# Patient Record
Sex: FEMALE | Race: WHITE | NOT HISPANIC OR LATINO | Employment: OTHER | URBAN - METROPOLITAN AREA
[De-identification: names, ages, dates, MRNs, and addresses within clinical notes are randomized per-mention and may not be internally consistent; named-entity substitution may affect disease eponyms.]

---

## 2017-03-30 ENCOUNTER — HISTORICAL (OUTPATIENT)
Dept: LAB | Facility: HOSPITAL | Age: 72
End: 2017-03-30

## 2017-04-03 ENCOUNTER — HISTORICAL (OUTPATIENT)
Dept: ADMINISTRATIVE | Facility: HOSPITAL | Age: 72
End: 2017-04-03

## 2017-04-19 ENCOUNTER — HISTORICAL (OUTPATIENT)
Dept: ANESTHESIOLOGY | Facility: HOSPITAL | Age: 72
End: 2017-04-19

## 2017-04-20 ENCOUNTER — HISTORICAL (OUTPATIENT)
Dept: LAB | Facility: HOSPITAL | Age: 72
End: 2017-04-20

## 2017-05-09 ENCOUNTER — HISTORICAL (OUTPATIENT)
Dept: RADIOLOGY | Facility: HOSPITAL | Age: 72
End: 2017-05-09

## 2017-05-09 LAB — POC CREATININE: 0.9 MG/DL (ref 0.6–1.3)

## 2017-10-10 ENCOUNTER — HISTORICAL (OUTPATIENT)
Dept: LAB | Facility: HOSPITAL | Age: 72
End: 2017-10-10

## 2017-10-16 ENCOUNTER — HISTORICAL (OUTPATIENT)
Dept: ADMINISTRATIVE | Facility: HOSPITAL | Age: 72
End: 2017-10-16

## 2017-12-20 ENCOUNTER — HISTORICAL (OUTPATIENT)
Dept: ADMINISTRATIVE | Facility: HOSPITAL | Age: 72
End: 2017-12-20

## 2018-03-01 ENCOUNTER — HISTORICAL (OUTPATIENT)
Dept: RADIOLOGY | Facility: HOSPITAL | Age: 73
End: 2018-03-01

## 2018-12-28 ENCOUNTER — HISTORICAL (OUTPATIENT)
Dept: ADMINISTRATIVE | Facility: HOSPITAL | Age: 73
End: 2018-12-28

## 2019-03-22 ENCOUNTER — HISTORICAL (OUTPATIENT)
Dept: INTENSIVE CARE | Facility: HOSPITAL | Age: 74
End: 2019-03-22

## 2019-04-16 ENCOUNTER — HISTORICAL (OUTPATIENT)
Dept: INTENSIVE CARE | Facility: HOSPITAL | Age: 74
End: 2019-04-16

## 2019-10-30 ENCOUNTER — HISTORICAL (OUTPATIENT)
Dept: CARDIOLOGY | Facility: HOSPITAL | Age: 74
End: 2019-10-30

## 2020-01-03 ENCOUNTER — HISTORICAL (OUTPATIENT)
Dept: ADMINISTRATIVE | Facility: HOSPITAL | Age: 75
End: 2020-01-03

## 2020-06-30 ENCOUNTER — TELEPHONE (OUTPATIENT)
Dept: SURGERY | Facility: CLINIC | Age: 75
End: 2020-06-30

## 2020-07-09 ENCOUNTER — OFFICE VISIT (OUTPATIENT)
Dept: SURGERY | Facility: CLINIC | Age: 75
End: 2020-07-09
Payer: MEDICARE

## 2020-07-09 VITALS
DIASTOLIC BLOOD PRESSURE: 65 MMHG | WEIGHT: 207 LBS | HEART RATE: 81 BPM | BODY MASS INDEX: 35.34 KG/M2 | HEIGHT: 64 IN | SYSTOLIC BLOOD PRESSURE: 119 MMHG

## 2020-07-09 DIAGNOSIS — K44.9 HERNIA, HIATAL: ICD-10-CM

## 2020-07-09 DIAGNOSIS — K31.84 GASTROPARESIS: ICD-10-CM

## 2020-07-09 PROCEDURE — 99999 PR PBB SHADOW E&M-EST. PATIENT-LVL III: CPT | Mod: PBBFAC,,, | Performed by: SURGERY

## 2020-07-09 PROCEDURE — 99999 PR PBB SHADOW E&M-EST. PATIENT-LVL III: ICD-10-PCS | Mod: PBBFAC,,, | Performed by: SURGERY

## 2020-07-09 PROCEDURE — 99204 OFFICE O/P NEW MOD 45 MIN: CPT | Mod: S$PBB,,, | Performed by: SURGERY

## 2020-07-09 PROCEDURE — 99213 OFFICE O/P EST LOW 20 MIN: CPT | Mod: PBBFAC | Performed by: SURGERY

## 2020-07-09 PROCEDURE — 99204 PR OFFICE/OUTPT VISIT, NEW, LEVL IV, 45-59 MIN: ICD-10-PCS | Mod: S$PBB,,, | Performed by: SURGERY

## 2020-07-09 RX ORDER — CARVEDILOL 25 MG/1
25 TABLET ORAL 2 TIMES DAILY WITH MEALS
Status: ON HOLD | COMMUNITY
End: 2023-08-29 | Stop reason: HOSPADM

## 2020-07-09 RX ORDER — OMEPRAZOLE 20 MG/1
CAPSULE, DELAYED RELEASE ORAL DAILY
Status: ON HOLD | COMMUNITY
End: 2023-08-29 | Stop reason: HOSPADM

## 2020-07-09 RX ORDER — FAMOTIDINE 20 MG/1
20 TABLET, FILM COATED ORAL NIGHTLY
Status: ON HOLD | COMMUNITY
Start: 2020-04-21 | End: 2023-08-29 | Stop reason: HOSPADM

## 2020-07-09 RX ORDER — VENLAFAXINE 75 MG/1
75 TABLET ORAL 2 TIMES DAILY
Status: ON HOLD | COMMUNITY
End: 2023-08-29 | Stop reason: HOSPADM

## 2020-07-09 RX ORDER — DICYCLOMINE HYDROCHLORIDE 20 MG/1
20 TABLET ORAL EVERY 6 HOURS
Status: ON HOLD | COMMUNITY
End: 2023-08-29 | Stop reason: HOSPADM

## 2020-07-09 RX ORDER — AMLODIPINE BESYLATE 2.5 MG/1
2.5 TABLET ORAL DAILY
Status: ON HOLD | COMMUNITY
End: 2023-08-29

## 2020-07-09 RX ORDER — METHOCARBAMOL 500 MG/1
500 TABLET, FILM COATED ORAL 4 TIMES DAILY
Status: ON HOLD | COMMUNITY
End: 2023-08-29 | Stop reason: HOSPADM

## 2020-07-09 RX ORDER — DIAZEPAM 5 MG/1
5 TABLET ORAL EVERY 6 HOURS PRN
Status: ON HOLD | COMMUNITY
End: 2023-08-29 | Stop reason: HOSPADM

## 2020-07-09 NOTE — MEDICAL/APP STUDENT
Patient ID: Katty Veronica is a 75 y.o. female.    Chief Complaint: Consult (hiatal hernia)      HPI:  Katty Veronica is a 75 y.o. female with a history of GERD, CAD, hypertension, heart disease and stroke who presented to clinic with epigastric pain and nausea for 6 months which is worse with eating. Patient denies having heartburn currently with no regurgitation. She denies vomiting though she does have dry heaves. Patient notes dysphagia to liquids and solids. Patient's last EGD noted a hiatal hernia.      Review of Systems   Constitutional: Negative for chills, fatigue and fever.   HENT: Negative.    Eyes: Negative.    Respiratory: Negative for chest tightness.    Cardiovascular: Negative.    Gastrointestinal: Positive for abdominal pain and nausea. Negative for vomiting.   Endocrine: Negative.    Genitourinary: Negative.    Musculoskeletal: Negative.    Allergic/Immunologic: Negative.    Neurological: Negative for dizziness, syncope and headaches.       Current Outpatient Medications   Medication Sig Dispense Refill    amLODIPine (NORVASC) 2.5 MG tablet Take 2.5 mg by mouth once daily.      carvediloL (COREG) 25 MG tablet Take 25 mg by mouth 2 (two) times daily with meals.      diazePAM (VALIUM) 5 MG tablet Take 5 mg by mouth every 6 (six) hours as needed for Anxiety.      dicyclomine (BENTYL) 20 mg tablet Take 20 mg by mouth every 6 (six) hours.      methocarbamoL (ROBAXIN) 500 MG Tab Take 500 mg by mouth 4 (four) times daily.      omeprazole (PRILOSEC) 20 MG capsule Take by mouth once daily.      venlafaxine (EFFEXOR) 75 MG tablet Take 75 mg by mouth 2 (two) times daily.      famotidine (PEPCID) 20 MG tablet Take 20 mg by mouth every evening.       No current facility-administered medications for this visit.        Review of patient's allergies indicates:   Allergen Reactions    Morphine     Norco [hydrocodone-acetaminophen]        Past Medical History:   Diagnosis Date    Anxiety     COPD  (chronic obstructive pulmonary disease)     Coronary artery disease     s/p 2 stents    Depression     GERD (gastroesophageal reflux disease)     Glaucoma     Hypertension     Obstructive sleep apnea     on cpap    Stroke     Urinary, incontinence, stress female        Past Surgical History:   Procedure Laterality Date    APPENDECTOMY      BACK SURGERY      CHOLECYSTECTOMY      EXPLORATION OF COMMON BILE DUCT      HERNIA REPAIR      incisional hernia times 2, central abdomen    HYSTERECTOMY      OVARIAN CYST REMOVAL      TONSILLECTOMY         Family History   Problem Relation Age of Onset    Heart disease Mother     Cancer Father        Social History     Socioeconomic History    Marital status:      Spouse name: Not on file    Number of children: Not on file    Years of education: Not on file    Highest education level: Not on file   Occupational History    Not on file   Social Needs    Financial resource strain: Not on file    Food insecurity     Worry: Not on file     Inability: Not on file    Transportation needs     Medical: Not on file     Non-medical: Not on file   Tobacco Use    Smoking status: Never Smoker   Substance and Sexual Activity    Alcohol use: Not on file    Drug use: Not on file    Sexual activity: Not on file   Lifestyle    Physical activity     Days per week: Not on file     Minutes per session: Not on file    Stress: Not on file   Relationships    Social connections     Talks on phone: Not on file     Gets together: Not on file     Attends [employer] service: Not on file     Active member of club or organization: Not on file     Attends meetings of clubs or organizations: Not on file     Relationship status: Not on file   Other Topics Concern    Not on file   Social History Narrative    Not on file       Vitals:    07/09/20 1428   BP: 119/65   Pulse: 81       Physical Exam  HENT:      Head: Atraumatic.   Cardiovascular:      Rate and Rhythm: Normal rate  and regular rhythm.   Pulmonary:      Effort: Pulmonary effort is normal.      Breath sounds: Normal breath sounds.   Abdominal:      General: Abdomen is flat.      Tenderness: There is abdominal tenderness. There is guarding.   Neurological:      General: No focal deficit present.      Mental Status: She is alert and oriented to person, place, and time.         Assessment & Plan:  Katty Veronica is a 75 y.o. female with a history of GERD, CAD, hypertension, heart disease and stroke who presented to clinic with epigastric pain and nausea for 6 months which is worse with eating. Will obtain mesenteric ultrasound, GES, UGI and EGD with dilation. Patient will need to be cleared with cardiologist prior to any procedure.     [facility] [place] - MS III

## 2020-07-09 NOTE — PROGRESS NOTES
I have seen the patient, reviewed the Resident's history and physical, assessment and plan. I have personally interviewed and examined the patient at bedside and: agree with the findings.     Moderate hiatal hernia and gastroparesis.  She denies heartburn, denies regurgitation, has dysphagia to liquids and solids (is on a soft diet) improved with dilation (last one 2012 with last endoscopy), has severe burping, has occasional nausea, denies vomiting but has dry heaves, has chronic epigastric pain worsened with eating, denies odynophagia, has coughing, denies asthma, denies hoarseness and denies sore throat.  Also she denies globus and denies water brash.  She takes pepcid daily and ppi bid.  She has no recent tests but last egd shows medium hiatal hernia and last ges was prolonged.  Will obtain mesenteric u/s, ges, ugi and egd with dilation.  She has significant sob and heart disease so will need clearance if we do any procedures.

## 2020-07-09 NOTE — LETTER
[facility] - General Surgery  [address]  [facility] [address]  Phone: [phone] July 9, 2020      Gunnar Segura MD  [address]  [address]  [facility] [address]    Patient: Katty Veronica   MR Number: [id number]   YOB: 1945   Date of Visit: 7/9/2020     Dear Dr. Segura:    Thank you for referring Katty Veronica to me for evaluation. Attached you will find relevant portions of my assessment and plan of care.    Ms. Veronica presents with moderate hiatal hernia and gastroparesis.  She denies heartburn, denies regurgitation, has dysphagia to liquids and solids (is on a soft diet) improved with dilation (last one 2012 with last endoscopy), has severe burping, has occasional nausea, denies vomiting but has dry heaves, has chronic epigastric pain worsened with eating, denies odynophagia, has coughing, denies asthma, denies hoarseness and denies sore throat.  Also, she denies globus and denies water brash.  She takes pepcid daily and PPI twice a day.  She has no recent tests but last EGD shows medium hiatal hernia and last GES was prolonged.  We will obtain mesenteric U/S, GES, UGI and EGD with dilation.  She has significant SOB and heart disease so we will need clearance if we do any procedures.    If you have questions, please do not hesitate to call me. I look forward to following Katty Veronica along with you.    Sincerely,    Jimmy Michele M.D.  Professor, [employer]  Section Head, General Surgery  [facility]    WSR/afw    CC  Mert Jara MD

## 2020-07-10 ENCOUNTER — HISTORICAL (OUTPATIENT)
Dept: ADMINISTRATIVE | Facility: HOSPITAL | Age: 75
End: 2020-07-10

## 2020-08-31 ENCOUNTER — HISTORICAL (OUTPATIENT)
Dept: ANESTHESIOLOGY | Facility: HOSPITAL | Age: 75
End: 2020-08-31

## 2020-09-04 ENCOUNTER — TELEPHONE (OUTPATIENT)
Dept: SURGERY | Facility: CLINIC | Age: 75
End: 2020-09-04

## 2020-09-04 NOTE — TELEPHONE ENCOUNTER
Pt states that most test have been set up by Dr. Segura, but she still hasn't had the EGD. Progress note sent to Dr. Segura to help assist with getting pt scheduled for an EGD w/ dilation.

## 2020-09-04 NOTE — TELEPHONE ENCOUNTER
----- Message from Abigail Casiano sent at 9/4/2020 10:32 AM CDT -----  ATTN Angela Spence       please call patient she needs to know what tests are ordered or going to be ordered and has questions regarding her surgery being planned     Please contact @# [phone]

## 2020-09-10 ENCOUNTER — HISTORICAL (OUTPATIENT)
Dept: RADIOLOGY | Facility: HOSPITAL | Age: 75
End: 2020-09-10

## 2020-09-29 ENCOUNTER — HISTORICAL (OUTPATIENT)
Dept: ENDOSCOPY | Facility: HOSPITAL | Age: 75
End: 2020-09-29

## 2020-10-16 ENCOUNTER — HISTORICAL (OUTPATIENT)
Dept: ADMINISTRATIVE | Facility: HOSPITAL | Age: 75
End: 2020-10-16

## 2020-10-18 LAB — FINAL CULTURE: NORMAL

## 2020-10-20 ENCOUNTER — TELEPHONE (OUTPATIENT)
Dept: SURGERY | Facility: CLINIC | Age: 75
End: 2020-10-20

## 2020-10-20 NOTE — TELEPHONE ENCOUNTER
----- Message from Toyin Roque sent at 10/20/2020 12:52 PM CDT -----  Pt is calling for deepthi about her surgery and would like for deepthi to give her a call back

## 2020-11-09 ENCOUNTER — DOCUMENTATION ONLY (OUTPATIENT)
Dept: SURGERY | Facility: CLINIC | Age: 75
End: 2020-11-09

## 2020-11-09 NOTE — PROGRESS NOTES
U/s 2020 fatty liver  EGD with dil 2020 normal esophagus, mild gastritis, med HH  Ugi 2020 Moderate hh and narrowing of les  Pcp has cleared    Still awaiting mesenteric u/s and ges.  May need motility study.  Also if not obtain will get pfts and stress test.

## 2020-11-11 ENCOUNTER — TELEPHONE (OUTPATIENT)
Dept: SURGERY | Facility: CLINIC | Age: 75
End: 2020-11-11

## 2020-11-12 ENCOUNTER — HISTORICAL (OUTPATIENT)
Dept: ADMINISTRATIVE | Facility: HOSPITAL | Age: 75
End: 2020-11-12

## 2021-03-01 ENCOUNTER — HISTORICAL (OUTPATIENT)
Dept: ADMINISTRATIVE | Facility: HOSPITAL | Age: 76
End: 2021-03-01

## 2021-03-15 ENCOUNTER — HISTORICAL (OUTPATIENT)
Dept: ADMINISTRATIVE | Facility: HOSPITAL | Age: 76
End: 2021-03-15

## 2021-03-30 ENCOUNTER — TELEPHONE (OUTPATIENT)
Dept: SURGERY | Facility: CLINIC | Age: 76
End: 2021-03-30

## 2021-04-06 ENCOUNTER — TELEPHONE (OUTPATIENT)
Dept: SURGERY | Facility: CLINIC | Age: 76
End: 2021-04-06

## 2021-04-14 ENCOUNTER — PATIENT MESSAGE (OUTPATIENT)
Dept: SURGERY | Facility: CLINIC | Age: 76
End: 2021-04-14

## 2021-04-15 ENCOUNTER — OFFICE VISIT (OUTPATIENT)
Dept: SURGERY | Facility: CLINIC | Age: 76
End: 2021-04-15
Payer: MEDICARE

## 2021-04-15 DIAGNOSIS — R10.9 ABDOMINAL PAIN, UNSPECIFIED ABDOMINAL LOCATION: ICD-10-CM

## 2021-04-15 DIAGNOSIS — K31.84 GASTROPARESIS: Primary | ICD-10-CM

## 2021-04-15 DIAGNOSIS — R06.02 SHORTNESS OF BREATH: ICD-10-CM

## 2021-04-15 DIAGNOSIS — K44.9 HERNIA, HIATAL: ICD-10-CM

## 2021-04-15 PROCEDURE — 99442 PR PHYSICIAN TELEPHONE EVALUATION 11-20 MIN: CPT | Mod: ,,, | Performed by: SURGERY

## 2021-04-15 PROCEDURE — 99442 PR PHYSICIAN TELEPHONE EVALUATION 11-20 MIN: ICD-10-PCS | Mod: ,,, | Performed by: SURGERY

## 2021-04-27 ENCOUNTER — HISTORICAL (OUTPATIENT)
Dept: RADIOLOGY | Facility: HOSPITAL | Age: 76
End: 2021-04-27

## 2021-05-10 ENCOUNTER — HISTORICAL (OUTPATIENT)
Dept: CARDIOLOGY | Facility: HOSPITAL | Age: 76
End: 2021-05-10

## 2021-05-12 ENCOUNTER — PATIENT MESSAGE (OUTPATIENT)
Dept: RESEARCH | Facility: HOSPITAL | Age: 76
End: 2021-05-12

## 2021-05-14 ENCOUNTER — HISTORICAL (OUTPATIENT)
Dept: ADMINISTRATIVE | Facility: HOSPITAL | Age: 76
End: 2021-05-14

## 2021-05-17 ENCOUNTER — HISTORICAL (OUTPATIENT)
Dept: ADMINISTRATIVE | Facility: HOSPITAL | Age: 76
End: 2021-05-17

## 2021-07-09 ENCOUNTER — TELEPHONE (OUTPATIENT)
Dept: SURGERY | Facility: CLINIC | Age: 76
End: 2021-07-09

## 2021-07-10 ENCOUNTER — PATIENT MESSAGE (OUTPATIENT)
Dept: SURGERY | Facility: CLINIC | Age: 76
End: 2021-07-10

## 2021-07-15 ENCOUNTER — TELEPHONE (OUTPATIENT)
Dept: SURGERY | Facility: CLINIC | Age: 76
End: 2021-07-15

## 2021-09-23 ENCOUNTER — HISTORICAL (OUTPATIENT)
Dept: ADMINISTRATIVE | Facility: HOSPITAL | Age: 76
End: 2021-09-23

## 2021-11-01 ENCOUNTER — HISTORICAL (OUTPATIENT)
Dept: ADMINISTRATIVE | Facility: HOSPITAL | Age: 76
End: 2021-11-01

## 2021-11-01 LAB
ALBUMIN SERPL-MCNC: 3.6 GM/DL (ref 3.4–4.8)
ALBUMIN/GLOB SERPL: 1.1 RATIO (ref 1.1–2)
ALP SERPL-CCNC: 76 UNIT/L (ref 40–150)
ALT SERPL-CCNC: 29 UNIT/L (ref 0–55)
AST SERPL-CCNC: 22 UNIT/L (ref 5–34)
BILIRUB SERPL-MCNC: 0.5 MG/DL
BILIRUBIN DIRECT+TOT PNL SERPL-MCNC: 0.2 MG/DL (ref 0–0.5)
BILIRUBIN DIRECT+TOT PNL SERPL-MCNC: 0.3 MG/DL (ref 0–0.8)
BUN SERPL-MCNC: 17.9 MG/DL (ref 9.8–20.1)
CALCIUM SERPL-MCNC: 9.3 MG/DL (ref 8.7–10.5)
CHLORIDE SERPL-SCNC: 103 MMOL/L (ref 98–107)
CO2 SERPL-SCNC: 25 MMOL/L (ref 23–31)
CREAT SERPL-MCNC: 0.88 MG/DL (ref 0.55–1.02)
ERYTHROCYTE [DISTWIDTH] IN BLOOD BY AUTOMATED COUNT: 13.8 % (ref 11.5–17)
GLOBULIN SER-MCNC: 3.3 GM/DL (ref 2.4–3.5)
GLUCOSE SERPL-MCNC: 103 MG/DL (ref 82–115)
HCT VFR BLD AUTO: 48.6 % (ref 37–47)
HGB BLD-MCNC: 15.6 GM/DL (ref 12–16)
MCH RBC QN AUTO: 28.8 PG (ref 27–31)
MCHC RBC AUTO-ENTMCNC: 32.1 GM/DL (ref 33–36)
MCV RBC AUTO: 89.7 FL (ref 80–94)
PLATELET # BLD AUTO: 245 X10(3)/MCL (ref 130–400)
PMV BLD AUTO: 11.8 FL (ref 9.4–12.4)
POTASSIUM SERPL-SCNC: 4.7 MMOL/L (ref 3.5–5.1)
PROT SERPL-MCNC: 6.9 GM/DL (ref 5.8–7.6)
RBC # BLD AUTO: 5.42 X10(6)/MCL (ref 4.2–5.4)
SODIUM SERPL-SCNC: 140 MMOL/L (ref 136–145)
WBC # SPEC AUTO: 8.9 X10(3)/MCL (ref 4.5–11.5)

## 2021-11-08 ENCOUNTER — HISTORICAL (OUTPATIENT)
Dept: ADMINISTRATIVE | Facility: HOSPITAL | Age: 76
End: 2021-11-08

## 2021-11-08 LAB
CHOLEST SERPL-MCNC: 194 MG/DL
CHOLEST/HDLC SERPL: 4 {RATIO} (ref 0–5)
FERRITIN SERPL-MCNC: 84 NG/ML (ref 4.63–204)
HDLC SERPL-MCNC: 53 MG/DL (ref 35–60)
IRON SATN MFR SERPL: 36 % (ref 20–50)
IRON SERPL-MCNC: 100 UG/DL (ref 50–170)
LDLC SERPL CALC-MCNC: 109 MG/DL (ref 50–140)
SARS-COV-2 AG RESP QL IA.RAPID: NEGATIVE
TIBC SERPL-MCNC: 181 UG/DL (ref 70–310)
TIBC SERPL-MCNC: 281 UG/DL (ref 250–450)
TRANSFERRIN SERPL-MCNC: 247 MG/DL (ref 173–360)
TRIGL SERPL-MCNC: 160 MG/DL (ref 37–140)
VLDLC SERPL CALC-MCNC: 32 MG/DL

## 2021-11-09 ENCOUNTER — HOSPITAL ENCOUNTER (OUTPATIENT)
Dept: MEDSURG UNIT | Facility: HOSPITAL | Age: 76
End: 2021-11-10
Attending: SURGERY | Admitting: SURGERY

## 2021-11-10 LAB
ABS NEUT (OLG): 6.89 X10(3)/MCL (ref 2.1–9.2)
ALBUMIN SERPL-MCNC: 3 GM/DL (ref 3.4–4.8)
ALBUMIN/GLOB SERPL: 0.9 RATIO (ref 1.1–2)
ALP SERPL-CCNC: 70 UNIT/L (ref 40–150)
ALT SERPL-CCNC: 26 UNIT/L (ref 0–55)
AST SERPL-CCNC: 28 UNIT/L (ref 5–34)
BASOPHILS # BLD AUTO: 0 X10(3)/MCL (ref 0–0.2)
BASOPHILS NFR BLD AUTO: 0 %
BILIRUB SERPL-MCNC: 0.5 MG/DL
BILIRUBIN DIRECT+TOT PNL SERPL-MCNC: 0.2 MG/DL (ref 0–0.5)
BILIRUBIN DIRECT+TOT PNL SERPL-MCNC: 0.3 MG/DL (ref 0–0.8)
BUN SERPL-MCNC: 10.5 MG/DL (ref 9.8–20.1)
CALCIUM SERPL-MCNC: 9 MG/DL (ref 8.7–10.5)
CHLORIDE SERPL-SCNC: 98 MMOL/L (ref 98–107)
CO2 SERPL-SCNC: 25 MMOL/L (ref 23–31)
CREAT SERPL-MCNC: 0.73 MG/DL (ref 0.55–1.02)
ERYTHROCYTE [DISTWIDTH] IN BLOOD BY AUTOMATED COUNT: 13.8 % (ref 11.5–17)
EST CREAT CLEARANCE SER (OHS): 55.21 ML/MIN
GLOBULIN SER-MCNC: 3.5 GM/DL (ref 2.4–3.5)
GLUCOSE SERPL-MCNC: 153 MG/DL (ref 82–115)
HCT VFR BLD AUTO: 43.6 % (ref 37–47)
HGB BLD-MCNC: 14.4 GM/DL (ref 12–16)
LYMPHOCYTES # BLD AUTO: 1.9 X10(3)/MCL (ref 0.6–4.6)
LYMPHOCYTES NFR BLD AUTO: 21 %
MCH RBC QN AUTO: 28.5 PG (ref 27–31)
MCHC RBC AUTO-ENTMCNC: 33 GM/DL (ref 33–36)
MCV RBC AUTO: 86.3 FL (ref 80–94)
MONOCYTES # BLD AUTO: 0.4 X10(3)/MCL (ref 0.1–1.3)
MONOCYTES NFR BLD AUTO: 5 %
NEUTROPHILS # BLD AUTO: 6.89 X10(3)/MCL (ref 2.1–9.2)
NEUTROPHILS NFR BLD AUTO: 74 %
PLATELET # BLD AUTO: 183 X10(3)/MCL (ref 130–400)
PMV BLD AUTO: 11.6 FL (ref 9.4–12.4)
POTASSIUM SERPL-SCNC: 4.3 MMOL/L (ref 3.5–5.1)
PROT SERPL-MCNC: 6.5 GM/DL (ref 5.8–7.6)
RBC # BLD AUTO: 5.05 X10(6)/MCL (ref 4.2–5.4)
SODIUM SERPL-SCNC: 133 MMOL/L (ref 136–145)
WBC # SPEC AUTO: 9.3 X10(3)/MCL (ref 4.5–11.5)

## 2022-02-21 ENCOUNTER — HISTORICAL (OUTPATIENT)
Dept: ADMINISTRATIVE | Facility: HOSPITAL | Age: 77
End: 2022-02-21

## 2022-03-30 ENCOUNTER — HISTORICAL (OUTPATIENT)
Dept: ADMINISTRATIVE | Facility: HOSPITAL | Age: 77
End: 2022-03-30

## 2022-04-09 ENCOUNTER — HISTORICAL (OUTPATIENT)
Dept: ADMINISTRATIVE | Facility: HOSPITAL | Age: 77
End: 2022-04-09

## 2022-04-25 VITALS
SYSTOLIC BLOOD PRESSURE: 124 MMHG | BODY MASS INDEX: 35.12 KG/M2 | DIASTOLIC BLOOD PRESSURE: 82 MMHG | OXYGEN SATURATION: 97 % | HEIGHT: 64 IN | WEIGHT: 205.69 LBS

## 2022-10-03 ENCOUNTER — HOSPITAL ENCOUNTER (EMERGENCY)
Facility: HOSPITAL | Age: 77
Discharge: HOME | End: 2022-10-03
Attending: EMERGENCY MEDICINE
Payer: MEDICARE

## 2022-10-03 VITALS
OXYGEN SATURATION: 98 % | RESPIRATION RATE: 17 BRPM | HEART RATE: 53 BPM | TEMPERATURE: 98 F | SYSTOLIC BLOOD PRESSURE: 141 MMHG | DIASTOLIC BLOOD PRESSURE: 70 MMHG
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
## 2022-10-03 DIAGNOSIS — S16.1XXA STRAIN OF NECK MUSCLE, INITIAL ENCOUNTER: ICD-10-CM

## 2022-10-03 PROCEDURE — 99284 EMERGENCY DEPT VISIT MOD MDM: CPT | Mod: 25

## 2022-10-03 PROCEDURE — 25000003 PHARM REV CODE 250: Performed by: NURSE PRACTITIONER

## 2022-10-03 RX ORDER — ONDANSETRON 4 MG/1
4 TABLET, ORALLY DISINTEGRATING ORAL
Status: COMPLETED | OUTPATIENT
Start: 2022-10-03 | End: 2022-10-03

## 2022-10-03 RX ORDER — ACETAMINOPHEN 500 MG
1000 TABLET ORAL
Status: COMPLETED | OUTPATIENT
Start: 2022-10-03 | End: 2022-10-03

## 2022-10-03 RX ADMIN — ONDANSETRON 4 MG: 4 TABLET, ORALLY DISINTEGRATING ORAL at 11:10

## 2022-10-03 RX ADMIN — ACETAMINOPHEN 1000 MG: 500 TABLET, FILM COATED ORAL at 11:10
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
## 2022-10-03 NOTE — DISCHARGE INSTRUCTIONS
Thanks for letting us take care of you today!  It is our goal to give you courteous care and to keep you comfortable and informed, if you have any questions before you leave I will be happy to try and answer them.    Here is some advice after your visit:      Your visit in the emergency department is NOT definitive care - please follow-up with your primary care doctor and/or specialist within 1-2 days.  Please return if you have any worsening in your condition or if you have any other concerns.    If you had radiology exams like an XRAY or CT in the emergency Department the interpreation on them may be preliminary - there may be less time sensitive findings on the reports please obtain these reports within 24 hours from the hospital or by using your out on your mobile phone to access records.  Bring these to your primary care doctor and/or specialist for further review of incidental findings.    Please review any LAB WORK from your visit today with your primary care physician.    If you were prescribed OPIATE PAIN MEDICATION - please understand of these medications can be addictive, you may fill less of the prescription was written for, you do not have to take the full prescription.  You may discard what you do not use.  Please seek help if you feel you are having problems with addiction.  Do not drive or operate heavy machinery if you are taking sedating medications.  Do not mix these medications with alcohol.
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
## 2023-01-10 ENCOUNTER — TELEPHONE (OUTPATIENT)
Dept: NEUROLOGY | Facility: CLINIC | Age: 78
End: 2023-01-10
Payer: MEDICARE

## 2023-01-10 NOTE — TELEPHONE ENCOUNTER
Patient is requesting appt with Dr. Pratt. Last OV on 08/06/2020 and was only seen for SABINA.     Patient is requesting appt to yennifer refills on Diazepam. States Dr. Moreira is unable to continue refills and this medication is needed to help her head and to keep her from falling since having balance issues after stroke.     Asking if Dr. Pratt can see her for this and take over medication or if she needs to follow up with another neurologist.     Phone: [phone]

## 2023-01-11 NOTE — TELEPHONE ENCOUNTER
As of 6/2022, patient was not utilizing pap therapy per Dr Moreira's note. Please see below message

## 2023-01-20 NOTE — TELEPHONE ENCOUNTER
Patient states she went to the hospital for a fall and they advised she follow up with neurology because they feel balance issues is due to stroke she had in the past.     Patient was seen by us for SABINA in 2020.     Will patient need to follow up with Dr. Leach? Or schedule follow up with Dr. Pratt for balance issues after stroke?     Need referral?

## 2023-03-06 ENCOUNTER — OFFICE VISIT (OUTPATIENT)
Dept: NEUROLOGY | Facility: CLINIC | Age: 78
End: 2023-03-06
Payer: MEDICARE

## 2023-03-06 VITALS
WEIGHT: 193 LBS | HEIGHT: 64 IN | DIASTOLIC BLOOD PRESSURE: 72 MMHG | BODY MASS INDEX: 32.95 KG/M2 | SYSTOLIC BLOOD PRESSURE: 104 MMHG

## 2023-03-06 DIAGNOSIS — Z78.9 INTOLERANCE OF CONTINUOUS POSITIVE AIRWAY PRESSURE (CPAP) VENTILATION: ICD-10-CM

## 2023-03-06 DIAGNOSIS — R29.6 MULTIPLE FALLS: Primary | ICD-10-CM

## 2023-03-06 DIAGNOSIS — G47.33 OBSTRUCTIVE SLEEP APNEA SYNDROME: ICD-10-CM

## 2023-03-06 PROCEDURE — 99214 OFFICE O/P EST MOD 30 MIN: CPT | Mod: S$PBB,,, | Performed by: SPECIALIST

## 2023-03-06 PROCEDURE — 99999 PR PBB SHADOW E&M-EST. PATIENT-LVL III: ICD-10-PCS | Mod: PBBFAC,,, | Performed by: SPECIALIST

## 2023-03-06 PROCEDURE — 99213 OFFICE O/P EST LOW 20 MIN: CPT | Mod: PBBFAC | Performed by: SPECIALIST

## 2023-03-06 PROCEDURE — 99999 PR PBB SHADOW E&M-EST. PATIENT-LVL III: CPT | Mod: PBBFAC,,, | Performed by: SPECIALIST

## 2023-03-06 PROCEDURE — 99214 PR OFFICE/OUTPT VISIT, EST, LEVL IV, 30-39 MIN: ICD-10-PCS | Mod: S$PBB,,, | Performed by: SPECIALIST

## 2023-03-06 NOTE — PROGRESS NOTES
"Subjective:         Patient ID: Katty Veronica is a 77 y.o. female.    Chief Complaint: Hospital F/U Falls Hx of Stroke     HPI:           (Pt states she has occs blurry vision, everything shakes in her head with left sided body weakness. States her balance is better wo falls since having her walker . Pt has not worn her CPAP for abt 2 yrs, states it was making her get infections, cough and PND. Sleeps 2-4 hrs states she hardly sleeps)      notes may also be on facesheet for HPI, ROS, and other sections     Review of Systems            Social History     Socioeconomic History    Marital status:    Tobacco Use    Smoking status: Never   Substance and Sexual Activity    Alcohol use: Not Currently   Social History Narrative    ** Merged History Encounter **              Current Outpatient Medications:     amLODIPine (NORVASC) 2.5 MG tablet, Take 2.5 mg by mouth once daily., Disp: , Rfl:     calcium-vitamin D3-magnesium 200 mg calcium- 1.25 mcg Cap, Take by mouth., Disp: , Rfl:     carvediloL (COREG) 25 MG tablet, Take 25 mg by mouth 2 (two) times daily with meals., Disp: , Rfl:     omeprazole (PRILOSEC) 20 MG capsule, Take by mouth once daily., Disp: , Rfl:     venlafaxine (EFFEXOR) 75 MG tablet, Take 75 mg by mouth 2 (two) times daily., Disp: , Rfl:     diazePAM (VALIUM) 5 MG tablet, Take 5 mg by mouth every 6 (six) hours as needed for Anxiety., Disp: , Rfl:     dicyclomine (BENTYL) 20 mg tablet, Take 20 mg by mouth every 6 (six) hours., Disp: , Rfl:     famotidine (PEPCID) 20 MG tablet, Take 20 mg by mouth every evening., Disp: , Rfl:     methocarbamoL (ROBAXIN) 500 MG Tab, Take 500 mg by mouth 4 (four) times daily., Disp: , Rfl:      Objective:      Exam  /72   Ht 5' 4" (1.626 m)   Wt 87.5 kg (193 lb)   BMI 33.13 kg/m²     General:   if accompanied, by:_ nobody   heart: RRR  pharynx:    Neurological  Speech: ok   vis fields:  EOMs:  funduscopic:  Motor:   coord:   Gait: walker "     Neuroimaging:  Images and imaging reports reviewed.  My comments:     Labs:  Reviewed     meds:      __._ multiple issues/ diagnoses or problems [if not enumerated in note then discussed in encounter but not documented]    complexity of data      _.mod   __. Imag reports reviewed:  __ hx obtained from family or accompaniment:   __other studies reviewed   __studies ordered __   __studies considered or discussed but not ordered __  __DDx discussed __    Risks     _. mod   _._ (possible or definite) neurodegenerative condition and inherent progression  __ (poss or def) autoimmune condition with possibility of flares or unexpected attack  __ (poss or def) seiz d.o. with possib of recurr seiz's   __ cerebrovasc ds with risk of recurrence of stroke  __ CNS meds (and/or) potentially high risk non CNS meds which may cause medical or behavioral side effects  _._ fall risk  _._ driving discussed   __ diagnosis unclear or DDx wide making risk uncertain to high  __other:    MDM/Medical Decision Making       _.moderate  despite no orders           Assessment/Plan:       Problem List Items Addressed This Visit          Pulmonary    Intolerance of continuous positive airway pressure (CPAP) ventilation       Other    Multiple falls - Primary    Obstructive sleep apnea syndrome       Other comments/ follow up:        Apologized I could not give her valium   Apologized I am not able to really help her     Blaze Pratt MD [facility]

## 2023-04-03 DIAGNOSIS — R10.11 ABDOMINAL PAIN, RIGHT UPPER QUADRANT: ICD-10-CM

## 2023-04-03 DIAGNOSIS — R10.13 ABDOMINAL PAIN, EPIGASTRIC: Primary | ICD-10-CM

## 2023-04-03 DIAGNOSIS — K21.9 ESOPHAGEAL REFLUX: ICD-10-CM

## 2023-06-13 ENCOUNTER — HOSPITAL ENCOUNTER (OUTPATIENT)
Dept: RADIOLOGY | Facility: HOSPITAL | Age: 78
Discharge: HOME | End: 2023-06-13
Attending: INTERNAL MEDICINE
Payer: MEDICARE

## 2023-06-13 DIAGNOSIS — R10.11 ABDOMINAL PAIN, RIGHT UPPER QUADRANT: ICD-10-CM

## 2023-06-13 DIAGNOSIS — K21.9 ESOPHAGEAL REFLUX: ICD-10-CM

## 2023-06-13 DIAGNOSIS — R10.13 ABDOMINAL PAIN, EPIGASTRIC: ICD-10-CM

## 2023-06-13 PROCEDURE — 78264 GASTRIC EMPTYING IMG STUDY: CPT | Mod: TC

## 2023-06-21 ENCOUNTER — HOSPITAL ENCOUNTER (OUTPATIENT)
Dept: RADIOLOGY | Facility: HOSPITAL | Age: 78
Discharge: HOME | End: 2023-06-21
Attending: INTERNAL MEDICINE
Payer: MEDICARE

## 2023-06-21 DIAGNOSIS — R10.11 ABDOMINAL PAIN, RIGHT UPPER QUADRANT: ICD-10-CM

## 2023-06-21 DIAGNOSIS — K21.9 ESOPHAGEAL REFLUX: ICD-10-CM

## 2023-06-21 DIAGNOSIS — R10.13 ABDOMINAL PAIN, EPIGASTRIC: ICD-10-CM

## 2023-06-21 PROCEDURE — 74240 X-RAY XM UPR GI TRC 1CNTRST: CPT | Mod: TC

## 2023-06-21 PROCEDURE — A9698 NON-RAD CONTRAST MATERIALNOC: HCPCS | Performed by: INTERNAL MEDICINE

## 2023-06-21 PROCEDURE — 74248 X-RAY SM INT F-THRU STD: CPT | Mod: TC

## 2023-06-21 PROCEDURE — 25500020 PHARM REV CODE 255: Performed by: INTERNAL MEDICINE

## 2023-06-21 RX ADMIN — BARIUM SULFATE 100 ML: 0.6 SUSPENSION ORAL at 12:06

## 2023-06-21 RX ADMIN — BARIUM SULFATE 150 ML: 980 POWDER, FOR SUSPENSION ORAL at 12:06

## 2023-07-08 ENCOUNTER — HOSPITAL ENCOUNTER (EMERGENCY)
Facility: HOSPITAL | Age: 78
Discharge: HOME | End: 2023-07-08
Attending: EMERGENCY MEDICINE
Payer: MEDICARE

## 2023-07-08 VITALS
DIASTOLIC BLOOD PRESSURE: 76 MMHG | TEMPERATURE: 99 F | OXYGEN SATURATION: 96 % | HEART RATE: 79 BPM | BODY MASS INDEX: 34.33 KG/M2 | WEIGHT: 200 LBS | RESPIRATION RATE: 20 BRPM | SYSTOLIC BLOOD PRESSURE: 115 MMHG

## 2023-07-08 DIAGNOSIS — M79.89 LEG SWELLING: ICD-10-CM

## 2023-07-08 LAB
ALBUMIN SERPL-MCNC: 3.4 G/DL (ref 3.4–4.8)
ALBUMIN/GLOB SERPL: 1 RATIO (ref 1.1–2)
ALP SERPL-CCNC: 103 UNIT/L (ref 40–150)
ALT SERPL-CCNC: 9 UNIT/L (ref 0–55)
AST SERPL-CCNC: 13 UNIT/L (ref 5–34)
BASOPHILS # BLD AUTO: 0.04 X10(3)/MCL
BASOPHILS NFR BLD AUTO: 0.5 %
BILIRUBIN DIRECT+TOT PNL SERPL-MCNC: 0.3 MG/DL
BNP BLD-MCNC: 171.2 PG/ML
BUN SERPL-MCNC: 12.4 MG/DL (ref 9.8–20.1)
CALCIUM SERPL-MCNC: 9.2 MG/DL (ref 8.4–10.2)
CHLORIDE SERPL-SCNC: 107 MMOL/L (ref 98–107)
CO2 SERPL-SCNC: 24 MMOL/L (ref 23–31)
CREAT SERPL-MCNC: 0.87 MG/DL (ref 0.55–1.02)
EOSINOPHIL # BLD AUTO: 0.15 X10(3)/MCL (ref 0–0.9)
EOSINOPHIL NFR BLD AUTO: 1.8 %
ERYTHROCYTE [DISTWIDTH] IN BLOOD BY AUTOMATED COUNT: 13.8 % (ref 11.5–17)
GFR SERPLBLD CREATININE-BSD FMLA CKD-EPI: >60 MLS/MIN/1.73/M2
GLOBULIN SER-MCNC: 3.4 GM/DL (ref 2.4–3.5)
GLUCOSE SERPL-MCNC: 118 MG/DL (ref 82–115)
HCT VFR BLD AUTO: 40.1 % (ref 37–47)
HGB BLD-MCNC: 13 G/DL (ref 12–16)
IMM GRANULOCYTES # BLD AUTO: 0.02 X10(3)/MCL (ref 0–0.04)
IMM GRANULOCYTES NFR BLD AUTO: 0.2 %
LYMPHOCYTES # BLD AUTO: 2.77 X10(3)/MCL (ref 0.6–4.6)
LYMPHOCYTES NFR BLD AUTO: 33.7 %
MCH RBC QN AUTO: 27.8 PG (ref 27–31)
MCHC RBC AUTO-ENTMCNC: 32.4 G/DL (ref 33–36)
MCV RBC AUTO: 85.7 FL (ref 80–94)
MONOCYTES # BLD AUTO: 0.65 X10(3)/MCL (ref 0.1–1.3)
MONOCYTES NFR BLD AUTO: 7.9 %
NEUTROPHILS # BLD AUTO: 4.6 X10(3)/MCL (ref 2.1–9.2)
NEUTROPHILS NFR BLD AUTO: 55.9 %
NRBC BLD AUTO-RTO: 0 %
PLATELET # BLD AUTO: 259 X10(3)/MCL (ref 130–400)
PMV BLD AUTO: 10.7 FL (ref 7.4–10.4)
POTASSIUM SERPL-SCNC: 3.7 MMOL/L (ref 3.5–5.1)
PROT SERPL-MCNC: 6.8 GM/DL (ref 5.8–7.6)
RBC # BLD AUTO: 4.68 X10(6)/MCL (ref 4.2–5.4)
SODIUM SERPL-SCNC: 139 MMOL/L (ref 136–145)
TROPONIN I SERPL-MCNC: <0.01 NG/ML (ref 0–0.04)
WBC # SPEC AUTO: 8.23 X10(3)/MCL (ref 4.5–11.5)

## 2023-07-08 PROCEDURE — 99285 EMERGENCY DEPT VISIT HI MDM: CPT | Mod: 25

## 2023-07-08 PROCEDURE — 93010 ELECTROCARDIOGRAM REPORT: CPT | Mod: ,,, | Performed by: INTERNAL MEDICINE

## 2023-07-08 PROCEDURE — 85025 COMPLETE CBC W/AUTO DIFF WBC: CPT | Performed by: PHYSICIAN ASSISTANT

## 2023-07-08 PROCEDURE — 93010 EKG 12-LEAD: ICD-10-PCS | Mod: ,,, | Performed by: INTERNAL MEDICINE

## 2023-07-08 PROCEDURE — 93005 ELECTROCARDIOGRAM TRACING: CPT

## 2023-07-08 PROCEDURE — 80053 COMPREHEN METABOLIC PANEL: CPT | Performed by: PHYSICIAN ASSISTANT

## 2023-07-08 PROCEDURE — 84484 ASSAY OF TROPONIN QUANT: CPT | Performed by: NURSE PRACTITIONER

## 2023-07-08 PROCEDURE — 83880 ASSAY OF NATRIURETIC PEPTIDE: CPT | Performed by: PHYSICIAN ASSISTANT

## 2023-07-08 NOTE — FIRST PROVIDER EVALUATION
Medical screening examination initiated.  I have conducted a focused provider triage encounter, findings are as follows:    Chief Complaint   Patient presents with    Leg Swelling     Pt presents c/o bilateral lower extremity swelling.  Onset x 2-4 days.       Brief history of present illness: 78 y.o. female presents to the ED with worsening BLE swelling to feet along with back pain s/t fall a few weeks ago. Saw ortho and is waiting for MRI of the back. Denies saddle paresthesia,bowel incontinence. Does have urinary incontinence which is chronic, sees urology     Vitals:    07/08/23 1341   BP: (!) 111/48   BP Location: Left arm   Patient Position: Sitting   Pulse: 80   Resp: 18   Temp: 99.4 °F (37.4 °C)   TempSrc: Oral   SpO2: 95%   Weight: 90.7 kg (200 lb)       Pertinent physical exam:  Awake, alert, non-labored respirations    Brief workup plan:  labs     Preliminary workup initiated; this workup will be continued and followed by the physician or advanced practice provider that is assigned to the patient when roomed.

## 2023-07-08 NOTE — ED PROVIDER NOTES
Encounter Date: 7/8/2023       History     Chief Complaint   Patient presents with    Leg Swelling     Pt presents c/o bilateral lower extremity swelling.  Onset x 2-4 days.       See MDM    The history is provided by the patient. No  was used.   Review of patient's allergies indicates:   Allergen Reactions    Morphine     Norco [hydrocodone-acetaminophen]      Past Medical History:   Diagnosis Date    Anxiety     COPD (chronic obstructive pulmonary disease)     Coronary artery disease     s/p 2 stents    Depression     GERD (gastroesophageal reflux disease)     Glaucoma     Hypertension     Obstructive sleep apnea     on cpap    Stroke     Urinary, incontinence, stress female      Past Surgical History:   Procedure Laterality Date    APPENDECTOMY      BACK SURGERY      CHOLECYSTECTOMY      EXPLORATION OF COMMON BILE DUCT      HERNIA REPAIR      incisional hernia times 2, central abdomen    HYSTERECTOMY      OVARIAN CYST REMOVAL      TONSILLECTOMY       Family History   Problem Relation Age of Onset    Heart disease Mother     Cancer Father      Social History     Tobacco Use    Smoking status: Never   Substance Use Topics    Alcohol use: Not Currently    Drug use: Not Currently     Review of Systems   Constitutional:  Negative for fever.   Respiratory:  Negative for cough and shortness of breath.    Cardiovascular:  Positive for leg swelling. Negative for chest pain.   Gastrointestinal:  Negative for abdominal pain.   Genitourinary:  Negative for difficulty urinating and dysuria.   Musculoskeletal:  Negative for gait problem.   Skin:  Negative for color change.   Neurological:  Negative for dizziness, speech difficulty and headaches.   Psychiatric/Behavioral:  Negative for hallucinations and suicidal ideas.    All other systems reviewed and are negative.    Physical Exam     Initial Vitals [07/08/23 1341]   BP Pulse Resp Temp SpO2   (!) 111/48 80 18 99.4 °F (37.4 °C) 95 %      MAP       --          Physical Exam    Nursing note and vitals reviewed.  Constitutional: She appears well-developed and well-nourished.   HENT:   Head: Normocephalic.   Eyes: EOM are normal.   Neck:   Normal range of motion.  Cardiovascular:  Normal rate, regular rhythm, normal heart sounds and intact distal pulses.           Pulmonary/Chest: Breath sounds normal. No respiratory distress.   Nonpitting edema legs bilateral   Abdominal: Abdomen is soft. Bowel sounds are normal. There is no abdominal tenderness.   Musculoskeletal:         General: Normal range of motion.      Cervical back: Normal range of motion.     Neurological: She is alert and oriented to person, place, and time. She has normal strength.   Skin: Skin is warm and dry.   Psychiatric: She has a normal mood and affect. Her behavior is normal. Judgment and thought content normal.       ED Course   Procedures  Labs Reviewed   COMPREHENSIVE METABOLIC PANEL - Abnormal; Notable for the following components:       Result Value    Glucose Level 118 (*)     Albumin/Globulin Ratio 1.0 (*)     All other components within normal limits   B-TYPE NATRIURETIC PEPTIDE - Abnormal; Notable for the following components:    Natriuretic Peptide 171.2 (*)     All other components within normal limits   CBC WITH DIFFERENTIAL - Abnormal; Notable for the following components:    MCHC 32.4 (*)     MPV 10.7 (*)     All other components within normal limits   TROPONIN I - Normal   CBC W/ AUTO DIFFERENTIAL    Narrative:     The following orders were created for panel order CBC auto differential.  Procedure                               Abnormality         Status                     ---------                               -----------         ------                     CBC with Differential[[id number]]        Abnormal            Final result                 Please view results for these tests on the individual orders.     EKG Readings: (Independently Interpreted)   Rhythm: Junctional Tachycardia.  Heart Rate: 76. Ectopy: No Ectopy. Conduction: Normal. ST Segments: Normal ST Segments. T Waves: Normal. Axis: Normal. Clinical Impression: Normal Sinus Rhythm   ECG Results              EKG 12-lead (Final result)  Result time 07/08/23 15:48:03      Final result by Interface, Lab In [facility] (07/08/23 15:48:03)                   Narrative:    Test Reason : M79.89,    Vent. Rate : 076 BPM     Atrial Rate : 076 BPM     P-R Int : 134 ms          QRS Dur : 076 ms      QT Int : 358 ms       P-R-T Axes : 260 -18 071 degrees     QTc Int : 402 ms    Undetermined rhythm. P wave is not well seen in this EKG  Probably AFIB   Baseline/motion artifact  Non-specific T wave abnormalities lateral lead AVL   Abnormal ECG  No previous ECGs available  Confirmed by Duane Emanuel MD ([id number]) on 7/8/2023 3:47:49 PM    Referred By: [email or web address]   SELF           Confirmed By:Duane Emanuel MD                                  Imaging Results              X-Ray Chest 1 View (Final result)  Result time 07/08/23 14:59:14      Final result by Ketan Rand MD (07/08/23 14:59:14)                   Impression:      No acute abnormality.      Electronically signed by: Ketan Rand MD  Date:    07/08/2023  Time:    14:59               Narrative:    EXAMINATION:  XR CHEST 1 VIEW    CLINICAL HISTORY:  Other specified soft tissue disorders    TECHNIQUE:  Single frontal view of the chest was performed.    COMPARISON:  03/30/2022    FINDINGS:  The lungs are clear, with normal appearance of pulmonary vasculature and no pleural effusion or pneumothorax.    The cardiac silhouette is normal in size. The hilar and mediastinal contours are unremarkable.    Bones are intact.                                       Medications - No data to display  Medical Decision Making:   Initial Assessment:   Historian:  Patient.  Patient is a 78-year-old female  that presents with bilateral leg swelling that has been present a few days. Associated symptoms nothing. Surrounding  information is patient did fall a few weeks ago injuring her back. Exacerbated by nothing. Relieved by nothing. Patient treatment prior to arrival none. Risk factors include none. Other history pertaining to this complaint nothing.   Assessment:  See physical exam.    Differential Diagnosis:   Peripheral edema, venous stasis, DVT, CHF  ED Management:  History was obtained.  Physical was performed.  EKG did show a junctional rhythm.  Chest x-ray was with no acute findings.  Lab studies are unremarkable elevation of BNP.  Patient is not short of breath.  Patient denies chest pain.  She states the edema is worse by the end of the day and better in the morning.  This appears to be dependent edema.  We will have her follow up with her cardiologist.  No medical or surgical consult for indicated in ER.  No social determinants that affect healthcare were noted.                        Clinical Impression:   Final diagnoses:  [M79.89] Leg swelling        ED Disposition Condition    Discharge Stable          ED Prescriptions    None       Follow-up Information       Follow up With Specialties Details Why Contact Info    Your Primary Care Provider  Call in 3 days ed follow up              BRENTON Gonzales  07/08/23 9019

## 2023-07-08 NOTE — ED NOTES
Pt. C/o bilat lower leg swelling.. reports hx of leg swelling in the past but reports more in the past few days.. reports recent left knee replacement.. noted swelling to bilat lower extremities.. gown provided to pt. At this time. Will continue to monitor..

## 2023-08-25 ENCOUNTER — HOSPITAL ENCOUNTER (INPATIENT)
Facility: HOSPITAL | Age: 78
LOS: 1 days | Discharge: HOME HEALTH | DRG: 069 | End: 2023-08-29
Attending: EMERGENCY MEDICINE | Admitting: INTERNAL MEDICINE
Payer: MEDICARE

## 2023-08-25 DIAGNOSIS — M79.89 LEG SWELLING: ICD-10-CM

## 2023-08-25 DIAGNOSIS — I10 BENIGN ESSENTIAL HTN: ICD-10-CM

## 2023-08-25 DIAGNOSIS — R60.0 LOWER EXTREMITY EDEMA: ICD-10-CM

## 2023-08-25 DIAGNOSIS — G45.9 TIA (TRANSIENT ISCHEMIC ATTACK): Primary | ICD-10-CM

## 2023-08-25 LAB
BASOPHILS # BLD AUTO: 0.03 X10(3)/MCL
BASOPHILS NFR BLD AUTO: 0.5 %
EOSINOPHIL # BLD AUTO: 0.13 X10(3)/MCL (ref 0–0.9)
EOSINOPHIL NFR BLD AUTO: 2 %
ERYTHROCYTE [DISTWIDTH] IN BLOOD BY AUTOMATED COUNT: 14.4 % (ref 11.5–17)
HCT VFR BLD AUTO: 36.8 % (ref 37–47)
HGB BLD-MCNC: 12.7 G/DL (ref 12–16)
IMM GRANULOCYTES # BLD AUTO: 0.01 X10(3)/MCL (ref 0–0.04)
IMM GRANULOCYTES NFR BLD AUTO: 0.2 %
LYMPHOCYTES # BLD AUTO: 3.21 X10(3)/MCL (ref 0.6–4.6)
LYMPHOCYTES NFR BLD AUTO: 49.2 %
MCH RBC QN AUTO: 28.4 PG (ref 27–31)
MCHC RBC AUTO-ENTMCNC: 34.5 G/DL (ref 33–36)
MCV RBC AUTO: 82.3 FL (ref 80–94)
MONOCYTES # BLD AUTO: 0.53 X10(3)/MCL (ref 0.1–1.3)
MONOCYTES NFR BLD AUTO: 8.1 %
NEUTROPHILS # BLD AUTO: 2.62 X10(3)/MCL (ref 2.1–9.2)
NEUTROPHILS NFR BLD AUTO: 40 %
NRBC BLD AUTO-RTO: 0 %
PLATELET # BLD AUTO: 238 X10(3)/MCL (ref 130–400)
PMV BLD AUTO: 11.2 FL (ref 7.4–10.4)
RBC # BLD AUTO: 4.47 X10(6)/MCL (ref 4.2–5.4)
WBC # SPEC AUTO: 6.53 X10(3)/MCL (ref 4.5–11.5)

## 2023-08-25 PROCEDURE — 84443 ASSAY THYROID STIM HORMONE: CPT | Performed by: INTERNAL MEDICINE

## 2023-08-25 PROCEDURE — 80061 LIPID PANEL: CPT | Performed by: INTERNAL MEDICINE

## 2023-08-25 PROCEDURE — 99285 EMERGENCY DEPT VISIT HI MDM: CPT | Mod: 25

## 2023-08-25 PROCEDURE — 84484 ASSAY OF TROPONIN QUANT: CPT | Performed by: EMERGENCY MEDICINE

## 2023-08-25 PROCEDURE — 83880 ASSAY OF NATRIURETIC PEPTIDE: CPT | Performed by: EMERGENCY MEDICINE

## 2023-08-25 PROCEDURE — 85730 THROMBOPLASTIN TIME PARTIAL: CPT | Performed by: EMERGENCY MEDICINE

## 2023-08-25 PROCEDURE — 83735 ASSAY OF MAGNESIUM: CPT | Performed by: EMERGENCY MEDICINE

## 2023-08-25 PROCEDURE — 85025 COMPLETE CBC W/AUTO DIFF WBC: CPT | Performed by: EMERGENCY MEDICINE

## 2023-08-25 PROCEDURE — 84439 ASSAY OF FREE THYROXINE: CPT | Performed by: INTERNAL MEDICINE

## 2023-08-25 PROCEDURE — 93005 ELECTROCARDIOGRAM TRACING: CPT

## 2023-08-25 PROCEDURE — 85610 PROTHROMBIN TIME: CPT | Performed by: EMERGENCY MEDICINE

## 2023-08-25 PROCEDURE — 86140 C-REACTIVE PROTEIN: CPT | Performed by: INTERNAL MEDICINE

## 2023-08-25 PROCEDURE — 80053 COMPREHEN METABOLIC PANEL: CPT | Performed by: EMERGENCY MEDICINE

## 2023-08-26 PROBLEM — G45.9 TIA (TRANSIENT ISCHEMIC ATTACK): Status: ACTIVE | Noted: 2023-08-26

## 2023-08-26 LAB
ALBUMIN SERPL-MCNC: 3.6 G/DL (ref 3.4–4.8)
ALBUMIN/GLOB SERPL: 1.2 RATIO (ref 1.1–2)
ALP SERPL-CCNC: 119 UNIT/L (ref 40–150)
ALT SERPL-CCNC: 11 UNIT/L (ref 0–55)
APPEARANCE UR: CLEAR
APTT PPP: 24.8 SECONDS (ref 23.2–33.7)
AST SERPL-CCNC: 27 UNIT/L (ref 5–34)
BACTERIA #/AREA URNS AUTO: NORMAL /HPF
BILIRUB SERPL-MCNC: 0.7 MG/DL
BILIRUB UR QL STRIP.AUTO: NEGATIVE
BNP BLD-MCNC: 253.9 PG/ML
BUN SERPL-MCNC: 12.6 MG/DL (ref 9.8–20.1)
CALCIUM SERPL-MCNC: 9.2 MG/DL (ref 8.4–10.2)
CHLORIDE SERPL-SCNC: 105 MMOL/L (ref 98–107)
CHOLEST SERPL-MCNC: 141 MG/DL
CHOLEST/HDLC SERPL: 3 {RATIO} (ref 0–5)
CO2 SERPL-SCNC: 25 MMOL/L (ref 23–31)
COLOR UR: YELLOW
CREAT SERPL-MCNC: 0.74 MG/DL (ref 0.55–1.02)
CRP SERPL-MCNC: 3.6 MG/L
D DIMER PPP IA.FEU-MCNC: 0.81 UG/ML FEU (ref 0–0.5)
ERYTHROCYTE [SEDIMENTATION RATE] IN BLOOD: 21 MM/HR (ref 0–20)
EST. AVERAGE GLUCOSE BLD GHB EST-MCNC: 114 MG/DL
GFR SERPLBLD CREATININE-BSD FMLA CKD-EPI: >60 MLS/MIN/1.73/M2
GLOBULIN SER-MCNC: 3.1 GM/DL (ref 2.4–3.5)
GLUCOSE SERPL-MCNC: 100 MG/DL (ref 82–115)
GLUCOSE UR QL STRIP.AUTO: NEGATIVE
HBA1C MFR BLD: 5.6 %
HDLC SERPL-MCNC: 41 MG/DL (ref 35–60)
INR PPP: 1.1
KETONES UR QL STRIP.AUTO: NEGATIVE
LDLC SERPL CALC-MCNC: 78 MG/DL (ref 50–140)
LEUKOCYTE ESTERASE UR QL STRIP.AUTO: NEGATIVE
MAGNESIUM SERPL-MCNC: 1.9 MG/DL (ref 1.6–2.6)
NITRITE UR QL STRIP.AUTO: NEGATIVE
PH UR STRIP.AUTO: 8 [PH]
POCT GLUCOSE: 114 MG/DL (ref 70–110)
POTASSIUM SERPL-SCNC: 4.5 MMOL/L (ref 3.5–5.1)
PROT SERPL-MCNC: 6.7 GM/DL (ref 5.8–7.6)
PROT UR QL STRIP.AUTO: NEGATIVE
PROTHROMBIN TIME: 13.7 SECONDS (ref 12.5–14.5)
RBC #/AREA URNS AUTO: <5 /HPF
RBC UR QL AUTO: NEGATIVE
SODIUM SERPL-SCNC: 139 MMOL/L (ref 136–145)
SP GR UR STRIP.AUTO: 1.01 (ref 1–1.03)
SQUAMOUS #/AREA URNS AUTO: <5 /HPF
T4 FREE SERPL-MCNC: 0.94 NG/DL (ref 0.7–1.48)
TRIGL SERPL-MCNC: 109 MG/DL (ref 37–140)
TROPONIN I SERPL-MCNC: <0.01 NG/ML (ref 0–0.04)
TSH SERPL-ACNC: 2.17 UIU/ML (ref 0.35–4.94)
UROBILINOGEN UR STRIP-ACNC: 1
VLDLC SERPL CALC-MCNC: 22 MG/DL
WBC #/AREA URNS AUTO: <5 /HPF

## 2023-08-26 PROCEDURE — 25000003 PHARM REV CODE 250: Performed by: INTERNAL MEDICINE

## 2023-08-26 PROCEDURE — 99223 PR INITIAL HOSPITAL CARE,LEVL III: ICD-10-PCS | Mod: ,,, | Performed by: PSYCHIATRY & NEUROLOGY

## 2023-08-26 PROCEDURE — 85652 RBC SED RATE AUTOMATED: CPT | Performed by: INTERNAL MEDICINE

## 2023-08-26 PROCEDURE — 63600175 PHARM REV CODE 636 W HCPCS: Performed by: EMERGENCY MEDICINE

## 2023-08-26 PROCEDURE — 96374 THER/PROPH/DIAG INJ IV PUSH: CPT

## 2023-08-26 PROCEDURE — A4216 STERILE WATER/SALINE, 10 ML: HCPCS | Performed by: INTERNAL MEDICINE

## 2023-08-26 PROCEDURE — 96372 THER/PROPH/DIAG INJ SC/IM: CPT | Performed by: INTERNAL MEDICINE

## 2023-08-26 PROCEDURE — C1751 CATH, INF, PER/CENT/MIDLINE: HCPCS

## 2023-08-26 PROCEDURE — 85379 FIBRIN DEGRADATION QUANT: CPT | Performed by: PHYSICIAN ASSISTANT

## 2023-08-26 PROCEDURE — 82962 GLUCOSE BLOOD TEST: CPT

## 2023-08-26 PROCEDURE — 96376 TX/PRO/DX INJ SAME DRUG ADON: CPT

## 2023-08-26 PROCEDURE — G0378 HOSPITAL OBSERVATION PER HR: HCPCS

## 2023-08-26 PROCEDURE — 99223 1ST HOSP IP/OBS HIGH 75: CPT | Mod: ,,, | Performed by: PSYCHIATRY & NEUROLOGY

## 2023-08-26 PROCEDURE — 63600175 PHARM REV CODE 636 W HCPCS: Performed by: INTERNAL MEDICINE

## 2023-08-26 PROCEDURE — 92523 SPEECH SOUND LANG COMPREHEN: CPT

## 2023-08-26 PROCEDURE — 81001 URINALYSIS AUTO W/SCOPE: CPT | Performed by: EMERGENCY MEDICINE

## 2023-08-26 PROCEDURE — 83036 HEMOGLOBIN GLYCOSYLATED A1C: CPT | Performed by: INTERNAL MEDICINE

## 2023-08-26 PROCEDURE — 25500020 PHARM REV CODE 255: Performed by: INTERNAL MEDICINE

## 2023-08-26 PROCEDURE — 25000003 PHARM REV CODE 250: Performed by: NURSE PRACTITIONER

## 2023-08-26 PROCEDURE — 25000003 PHARM REV CODE 250: Performed by: EMERGENCY MEDICINE

## 2023-08-26 PROCEDURE — 36410 VNPNXR 3YR/> PHY/QHP DX/THER: CPT

## 2023-08-26 PROCEDURE — 96375 TX/PRO/DX INJ NEW DRUG ADDON: CPT

## 2023-08-26 RX ORDER — LOSARTAN POTASSIUM 25 MG/1
25 TABLET ORAL 2 TIMES DAILY
Status: ON HOLD | COMMUNITY
Start: 2023-08-24 | End: 2023-11-19 | Stop reason: ALTCHOICE

## 2023-08-26 RX ORDER — CARVEDILOL 25 MG/1
25 TABLET ORAL
Status: ON HOLD | COMMUNITY
End: 2023-08-29 | Stop reason: HOSPADM

## 2023-08-26 RX ORDER — ENOXAPARIN SODIUM 100 MG/ML
40 INJECTION SUBCUTANEOUS EVERY 24 HOURS
Status: DISCONTINUED | OUTPATIENT
Start: 2023-08-26 | End: 2023-08-29 | Stop reason: HOSPADM

## 2023-08-26 RX ORDER — CETIRIZINE HYDROCHLORIDE 10 MG/1
TABLET ORAL
Status: ON HOLD | COMMUNITY
Start: 2023-07-25 | End: 2023-08-29

## 2023-08-26 RX ORDER — NAPROXEN SODIUM 220 MG/1
81 TABLET, FILM COATED ORAL ONCE
Status: COMPLETED | OUTPATIENT
Start: 2023-08-26 | End: 2023-08-26

## 2023-08-26 RX ORDER — FAMOTIDINE 20 MG/1
1 TABLET, FILM COATED ORAL NIGHTLY
Status: ON HOLD | COMMUNITY
Start: 2023-07-25 | End: 2023-08-29 | Stop reason: HOSPADM

## 2023-08-26 RX ORDER — ONDANSETRON 2 MG/ML
4 INJECTION INTRAMUSCULAR; INTRAVENOUS
Status: COMPLETED | OUTPATIENT
Start: 2023-08-26 | End: 2023-08-26

## 2023-08-26 RX ORDER — PROCHLORPERAZINE EDISYLATE 5 MG/ML
5 INJECTION INTRAMUSCULAR; INTRAVENOUS EVERY 6 HOURS PRN
Status: DISCONTINUED | OUTPATIENT
Start: 2023-08-26 | End: 2023-08-29 | Stop reason: HOSPADM

## 2023-08-26 RX ORDER — SODIUM CHLORIDE 0.9 % (FLUSH) 0.9 %
10 SYRINGE (ML) INJECTION
Status: DISCONTINUED | OUTPATIENT
Start: 2023-08-26 | End: 2023-08-29 | Stop reason: HOSPADM

## 2023-08-26 RX ORDER — FUROSEMIDE 10 MG/ML
40 INJECTION INTRAMUSCULAR; INTRAVENOUS
Status: DISCONTINUED | OUTPATIENT
Start: 2023-08-26 | End: 2023-08-26

## 2023-08-26 RX ORDER — OXYCODONE AND ACETAMINOPHEN 10; 325 MG/1; MG/1
TABLET ORAL
Status: ON HOLD | COMMUNITY
Start: 2023-08-17 | End: 2023-08-29 | Stop reason: HOSPADM

## 2023-08-26 RX ORDER — FENTANYL CITRATE 50 UG/ML
50 INJECTION, SOLUTION INTRAMUSCULAR; INTRAVENOUS
Status: COMPLETED | OUTPATIENT
Start: 2023-08-26 | End: 2023-08-26

## 2023-08-26 RX ORDER — ONDANSETRON 2 MG/ML
4 INJECTION INTRAMUSCULAR; INTRAVENOUS EVERY 4 HOURS PRN
Status: DISCONTINUED | OUTPATIENT
Start: 2023-08-26 | End: 2023-08-29 | Stop reason: HOSPADM

## 2023-08-26 RX ORDER — ATORVASTATIN CALCIUM 40 MG/1
40 TABLET, FILM COATED ORAL DAILY
Status: DISCONTINUED | OUTPATIENT
Start: 2023-08-26 | End: 2023-08-29 | Stop reason: HOSPADM

## 2023-08-26 RX ORDER — SODIUM CHLORIDE 0.9 % (FLUSH) 0.9 %
10 SYRINGE (ML) INJECTION EVERY 6 HOURS
Status: DISCONTINUED | OUTPATIENT
Start: 2023-08-26 | End: 2023-08-29 | Stop reason: HOSPADM

## 2023-08-26 RX ORDER — ACETAMINOPHEN 325 MG/1
650 TABLET ORAL EVERY 6 HOURS PRN
Status: DISCONTINUED | OUTPATIENT
Start: 2023-08-26 | End: 2023-08-29 | Stop reason: HOSPADM

## 2023-08-26 RX ORDER — NAPROXEN SODIUM 220 MG/1
81 TABLET, FILM COATED ORAL DAILY
Status: DISCONTINUED | OUTPATIENT
Start: 2023-08-26 | End: 2023-08-26

## 2023-08-26 RX ORDER — PANTOPRAZOLE SODIUM 40 MG/1
40 TABLET, DELAYED RELEASE ORAL
COMMUNITY
Start: 2023-08-24

## 2023-08-26 RX ORDER — SODIUM CHLORIDE 9 MG/ML
INJECTION, SOLUTION INTRAVENOUS CONTINUOUS
Status: DISCONTINUED | OUTPATIENT
Start: 2023-08-26 | End: 2023-08-27

## 2023-08-26 RX ORDER — BISACODYL 10 MG
10 SUPPOSITORY, RECTAL RECTAL DAILY PRN
Status: DISCONTINUED | OUTPATIENT
Start: 2023-08-26 | End: 2023-08-29 | Stop reason: HOSPADM

## 2023-08-26 RX ORDER — POLYETHYLENE GLYCOL 3350 17 G/17G
17 POWDER, FOR SOLUTION ORAL 2 TIMES DAILY PRN
Status: DISCONTINUED | OUTPATIENT
Start: 2023-08-27 | End: 2023-08-29 | Stop reason: HOSPADM

## 2023-08-26 RX ORDER — ONDANSETRON 4 MG/1
4 TABLET, ORALLY DISINTEGRATING ORAL
Status: COMPLETED | OUTPATIENT
Start: 2023-08-26 | End: 2023-08-26

## 2023-08-26 RX ORDER — SERTRALINE HYDROCHLORIDE 50 MG/1
50 TABLET, FILM COATED ORAL DAILY
COMMUNITY
Start: 2023-08-24

## 2023-08-26 RX ORDER — GABAPENTIN 300 MG/1
300 CAPSULE ORAL 3 TIMES DAILY
COMMUNITY
Start: 2023-08-25

## 2023-08-26 RX ORDER — METOCLOPRAMIDE 5 MG/1
TABLET ORAL
Status: ON HOLD | COMMUNITY
Start: 2023-08-02 | End: 2023-08-29 | Stop reason: HOSPADM

## 2023-08-26 RX ORDER — AMOXICILLIN 250 MG
1 CAPSULE ORAL 2 TIMES DAILY PRN
Status: DISCONTINUED | OUTPATIENT
Start: 2023-08-27 | End: 2023-08-29 | Stop reason: HOSPADM

## 2023-08-26 RX ORDER — AMLODIPINE BESYLATE 5 MG/1
5 TABLET ORAL 2 TIMES DAILY
Status: ON HOLD | COMMUNITY
Start: 2023-08-24 | End: 2023-08-29

## 2023-08-26 RX ORDER — LABETALOL HYDROCHLORIDE 5 MG/ML
10 INJECTION, SOLUTION INTRAVENOUS EVERY 30 MIN PRN
Status: DISCONTINUED | OUTPATIENT
Start: 2023-08-26 | End: 2023-08-29 | Stop reason: HOSPADM

## 2023-08-26 RX ORDER — ASPIRIN 81 MG/1
81 TABLET ORAL DAILY
Status: DISCONTINUED | OUTPATIENT
Start: 2023-08-26 | End: 2023-08-29 | Stop reason: HOSPADM

## 2023-08-26 RX ORDER — METHOCARBAMOL 500 MG/1
500 TABLET, FILM COATED ORAL
Status: ON HOLD | COMMUNITY
End: 2023-08-29 | Stop reason: HOSPADM

## 2023-08-26 RX ADMIN — IOPAMIDOL 70 ML: 755 INJECTION, SOLUTION INTRAVENOUS at 04:08

## 2023-08-26 RX ADMIN — ONDANSETRON 4 MG: 2 INJECTION INTRAMUSCULAR; INTRAVENOUS at 04:08

## 2023-08-26 RX ADMIN — ONDANSETRON 4 MG: 4 TABLET, ORALLY DISINTEGRATING ORAL at 08:08

## 2023-08-26 RX ADMIN — ACETAMINOPHEN 650 MG: 325 TABLET, FILM COATED ORAL at 09:08

## 2023-08-26 RX ADMIN — SODIUM CHLORIDE, PRESERVATIVE FREE 10 ML: 5 INJECTION INTRAVENOUS at 11:08

## 2023-08-26 RX ADMIN — ONDANSETRON 4 MG: 2 INJECTION INTRAMUSCULAR; INTRAVENOUS at 09:08

## 2023-08-26 RX ADMIN — ENOXAPARIN SODIUM 40 MG: 40 INJECTION SUBCUTANEOUS at 04:08

## 2023-08-26 RX ADMIN — PROCHLORPERAZINE EDISYLATE 5 MG: 5 INJECTION INTRAMUSCULAR; INTRAVENOUS at 09:08

## 2023-08-26 RX ADMIN — SODIUM CHLORIDE: 9 INJECTION, SOLUTION INTRAVENOUS at 09:08

## 2023-08-26 RX ADMIN — PROCHLORPERAZINE EDISYLATE 5 MG: 5 INJECTION INTRAMUSCULAR; INTRAVENOUS at 11:08

## 2023-08-26 RX ADMIN — ASPIRIN 81 MG: 81 TABLET, COATED ORAL at 08:08

## 2023-08-26 RX ADMIN — FENTANYL CITRATE 50 MCG: 50 INJECTION, SOLUTION INTRAMUSCULAR; INTRAVENOUS at 04:08

## 2023-08-26 RX ADMIN — ASPIRIN 81 MG CHEWABLE TABLET 81 MG: 81 TABLET CHEWABLE at 01:08

## 2023-08-26 RX ADMIN — ATORVASTATIN CALCIUM 40 MG: 40 TABLET, FILM COATED ORAL at 08:08

## 2023-08-26 NOTE — PT/OT/SLP EVAL
[facility]  Speech Language Pathology Department  Cognitive-Communication Evaluation    Patient Name:  Katty Veronica   MRN:  [id number]    Recommendations:     General recommendations:  SLP intervention not indicated  Communication strategies:  none  Barriers to safe discharge: none    History:       Past Medical History:   Diagnosis Date    Anxiety     COPD (chronic obstructive pulmonary disease)     Coronary artery disease     s/p 2 stents    Depression     GERD (gastroesophageal reflux disease)     Glaucoma     Hypertension     Obstructive sleep apnea     on cpap    Stroke     Urinary, incontinence, stress female      Past Surgical History:   Procedure Laterality Date    APPENDECTOMY      BACK SURGERY      CHOLECYSTECTOMY      EXPLORATION OF COMMON BILE DUCT      HERNIA REPAIR      incisional hernia times 2, central abdomen    HYSTERECTOMY      OVARIAN CYST REMOVAL      TONSILLECTOMY         Imaging   No results found for this or any previous visit.    Subjective     Patient awake, alert, and cooperative.    Pain/Comfort: Pain Rating 1: 0/10  Respiratory Status: Room air    Objective:     ORAL MUSCULATURE  Facial Movement: reduced left  Buccal Strength & Mobility: WFL  Mandibular Strength & Mobility: WFL  Oral Labial Strength & Mobility: WFL  Lingual Strength & Mobility: WFL      SPEECH PRODUCTION  Phoneme Production: adequate  Voice Quality: adequate  Voice Production: adequate  Speech Rate: appropriate  Loudness: acceptable  Respiration: WFL for speech  Resonance: adequate  Prosody: adequate  Speech Intelligibility  Known Context: Greater that 90%  Unknown Context: Greater that 90%    AUDITORY COMPREHENSION  Identification:  Body parts: WFL  Following Directions:  1-Step: WFL  2-Step: WFL  Yes/No Questions:  Simple: WFL  Complex: WFL    VERBAL EXPRESSION  Automatic Speech:  Counting: WFL  Confrontation Naming  Objects: WFL  Wh- Questions:  Object name: WFL  Object function:  WFL    COGNITION  Orientation:  Person: yes  Place: yes  Time: yes  Situation: yes   Attention:  Sustained: WFL  Memory:  Short Term: WFL  Long Term: WFL  Problem Solving  Functional simple: WFL    Assessment:     Patient and family report cognitive-linguistic skills to be at baseline. ST to sign off.    Goals:     Multidisciplinary Problems       SLP Goals       Not on file                  Patient Education:     Patient and family provided with verbal education regarding ST POC.  Understanding was verbalized.    Plan:     SLP Follow-Up:  No   Plan of Care reviewed with:  patient, family      Time Tracking:     SLP Treatment Date:   08/26/23  Speech Start Time:  0900  Speech Stop Time:  0920     Speech Total Time (min):  20 min    Billable minutes:  Evaluation of Speech Sound Production with Comprehension and Expression, 20 minutes     08/26/2023

## 2023-08-26 NOTE — ED PROVIDER NOTES
"Encounter Date: 8/25/2023    SCRIBE #1 NOTE: I, Greer Vi, am scribing for, and in the presence of,  Karen Hernandez MD. I have scribed the following portions of the note - Other sections scribed: HPI, ROS, PE.       History     Chief Complaint   Patient presents with    Facial Droop     Pt reports approx 1 hour ago had multiple episodes of l sided facial droop and slurred speech, no symptoms currently     78 year old female with a history of COPD, CAD s/p 2 stents, GERD, hypertension, and stroke with left sided weakness presents to ED via EMS for left sided facial "twisting" and slurred speech PTA. States her face started feeling like a "twisting" sensation with swelling and when she called her daughter, she had slurred speech. Pt also reports bilateral leg swelling and decreased sensation to her right 3rd toe since this afternoon. States she was seen here a few weeks ago for feet swelling, but did not go home with any fluid pills. Denies blood thinners. Reports shortness of breath from back pain for a fall on 6/21 with 3 fractures. She uses a walker for daily living. EMS denies any facial droop or speech difficulty en route.    The history is provided by the patient and the EMS personnel. No  was used.     Review of patient's allergies indicates:   Allergen Reactions    Morphine     Norco [hydrocodone-acetaminophen]      Past Medical History:   Diagnosis Date    Anxiety     COPD (chronic obstructive pulmonary disease)     Coronary artery disease     s/p 2 stents    Depression     GERD (gastroesophageal reflux disease)     Glaucoma     Hypertension     Obstructive sleep apnea     on cpap    Stroke     Urinary, incontinence, stress female      Past Surgical History:   Procedure Laterality Date    APPENDECTOMY      BACK SURGERY      CHOLECYSTECTOMY      EXPLORATION OF COMMON BILE DUCT      HERNIA REPAIR      incisional hernia times 2, central abdomen    HYSTERECTOMY      OVARIAN CYST REMOVAL   " "   TONSILLECTOMY       Family History   Problem Relation Age of Onset    Heart disease Mother     Cancer Father      Social History     Tobacco Use    Smoking status: Never   Substance Use Topics    Alcohol use: Not Currently    Drug use: Not Currently     Review of Systems   Constitutional:  Negative for chills, diaphoresis and fever.   HENT:  Negative for congestion, ear pain, sinus pain and sore throat.    Eyes:  Negative for pain, discharge and visual disturbance.   Respiratory:  Positive for shortness of breath. Negative for cough, wheezing and stridor.    Cardiovascular:  Positive for leg swelling. Negative for chest pain and palpitations.   Gastrointestinal:  Negative for abdominal pain, constipation, diarrhea, nausea, rectal pain and vomiting.   Genitourinary:  Negative for dysuria and hematuria.   Musculoskeletal:  Positive for back pain.   Skin:  Negative for rash.   Neurological:  Positive for speech difficulty (slurred), weakness (chornic, left side) and numbness. Negative for dizziness, syncope, facial asymmetry and headaches.        Facial "twisting" and swelling   Hematological: Negative.    Psychiatric/Behavioral: Negative.     All other systems reviewed and are negative.      Physical Exam     Initial Vitals [08/25/23 2047]   BP Pulse Resp Temp SpO2   (!) 140/78 78 16 98 °F (36.7 °C) 97 %      MAP       --         Physical Exam    Nursing note and vitals reviewed.  Constitutional: She appears well-developed and well-nourished. She is not diaphoretic. No distress.   HENT:   Head: Normocephalic and atraumatic.   Nose: Nose normal.   Mouth/Throat: Oropharynx is clear and moist.   Eyes: Conjunctivae and EOM are normal. Pupils are equal, round, and reactive to light.   Neck: Trachea normal. Neck supple.   Normal range of motion.  Cardiovascular:  Normal rate, regular rhythm, normal heart sounds and intact distal pulses.           No murmur heard.  Pulmonary/Chest: Breath sounds normal. No respiratory " distress. She has no wheezes. She has no rhonchi. She has no rales. She exhibits no tenderness.   Abdominal: Abdomen is soft. Bowel sounds are normal. She exhibits no distension and no mass. There is no abdominal tenderness. There is no rebound and no guarding.   Musculoskeletal:         General: No tenderness or edema. Normal range of motion.      Cervical back: Normal range of motion and neck supple.      Lumbar back: Normal. Normal range of motion.     Neurological: She is alert and oriented to person, place, and time. A sensory deficit is present. No cranial nerve deficit.   No facial weakness  Decreased sensation to left lower face  Tongue deviation to right side  Slightly left upper and left lower extremities weakness that is chronic  Decreased sensation to left lower extremity  Decreased sensation to right 3rd toe   Skin: Skin is warm and dry. Capillary refill takes less than 2 seconds. No abscess noted. No erythema. No pallor.   Psychiatric: She has a normal mood and affect. Her behavior is normal. Judgment and thought content normal.         ED Course   Procedures  Labs Reviewed   B-TYPE NATRIURETIC PEPTIDE - Abnormal; Notable for the following components:       Result Value    Natriuretic Peptide 253.9 (*)     All other components within normal limits   CBC WITH DIFFERENTIAL - Abnormal; Notable for the following components:    Hct 36.8 (*)     MPV 11.2 (*)     All other components within normal limits   MAGNESIUM - Normal   TROPONIN I - Normal   URINALYSIS, REFLEX TO URINE CULTURE - Normal   URINALYSIS, MICROSCOPIC - Normal   CBC W/ AUTO DIFFERENTIAL    Narrative:     The following orders were created for panel order CBC auto differential.  Procedure                               Abnormality         Status                     ---------                               -----------         ------                     CBC with Differential[[id number]]        Abnormal            Final result                 Please  view results for these tests on the individual orders.   COMPREHENSIVE METABOLIC PANEL   APTT   PROTIME-INR          Imaging Results              CT Head Without Contrast (Final result)  Result time 08/25/23 22:04:25      Final result by Ketan Morris MD (08/25/23 22:04:25)                   Impression:      No acute intracranial abnormality.      Electronically signed by: Ketan Morris MD  Date:    08/25/2023  Time:    22:04               Narrative:    EXAMINATION:  CT HEAD WITHOUT CONTRAST    CLINICAL HISTORY:  Transient ischemic attack (TIA);    TECHNIQUE:  Axial images of the head were obtained without IV contrast administration.  Coronal and sagittal reconstructions were provided.  Three dimensional and MIP images were obtained and evaluated.  Total DLP was 910 mGy-cm. Dose lowering technique and automated exposure control were utilized for this exam.    COMPARISON:  CT of the head 10/03/2022.    FINDINGS:  There is normal brain formation.  There is normal gray-white matter differentiation.  There is no hemorrhage, hydrocephalus, or midline shift.  There is no cytotoxic or vasogenic edema.  There is no intra or extra-axial fluid collection.  There is no herniation.    The calvarium is intact.  There is no fracture.  The bilateral orbits are normal.  The paranasal sinuses and mastoid air cells are normally developed and free of disease.                                       X-Ray Chest AP Portable (Final result)  Result time 08/25/23 21:24:39      Final result by Ketan Morris MD (08/25/23 21:24:39)                   Impression:      No acute cardiopulmonary abnormality.      Electronically signed by: Ketan Morris MD  Date:    08/25/2023  Time:    21:24               Narrative:    EXAMINATION:  Single view chest radiograph.    CLINICAL HISTORY:  shortnes of breath;    TECHNIQUE:  Single view of the chest.    COMPARISON:  Chest radiograph 07/08/2023.    FINDINGS:  The lungs are clear without consolidation or effusion.   There is no pneumothorax.  The cardiac silhouette is normal in size.  There is no acute osseous abnormality.                                    X-Rays:   Independently Interpreted Readings:   Chest X-Ray: Normal heart size.  No infiltrates.  No acute abnormalities.     Medications   furosemide injection 40 mg (has no administration in time range)   aspirin chewable tablet 81 mg (81 mg Oral Given 8/26/23 0121)     Medical Decision Making  The differential diagnosis includes, but is not limited to: TIA, CVA, hypoglycemia, and electrolyte derangement.   Cbc, cmp, coags, trop, bnp, ekg, ct head ordered and reviewed without signficiant abnoramlity  Bnp elevated, ?chf as cause of lower extremity edema, no pulmonary edema on cxr  Given asa and admitted to hospitalist    Problems Addressed:  Benign essential HTN: chronic illness or injury  Lower extremity edema: acute illness or injury that poses a threat to life or bodily functions  TIA (transient ischemic attack): acute illness or injury that poses a threat to life or bodily functions    Amount and/or Complexity of Data Reviewed  Independent Historian: EMS     Details: EMS denies any facial droop or speech difficulty en route.  Labs: ordered. Decision-making details documented in ED Course.  Radiology: ordered and independent interpretation performed. Decision-making details documented in ED Course.  ECG/medicine tests: ordered and independent interpretation performed.    Risk  OTC drugs.  Prescription drug management.  Decision regarding hospitalization.      Additional MDM:     NIH Stroke Scale:   Interval = initial 15 minute assessment from arrival  Level of consciousness = 0 - alert  LOC questions = 0 - answers both correctly  LOC commands = 0 - performs both correctly  Best gaze = 0 - normal  Visual = 0 - no visual loss  Facial palsy = 0 - normal  Motor left arm =  1 - drift  Motor right arm =  0 - no drift  Motor left leg = 1 - drift  Motor right leg =  0 - no  drift  Limb ataxia = 0 - absent  Sensory = 0 - normal  Best language = 0 - no aphasia  Dysarthria = 0 - normal articulation  Extinction and inattention = 0 - no neglect  NIH Stroke Scale Total = 2           Scribe Attestation:   Scribe #1: I performed the above scribed service and the documentation accurately describes the services I performed. I attest to the accuracy of the note.  Comments: Attending:   Physician Attestation Statement for Scribe #1: Karen MADISON MD, personally performed the services described in this documentation. All medical record entries made by the scribe were at my direction and in my presence.  I have reviewed the chart and agree that the record reflects my personal performance and is accurate and complete.        Attending Attestation:           Physician Attestation for Scribe:  Physician Attestation Statement for Scribe #1: David MADISON Brooke R, MD, reviewed documentation, as scribed by Greer Terry in my presence, and it is both accurate and complete.             ED Course as of 08/26/23 0242   Fri Aug 25, 2023   2100 First onset of symptoms with foot numbness over 4.5 hours ago, not candidate for thrombolytics and does not meet criteria for lvo [BS]   2237 Ekg performed at 2131 rate 72 regular rate, abnormal p axis, possible ectopic atrial rhythm [BS]   2237 Asking to send urine to check for uti [BS]   Sat Aug 26, 2023   0128 Hospitalist consulted [BS]      ED Course User Index  [BS] Karen Hernandez MD               Medical Decision Making:   History:   Old Medical Records: I decided to obtain old medical records.  Old Records Summarized: records from previous admission(s).       <> Summary of Records: Visit for edema  Initial Assessment:   See hpi  Independently Interpreted Test(s):   I have ordered and independently interpreted X-rays - see prior notes.  I have ordered and independently interpreted EKG Reading(s) - see prior notes  Clinical Tests:   Lab Tests: Ordered and  Reviewed  Radiological Study: Ordered and Reviewed  Medical Tests: Ordered and Reviewed  Other:   I have discussed this case with another health care provider.      Clinical Impression:   Final diagnoses:  [G45.9] TIA (transient ischemic attack) (Primary)  [I10] Benign essential HTN  [R60.0] Lower extremity edema        ED Disposition Condition    Observation Stable                Karen Hernandez MD  08/26/23 6211

## 2023-08-26 NOTE — SUBJECTIVE & OBJECTIVE
Past Medical History:   Diagnosis Date    Anxiety     COPD (chronic obstructive pulmonary disease)     Coronary artery disease     s/p 2 stents    Depression     GERD (gastroesophageal reflux disease)     Glaucoma     Hypertension     Obstructive sleep apnea     on cpap    Stroke     Urinary, incontinence, stress female        Past Surgical History:   Procedure Laterality Date    APPENDECTOMY      BACK SURGERY      CHOLECYSTECTOMY      EXPLORATION OF COMMON BILE DUCT      HERNIA REPAIR      incisional hernia times 2, central abdomen    HYSTERECTOMY      OVARIAN CYST REMOVAL      TONSILLECTOMY         Review of patient's allergies indicates:   Allergen Reactions    Morphine     Norco [hydrocodone-acetaminophen]          No current facility-administered medications on file prior to encounter.     Current Outpatient Medications on File Prior to Encounter   Medication Sig    amLODIPine (NORVASC) 5 MG tablet Take 5 mg by mouth 2 (two) times daily.    carvediloL (COREG) 25 MG tablet Take 25 mg by mouth 2 (two) times daily with meals.    cetirizine (ZYRTEC) 10 MG tablet TAKE ONE TABLET BY MOUTH EVERY DAY AT 9AM    famotidine (PEPCID) 20 MG tablet Take 1 tablet by mouth every evening.    gabapentin (NEURONTIN) 300 MG capsule Take 300 mg by mouth 3 (three) times daily.    losartan (COZAAR) 25 MG tablet Take 25 mg by mouth 2 (two) times daily.    methocarbamoL (ROBAXIN) 500 MG Tab Take 500 mg by mouth.    metoclopramide HCl (REGLAN) 5 MG tablet TAKE ONE TABLET BY MOUTH 15 MINUTES BEFORE MEALS AND AT BEDTIME STARTING MONDAY, JUNE 19TH, WHEN YOU HAVE FINISHED YOUR VENLAXAFINE    omeprazole (PRILOSEC) 20 MG capsule Take by mouth once daily.    oxyCODONE-acetaminophen (PERCOCET)  mg per tablet TAKE ONE TABLET BY MOUTH THREE TIMES DAILY AS NEEDED FOR PAIN. MAY CAUSE DROWSINESS    pantoprazole (PROTONIX) 40 MG tablet Take 40 mg by mouth.    sertraline (ZOLOFT) 50 MG tablet Take 50 mg by mouth.    amLODIPine (NORVASC) 2.5 MG  tablet Take 2.5 mg by mouth once daily.    calcium-vitamin D3-magnesium 200 mg calcium- 1.25 mcg Cap Take by mouth.    carvediloL (COREG) 25 MG tablet Take 25 mg by mouth.    diazePAM (VALIUM) 5 MG tablet Take 5 mg by mouth every 6 (six) hours as needed for Anxiety.    dicyclomine (BENTYL) 20 mg tablet Take 20 mg by mouth every 6 (six) hours.    famotidine (PEPCID) 20 MG tablet Take 20 mg by mouth every evening.    methocarbamoL (ROBAXIN) 500 MG Tab Take 500 mg by mouth 4 (four) times daily.    venlafaxine (EFFEXOR) 75 MG tablet Take 75 mg by mouth 2 (two) times daily.     Family History       Problem Relation (Age of Onset)    Cancer Father    Heart disease Mother          Tobacco Use    Smoking status: Never    Smokeless tobacco: Not on file   Substance and Sexual Activity    Alcohol use: Not Currently    Drug use: Not Currently    Sexual activity: Not on file     Review of Systems   Neurological:  Positive for facial asymmetry, speech difficulty and weakness. Negative for dizziness, tremors, seizures, syncope, light-headedness, numbness and headaches.   All other systems reviewed and are negative.      Objective:     Vital Signs (Most Recent):  Temp: 98 °F (36.7 °C) (08/25/23 2047)  Pulse: 77 (08/26/23 1114)  Resp: (!) 23 (08/26/23 1114)  BP: (!) 163/72 (08/26/23 1114)  SpO2: 98 % (08/26/23 1114) Vital Signs (24h Range):  Temp:  [98 °F (36.7 °C)] 98 °F (36.7 °C)  Pulse:  [68-78] 77  Resp:  [12-23] 23  SpO2:  [93 %-99 %] 98 %  BP: (130-169)/(72-96) 163/72        There is no height or weight on file to calculate BMI.     Physical Exam  Vitals reviewed.   Constitutional:       General: She is awake. She is not in acute distress.     Appearance: She is not ill-appearing or toxic-appearing.   Eyes:      General: Vision grossly intact. No visual field deficit.     Extraocular Movements: Extraocular movements intact.      Pupils: Pupils are equal, round, and reactive to light.   Cardiovascular:      Rate and Rhythm:  Normal rate.   Pulmonary:      Effort: Pulmonary effort is normal.   Skin:     General: Skin is warm and dry.      Capillary Refill: Capillary refill takes less than 2 seconds.   Neurological:      Mental Status: She is alert and oriented to person, place, and time. Mental status is at baseline.      Cranial Nerves: No dysarthria or facial asymmetry.      Sensory: Sensation is intact.      Motor: No weakness, tremor, atrophy, seizure activity or pronator drift.      Coordination: Coordination is intact.   Psychiatric:         Attention and Perception: Attention normal.         Mood and Affect: Mood and affect normal.         Speech: Speech normal.         Behavior: Behavior normal. Behavior is cooperative.      Significant Labs: Hemoglobin A1c:   Recent Labs   Lab 08/26/23  0651   HGBA1C 5.6     BMP:   Recent Labs   Lab 08/25/23  2253      K 4.5   CO2 25   BUN 12.6   CREATININE 0.74   CALCIUM 9.2   MG 1.90     CBC:   Recent Labs   Lab 08/25/23  2255   WBC 6.53   HGB 12.7   HCT 36.8*        Inflammatory Markers:   Recent Labs   Lab 08/25/23  2253 08/26/23  0651   SEDRATE  --  21*   CRP 3.60  --        Significant Imaging: I have reviewed all pertinent imaging results/findings within the past 24 hours.

## 2023-08-26 NOTE — HPI
78-year-old female with past medical history of COPD, CAD s/p stent, GERD, HTN, stroke (with left-sided weakness), presented to ED on 08/25 with report of left-sided facial twisting and slurred speech.  Symptom onset was 8/25 at approximately 9:30 p.m. symptoms had resolved prior to arrival to ED.  CTh showed no acute intracranial abnormality.  She was admitted to the hospitalist service and Neurology was consulted for stroke workup.    On 08/26, stroke alert was activated at 9:43 a.m. due to recurring slurred speech and left facial droop.  Symptoms resolved quickly; she was back to baseline prior to going for stat CTh.  Repeat CTh showed no acute intracranial abnormalities.  Discussed with Dr. Leach ...  No recommendation for TNK due to symptom resolution.

## 2023-08-26 NOTE — H&P
[facility] Hospital Medicine History & Physical Examination       Patient Name: Katty Veronica  MRN: [id number]  Patient Class: OP- Observation   Admission Date: 8/25/2023   Admitting Physician: Chandler Urrutia MD  Length of Stay: 0  Attending Physician: Eileen Castillo MD   Primary Care Provider: Sheila Moreira DO  Face-to-Face encounter date: 08/26/2023  Code Status:Full code   Chief Complaint: Facial Droop (Pt reports approx 1 hour ago had multiple episodes of l sided facial droop and slurred speech, no symptoms currently)        Patient information was obtained from patient, patient's family, past medical records and ER records.     HISTORY OF PRESENT ILLNESS:   Katty Veronica is a 78 y.o. female with a past medical history of essential hypertension, CAD, COPD, CVA, GERD, SABINA on nightly 2 L oxygen, depression, and anxiety presented to [facility] on 8/25/2023 for stroke-like symptoms.  Patient reported left-sided facial swelling, left facial droop, tongue deviation to the right, and slurred speech that began at paroxysmally 9:30 p.m. yesterday 08/25/2023.  Patient reported symptoms resolved prior to arrival to ED. previous CVA without deficits.  Patient stated she recently had lumbar surgery on 08/07/2023 at [facility] from fall in June.  Patient reported increased lower extremity swelling and shortness of breath secondary to pain.  Initial vital signs in ED were /78, pulse 78, respirations 16, temperature 36.7° C, and SpO2 97% on room air.  Labs revealed WBC 6.53, .9, and undetectable troponin.  EKG revealed unusual P axis, possible ectopic atrial rhythm with premature supraventricular complexes, and heart rate of 72 beats per minute.  Chest x-ray revealed no acute cardiopulmonary abnormality.  CT head revealed no acute intracranial abnormality.  Patient was given 81 mg aspirin in ED.  Patient was admitted to hospital medicine service for further medical management.   Neurology was consulted.    PAST MEDICAL HISTORY:   Essential hypertension  CAD  COPD  CVA  GERD  SABINA   Depression   Anxiety    PAST SURGICAL HISTORY:     Past Surgical History:   Procedure Laterality Date    APPENDECTOMY      BACK SURGERY      CHOLECYSTECTOMY      EXPLORATION OF COMMON BILE DUCT      HERNIA REPAIR      incisional hernia times 2, central abdomen    HYSTERECTOMY      OVARIAN CYST REMOVAL      TONSILLECTOMY       ALLERGIES:   Morphine and Norco [hydrocodone-acetaminophen]    FAMILY HISTORY:   Father: Kidney cancer, bladder cancer   Mother:  MI   Son: Heart transplant, CMO    SOCIAL HISTORY:   Denies tobacco, drug, and alcohol use    HOME MEDICATIONS:     Prior to Admission medications    Medication Sig Start Date End Date Taking? Authorizing Provider   amLODIPine (NORVASC) 5 MG tablet Take 5 mg by mouth 2 (two) times daily. 8/24/23  Yes Provider, Historical   carvediloL (COREG) 25 MG tablet Take 25 mg by mouth 2 (two) times daily with meals.   Yes Provider, Historical   cetirizine (ZYRTEC) 10 MG tablet TAKE ONE TABLET BY MOUTH EVERY DAY AT 9AM 7/25/23  Yes Provider, Historical   famotidine (PEPCID) 20 MG tablet Take 1 tablet by mouth every evening. 7/25/23  Yes Provider, Historical   gabapentin (NEURONTIN) 300 MG capsule Take 300 mg by mouth 3 (three) times daily. 8/25/23  Yes Provider, Historical   losartan (COZAAR) 25 MG tablet Take 25 mg by mouth 2 (two) times daily. 8/24/23  Yes Provider, Historical   methocarbamoL (ROBAXIN) 500 MG Tab Take 500 mg by mouth.   Yes Provider, Historical   metoclopramide HCl (REGLAN) 5 MG tablet TAKE ONE TABLET BY MOUTH 15 MINUTES BEFORE MEALS AND AT BEDTIME STARTING MONDAY, JUNE 19TH, WHEN YOU HAVE FINISHED YOUR VENLAXAFINE 8/2/23  Yes Provider, Historical   omeprazole (PRILOSEC) 20 MG capsule Take by mouth once daily.   Yes Provider, Historical   oxyCODONE-acetaminophen (PERCOCET)  mg per tablet TAKE ONE TABLET BY MOUTH THREE TIMES DAILY AS NEEDED FOR PAIN.  MAY CAUSE DROWSINESS 8/17/23  Yes Provider, Historical   pantoprazole (PROTONIX) 40 MG tablet Take 40 mg by mouth. 8/24/23  Yes Provider, Historical   sertraline (ZOLOFT) 50 MG tablet Take 50 mg by mouth. 8/24/23  Yes Provider, Historical   amLODIPine (NORVASC) 2.5 MG tablet Take 2.5 mg by mouth once daily.    Provider, Historical   calcium-vitamin D3-magnesium 200 mg calcium- 1.25 mcg Cap Take by mouth.    Provider, Historical   carvediloL (COREG) 25 MG tablet Take 25 mg by mouth.    Provider, Historical   diazePAM (VALIUM) 5 MG tablet Take 5 mg by mouth every 6 (six) hours as needed for Anxiety.    Provider, Historical   dicyclomine (BENTYL) 20 mg tablet Take 20 mg by mouth every 6 (six) hours.    Provider, Historical   famotidine (PEPCID) 20 MG tablet Take 20 mg by mouth every evening. 4/21/20   Provider, Historical   methocarbamoL (ROBAXIN) 500 MG Tab Take 500 mg by mouth 4 (four) times daily.    Provider, Historical   venlafaxine (EFFEXOR) 75 MG tablet Take 75 mg by mouth 2 (two) times daily.    Provider, Historical       REVIEW OF SYSTEMS:   Except as documented, all other systems reviewed and negative     PHYSICAL EXAM:     VITAL SIGNS: 24 HRS MIN & MAX LAST   Temp  Min: 98 °F (36.7 °C)  Max: 98 °F (36.7 °C) 98 °F (36.7 °C)   BP  Min: 130/75  Max: 162/86 (!) 156/81   Pulse  Min: 68  Max: 78  71   Resp  Min: 12  Max: 20 17   SpO2  Min: 93 %  Max: 99 % 99 %       General appearance: Elderly female in no apparent distress.  HEENNT: Atraumatic head.   Lungs: Clear to auscultation bilaterally.   Heart: Regular rate and rhythm.    Abdomen: Soft, non-distended, non-tender. Bowel sounds are normal.   Extremities: Bilateral lower extremity edema   Skin: No Rash. Warm and dry.   Neuro: Awake, alert, and oriented. Motor and sensory exams grossly intact.  No slurred speech.  Strength equal in bilateral upper and lower extremities 4/5 muscle strength  Psych/mental status: Appropriate mood and affect. Responds  appropriately to questions.     LABS AND IMAGING:     Recent Labs   Lab 08/25/23  2255   WBC 6.53   RBC 4.47   HGB 12.7   HCT 36.8*   MCV 82.3   MCH 28.4   MCHC 34.5   RDW 14.4      MPV 11.2*       Recent Labs   Lab 08/25/23  2253      K 4.5   CO2 25   BUN 12.6   CREATININE 0.74   CALCIUM 9.2   MG 1.90   ALBUMIN 3.6   ALKPHOS 119   ALT 11   AST 27   BILITOT 0.7       Microbiology Results (last 7 days)       ** No results found for the last 168 hours. **             CTA Head and Neck (xpd)  START OF REPORT:  Technique: CT angiogram of the intracranial vessels was performed without and with intravenous contrast with direct axial as well as sagittal and coronal reformations. CT angiogram of the neck vessels was performed without and with intravenous contrast with direct axial as well as sagittal and coronal reformations.    Comparison: Comparison is with study dated2023-08-25 22:00:20.    Clinical history: Slurred speech, facial droop.    Findings:  Intracranial Vascular structures:  Internal carotid arteries: Mild atheromatous calcification of the cavernous clinoid and supraclinoid segments of the bilateral internal carotid arteries is seen with only minimal associated narrowing of the associated segments.  Middle cerebral arteries: Unremarkable.  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 The right anterior cerebral artery is unremarkable.  Vertebral arteries: The left vertebral artery terminates into posterior inferior cerebellar artery. The right vertebral artery is patent. Minimal atheromatous calcification is seen in the right distal vertebral artery.  Basilar artery: Unremarkable.  Posterior cerebral arteries: There is fetal origin of the right posterior cerebral artery with a hypoplastic P1 segment. There is fetal origin of the left posterior cerebral artery with an aplastic P1 segment.  Carotids:  Common carotid arteries: The  right and the left common carotid arteries appear unremarkable.  Internal carotid artery: The right and the left internal carotid arteries appear unremarkable.  Vertebral arteries: Right vertebral artery is dominant. Both vertebral arteries are patent and normal in caliber. The origins of both vertebral arteries are unremarkable.  Brain parenchyma: No abnormal leptomeningeal or parenchymal enhancement is seen.    Impression:  1. 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  2. There is fetal origin of the right posterior cerebral artery with a hypoplastic P1 segment. There is fetal origin of the left posterior cerebral artery with an aplastic P1 segment.  3. The left vertebral artery terminates into posterior inferior cerebellar artery.  4. Unremarkable CT angiogram of the head and neck. Details and findings as noted above.        ASSESSMENT & PLAN:   Assessment:  TIA  Lower extremity swelling  Recent lumbar surgery 08/07/2023  History of essential hypertension, CAD, COPD, CVA, GERD, SABINA on nightly 2 L oxygen, depression, and anxiety    Plan:  Neurology consulted, appreciate recommendations   MRI brain   CTA head and neck - moderate hypoplasia of the A1 segment of the left anterior cerebral artery, fetal origin of the right posterior cerebral artery with the hypoplastic P1 segment, fetal origin of the left posterior cerebral artery with an aplastic P1 segment, left vertebral artery terminates into posterior inferior cerebellar artery  Echo   Bilateral carotid artery ultrasound   Lipid panel - 141, HDL 41, LDL 78, triglycerides 109, VLDL 22  Hemoglobin A1c - 5.6  ESR   CRP - 3.60  Urine drug screen   Physical, occupational, and speech therapy consulted   BP parameters per neurology  Fall precautions   Venous bilateral lower extremity ultrasound pending, follow-up results   D-dimer pending, follow-up results  Continue appropriate home medications once med rec updated   Labs in  a.m.    VTE Prophylaxis:  Lovenox    __________________________________________________________________________  INPATIENT LIST OF MEDICATIONS     Scheduled Meds:   aspirin  81 mg Oral Daily    atorvastatin  40 mg Per NG tube Daily    enoxparin  40 mg Subcutaneous Daily    furosemide (LASIX) injection  40 mg Intravenous ED 1 Time     Continuous Infusions:  PRN Meds:.acetaminophen, bisacodyL, labetalol, ondansetron, [START ON 8/27/2023] polyethylene glycol, prochlorperazine, [START ON 8/27/2023] senna-docusate 8.6-50 mg, sodium chloride 0.9%      Gus MADISON PA-C, have reviewed and discussed the case with Dr. Zoë Castillo MD   Please see the following addendum for further assessment and plan from there attending MD.    08/26/2023    ________________________________________________________________________________    MD Addendum:  zoë MADISON assumed care of this patient today  For the patient encounter, I performed the substantive portion of the visit, I reviewed the PA documentation, treatment plan, and medical decision making.  I had face to face time with this patient     78-year-old female with past medical history of COPD, coronary artery disease, GERD, hypertension, stroke presented to ED with complaints of left-sided facial twitching and slurred speech that happened on August 25th at around 9:30 p.m. symptoms resolved she was brought into the ED stat CT of the head was ordered that was negative she was admitted to hospitalist service earlier this morning code fast was called because patient started having left-sided facial droop and slurred speech immediately went and examined the patient neuro NP was present bedside patient's speech was much better symptoms had resolved.  Case was discussed with interventional Neurology as per them no recommendations for T and K due to symptom resolution repeat CT of the head was done that was negative  General awake alert oriented  Chest clear to  auscultate  Abdomen soft nontender    For now will continue with stroke protocol  MRI of the brain pending  Carotid 2D echo ordered results are pending   Repeat CT of the head no acute process  Neurology consulted and following  Started on aspirin and statin   Patient has not taking aspirin the past 1 year  PT OT ST consulted     Prophylaxis: Lovenox  Discharge Planning and Disposition: No mobility needs. Ambulating well. Good social support system.   Anticipated discharge    All diagnosis and differential diagnosis have been reviewed; assessment and plan has been documented; I have personally reviewed the labs and test results that are presently available; I have reviewed the patients medication list; I have reviewed the consulting providers response and recommendations. I have reviewed or attempted to review medical records based upon their availability.    All of the patient and family questions have been addressed and answered. Patient's is agreeable to the above stated plan. I will continue to monitor closely and make adjustments to medical management as needed.      08/26/2023

## 2023-08-26 NOTE — CONSULTS
[facility] - Emergency Dept  Neurology  Consult Note    Patient Name: Katty Veronica  MRN: [id number]  Admission Date: 8/25/2023  Hospital Length of Stay: 0 days  Code Status: Full Code   Attending Provider: Eileen Castillo MD   Consulting Provider: BRENTON Ashley  Primary Care Physician: Sheila Moreira DO  Principal Problem:<principal problem not specified>    Inpatient consult to Neurology  Consult performed by: Keyana Moreno FNP  Consult ordered by: Keyana Moreno FNP         Subjective:     Chief Complaint:    Chief Complaint   Patient presents with    Facial Droop     Pt reports approx 1 hour ago had multiple episodes of l sided facial droop and slurred speech, no symptoms currently          HPI:   78-year-old female with past medical history of COPD, CAD s/p stent, GERD, HTN, stroke (with left-sided weakness), presented to ED on 08/25 with report of left-sided facial twisting and slurred speech.  Symptom onset was 8/25 at approximately 9:30 p.m. symptoms had resolved prior to arrival to ED.  CTh showed no acute intracranial abnormality.  She was admitted to the hospitalist service and Neurology was consulted for stroke workup.    On 08/26, stroke alert was activated at 9:43 a.m. due to recurring slurred speech and left facial droop.  Symptoms resolved quickly; she was back to baseline prior to going for stat CTh.  Repeat CTh showed no acute intracranial abnormalities.  Discussed with Dr. Leach ...  No recommendation for TNK due to symptom resolution.    Stroke alert activated at 9:43 a.m.  Stroke NP responded 9:46 a.m.  Patient was back to baseline prior to NP arrival     Past Medical History:   Diagnosis Date    Anxiety     COPD (chronic obstructive pulmonary disease)     Coronary artery disease     s/p 2 stents    Depression     GERD (gastroesophageal reflux disease)     Glaucoma     Hypertension     Obstructive sleep apnea     on cpap    Stroke      Urinary, incontinence, stress female        Past Surgical History:   Procedure Laterality Date    APPENDECTOMY      BACK SURGERY      CHOLECYSTECTOMY      EXPLORATION OF COMMON BILE DUCT      HERNIA REPAIR      incisional hernia times 2, central abdomen    HYSTERECTOMY      OVARIAN CYST REMOVAL      TONSILLECTOMY         Review of patient's allergies indicates:   Allergen Reactions    Morphine     Norco [hydrocodone-acetaminophen]          No current facility-administered medications on file prior to encounter.     Current Outpatient Medications on File Prior to Encounter   Medication Sig    amLODIPine (NORVASC) 5 MG tablet Take 5 mg by mouth 2 (two) times daily.    carvediloL (COREG) 25 MG tablet Take 25 mg by mouth 2 (two) times daily with meals.    cetirizine (ZYRTEC) 10 MG tablet TAKE ONE TABLET BY MOUTH EVERY DAY AT 9AM    famotidine (PEPCID) 20 MG tablet Take 1 tablet by mouth every evening.    gabapentin (NEURONTIN) 300 MG capsule Take 300 mg by mouth 3 (three) times daily.    losartan (COZAAR) 25 MG tablet Take 25 mg by mouth 2 (two) times daily.    methocarbamoL (ROBAXIN) 500 MG Tab Take 500 mg by mouth.    metoclopramide HCl (REGLAN) 5 MG tablet TAKE ONE TABLET BY MOUTH 15 MINUTES BEFORE MEALS AND AT BEDTIME STARTING MONDAY, JUNE 19TH, WHEN YOU HAVE FINISHED YOUR VENLAXAFINE    omeprazole (PRILOSEC) 20 MG capsule Take by mouth once daily.    oxyCODONE-acetaminophen (PERCOCET)  mg per tablet TAKE ONE TABLET BY MOUTH THREE TIMES DAILY AS NEEDED FOR PAIN. MAY CAUSE DROWSINESS    pantoprazole (PROTONIX) 40 MG tablet Take 40 mg by mouth.    sertraline (ZOLOFT) 50 MG tablet Take 50 mg by mouth.    amLODIPine (NORVASC) 2.5 MG tablet Take 2.5 mg by mouth once daily.    calcium-vitamin D3-magnesium 200 mg calcium- 1.25 mcg Cap Take by mouth.    carvediloL (COREG) 25 MG tablet Take 25 mg by mouth.    diazePAM (VALIUM) 5 MG tablet Take 5 mg by mouth every 6 (six) hours as needed for  Anxiety.    dicyclomine (BENTYL) 20 mg tablet Take 20 mg by mouth every 6 (six) hours.    famotidine (PEPCID) 20 MG tablet Take 20 mg by mouth every evening.    methocarbamoL (ROBAXIN) 500 MG Tab Take 500 mg by mouth 4 (four) times daily.    venlafaxine (EFFEXOR) 75 MG tablet Take 75 mg by mouth 2 (two) times daily.     Family History       Problem Relation (Age of Onset)    Cancer Father    Heart disease Mother          Tobacco Use    Smoking status: Never    Smokeless tobacco: Not on file   Substance and Sexual Activity    Alcohol use: Not Currently    Drug use: Not Currently    Sexual activity: Not on file     Review of Systems   Neurological:  Positive for facial asymmetry, speech difficulty and weakness. Negative for dizziness, tremors, seizures, syncope, light-headedness, numbness and headaches.   All other systems reviewed and are negative.      Objective:     Vital Signs (Most Recent):  Temp: 98 °F (36.7 °C) (08/25/23 2047)  Pulse: 77 (08/26/23 1114)  Resp: (!) 23 (08/26/23 1114)  BP: (!) 163/72 (08/26/23 1114)  SpO2: 98 % (08/26/23 1114) Vital Signs (24h Range):  Temp:  [98 °F (36.7 °C)] 98 °F (36.7 °C)  Pulse:  [68-78] 77  Resp:  [12-23] 23  SpO2:  [93 %-99 %] 98 %  BP: (130-169)/(72-96) 163/72        There is no height or weight on file to calculate BMI.     Physical Exam  Vitals reviewed.   Constitutional:       General: She is awake. She is not in acute distress.     Appearance: She is not ill-appearing or toxic-appearing.   Eyes:      General: Vision grossly intact. No visual field deficit.     Extraocular Movements: Extraocular movements intact.      Pupils: Pupils are equal, round, and reactive to light.   Cardiovascular:      Rate and Rhythm: Normal rate.   Pulmonary:      Effort: Pulmonary effort is normal.   Skin:     General: Skin is warm and dry.      Capillary Refill: Capillary refill takes less than 2 seconds.   Neurological:      Mental Status: She is alert and oriented to person,  place, and time. Mental status is at baseline.      Cranial Nerves: No dysarthria or facial asymmetry.      Sensory: Sensation is intact.      Motor: No weakness, tremor, atrophy, seizure activity or pronator drift.      Coordination: Coordination is intact.   Psychiatric:         Attention and Perception: Attention normal.         Mood and Affect: Mood and affect normal.         Speech: Speech normal.         Behavior: Behavior normal. Behavior is cooperative.      Significant Labs: Hemoglobin A1c:   Recent Labs   Lab 08/26/23  0651   HGBA1C 5.6     BMP:   Recent Labs   Lab 08/25/23  2253      K 4.5   CO2 25   BUN 12.6   CREATININE 0.74   CALCIUM 9.2   MG 1.90     CBC:   Recent Labs   Lab 08/25/23 2255   WBC 6.53   HGB 12.7   HCT 36.8*        Inflammatory Markers:   Recent Labs   Lab 08/25/23 2253 08/26/23 0651   SEDRATE  --  21*   CRP 3.60  --        Significant Imaging: I have reviewed all pertinent imaging results/findings within the past 24 hours.      Assessment and Plan:     TIA (transient ischemic attack)  TIA - recurrent left facial droop and slurred speech    Continue stroke workup ...  Unsure if patient will be able to have MRI due to spinal stimulator.  Continue aspirin 81 mg daily  Continue atorvastatin 40 mg daily  Plan for diagnostic cerebral angiogram on Monday (8/28)  NPO after midnight on Monday  PT/OT/ST to evaluate        Antithrombotics for secondary stroke prevention: Antiplatelets: Aspirin: 81 mg daily    Statins for secondary stroke prevention and hyperlipidemia, if present: Statins: Atorvastatin- 40 mg daily    Aggressive risk factor modification: HTN, HLD, Obesity, CAD     Rehab efforts: The patient has been evaluated by a stroke team provider and the therapy needs have been fully considered based off the presenting complaints and exam findings. The following therapy evaluations are needed: PT evaluate and treat, OT evaluate and treat, SLP evaluate and treat    Diagnostics  ordered/pending: Carotid ultrasound to assess vasculature, MRI head without contrast to assess brain parenchyma, TTE to assess cardiac function/status   Stroke workup:  -CTH:  No acute intracranial abnormality   -CTA h/n:   1. 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  2. There is fetal origin of the right posterior cerebral artery with a hypoplastic P1 segment. There is fetal origin of the left posterior cerebral artery with an aplastic P1 segment.  3. The left vertebral artery terminates into posterior inferior cerebellar artery.  4.  No hemodynamically significant flow-limiting stenosis identified.  -repeat CTh: Chronic age-related changes.  No acute process.  -ECHO:  Completed, interpretation pending  -CUS:  Completed, interpretation pending  -LDL: 78  -A1c:  5.6  -TSH:  2.167  -not on antiplatelet, anticoagulation, or statin therapy prior to hospital admission    VTE prophylaxis: Enoxaparin 40 mg SQ every 24 hours  Mechanical prophylaxis: Place SCDs    BP parameters: TIA: SBP <220 until imaging confirmation of no infarct             VTE Risk Mitigation (From admission, onward)         Ordered     enoxaparin injection 40 mg  Daily         08/26/23 0636     Place sequential compression device  Until discontinued         08/26/23 0636                Thank you for your consult. Further recommendations may follow by MD. Keyana Moreno, BRENTON  Neurology  [facility] - Emergency Dept

## 2023-08-26 NOTE — PROCEDURES
Katty Veronica is a 78 y.o. female patient.    Temp: 98 °F (36.7 °C) (08/25/23 2047)  Pulse: 71 (08/26/23 0800)  Resp: 18 (08/26/23 0800)  BP: (!) 169/84 (08/26/23 0800)  SpO2: 98 % (08/26/23 0800)    PICC  Date/Time: 8/26/2023 8:39 AM  Performed by: Mariia Milian RN  Consent Done: Yes  Time out: Immediately prior to procedure a time out was called to verify the correct patient, procedure, equipment, support staff and site/side marked as required  Indications: vascular access  Anesthesia: local infiltration  Local anesthetic: lidocaine 1% without epinephrine  Anesthetic Total (mL): 5  Preparation: skin prepped with ChloraPrep  Skin prep agent dried: skin prep agent completely dried prior to procedure  Sterile barriers: all five maximum sterile barriers used - cap, mask, sterile gown, sterile gloves, and large sterile sheet  Hand hygiene: hand hygiene performed prior to central venous catheter insertion  Location details: left brachial  Catheter type: single lumen  Catheter size: 4 Fr  Catheter Length: 16cm    Ultrasound guidance: yes  Vessel Caliber: medium and patent, compressibility normal  Needle advanced into vessel with real time Ultrasound guidance.  Guidewire confirmed in vessel.  Sterile sheath used.  Number of attempts: 1  Post-procedure: blood return through all ports, sterile dressing applied and chlorhexidine patch    Complications: none  Comments: Arm circumference 34 cm          Name Mariia Milian RN  8/26/2023

## 2023-08-26 NOTE — ASSESSMENT & PLAN NOTE
TIA - recurrent left facial droop and slurred speech    Continue stroke workup ...  Unsure if patient will be able to have MRI due to spinal stimulator.  Continue aspirin 81 mg daily  Continue atorvastatin 40 mg daily  Plan for diagnostic cerebral angiogram on Monday (8/28)  NPO after midnight on Monday  PT/OT/ST to evaluate        Antithrombotics for secondary stroke prevention: Antiplatelets: Aspirin: 81 mg daily    Statins for secondary stroke prevention and hyperlipidemia, if present: Statins: Atorvastatin- 40 mg daily    Aggressive risk factor modification: HTN, HLD, Obesity, CAD     Rehab efforts: The patient has been evaluated by a stroke team provider and the therapy needs have been fully considered based off the presenting complaints and exam findings. The following therapy evaluations are needed: PT evaluate and treat, OT evaluate and treat, SLP evaluate and treat    Diagnostics ordered/pending: Carotid ultrasound to assess vasculature, MRI head without contrast to assess brain parenchyma, TTE to assess cardiac function/status   Stroke workup:  -CTH:  No acute intracranial abnormality   -CTA h/n:   1. 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  2. There is fetal origin of the right posterior cerebral artery with a hypoplastic P1 segment. There is fetal origin of the left posterior cerebral artery with an aplastic P1 segment.  3. The left vertebral artery terminates into posterior inferior cerebellar artery.  4.  No hemodynamically significant flow-limiting stenosis identified.  -repeat CTh: Chronic age-related changes.  No acute process.  -ECHO:  Completed, interpretation pending  -CUS:  Completed, interpretation pending  -LDL: 78  -A1c:  5.6  -TSH:  2.167  -not on antiplatelet, anticoagulation, or statin therapy prior to hospital admission    VTE prophylaxis: Enoxaparin 40 mg SQ every 24 hours  Mechanical prophylaxis: Place SCDs    BP parameters: TIA: SBP <220  until imaging confirmation of no infarct

## 2023-08-27 LAB — POCT GLUCOSE: 86 MG/DL (ref 70–110)

## 2023-08-27 PROCEDURE — 99233 SBSQ HOSP IP/OBS HIGH 50: CPT | Mod: ,,, | Performed by: PSYCHIATRY & NEUROLOGY

## 2023-08-27 PROCEDURE — 63600175 PHARM REV CODE 636 W HCPCS: Performed by: INTERNAL MEDICINE

## 2023-08-27 PROCEDURE — G0378 HOSPITAL OBSERVATION PER HR: HCPCS

## 2023-08-27 PROCEDURE — 97162 PT EVAL MOD COMPLEX 30 MIN: CPT

## 2023-08-27 PROCEDURE — 25000003 PHARM REV CODE 250: Performed by: INTERNAL MEDICINE

## 2023-08-27 PROCEDURE — 96372 THER/PROPH/DIAG INJ SC/IM: CPT | Performed by: INTERNAL MEDICINE

## 2023-08-27 PROCEDURE — 92610 EVALUATE SWALLOWING FUNCTION: CPT

## 2023-08-27 PROCEDURE — A4216 STERILE WATER/SALINE, 10 ML: HCPCS | Performed by: INTERNAL MEDICINE

## 2023-08-27 PROCEDURE — 99233 PR SUBSEQUENT HOSPITAL CARE,LEVL III: ICD-10-PCS | Mod: ,,, | Performed by: PSYCHIATRY & NEUROLOGY

## 2023-08-27 PROCEDURE — 25000003 PHARM REV CODE 250: Performed by: NURSE PRACTITIONER

## 2023-08-27 RX ORDER — VENLAFAXINE 37.5 MG/1
75 TABLET ORAL 2 TIMES DAILY
Status: DISCONTINUED | OUTPATIENT
Start: 2023-08-27 | End: 2023-08-29 | Stop reason: HOSPADM

## 2023-08-27 RX ORDER — METHOCARBAMOL 500 MG/1
500 TABLET, FILM COATED ORAL 4 TIMES DAILY PRN
Status: DISCONTINUED | OUTPATIENT
Start: 2023-08-27 | End: 2023-08-29 | Stop reason: HOSPADM

## 2023-08-27 RX ORDER — SERTRALINE HYDROCHLORIDE 50 MG/1
50 TABLET, FILM COATED ORAL DAILY
Status: DISCONTINUED | OUTPATIENT
Start: 2023-08-27 | End: 2023-08-29 | Stop reason: HOSPADM

## 2023-08-27 RX ORDER — CARVEDILOL 3.12 MG/1
6.25 TABLET ORAL 2 TIMES DAILY
Status: DISCONTINUED | OUTPATIENT
Start: 2023-08-27 | End: 2023-08-29

## 2023-08-27 RX ORDER — GABAPENTIN 300 MG/1
300 CAPSULE ORAL 3 TIMES DAILY
Status: DISCONTINUED | OUTPATIENT
Start: 2023-08-27 | End: 2023-08-29 | Stop reason: HOSPADM

## 2023-08-27 RX ORDER — PANTOPRAZOLE SODIUM 40 MG/1
40 TABLET, DELAYED RELEASE ORAL DAILY
Status: DISCONTINUED | OUTPATIENT
Start: 2023-08-27 | End: 2023-08-29 | Stop reason: HOSPADM

## 2023-08-27 RX ADMIN — SODIUM CHLORIDE, PRESERVATIVE FREE 10 ML: 5 INJECTION INTRAVENOUS at 09:08

## 2023-08-27 RX ADMIN — GABAPENTIN 300 MG: 300 CAPSULE ORAL at 03:08

## 2023-08-27 RX ADMIN — SODIUM CHLORIDE, PRESERVATIVE FREE 10 ML: 5 INJECTION INTRAVENOUS at 12:08

## 2023-08-27 RX ADMIN — ATORVASTATIN CALCIUM 40 MG: 40 TABLET, FILM COATED ORAL at 08:08

## 2023-08-27 RX ADMIN — ACETAMINOPHEN 650 MG: 325 TABLET, FILM COATED ORAL at 09:08

## 2023-08-27 RX ADMIN — ONDANSETRON 4 MG: 2 INJECTION INTRAMUSCULAR; INTRAVENOUS at 09:08

## 2023-08-27 RX ADMIN — ACETAMINOPHEN 650 MG: 325 TABLET, FILM COATED ORAL at 06:08

## 2023-08-27 RX ADMIN — CARVEDILOL 6.25 MG: 3.12 TABLET, FILM COATED ORAL at 09:08

## 2023-08-27 RX ADMIN — VENLAFAXINE 75 MG: 37.5 TABLET ORAL at 09:08

## 2023-08-27 RX ADMIN — SODIUM CHLORIDE: 9 INJECTION, SOLUTION INTRAVENOUS at 12:08

## 2023-08-27 RX ADMIN — METHOCARBAMOL 500 MG: 500 TABLET ORAL at 09:08

## 2023-08-27 RX ADMIN — ASPIRIN 81 MG: 81 TABLET, COATED ORAL at 08:08

## 2023-08-27 RX ADMIN — PROCHLORPERAZINE EDISYLATE 5 MG: 5 INJECTION INTRAMUSCULAR; INTRAVENOUS at 06:08

## 2023-08-27 RX ADMIN — PROCHLORPERAZINE EDISYLATE 5 MG: 5 INJECTION INTRAMUSCULAR; INTRAVENOUS at 09:08

## 2023-08-27 RX ADMIN — SODIUM CHLORIDE, PRESERVATIVE FREE 10 ML: 5 INJECTION INTRAVENOUS at 11:08

## 2023-08-27 RX ADMIN — ONDANSETRON 4 MG: 2 INJECTION INTRAMUSCULAR; INTRAVENOUS at 05:08

## 2023-08-27 RX ADMIN — GABAPENTIN 300 MG: 300 CAPSULE ORAL at 09:08

## 2023-08-27 RX ADMIN — SERTRALINE HYDROCHLORIDE 50 MG: 50 TABLET ORAL at 03:08

## 2023-08-27 RX ADMIN — ENOXAPARIN SODIUM 40 MG: 40 INJECTION SUBCUTANEOUS at 07:08

## 2023-08-27 RX ADMIN — PANTOPRAZOLE SODIUM 40 MG: 40 TABLET, DELAYED RELEASE ORAL at 03:08

## 2023-08-27 NOTE — PROGRESS NOTES
[facility] - Emergency Dept  Neurology  Progress Note    Patient Name: Katty Veronica  MRN: [id number]  Admission Date: 8/25/2023  Hospital Length of Stay: 0 days  Code Status: Full Code   Attending Provider: Eileen Castillo MD  Primary Care Physician: Sheila Moreira DO   Principal Problem:TIA (transient ischemic attack)    HPI:   78-year-old female with past medical history of COPD, CAD s/p stent, GERD, HTN, stroke (with left-sided weakness), presented to ED on 08/25 with report of left-sided facial twisting and slurred speech.  Symptom onset was 8/25 at approximately 9:30 p.m. symptoms had resolved prior to arrival to ED.  CTh showed no acute intracranial abnormality.  She was admitted to the hospitalist service and Neurology was consulted for stroke workup.    On 08/26, stroke alert was activated at 9:43 a.m. due to recurring slurred speech and left facial droop.  Symptoms resolved quickly; she was back to baseline prior to going for stat CTh.  Repeat CTh showed no acute intracranial abnormalities.  Discussed with Dr. Leach ...  No recommendation for TNK due to symptom resolution.      Overview/Hospital Course:  No notes on file        Subjective:     Interval History:  Sitting up in bed.  Reports continued back pain and spasms in the right leg.  Unclear if spinal stimulator is MRI compatible.  Repeat CTh this a.m. showed no acute intracranial abnormalities.      Current Facility-Administered Medications   Medication Dose Route Frequency Provider Last Rate Last Admin    0.9%  NaCl infusion   Intravenous Continuous Anabelle Moyer, FNP 75 mL/hr at 08/26/23 2109 New Bag at 08/26/23 2109    acetaminophen tablet 650 mg  650 mg Oral Q6H PRN Chandler Urrutia MD   650 mg at 08/27/23 0624    aspirin EC tablet 81 mg  81 mg Oral Daily Chandler Urrutia MD   81 mg at 08/27/23 0838    atorvastatin tablet 40 mg  40 mg Per NG tube Daily Chandler Urrutia MD   40 mg at 08/27/23 0838    bisacodyL  suppository 10 mg  10 mg Rectal Daily PRN Chandler Urrutia MD        enoxaparin injection 40 mg  40 mg Subcutaneous Daily Chandler Urrutia MD   40 mg at 08/26/23 1616    labetaloL injection 10 mg  10 mg Intravenous Q30 Min PRN Chandler Urrutia MD        methocarbamoL tablet 500 mg  500 mg Oral QID PRN Eileen Castillo MD        ondansetron injection 4 mg  4 mg Intravenous Q4H PRN Chandler Urrutia MD   4 mg at 08/27/23 0554    polyethylene glycol packet 17 g  17 g Oral BID PRN Chandler Urrutia MD        prochlorperazine injection Soln 5 mg  5 mg Intravenous Q6H PRN Chandler Urrutia MD   5 mg at 08/27/23 0926    senna-docusate 8.6-50 mg per tablet 1 tablet  1 tablet Oral BID PRN Chandler Urrutia MD        sodium chloride 0.9% flush 10 mL  10 mL Intravenous PRN Chandler Urrutia MD        sodium chloride 0.9% flush 10 mL  10 mL Intravenous Q6H Chandler Urrutia MD   10 mL at 08/26/23 1141    And    sodium chloride 0.9% flush 10 mL  10 mL Intravenous PRN Chandler Urrutia MD         Current Outpatient Medications   Medication Sig Dispense Refill    amLODIPine (NORVASC) 5 MG tablet Take 5 mg by mouth 2 (two) times daily.      carvediloL (COREG) 25 MG tablet Take 25 mg by mouth 2 (two) times daily with meals.      cetirizine (ZYRTEC) 10 MG tablet TAKE ONE TABLET BY MOUTH EVERY DAY AT 9AM      famotidine (PEPCID) 20 MG tablet Take 1 tablet by mouth every evening.      gabapentin (NEURONTIN) 300 MG capsule Take 300 mg by mouth 3 (three) times daily.      losartan (COZAAR) 25 MG tablet Take 25 mg by mouth 2 (two) times daily.      methocarbamoL (ROBAXIN) 500 MG Tab Take 500 mg by mouth.      metoclopramide HCl (REGLAN) 5 MG tablet TAKE ONE TABLET BY MOUTH 15 MINUTES BEFORE MEALS AND AT BEDTIME STARTING MONDAY, JUNE 19TH, WHEN YOU HAVE FINISHED YOUR VENLAXAFINE      omeprazole (PRILOSEC) 20 MG capsule Take by mouth once daily.      oxyCODONE-acetaminophen (PERCOCET)  mg per tablet TAKE ONE TABLET BY MOUTH THREE TIMES DAILY AS  NEEDED FOR PAIN. MAY CAUSE DROWSINESS      pantoprazole (PROTONIX) 40 MG tablet Take 40 mg by mouth.      sertraline (ZOLOFT) 50 MG tablet Take 50 mg by mouth.      amLODIPine (NORVASC) 2.5 MG tablet Take 2.5 mg by mouth once daily.      calcium-vitamin D3-magnesium 200 mg calcium- 1.25 mcg Cap Take by mouth.      carvediloL (COREG) 25 MG tablet Take 25 mg by mouth.      diazePAM (VALIUM) 5 MG tablet Take 5 mg by mouth every 6 (six) hours as needed for Anxiety.      dicyclomine (BENTYL) 20 mg tablet Take 20 mg by mouth every 6 (six) hours.      famotidine (PEPCID) 20 MG tablet Take 20 mg by mouth every evening.      methocarbamoL (ROBAXIN) 500 MG Tab Take 500 mg by mouth 4 (four) times daily.      venlafaxine (EFFEXOR) 75 MG tablet Take 75 mg by mouth 2 (two) times daily.         Review of Systems   Neurological:  Positive for weakness (left side (chronic)). Negative for dizziness, tremors, seizures, syncope, facial asymmetry, speech difficulty, light-headedness, numbness and headaches.   All other systems reviewed and are negative.    Objective:     Vital Signs (Most Recent):  Temp: 98 °F (36.7 °C) (08/27/23 0752)  Pulse: 79 (08/27/23 1004)  Resp: (!) 25 (08/27/23 1004)  BP: (!) 144/91 (08/27/23 1004)  SpO2: 100 % (08/27/23 1004) Vital Signs (24h Range):  Temp:  [98 °F (36.7 °C)] 98 °F (36.7 °C)  Pulse:  [69-83] 79  Resp:  [16-25] 25  SpO2:  [96 %-100 %] 100 %  BP: (129-163)/() 144/91        There is no height or weight on file to calculate BMI.     Physical Exam  Vitals reviewed.   Constitutional:       General: She is awake. She is not in acute distress.     Appearance: She is obese. She is not ill-appearing or toxic-appearing.   HENT:      Mouth/Throat:      Mouth: Mucous membranes are dry.   Eyes:      General: Vision grossly intact. No visual field deficit.     Extraocular Movements: Extraocular movements intact.      Pupils: Pupils are equal, round, and reactive to light.   Cardiovascular:       Rate and Rhythm: Normal rate.   Pulmonary:      Effort: Pulmonary effort is normal.   Skin:     General: Skin is warm and dry.      Capillary Refill: Capillary refill takes less than 2 seconds.   Neurological:      Mental Status: She is alert and oriented to person, place, and time. Mental status is at baseline.      Cranial Nerves: No dysarthria or facial asymmetry.      Sensory: Sensation is intact.      Motor: Weakness (Mild Left sided weakness (chronic), left hand grasp slightly weaker) present. No tremor, atrophy or seizure activity.      Coordination: Coordination is intact.   Psychiatric:         Attention and Perception: Attention normal.         Mood and Affect: Mood and affect normal.         Speech: Speech normal.         Behavior: Behavior normal. Behavior is cooperative.          Significant Labs: Hemoglobin A1c:   Recent Labs   Lab 08/26/23  0651   HGBA1C 5.6     BMP:   Recent Labs   Lab 08/25/23 2253      K 4.5   CO2 25   BUN 12.6   CREATININE 0.74   CALCIUM 9.2   MG 1.90     CBC:   Recent Labs   Lab 08/25/23 2255   WBC 6.53   HGB 12.7   HCT 36.8*        Inflammatory Markers:   Recent Labs   Lab 08/25/23 2253 08/26/23  0651   SEDRATE  --  21*   CRP 3.60  --        Significant Imaging: I have reviewed all pertinent imaging results/findings within the past 24 hours.      Assessment and Plan:     * TIA (transient ischemic attack)  TIA - recurrent left facial droop and slurred speech    Repeat CTh showed no acute abnormalities.  Continue aspirin 81 mg daily  Continue atorvastatin 40 mg daily  Plan for diagnostic cerebral angiogram on Monday (8/28)  NPO after midnight on Monday  PT/OT to evaluate    Further recommendations may follow by MD.      Antithrombotics for secondary stroke prevention: Antiplatelets: Aspirin: 81 mg daily    Statins for secondary stroke prevention and hyperlipidemia, if present: Statins: Atorvastatin- 40 mg daily    Aggressive risk factor modification: HTN, HLD,  Obesity, CAD     Rehab efforts: The patient has been evaluated by a stroke team provider and the therapy needs have been fully considered based off the presenting complaints and exam findings. The following therapy evaluations are needed: PT evaluate and treat, OT evaluate and treat, SLP evaluate and treat    Diagnostics ordered/pending: TTE to assess cardiac function/status   Stroke workup limited, unclear if spinal stimulator is MRI compatible.  -CTH:  No acute intracranial abnormality   -CTA h/n:   1. 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  2. There is fetal origin of the right posterior cerebral artery with a hypoplastic P1 segment. There is fetal origin of the left posterior cerebral artery with an aplastic P1 segment.  3. The left vertebral artery terminates into posterior inferior cerebellar artery.  4.  No hemodynamically significant flow-limiting stenosis identified.  -repeat CTh: Chronic age-related changes.  No acute process.  -ECHO:  Completed, interpretation pending  -CUS:  Negative  -LDL: 78  -A1c:  5.6  -TSH:  2.167  -not on antiplatelet, anticoagulation, or statin therapy prior to hospital admission  -repeat CTh (8/27): No acute intracranial process identified.     VTE prophylaxis: Enoxaparin 40 mg SQ every 24 hours  Mechanical prophylaxis: Place SCDs    BP parameters: TIA: Normalize blood pressure, onset greater than 24 hours            VTE Risk Mitigation (From admission, onward)         Ordered     enoxaparin injection 40 mg  Daily         08/26/23 0636     Place sequential compression device  Until discontinued         08/26/23 0636                BRENTON Ashley  Neurology  [facility] - Emergency Dept

## 2023-08-27 NOTE — ASSESSMENT & PLAN NOTE
TIA - recurrent left facial droop and slurred speech    Repeat CTh showed no acute abnormalities.  Continue aspirin 81 mg daily  Continue atorvastatin 40 mg daily  Plan for diagnostic cerebral angiogram on Monday (8/28)  NPO after midnight on Monday  PT/OT to evaluate    Further recommendations may follow by MD.      Antithrombotics for secondary stroke prevention: Antiplatelets: Aspirin: 81 mg daily    Statins for secondary stroke prevention and hyperlipidemia, if present: Statins: Atorvastatin- 40 mg daily    Aggressive risk factor modification: HTN, HLD, Obesity, CAD     Rehab efforts: The patient has been evaluated by a stroke team provider and the therapy needs have been fully considered based off the presenting complaints and exam findings. The following therapy evaluations are needed: PT evaluate and treat, OT evaluate and treat, SLP evaluate and treat    Diagnostics ordered/pending: TTE to assess cardiac function/status   Stroke workup limited, unclear if spinal stimulator is MRI compatible.  -CTH:  No acute intracranial abnormality   -CTA h/n:   1. 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  2. There is fetal origin of the right posterior cerebral artery with a hypoplastic P1 segment. There is fetal origin of the left posterior cerebral artery with an aplastic P1 segment.  3. The left vertebral artery terminates into posterior inferior cerebellar artery.  4.  No hemodynamically significant flow-limiting stenosis identified.  -repeat CTh: Chronic age-related changes.  No acute process.  -ECHO:  Completed, interpretation pending  -CUS:  Negative  -LDL: 78  -A1c:  5.6  -TSH:  2.167  -not on antiplatelet, anticoagulation, or statin therapy prior to hospital admission  -repeat CTh (8/27): No acute intracranial process identified.     VTE prophylaxis: Enoxaparin 40 mg SQ every 24 hours  Mechanical prophylaxis: Place SCDs    BP parameters: TIA: Normalize blood pressure,  onset greater than 24 hours

## 2023-08-27 NOTE — PT/OT/SLP EVAL
Physical Therapy Evaluation    Patient Name:  Katty Veronica   MRN:  [id number]    Recommendations:     Discharge Recommendations: nursing facility, skilled   Discharge Equipment Recommendations: none   Barriers to discharge: Impaired mobility and Ongoing medical needs    Assessment:     Katty Veronica is a 78 y.o. female admitted with a medical diagnosis of TIA (transient ischemic attack). Pt experiencing intermittent L facial droop and numbness beginning Friday evening. Pt pending cerebral angiogram Monday 8/28/23. Per daughter's report in room pt also with increased frequency of falls recently. She has chronic back pain with stimulator in place as well as L TKA approximately 1 yr ago which she states she has residual numbness in this extremity. Earlier this month pt underwent kyphoplasty at [facility] for compression fractures in lumbar spine. She presents with the following impairments/functional limitations: weakness, impaired endurance, impaired functional mobility, gait instability, impaired balance, pain. Overall pt mobility limited by pain and generalized stiffness. She required min A for transfers and ambulation with RW. Due to ongoing decline in mobility, high risk of falls and re-admission, this PT recommends SNF placement for ongoing skilled interventions to improve independence prior to return home.    Rehab Prognosis: Good; patient would benefit from acute skilled PT services to address these deficits and reach maximum level of function.    Recent Surgery: * No surgery found *      Plan:     During this hospitalization, patient to be seen 5 x/week to address the identified rehab impairments via gait training, therapeutic activities, therapeutic exercises and progress toward the following goals:    Plan of Care Expires:  09/27/23    Subjective     Chief Complaint: back pain  Patient/Family Comments/goals: return home  Pain/Comfort:  Pain Rating 1: 9/10  Location 1: back  Pain Addressed 1:  Reposition, Distraction  Pain Rating Post-Intervention 1: 6/10    Patients cultural, spiritual, Pentecostalism conflicts given the current situation: no    Living Environment:  Pt lives alone in handicap accessible apartment.  Prior to admission, patients level of function was Modified Independent.  Equipment used at home: walker, rolling.  DME owned (not currently used): none.  Upon discharge, patient will have assistance from TBD.    Objective:     Communicated with nurse prior to session.  Patient found HOB elevated with blood pressure cuff, PureWick, peripheral IV, pulse ox (continuous), telemetry  upon PT entry to room.    General Precautions: Standard, fall  Orthopedic Precautions:N/A   Braces: N/A  Respiratory Status: Nasal cannula, flow 2 L/min      Exams:  Cognitive Exam:  Patient is oriented to Person, Place, Time, and Situation  RLE ROM: WFL  RLE Strength: WFL  LLE ROM: WFL  LLE Strength: WFL  Skin integrity: Visible skin intact      Functional Mobility:  Bed Mobility:     Rolling Right: minimum assistance  Supine to Sit: minimum assistance  Sit to Supine: minimum assistance  Transfers:     Sit to Stand:  minimum assistance with rolling walker  Gait: 30ft with RW, min A. Verbal cues to correct posture. Lateral steps to HOB with HHA due to limited space, min A with increase trunk sway  Balance: fair      AM-PAC 6 CLICK MOBILITY  Total Score:18     Patient provided with verbal education regarding PT POC.  Understanding was verbalized, however additional teaching warranted.     Patient left HOB elevated with all lines intact, call button in reach, and nurse notified.    GOALS:   Multidisciplinary Problems       Physical Therapy Goals          Problem: Physical Therapy    Goal Priority Disciplines Outcome Goal Variances Interventions   Physical Therapy Goal     PT, PT/OT Ongoing, Progressing     Description: Goals to be met by: 09/27/2023     Patient will increase functional independence with mobility by  performin. Supine to sit with [place]  2. Sit to stand transfer with [place]  3. Bed to chair transfer with Modified [place] using Rolling Walker  4. Gait  x 300 feet with Modified [place] using Rolling Walker.                          History:     Past Medical History:   Diagnosis Date    Anxiety     COPD (chronic obstructive pulmonary disease)     Coronary artery disease     s/p 2 stents    Depression     GERD (gastroesophageal reflux disease)     Glaucoma     Hypertension     Obstructive sleep apnea     on cpap    Stroke     Urinary, incontinence, stress female        Past Surgical History:   Procedure Laterality Date    APPENDECTOMY      BACK SURGERY      CHOLECYSTECTOMY      EXPLORATION OF COMMON BILE DUCT      HERNIA REPAIR      incisional hernia times 2, central abdomen    HYSTERECTOMY      OVARIAN CYST REMOVAL      TONSILLECTOMY         Time Tracking:     PT Received On: 23  PT Start Time: 1123     PT Stop Time: 1138  PT Total Time (min): 15 min     Billable Minutes: Evaluation moderate      2023

## 2023-08-27 NOTE — PT/OT/SLP EVAL
"[facility]  Speech Language Pathology Department  Clinical Swallow Evaluation    Patient Name:  Katty Veronica   MRN:  [id number]    Recommendations:     General recommendations:  SLP intervention not indicated  Diet recommendations:  Easy to Chew solids (IDDSI 7) and thin liquids (IDDSI 0)  Medications: per patient preference  Swallow strategies/precautions: slow rate  Precautions: Standard, fall    History:     Katty Veronica is a/n 78 y.o. female admitted with symptoms of recurring TIAs, presenting with slurred speech.  Now resolved.  Daughter at bedside, reported pt has hx of esophageal "issues."  SLP reconsulted due to new episode of slurred speech.    Past Medical History:   Diagnosis Date    Anxiety     COPD (chronic obstructive pulmonary disease)     Coronary artery disease     s/p 2 stents    Depression     GERD (gastroesophageal reflux disease)     Glaucoma     Hypertension     Obstructive sleep apnea     on cpap    Stroke     Urinary, incontinence, stress female      Past Surgical History:   Procedure Laterality Date    APPENDECTOMY      BACK SURGERY      CHOLECYSTECTOMY      EXPLORATION OF COMMON BILE DUCT      HERNIA REPAIR      incisional hernia times 2, central abdomen    HYSTERECTOMY      OVARIAN CYST REMOVAL      TONSILLECTOMY           Home Diet: Easy to Chew and thin liquids  Current Method of Nutrition: NPO    Patient complaint: Nauseated    Imaging   Results for orders placed during the hospital encounter of 07/08/23    X-Ray Chest 1 View    Narrative  EXAMINATION:  XR CHEST 1 VIEW    CLINICAL HISTORY:  Other specified soft tissue disorders    TECHNIQUE:  Single frontal view of the chest was performed.    COMPARISON:  03/30/2022    FINDINGS:  The lungs are clear, with normal appearance of pulmonary vasculature and no pleural effusion or pneumothorax.    The cardiac silhouette is normal in size. The hilar and mediastinal contours are unremarkable.    Bones " are intact.    Impression  No acute abnormality.      Electronically signed by: Ketan aRnd MD  Date:    07/08/2023  Time:    14:59    Results for orders placed in visit on 04/08/14    CT Chest With Contrast    Narrative  CT thorax with contrast    INDICATION: Pulmonary mass.    Mediastinal windows demonstrate right superhilar node complex. Small  precarinal nodes present. Minor coronary vascular calcification  present.    No significant left hilar hernia present.    Lung windows unremarkable. There is some benign there is a posterior  pleural wall thickening.    Lung fields and mediastinum looks comparable to prior study of August 20, 2012.    Adrenals normal. Patient status post cholecystectomy.    IMPRESSION: Significant hiatal hernia with slight twisting of the  stomach above the hemidiaphragm. This hernia has increased in size  slightly compared to prior study of August 20, 2012    Lung fields and mediastinum otherwise stable.    Electronically Signed By: Marty Hernandez MD  Date/Time Signed: 04/08/2014 11:18    No results found for this or any previous visit.    Subjective     Patient awake, alert, and cooperative.    Patient goals: Eat/drink.     Spiritual/Cultural/Amish Beliefs/Practices that affect care: no  Pain/Comfort: Pain Rating 1: 0/10      Objective:     ORAL MUSCULATURE  Dentition: edentulous  Secretion Management: adequate  Mucosal Quality: good  Facial Movement: WFL  Buccal Strength & Mobility: WFL  Mandibular Strength & Mobility: WFL  Oral Labial Strength & Mobility: WFL  Lingual Strength & Mobility: WFL  Velar Elevation: WFL  Vocal Quality: adequate      Consistency Fed By Oral Symptoms Pharyngeal Symptoms   Thin liquid by straw Self None None   Puree Self None None     Assessment:     Swallow function appeared WNL with no overt s/sx of aspiration.  Pt refused solid trial due to nauseau; however with absence of weakness it appears safe for pt to resume normal diet.  Recommend Easy to  Chew diet, thin liquids, meds per pt preference.    Goals:     Multidisciplinary Problems       SLP Goals       Not on file                  Patient Education:     Patient and daughter/s provided with verbal education regarding swallow function.  Understanding was verbalized.    Plan:     Easy to Chew diet  Thin liquids  Meds per pt preference       Time Tracking:     SLP Treatment Date:   08/27/23  Speech Start Time:  0950  Speech Stop Time:  1000     Speech Total Time (min):  10 min    Billable minutes:  Swallow and Oral Function Evaluation, 10 minutes     08/27/2023

## 2023-08-27 NOTE — PLAN OF CARE
Problem: Physical Therapy  Goal: Physical Therapy Goal  Description: Goals to be met by: 2023     Patient will increase functional independence with mobility by performin. Supine to sit with [place]  2. Sit to stand transfer with [place]  3. Bed to chair transfer with Modified [place] using Rolling Walker  4. Gait  x 300 feet with Modified [place] using Rolling Walker.     Outcome: Ongoing, Progressing

## 2023-08-27 NOTE — SUBJECTIVE & OBJECTIVE
Subjective:     Interval History:  Sitting up in bed.  Reports continued back pain and spasms in the right leg.  Unclear if spinal stimulator is MRI compatible.  Repeat CTh this a.m. showed no acute intracranial abnormalities.      Current Facility-Administered Medications   Medication Dose Route Frequency Provider Last Rate Last Admin    0.9%  NaCl infusion   Intravenous Continuous Anabelle Moyer, FNP 75 mL/hr at 08/26/23 2109 New Bag at 08/26/23 2109    acetaminophen tablet 650 mg  650 mg Oral Q6H PRN Chandler Urrutia MD   650 mg at 08/27/23 0624    aspirin EC tablet 81 mg  81 mg Oral Daily Chandler Urrutia MD   81 mg at 08/27/23 0838    atorvastatin tablet 40 mg  40 mg Per NG tube Daily Chandler Urrutia MD   40 mg at 08/27/23 0838    bisacodyL suppository 10 mg  10 mg Rectal Daily PRN Chandler Urrutia MD        enoxaparin injection 40 mg  40 mg Subcutaneous Daily Chandler Urrutia MD   40 mg at 08/26/23 1616    labetaloL injection 10 mg  10 mg Intravenous Q30 Min PRN Chandler Urrutia MD        methocarbamoL tablet 500 mg  500 mg Oral QID PRN iEleen Castillo MD        ondansetron injection 4 mg  4 mg Intravenous Q4H PRN Chandler Urrutia MD   4 mg at 08/27/23 0554    polyethylene glycol packet 17 g  17 g Oral BID PRN Chandler Urrutia MD        prochlorperazine injection Soln 5 mg  5 mg Intravenous Q6H PRN Chandler Urrutia MD   5 mg at 08/27/23 0926    senna-docusate 8.6-50 mg per tablet 1 tablet  1 tablet Oral BID PRN Chandler Urrutia MD        sodium chloride 0.9% flush 10 mL  10 mL Intravenous PRN Chandler Urrutia MD        sodium chloride 0.9% flush 10 mL  10 mL Intravenous Q6H Chandler Urrutia MD   10 mL at 08/26/23 1141    And    sodium chloride 0.9% flush 10 mL  10 mL Intravenous PRN Chandler Urrutia MD         Current Outpatient Medications   Medication Sig Dispense Refill    amLODIPine (NORVASC) 5 MG tablet Take 5 mg by mouth 2 (two) times daily.      carvediloL (COREG) 25 MG tablet Take 25 mg by mouth 2 (two) times daily with meals.       cetirizine (ZYRTEC) 10 MG tablet TAKE ONE TABLET BY MOUTH EVERY DAY AT 9AM      famotidine (PEPCID) 20 MG tablet Take 1 tablet by mouth every evening.      gabapentin (NEURONTIN) 300 MG capsule Take 300 mg by mouth 3 (three) times daily.      losartan (COZAAR) 25 MG tablet Take 25 mg by mouth 2 (two) times daily.      methocarbamoL (ROBAXIN) 500 MG Tab Take 500 mg by mouth.      metoclopramide HCl (REGLAN) 5 MG tablet TAKE ONE TABLET BY MOUTH 15 MINUTES BEFORE MEALS AND AT BEDTIME STARTING MONDAY, JUNE 19TH, WHEN YOU HAVE FINISHED YOUR VENLAXAFINE      omeprazole (PRILOSEC) 20 MG capsule Take by mouth once daily.      oxyCODONE-acetaminophen (PERCOCET)  mg per tablet TAKE ONE TABLET BY MOUTH THREE TIMES DAILY AS NEEDED FOR PAIN. MAY CAUSE DROWSINESS      pantoprazole (PROTONIX) 40 MG tablet Take 40 mg by mouth.      sertraline (ZOLOFT) 50 MG tablet Take 50 mg by mouth.      amLODIPine (NORVASC) 2.5 MG tablet Take 2.5 mg by mouth once daily.      calcium-vitamin D3-magnesium 200 mg calcium- 1.25 mcg Cap Take by mouth.      carvediloL (COREG) 25 MG tablet Take 25 mg by mouth.      diazePAM (VALIUM) 5 MG tablet Take 5 mg by mouth every 6 (six) hours as needed for Anxiety.      dicyclomine (BENTYL) 20 mg tablet Take 20 mg by mouth every 6 (six) hours.      famotidine (PEPCID) 20 MG tablet Take 20 mg by mouth every evening.      methocarbamoL (ROBAXIN) 500 MG Tab Take 500 mg by mouth 4 (four) times daily.      venlafaxine (EFFEXOR) 75 MG tablet Take 75 mg by mouth 2 (two) times daily.         Review of Systems   Neurological:  Positive for weakness (left side (chronic)). Negative for dizziness, tremors, seizures, syncope, facial asymmetry, speech difficulty, light-headedness, numbness and headaches.   All other systems reviewed and are negative.    Objective:     Vital Signs (Most Recent):  Temp: 98 °F (36.7 °C) (08/27/23 0752)  Pulse: 79 (08/27/23 1004)  Resp: (!) 25 (08/27/23 1004)  BP: (!) 144/91 (08/27/23  1004)  SpO2: 100 % (08/27/23 1004) Vital Signs (24h Range):  Temp:  [98 °F (36.7 °C)] 98 °F (36.7 °C)  Pulse:  [69-83] 79  Resp:  [16-25] 25  SpO2:  [96 %-100 %] 100 %  BP: (129-163)/() 144/91        There is no height or weight on file to calculate BMI.     Physical Exam  Vitals reviewed.   Constitutional:       General: She is awake. She is not in acute distress.     Appearance: She is obese. She is not ill-appearing or toxic-appearing.   HENT:      Mouth/Throat:      Mouth: Mucous membranes are dry.   Eyes:      General: Vision grossly intact. No visual field deficit.     Extraocular Movements: Extraocular movements intact.      Pupils: Pupils are equal, round, and reactive to light.   Cardiovascular:      Rate and Rhythm: Normal rate.   Pulmonary:      Effort: Pulmonary effort is normal.   Skin:     General: Skin is warm and dry.      Capillary Refill: Capillary refill takes less than 2 seconds.   Neurological:      Mental Status: She is alert and oriented to person, place, and time. Mental status is at baseline.      Cranial Nerves: No dysarthria or facial asymmetry.      Sensory: Sensation is intact.      Motor: Weakness (Mild Left sided weakness (chronic), left hand grasp slightly weaker) present. No tremor, atrophy or seizure activity.      Coordination: Coordination is intact.   Psychiatric:         Attention and Perception: Attention normal.         Mood and Affect: Mood and affect normal.         Speech: Speech normal.         Behavior: Behavior normal. Behavior is cooperative.          Significant Labs: Hemoglobin A1c:   Recent Labs   Lab 08/26/23  0651   HGBA1C 5.6     BMP:   Recent Labs   Lab 08/25/23 2253      K 4.5   CO2 25   BUN 12.6   CREATININE 0.74   CALCIUM 9.2   MG 1.90     CBC:   Recent Labs   Lab 08/25/23 2255   WBC 6.53   HGB 12.7   HCT 36.8*        Inflammatory Markers:   Recent Labs   Lab 08/25/23 2253 08/26/23  0651   SEDRATE  --  21*   CRP 3.60  --         Significant Imaging: I have reviewed all pertinent imaging results/findings within the past 24 hours.

## 2023-08-27 NOTE — PROGRESS NOTES
[facility] Hospital Medicine Progress Note        Chief Complaint: Inpatient Follow-up for     HPI: 78 y.o. female with a past medical history of essential hypertension, CAD, COPD, CVA, GERD, SABINA on nightly 2 L oxygen, depression, and anxiety presented to [facility] on 8/25/2023 for stroke-like symptoms.  Patient reported left-sided facial swelling, left facial droop, tongue deviation to the right, and slurred speech that began at paroxysmally 9:30 p.m. yesterday 08/25/2023.  Patient reported symptoms resolved prior to arrival to ED. previous CVA without deficits.  Patient stated she recently had lumbar surgery on 08/07/2023 at [facility] from fall in June.  Patient reported increased lower extremity swelling and shortness of breath secondary to pain.  Initial vital signs in ED were /78, pulse 78, respirations 16, temperature 36.7° C, and SpO2 97% on room air.  Labs revealed WBC 6.53, .9, and undetectable troponin.  EKG revealed unusual P axis, possible ectopic atrial rhythm with premature supraventricular complexes, and heart rate of 72 beats per minute.  Chest x-ray revealed no acute cardiopulmonary abnormality.  CT head revealed no acute intracranial abnormality.  Patient was given 81 mg aspirin in ED.  Patient was admitted to hospital medicine service for further medical management.  Neurology was consulted.    Interval Hx:   Patient seen and examined this morning was working with speech therapy passed the swallowing no other issues reported overnight    Objective/physical exam:  General: In no acute distress, afebrile  Chest: Clear to auscultation bilaterally  Heart: RRR, +S1, S2, no appreciable murmur  Abdomen: Soft, nontender, BS +  MSK: Warm, no lower extremity edema, no clubbing or cyanosis  Neurologic: Alert and oriented x4,     VITAL SIGNS: 24 HRS MIN & MAX LAST   Temp  Min: 98 °F (36.7 °C)  Max: 98 °F (36.7 °C) 98 °F (36.7 °C)   BP  Min: 129/53  Max: 149/73 (!) 149/73    Pulse  Min: 69  Max: 83  78   Resp  Min: 16  Max: 23 (!) 22   SpO2  Min: 96 %  Max: 100 % 99 %     I have reviewed the following labs:  Recent Labs   Lab 08/25/23  2255   WBC 6.53   RBC 4.47   HGB 12.7   HCT 36.8*   MCV 82.3   MCH 28.4   MCHC 34.5   RDW 14.4      MPV 11.2*     Recent Labs   Lab 08/25/23  2253      K 4.5   CO2 25   BUN 12.6   CREATININE 0.74   CALCIUM 9.2   MG 1.90   ALBUMIN 3.6   ALKPHOS 119   ALT 11   AST 27   BILITOT 0.7     Microbiology Results (last 7 days)       ** No results found for the last 168 hours. **             See below for Radiology    Scheduled Med:   aspirin  81 mg Oral Daily    atorvastatin  40 mg Per NG tube Daily    enoxparin  40 mg Subcutaneous Daily    sodium chloride 0.9%  10 mL Intravenous Q6H      Continuous Infusions:   sodium chloride 0.9% 75 mL/hr at 08/27/23 1230      PRN Meds:  acetaminophen, bisacodyL, labetalol, methocarbamoL, ondansetron, polyethylene glycol, prochlorperazine, senna-docusate 8.6-50 mg, sodium chloride 0.9%, Flushing PICC Protocol **AND** sodium chloride 0.9% **AND** sodium chloride 0.9%     Assessment/Plan:  CVA versus TIA   Lower extremity swelling   Recent kyphoplasty   History of essential hypertension  History of coronary artery disease   COPD not in exacerbation  History of GERD   SABINA on nightly 2 L of oxygen   Depression  Anxiety    CT of the head without contrast was negative apparently patient can not get MRI because of the device incompatibility with the MRI machine   Patient had a repeat CT head done that was negative yesterday since there was symptom resolution so there was no recommendation for Tnk  Continue with aspirin and statin  Neurology is planning on doing diagnostic cerebral angiogram on Monday  PT OT speech consulted speech cleared the patient will start on easy to chew diet cardiac discussed this with the nursing staff   CTA h/n:   1. 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  2. There is fetal origin of the right posterior cerebral artery with a hypoplastic P1 segment. There is fetal origin of the left posterior cerebral artery with an aplastic P1 segment  Carotid ultrasound no large vessel occlusion, 2D echo shows EF of 55-60%   Venous ultrasound done yesterday was negative for DVT D-dimer was less than 1    VTE prophylaxis:  Lovenox    Patient condition:  Stable/Fair/Guarded/ Serious/ Critical    Anticipated discharge and Disposition:         All diagnosis and differential diagnosis have been reviewed; assessment and plan has been documented; I have personally reviewed the labs and test results that are presently available; I have reviewed the patients medication list; I have reviewed the consulting providers response and recommendations. I have reviewed or attempted to review medical records based upon their availability    All of the patient's questions have been  addressed and answered. Patient's is agreeable to the above stated plan. I will continue to monitor closely and make adjustments to medical management as needed.  _____________________________________________________________________    Nutrition Status:    Radiology:  I have personally reviewed the following imaging and agree with the radiologist.     CT Head Without Contrast  Narrative: Technique:CT of the head was performed without intravenous contrast with axial as well as coronal and sagittal images.    Comparison:Comparison is with study dated 2023-08-26 09:59:33.    Dosage Information:Automated exposure control was utilized.    Clinical history:Stroke follow up.    Findings:    Hemorrhage:No acute intracranial hemorrhage is seen.    CSF spaces:The ventricles, sulci and basal cisterns all appear mildly prominent suggesting an element of global cerebral atrophy.    Brain parenchyma:Unremarkable with preservation of the grey white junction throughout.  There is no acute large vessel territory  infarct    Cerebellum:Unremarkable.    Vascular:Note the right jugular foramen is prominent.    Sella and skull base:The sella appears to be within normal limits for age.    Calvarium:No acute linear or depressed skull fracture is seen.    Maxillofacial Structures:    Paranasal sinuses:The visualized paranasal sinuses appear clear with no mucoperiosteal thickening or air fluid levels identified.    Visualized upper cervical spine:The visualized cervical spine appears unremarkable.  Impression: Impression:    No acute intracranial process identified. Details and other findings as noted above.    No significant discrepancy with overnight report.    Electronically signed by: Anastacio Preston  Date:    08/27/2023  Time:    07:22      Eileen Castillo MD   08/27/2023

## 2023-08-28 PROCEDURE — 97116 GAIT TRAINING THERAPY: CPT

## 2023-08-28 PROCEDURE — 36223 PLACE CATH CAROTID/INOM ART: CPT | Mod: 59,LT,, | Performed by: PSYCHIATRY & NEUROLOGY

## 2023-08-28 PROCEDURE — 36226 PR ANGIO VERTEBRAL ARTERY +/- CERVIOCEREBRAL ARCH, VERTEBRAL ART, SELECTV CATH,S&I: ICD-10-PCS | Mod: 51,LT,, | Performed by: PSYCHIATRY & NEUROLOGY

## 2023-08-28 PROCEDURE — 27000221 HC OXYGEN, UP TO 24 HOURS

## 2023-08-28 PROCEDURE — 25000003 PHARM REV CODE 250: Performed by: INTERNAL MEDICINE

## 2023-08-28 PROCEDURE — 36225 PLACE CATH SUBCLAVIAN ART: CPT | Mod: 59,RT,, | Performed by: PSYCHIATRY & NEUROLOGY

## 2023-08-28 PROCEDURE — A4216 STERILE WATER/SALINE, 10 ML: HCPCS | Performed by: INTERNAL MEDICINE

## 2023-08-28 PROCEDURE — 25000003 PHARM REV CODE 250: Performed by: PSYCHIATRY & NEUROLOGY

## 2023-08-28 PROCEDURE — 63600175 PHARM REV CODE 636 W HCPCS: Performed by: INTERNAL MEDICINE

## 2023-08-28 PROCEDURE — 36227 PLACE CATH XTRNL CAROTID: CPT | Mod: RT,,, | Performed by: PSYCHIATRY & NEUROLOGY

## 2023-08-28 PROCEDURE — 99152 MOD SED SAME PHYS/QHP 5/>YRS: CPT | Mod: ,,, | Performed by: PSYCHIATRY & NEUROLOGY

## 2023-08-28 PROCEDURE — 97166 OT EVAL MOD COMPLEX 45 MIN: CPT

## 2023-08-28 PROCEDURE — 36223 PR ANGIO INTRCRANL ART +/- CERVIOCEREBRAL ARCH, CAROTID/INNOM ART,  SELCETV CATH, S&I: ICD-10-PCS | Mod: 59,LT,, | Performed by: PSYCHIATRY & NEUROLOGY

## 2023-08-28 PROCEDURE — 99152 PR MOD CONSCIOUS SEDATION, SAME PHYS, 5+ YRS, FIRST 15 MIN: ICD-10-PCS | Mod: ,,, | Performed by: PSYCHIATRY & NEUROLOGY

## 2023-08-28 PROCEDURE — 94761 N-INVAS EAR/PLS OXIMETRY MLT: CPT

## 2023-08-28 PROCEDURE — 36224 PR ANGIO INTRCRNL ART +/- CERVIOCEREBRAL ARCH, INTRNL CAROTID ART, SELECTV CATH ,S&I: ICD-10-PCS | Mod: RT,,, | Performed by: PSYCHIATRY & NEUROLOGY

## 2023-08-28 PROCEDURE — 36226 PLACE CATH VERTEBRAL ART: CPT | Mod: 51,LT,, | Performed by: PSYCHIATRY & NEUROLOGY

## 2023-08-28 PROCEDURE — 36225 PR ANGIO VERTEBRAL ARTERY +/- CERVIOCEREBRAL ARCH, SUBCLAVIAN ART, SELECTV CATH,S&I: ICD-10-PCS | Mod: 59,RT,, | Performed by: PSYCHIATRY & NEUROLOGY

## 2023-08-28 PROCEDURE — 21400001 HC TELEMETRY ROOM

## 2023-08-28 PROCEDURE — 63600175 PHARM REV CODE 636 W HCPCS: Performed by: PSYCHIATRY & NEUROLOGY

## 2023-08-28 PROCEDURE — 36227 PR ANGIO XTRNL CAROTD CIRC, XTRNL CAROTID, SELECTV CATH S&I: ICD-10-PCS | Mod: RT,,, | Performed by: PSYCHIATRY & NEUROLOGY

## 2023-08-28 PROCEDURE — 36224 PLACE CATH CAROTD ART: CPT | Mod: RT,,, | Performed by: PSYCHIATRY & NEUROLOGY

## 2023-08-28 RX ORDER — ONDANSETRON 2 MG/ML
INJECTION INTRAMUSCULAR; INTRAVENOUS
Status: COMPLETED | OUTPATIENT
Start: 2023-08-28 | End: 2023-08-28

## 2023-08-28 RX ORDER — FENTANYL CITRATE 50 UG/ML
INJECTION, SOLUTION INTRAMUSCULAR; INTRAVENOUS
Status: COMPLETED | OUTPATIENT
Start: 2023-08-28 | End: 2023-08-28

## 2023-08-28 RX ORDER — SODIUM CHLORIDE, SODIUM LACTATE, POTASSIUM CHLORIDE, CALCIUM CHLORIDE 600; 310; 30; 20 MG/100ML; MG/100ML; MG/100ML; MG/100ML
INJECTION, SOLUTION INTRAVENOUS CONTINUOUS
Status: ACTIVE | OUTPATIENT
Start: 2023-08-28 | End: 2023-08-29

## 2023-08-28 RX ORDER — MIDAZOLAM HYDROCHLORIDE 1 MG/ML
INJECTION INTRAMUSCULAR; INTRAVENOUS
Status: COMPLETED | OUTPATIENT
Start: 2023-08-28 | End: 2023-08-28

## 2023-08-28 RX ORDER — LIDOCAINE HYDROCHLORIDE 20 MG/ML
INJECTION, SOLUTION INFILTRATION; PERINEURAL
Status: COMPLETED | OUTPATIENT
Start: 2023-08-28 | End: 2023-08-28

## 2023-08-28 RX ORDER — KETOROLAC TROMETHAMINE 30 MG/ML
15 INJECTION, SOLUTION INTRAMUSCULAR; INTRAVENOUS EVERY 6 HOURS PRN
Status: DISCONTINUED | OUTPATIENT
Start: 2023-08-28 | End: 2023-08-29 | Stop reason: HOSPADM

## 2023-08-28 RX ORDER — HEPARIN SOD,PORCINE/0.9 % NACL 1000/500ML
INTRAVENOUS SOLUTION INTRAVENOUS
Status: COMPLETED | OUTPATIENT
Start: 2023-08-28 | End: 2023-08-28

## 2023-08-28 RX ADMIN — CARVEDILOL 6.25 MG: 3.12 TABLET, FILM COATED ORAL at 08:08

## 2023-08-28 RX ADMIN — ONDANSETRON 4 MG: 2 INJECTION INTRAMUSCULAR; INTRAVENOUS at 12:08

## 2023-08-28 RX ADMIN — GABAPENTIN 300 MG: 300 CAPSULE ORAL at 11:08

## 2023-08-28 RX ADMIN — VENLAFAXINE 75 MG: 37.5 TABLET ORAL at 11:08

## 2023-08-28 RX ADMIN — CARVEDILOL 6.25 MG: 3.12 TABLET, FILM COATED ORAL at 11:08

## 2023-08-28 RX ADMIN — PROCHLORPERAZINE EDISYLATE 5 MG: 5 INJECTION INTRAMUSCULAR; INTRAVENOUS at 02:08

## 2023-08-28 RX ADMIN — SODIUM CHLORIDE, POTASSIUM CHLORIDE, SODIUM LACTATE AND CALCIUM CHLORIDE: 600; 310; 30; 20 INJECTION, SOLUTION INTRAVENOUS at 03:08

## 2023-08-28 RX ADMIN — ENOXAPARIN SODIUM 40 MG: 40 INJECTION SUBCUTANEOUS at 11:08

## 2023-08-28 RX ADMIN — Medication 2000 ML: at 12:08

## 2023-08-28 RX ADMIN — ASPIRIN 81 MG: 81 TABLET, COATED ORAL at 08:08

## 2023-08-28 RX ADMIN — SODIUM CHLORIDE, PRESERVATIVE FREE 10 ML: 5 INJECTION INTRAVENOUS at 05:08

## 2023-08-28 RX ADMIN — PROCHLORPERAZINE EDISYLATE 5 MG: 5 INJECTION INTRAMUSCULAR; INTRAVENOUS at 09:08

## 2023-08-28 RX ADMIN — KETOROLAC TROMETHAMINE 15 MG: 30 INJECTION, SOLUTION INTRAMUSCULAR; INTRAVENOUS at 09:08

## 2023-08-28 RX ADMIN — LIDOCAINE HYDROCHLORIDE 5 ML: 20 INJECTION, SOLUTION INFILTRATION; PERINEURAL at 12:08

## 2023-08-28 RX ADMIN — FENTANYL CITRATE 25 MCG: 50 INJECTION, SOLUTION INTRAMUSCULAR; INTRAVENOUS at 12:08

## 2023-08-28 RX ADMIN — KETOROLAC TROMETHAMINE 15 MG: 30 INJECTION, SOLUTION INTRAMUSCULAR; INTRAVENOUS at 03:08

## 2023-08-28 RX ADMIN — MIDAZOLAM HYDROCHLORIDE 1 MG: 1 INJECTION, SOLUTION INTRAMUSCULAR; INTRAVENOUS at 12:08

## 2023-08-28 NOTE — BRIEF OP NOTE
Cerebral angiography demonstrated no significant stenoses.  The patient tolerated the procedure well, without apparent complication.  Full, dictated report to follow.

## 2023-08-28 NOTE — NURSING
Nurses Note -- 4 Eyes      8/28/2023   1:34 AM      Skin assessed during: Admit      [] No Altered Skin Integrity Present    []Prevention Measures Documented      [x] Yes- Altered Skin Integrity Present or Discovered   [] LDA Added if Not in Epic (Describe Wound)   [x] New Altered Skin Integrity was Present on Admit and Documented in LDA   [] Wound Image Taken    Wound Care Consulted? No    Attending Nurse:  Lauren Mendez RN/Staff Member:     Trinh GARCÍA

## 2023-08-28 NOTE — PROGRESS NOTES
[facility]  Hospital Medicine Progress Note        Chief Complaint: Inpatient Follow-up for right sided weakness     HPI:   78 y.o. female with a past medical history of essential hypertension, CAD, COPD, CVA, GERD, SABINA on nightly 2 L oxygen, depression, and anxiety presented to [facility] on 8/25/2023 for stroke-like symptoms.  Patient reported left-sided facial swelling, left facial droop, tongue deviation to the right, and slurred speech that began at paroxysmally 9:30 p.m. yesterday 08/25/2023.  Patient reported symptoms resolved prior to arrival to ED. previous CVA without deficits.  Patient stated she recently had lumbar surgery on 08/07/2023 at [facility] from fall in June.  Patient reported increased lower extremity swelling and shortness of breath secondary to pain.  Initial vital signs in ED were /78, pulse 78, respirations 16, temperature 36.7° C, and SpO2 97% on room air.  Labs revealed WBC 6.53, .9, and undetectable troponin.  EKG revealed unusual P axis, possible ectopic atrial rhythm with premature supraventricular complexes, and heart rate of 72 beats per minute.  Chest x-ray revealed no acute cardiopulmonary abnormality.  CT head revealed no acute intracranial abnormality.  Patient was given 81 mg aspirin in ED.  Patient was admitted to hospital medicine service for further medical management.  Neurology was consulted.     Patient was started on ASA and statin tx. Seen by neuro and cerebral angio done.     Interval Hx:   Patient awake and in mild distress. Had cerebral angio done and has bad nausea. No chest pain, headache, fever or chills or weakness of extremities.     Family at bedside, explained in detail about the patients condition, diagnosis, vitals, labs and treatment plan. They understand and agree with the plan. All their questions were answered.      Case was discussed with patient's nurse and  on the floor.    Objective/physical exam:  General:  In mild distress,  Chest: Clear to auscultation bilaterally  Heart: RRR, +S1, S2, no appreciable murmur  Abdomen: Soft, nontender, BS +  MSK: Warm, no lower extremity edema, no clubbing or cyanosis  Neurologic: Cranial nerve II-XII intact, Strength 5/5 in all 4 extremities    VITAL SIGNS: 24 HRS MIN & MAX LAST   Temp  Min: 98 °F (36.7 °C)  Max: 98.7 °F (37.1 °C) 98.7 °F (37.1 °C)   BP  Min: 134/79  Max: 177/79 (!) 177/79   Pulse  Min: 65  Max: 83  68   Resp  Min: 16  Max: 20 16   SpO2  Min: 95 %  Max: 100 % 99 %     I have reviewed the following labs:  Recent Labs   Lab 08/25/23  2255   WBC 6.53   RBC 4.47   HGB 12.7   HCT 36.8*   MCV 82.3   MCH 28.4   MCHC 34.5   RDW 14.4      MPV 11.2*     Recent Labs   Lab 08/25/23  2253      K 4.5   CO2 25   BUN 12.6   CREATININE 0.74   CALCIUM 9.2   MG 1.90   ALBUMIN 3.6   ALKPHOS 119   ALT 11   AST 27   BILITOT 0.7     Microbiology Results (last 7 days)       ** No results found for the last 168 hours. **             See below for Radiology    Scheduled Med:   aspirin  81 mg Oral Daily    atorvastatin  40 mg Per NG tube Daily    carvediloL  6.25 mg Oral BID    enoxparin  40 mg Subcutaneous Daily    gabapentin  300 mg Oral TID    pantoprazole  40 mg Oral Daily    sertraline  50 mg Oral Daily    sodium chloride 0.9%  10 mL Intravenous Q6H    venlafaxine  75 mg Oral BID      Continuous Infusions:   lactated ringers        PRN Meds:  acetaminophen, bisacodyL, ketorolac, labetalol, methocarbamoL, ondansetron, polyethylene glycol, prochlorperazine, senna-docusate 8.6-50 mg, sodium chloride 0.9%, Flushing PICC Protocol **AND** sodium chloride 0.9% **AND** sodium chloride 0.9%     Assessment/Plan:  TIA   Lower extremity swelling, non specific   Recent kyphoplasty   History of essential hypertension  History of coronary artery disease   COPD not in exacerbation  History of GERD   SABINA on nightly 2 L of oxygen   Depression  Anxiety    Plan:  Patient having nausea after her  cerebral angiogram. Will cont iv prn antiemetics  She also is c/o right hip pain, this is chronic   Added prn toradol  Will cont ASA and statin   Added iv fluids for overnight    Cont supportive care     Dc to home in am if cleared by neuro team     VTE prophylaxis: Lovenox     Patient condition:  Fair    Anticipated discharge and Disposition:   Home       All diagnosis and differential diagnosis have been reviewed; assessment and plan has been documented; I have personally reviewed the labs and test results that are presently available; I have reviewed the patients medication list; I have reviewed the consulting providers response and recommendations. I have reviewed or attempted to review medical records based upon their availability    All of the patient's questions have been  addressed and answered. Patient's is agreeable to the above stated plan. I will continue to monitor closely and make adjustments to medical management as needed.  _____________________________________________________________________    Nutrition Status:    Radiology:  I have personally reviewed the following imaging and agree with the radiologist.     IR Angiogram Cerebral Intracranial ea Ad  EXAMINATION  IR ANGIOGRAM CEREBRAL INTRACRANIAL EA ADD VESSEL    CLINICAL HISTORY  Procedural guidance - please schedule diagnostic cerebral angiogram on Monday (8/28) per Dr. Leach;    TECHNIQUE/FINDINGS  Intraoperative fluoroscopy used for procedural guidance. Independent interpretation performed by the attending surgeon in the operating room - please see the corresponding operative report for additional details.    RADIATION DOSE  *Fluoro time: 11.4 minutes  *DAP: 71 Gy x cm^2  *Ka,r: 422 mGy  *Number of images: 18 acquisition series with total of 720 images    Electronically signed by: Wilbert Moore  Date:    08/28/2023  Time:    13:18      Cristopher Davis MD   08/28/2023

## 2023-08-28 NOTE — PLAN OF CARE
Problem: Occupational Therapy  Goal: Occupational Therapy Goal  Description: Goals to be met by: in 2 weeks      Patient will increase functional independence with ADLs by performing:    UE Dressing with Modified [place].  LE Dressing with Modified [place].  Grooming while standing with Modified [place].  Toileting from toilet with Modified [place] for hygiene and clothing management.   Toilet transfer to toilet with Modified [place].    Outcome: Ongoing, Progressing

## 2023-08-28 NOTE — PT/OT/SLP EVAL
Occupational Therapy  Evaluation    Name: Katty Veronica  MRN: [id number]  Admitting Diagnosis: TIA (transient ischemic attack)  Recent Surgery: * No surgery found *      Recommendations:     Discharge Recommendations: nursing facility, skilled  Discharge Equipment Recommendations:     Barriers to discharge:       Assessment:     Katty Veronica is a 78 y.o. female with a medical diagnosis of TIA (transient ischemic attack).  She presents with the following performance deficits affecting function: weakness, impaired endurance, impaired self care skills, impaired functional mobility, gait instability, impaired sensation.   Pt lives alone, uses RW at home. Lumbar surgery 8/7. Today, pt ambulated to toilet with RW no LOB. Presents with LUE weakness from stroke in 2011. Increased time for LE dressing (socks) due to sensation deficits in LLE from hx of knee replacement. Recommend SNF at this time.      Rehab Prognosis: Good; patient would benefit from acute skilled OT services to address these deficits and reach maximum level of function.       Plan:     Patient to be seen 5 x/week to address the above listed problems via self-care/home management, therapeutic activities, therapeutic exercises  Plan of Care Expires:    Plan of Care Reviewed with: patient, son    Subjective         Occupational Profile:  Living Environment: alone, handicap accessible apt, tub with bench   Previous level of function: mod I with RW   Roles and Routines: no longer drives/works   Equipment Used at Home: bath bench, walker, rolling  Assistance upon Discharge: none; daughter provides meals     Pain/Comfort:  Pain Rating 1: 8/10  Pain Addressed 1: Reposition    Patients cultural, spiritual, Nondenominational conflicts given the current situation:      Objective:     Communicated with: nrsg prior to session.  Patient found HOB elevated with   upon OT entry to room.    General Precautions: Standard,    Orthopedic Precautions:    Braces:     Respiratory Status: Nasal cannula, flow 2 L/min- removed to mobilize - pt only uses 02 for sleep   Vital Signs: Blood Pressure: 158/75  HR: 69  Sp02: 99    Occupational Performance:    Bed Mobility:    Patient completed Supine to Sit with stand by assistance  Patient completed Sit to Supine with stand by assistance    Functional Mobility/Transfers:  Patient completed Toilet Transfer Step Transfer technique with contact guard assistance with  rolling walker  Functional Mobility: no LOB     Activities of Daily Living:  Lower Body Dressing: contact guard assistance for don/doff L sock   Toileting: contact guard assistance to bathroom with RW     Cognitive/Visual Perceptual:  Cognitive/Psychosocial Skills:     -       Oriented to: Person, Place, and Time   Visual/Perceptual:      -Intact  tracking and acuity      Physical Exam:  Sensation:    -       Intact  Upper Extremity Strength:    -       Right Upper Extremity: WNL  -       Left Upper Extremity: +3/5   Fine Motor Coordination:    -       Intact  Left hand, finger to nose, Right hand, finger to nose, Left hand thumb/finger opposition skills, and Right hand thumb/finger opposition skills    Therapeutic Positioning  Risk for acquired pressure injuries is decreased due to ability to get to BSC/toilet with assist.    OT interventions performed during the course of today's session in an effort to prevent and/or reduce acquired pressure injuries:   Therapeutic positioning completed     Skin assessment:  -   Findings: known area of altered skin integrity at L groin/hip     OT recommendations for therapeutic positioning throughout hospitalization:   Follow [facility] Pressure Injury Prevention Protocol      Patient Education:  Patient and son/s provided with verbal education regarding OT role/goals/POC, fall prevention, safety awareness, and Discharge/DME recommendations.  Understanding was verbalized.     Patient left HOB elevated with all lines intact, call button in reach,  and son present    GOALS:   Multidisciplinary Problems       Occupational Therapy Goals          Problem: Occupational Therapy    Goal Priority Disciplines Outcome Interventions   Occupational Therapy Goal     OT, PT/OT Ongoing, Progressing    Description: Goals to be met by: in 2 weeks      Patient will increase functional independence with ADLs by performing:    UE Dressing with Modified [place].  LE Dressing with Modified [place].  Grooming while standing with Modified [place].  Toileting from toilet with Modified [place] for hygiene and clothing management.   Toilet transfer to toilet with Modified [place].                         History:     Past Medical History:   Diagnosis Date    Anxiety     COPD (chronic obstructive pulmonary disease)     Coronary artery disease     s/p 2 stents    Depression     GERD (gastroesophageal reflux disease)     Glaucoma     Hypertension     Obstructive sleep apnea     on cpap    Stroke     Urinary, incontinence, stress female          Past Surgical History:   Procedure Laterality Date    APPENDECTOMY      BACK SURGERY      CHOLECYSTECTOMY      EXPLORATION OF COMMON BILE DUCT      HERNIA REPAIR      incisional hernia times 2, central abdomen    HYSTERECTOMY      OVARIAN CYST REMOVAL      TONSILLECTOMY         Time Tracking:     OT Date of Treatment:    OT Start Time: 1023  OT Stop Time: 1038  OT Total Time (min): 15 min    Billable Minutes:Evaluation Moderate Complexity     8/28/2023

## 2023-08-28 NOTE — OP NOTE
[facility]                       [address] [place] [address]    PATIENT NAME:      YVES CURRAN  YOB: 1945  CSN:               [id number]  MRN:               [id number]  ADMIT DATE:        08/25/2023 20:53:00  PHYSICIAN:         Kane Leach MD                          OPERATIVE REPORT      DATE OF SURGERY:    08/28/2023 00:00:00    SURGEON:  Kane Leach MD    PREOPERATIVE DIAGNOSIS:  TIA.    POSTOPERATIVE DIAGNOSIS:  Normal angiogram.    PROCEDURE PERFORMED:  Cerebral angiogram with catheter insertion in the   following arteries:  Right common carotid, right internal carotid, right   external carotid, right subclavian, left subclavian, left vertebral, left common   carotid.    LEVEL OF SEDATION:  Conscious sedation.  Sedation administered by independent   trained observer under attending supervision with continuous monitoring of the   patient's level of consciousness and physiologic status.  Total intraservice   sedation time 30 minutes.    COMPLICATIONS:  None.    DETAILED DESCRIPTION:  Following informed consent, the patient was prepped and   draped in the usual sterile fashion.  I infiltrated the right groin with local   anesthetic and punctured the right femoral artery, placing a 5-Japanese sheath   without incident.  I introduced my catheter and wire and advanced them to the   aortic arch.  I selected the right common carotid artery.  Angiographic images   demonstrated a normal bifurcation.  I selected the right internal carotid   artery.  Cerebral AP and lateral views demonstrated no evidence of significant   stenosis, mass effect, vascular malformation, or early venous drainage.  I then   selected the right external carotid artery.  Angiographic images demonstrated   normal external carotid circulation.  I then selected the right subclavian   artery.  Angiographic images demonstrated  no stenosis at the origin of the right   vertebral artery.  Cerebral AP and lateral views via right subclavian artery   injection demonstrated no evidence of significant stenosis, mass effect,   vascular malformation, or early venous drainage.  I then selected the left   subclavian artery.  Angiographic images demonstrated no stenosis at the origin   of left vertebral artery.  I selected the left vertebral artery.  Cerebral AP   and lateral views demonstrated that the left vertebral artery ends in the left   posterior inferior cerebellar artery.  There was no evidence of significant   stenosis, mass effect, vascular malformation, or early venous drainage.  I then   selected the left common carotid artery.  Angiographic images demonstrated a   normal bifurcation.  Cerebral AP and lateral views via left common carotid   artery injection demonstrated no evidence of significant stenosis, mass effect,   vascular malformation, or early venous drainage.  I removed the catheter.    Hemostasis was achieved using a Mynx closure device.  The patient tolerated   procedure well without apparent complication.        ______________________________  Kane Leach MD    DEP/AQS  DD:  08/28/2023  Time:  01:17PM  DT:  08/28/2023  Time:  02:14PM  Job #:  [id number]      OPERATIVE REPORT

## 2023-08-28 NOTE — PROGRESS NOTES
Inpatient Nutrition Evaluation    Admit Date: 8/25/2023   Total duration of encounter: 3 days    Nutrition Recommendation/Prescription     Continue oral diet as tolerated.  Add strawberry Boost Very High Calorie (provides 530 kcal, 22 g protein per serving) BID.  Encouraged intake.    Nutrition Assessment     Chart Review    Reason Seen: malnutrition screening tool (MST)    Malnutrition Screening Tool Results   Have you recently lost weight without trying?: Yes: 2-13 lbs  Have you been eating poorly because of a decreased appetite?: Yes   MST Score: 2   Diagnosis:   CVA versus TIA   Lower extremity swelling   Recent kyphoplasty      Past Medical History:   Diagnosis Date    Anxiety     COPD (chronic obstructive pulmonary disease)     Coronary artery disease     s/p 2 stents    Depression     GERD (gastroesophageal reflux disease)     Glaucoma     Hypertension     Obstructive sleep apnea     on cpap    Stroke     Urinary, incontinence, stress female      Scheduled Medications:   aspirin  81 mg Oral Daily    atorvastatin  40 mg Per NG tube Daily    carvediloL  6.25 mg Oral BID    enoxparin  40 mg Subcutaneous Daily    gabapentin  300 mg Oral TID    pantoprazole  40 mg Oral Daily    sertraline  50 mg Oral Daily    sodium chloride 0.9%  10 mL Intravenous Q6H    venlafaxine  75 mg Oral BID     Recent Labs     08/25/23  2253      K 4.5   CHLORIDE 105   CO2 25   MG 1.90   CALCIUM 9.2   GLUCOSE 100   ALKPHOS 119   AST 27   ALT 11   ALBUMIN 3.6   CRP 3.60     Diet Order: Diet heart healthy  Oral Supplement Order: none  Appetite/Oral Intake: fair/50-75% of meals  Factors Affecting Nutritional Intake: decreased appetite  Food/Jewish/Cultural Preferences: none reported  Food Allergies: none reported    Wound(s):  no pressure injuries documented at this time  Last Bowel Movement: 08/25/23    Comments    8/28/23 Patient reports slightly decreased intake and slow/steady weight loss over the past few years, recently lost  "4% in 1 month. She supplements with Ensure at home sometimes, likes strawberry flavor, agreeable to Boost.    Anthropometrics    Height: 5' 3" (160 cm) Height Method: Stated  Last Weight: 87.1 kg (192 lb) (23) Weight Method: Bed Scale  BMI (Calculated): 34  BMI Classification: obese grade I (BMI 30-34.9)     Ideal Body Weight (IBW), Female: 115 lb     % Ideal Body Weight, Female (lb): 166.96 %                    Usual Body Weight (UBW), k.72 kg (23)  % Usual Body Weight: 96.2     Usual Weight Provided By: patient and EMR weight history    Wt Readings from Last 5 Encounters:   23 87.1 kg (192 lb)   23 90.7 kg (200 lb)   23 87.5 kg (193 lb)   21 93.3 kg (205 lb 11 oz)   20 93.9 kg (207 lb)     Weight Change(s) Since Admission:   23 admission weight 23, no new weights    Wt Readings from Last 1 Encounters:   23 87.1 kg (192 lb)    Admit Weight: 87.1 kg (192 lb) (23)    Patient Education Not applicable.    Monitoring & Evaluation     Dietitian will monitor food and beverage intake.  Nutrition Risk/Follow-Up: low (follow-up in 5-7 days)  Patients assigned 'low nutrition risk' status do not qualify for a full nutritional assessment but will be monitored and re-evaluated in a 5-7 day time period. Please consult if re-evaluation needed sooner.  "

## 2023-08-28 NOTE — PT/OT/SLP PROGRESS
Physical Therapy Treatment    Patient Name:  Katty Veronica   MRN:  [id number]    Recommendations:     Discharge Recommendations: nursing facility, skilled  Discharge Equipment Recommendations: none  Barriers to discharge: None    Assessment:     Katty Veronica is a 78 y.o. female admitted with a medical diagnosis of TIA (transient ischemic attack).  She presents with the following impairments/functional limitations: weakness, impaired endurance, impaired self care skills, impaired functional mobility, gait instability . Pt walked a greater distance and required less assist with transfers    Rehab Prognosis: Good; patient would benefit from acute skilled PT services to address these deficits and reach maximum level of function.    Recent Surgery: * No surgery found *      Plan:     During this hospitalization, patient to be seen 5 x/week to address the identified rehab impairments via gait training, therapeutic activities, therapeutic exercises and progress toward the following goals:    Plan of Care Expires:  09/27/23    Subjective     Chief Complaint: back pain  Patient/Family Comments/goals:   Pain/Comfort:  Pain Rating 1: 6/10  Location 1: back      Objective:     Communicated with nurse prior to session.  Patient found supine with PureWick, oxygen, pulse ox (continuous), telemetry upon PT entry to room.     General Precautions: Standard, fall  Orthopedic Precautions: N/A  Braces: N/A  Respiratory Status: Nasal cannula, flow 2 L/min  Blood Pressure:   Skin Integrity: Visible skin intact      Functional Mobility:  Bed Mobility:     Supine to Sit: stand by assistance  Sit to Supine: minimum assistance  Transfers:     Sit to Stand:  contact guard assistance with rolling walker  Bed to Chair: contact guard assistance with  rolling walker  using  Step Transfer  Gait: Pt ambulated with 2 l o2 and rw for 2 attempts of 100feet and a long rest break in between    Therapeutic  Activities/Exercises:      Education:  Patient provided with verbal education regarding posture with gait.  Understanding was verbalized, however additional teaching warranted.     Patient left supine with all lines intact and call button in reach..    GOALS:   Multidisciplinary Problems       Physical Therapy Goals          Problem: Physical Therapy    Goal Priority Disciplines Outcome Goal Variances Interventions   Physical Therapy Goal     PT, PT/OT Ongoing, Progressing     Description: Goals to be met by: 2023     Patient will increase functional independence with mobility by performin. Supine to sit with [place]  2. Sit to stand transfer with [place]  3. Bed to chair transfer with Modified [place] using Rolling Walker  4. Gait  x 300 feet with Modified [place] using Rolling Walker.                          Time Tracking:     PT Received On:    PT Start Time: 0950     PT Stop Time: 1015  PT Total Time (min): 25 min     Billable Minutes: Gait Training 25    Treatment Type: Treatment  PT/PTA: PT     Number of PTA visits since last PT visit: 2023

## 2023-08-29 VITALS
HEIGHT: 63 IN | DIASTOLIC BLOOD PRESSURE: 67 MMHG | RESPIRATION RATE: 18 BRPM | WEIGHT: 192 LBS | BODY MASS INDEX: 34.02 KG/M2 | SYSTOLIC BLOOD PRESSURE: 143 MMHG | HEART RATE: 64 BPM | OXYGEN SATURATION: 97 % | TEMPERATURE: 98 F

## 2023-08-29 LAB
AV INDEX (PROSTH): 0.68
AV MEAN GRADIENT: 5 MMHG
AV PEAK GRADIENT: 9 MMHG
AV VALVE AREA BY VELOCITY RATIO: 2.18 CM²
AV VALVE AREA: 2.14 CM²
AV VELOCITY RATIO: 0.69
CV ECHO LV RWT: 0.59 CM
DOP CALC AO PEAK VEL: 1.5 M/S
DOP CALC AO VTI: 28.9 CM
DOP CALC LVOT AREA: 3.1 CM2
DOP CALC LVOT DIAMETER: 2 CM
DOP CALC LVOT PEAK VEL: 1.04 M/S
DOP CALC LVOT STROKE VOLUME: 61.86 CM3
DOP CALC MV VTI: 44 CM
DOP CALCLVOT PEAK VEL VTI: 19.7 CM
E WAVE DECELERATION TIME: 224 MSEC
E/A RATIO: 1.09
E/E' RATIO: 14 M/S
ECHO LV POSTERIOR WALL: 1.15 CM (ref 0.6–1.1)
FRACTIONAL SHORTENING: 34 % (ref 28–44)
INTERVENTRICULAR SEPTUM: 1.09 CM (ref 0.6–1.1)
LEFT ATRIUM SIZE: 4.4 CM
LEFT ATRIUM VOLUME MOD: 41.5 CM3
LEFT INTERNAL DIMENSION IN SYSTOLE: 2.6 CM (ref 2.1–4)
LEFT VENTRICLE DIASTOLIC VOLUME: 66.3 ML
LEFT VENTRICLE SYSTOLIC VOLUME: 24.6 ML
LEFT VENTRICULAR INTERNAL DIMENSION IN DIASTOLE: 3.91 CM (ref 3.5–6)
LEFT VENTRICULAR MASS: 144.4 G
LV LATERAL E/E' RATIO: 12.6 M/S
LV SEPTAL E/E' RATIO: 15.75 M/S
LVOT MG: 3 MMHG
LVOT MV: 0.74 CM/S
MV MEAN GRADIENT: 5 MMHG
MV PEAK A VEL: 1.16 M/S
MV PEAK E VEL: 1.26 M/S
MV PEAK GRADIENT: 9 MMHG
MV STENOSIS PRESSURE HALF TIME: 79 MS
MV VALVE AREA BY CONTINUITY EQUATION: 1.41 CM2
MV VALVE AREA P 1/2 METHOD: 2.78 CM2
PISA TR MAX VEL: 2.3 M/S
PV PEAK GRADIENT: 4 MMHG
PV PEAK VELOCITY: 1.03 M/S
RA PRESSURE ESTIMATED: 3 MMHG
RV TB RVSP: 5 MMHG
TDI LATERAL: 0.1 M/S
TDI SEPTAL: 0.08 M/S
TDI: 0.09 M/S
TR MAX PG: 21 MMHG
TRICUSPID ANNULAR PLANE SYSTOLIC EXCURSION: 1.88 CM
TV REST PULMONARY ARTERY PRESSURE: 24 MMHG

## 2023-08-29 PROCEDURE — 25000003 PHARM REV CODE 250: Performed by: INTERNAL MEDICINE

## 2023-08-29 PROCEDURE — 99232 PR SUBSEQUENT HOSPITAL CARE,LEVL II: ICD-10-PCS | Mod: ,,, | Performed by: PSYCHIATRY & NEUROLOGY

## 2023-08-29 PROCEDURE — 63600175 PHARM REV CODE 636 W HCPCS: Performed by: INTERNAL MEDICINE

## 2023-08-29 PROCEDURE — 99232 SBSQ HOSP IP/OBS MODERATE 35: CPT | Mod: ,,, | Performed by: PSYCHIATRY & NEUROLOGY

## 2023-08-29 PROCEDURE — 94761 N-INVAS EAR/PLS OXIMETRY MLT: CPT

## 2023-08-29 RX ORDER — CARVEDILOL 12.5 MG/1
12.5 TABLET ORAL 2 TIMES DAILY
Status: DISCONTINUED | OUTPATIENT
Start: 2023-08-29 | End: 2023-08-29 | Stop reason: HOSPADM

## 2023-08-29 RX ORDER — ASPIRIN 81 MG/1
81 TABLET ORAL DAILY
Refills: 0
Start: 2023-08-30 | End: 2024-08-29

## 2023-08-29 RX ORDER — ATORVASTATIN CALCIUM 40 MG/1
40 TABLET, FILM COATED ORAL DAILY
Qty: 90 TABLET | Refills: 3
Start: 2023-08-30 | End: 2024-02-09 | Stop reason: CLARIF

## 2023-08-29 RX ORDER — CARVEDILOL 12.5 MG/1
12.5 TABLET ORAL 2 TIMES DAILY
Qty: 60 TABLET | Refills: 11 | Status: ON HOLD | OUTPATIENT
Start: 2023-08-29 | End: 2023-11-20 | Stop reason: HOSPADM

## 2023-08-29 RX ADMIN — PROCHLORPERAZINE EDISYLATE 5 MG: 5 INJECTION INTRAMUSCULAR; INTRAVENOUS at 09:08

## 2023-08-29 RX ADMIN — KETOROLAC TROMETHAMINE 15 MG: 30 INJECTION, SOLUTION INTRAMUSCULAR; INTRAVENOUS at 03:08

## 2023-08-29 RX ADMIN — PANTOPRAZOLE SODIUM 40 MG: 40 TABLET, DELAYED RELEASE ORAL at 09:08

## 2023-08-29 RX ADMIN — ASPIRIN 81 MG: 81 TABLET, COATED ORAL at 09:08

## 2023-08-29 RX ADMIN — GABAPENTIN 300 MG: 300 CAPSULE ORAL at 09:08

## 2023-08-29 RX ADMIN — SERTRALINE HYDROCHLORIDE 50 MG: 50 TABLET ORAL at 09:08

## 2023-08-29 RX ADMIN — CARVEDILOL 6.25 MG: 3.12 TABLET, FILM COATED ORAL at 09:08

## 2023-08-29 RX ADMIN — ATORVASTATIN CALCIUM 40 MG: 40 TABLET, FILM COATED ORAL at 09:08

## 2023-08-29 RX ADMIN — KETOROLAC TROMETHAMINE 15 MG: 30 INJECTION, SOLUTION INTRAMUSCULAR; INTRAVENOUS at 09:08

## 2023-08-29 RX ADMIN — VENLAFAXINE 75 MG: 37.5 TABLET ORAL at 09:08

## 2023-08-29 NOTE — DISCHARGE SUMMARY
[facility] Hospital Medicine Discharge Summary    Admit Date: 8/25/2023  Discharge Date and Time: 8/29/202310:28 AM  Admitting Physician: JOHN Team  Discharging Physician: Cristopher Davis MD.  Primary Care Physician: Sheila Moreira DO  Consults: Neurology    Discharge Diagnoses:  TIA   Lower extremity swelling, non specific   Recent kyphoplasty   History of essential hypertension  History of coronary artery disease   COPD not in exacerbation  History of GERD   SABINA on nightly 2 L of oxygen   Depression  Anxiety    Hospital Course:   78 y.o. female with a past medical history of essential hypertension, CAD, COPD, CVA, GERD, SABINA on nightly 2 L oxygen, depression, and anxiety presented to [facility] on 8/25/2023 for stroke-like symptoms.  Patient reported left-sided facial swelling, left facial droop, tongue deviation to the right, and slurred speech that began at paroxysmally 9:30 p.m. yesterday 08/25/2023.  Patient reported symptoms resolved prior to arrival to ED. previous CVA without deficits.  Patient stated she recently had lumbar surgery on 08/07/2023 at [facility] from fall in June.  Patient reported increased lower extremity swelling and shortness of breath secondary to pain.  Initial vital signs in ED were /78, pulse 78, respirations 16, temperature 36.7° C, and SpO2 97% on room air.  Labs revealed WBC 6.53, .9, and undetectable troponin.  EKG revealed unusual P axis, possible ectopic atrial rhythm with premature supraventricular complexes, and heart rate of 72 beats per minute.  Chest x-ray revealed no acute cardiopulmonary abnormality.  CT head revealed no acute intracranial abnormality.  Patient was given 81 mg aspirin in ED.  Patient was admitted to hospital medicine service for further medical management.  Neurology was consulted.     Patient was started on ASA and statin tx. Seen by neuro and cerebral angio done. Cerebral angiography demonstrated no significant  stenoses. She was stable and asymptomatic. Had no neurological deficits. She lives in an assisted living and wants to go back there. She is ok with PT. Does not want rehab.     She was stable and asymptomatic. She was discharged home with HH and PT.      Pt was seen and examined on the day of discharge  Vitals:  VITAL SIGNS: 24 HRS MIN & MAX LAST   Temp  Min: 98.1 °F (36.7 °C)  Max: 98.7 °F (37.1 °C) 98.1 °F (36.7 °C)   BP  Min: 131/74  Max: 177/79 (!) 148/70   Pulse  Min: 65  Max: 78  68   Resp  Min: 18  Max: 20 18   SpO2  Min: 84 %  Max: 100 % 99 %       Physical Exam:  Heart RRR  Lungs clear   Abdomen soft and non tender   Neuro: No FND      Procedures Performed: No admission procedures for hospital encounter.     Significant Diagnostic Studies: See Full reports for all details    Recent Labs   Lab 08/25/23  2255   WBC 6.53   RBC 4.47   HGB 12.7   HCT 36.8*   MCV 82.3   MCH 28.4   MCHC 34.5   RDW 14.4      MPV 11.2*       Recent Labs   Lab 08/25/23  2253      K 4.5   CO2 25   BUN 12.6   CREATININE 0.74   CALCIUM 9.2   MG 1.90   ALBUMIN 3.6   ALKPHOS 119   ALT 11   AST 27   BILITOT 0.7        Microbiology Results (last 7 days)       ** No results found for the last 168 hours. **             Echo Saline Bubble? Yes    Left Ventricle: The left ventricle is normal in size. Normal wall   thickness. Normal wall motion. There is normal systolic function with a   visually estimated ejection fraction of 55 - 60%.    Left Atrium: Left atrium is mildly dilated.    Right Ventricle: Normal right ventricular cavity size. Wall thickness   is normal. Right ventricle wall motion  is normal. Systolic function is   normal.    Mitral Valve: There is no stenosis. The mean pressure gradient across   the mitral valve is 5 mmHg. There is mild regurgitation.    Tricuspid Valve: There is mild regurgitation.         Medication List        START taking these medications      aspirin 81 MG EC tablet  Commonly known as:  ECOTRIN  Take 1 tablet (81 mg total) by mouth once daily.  Start taking on: August 30, 2023     atorvastatin 40 MG tablet  Commonly known as: LIPITOR  Take 1 tablet (40 mg total) by mouth once daily.  Start taking on: August 30, 2023            CHANGE how you take these medications      carvediloL 12.5 MG tablet  Commonly known as: COREG  Take 1 tablet (12.5 mg total) by mouth 2 (two) times daily.  What changed:   medication strength  how much to take  when to take this  Another medication with the same name was removed. Continue taking this medication, and follow the directions you see here.            CONTINUE taking these medications      gabapentin 300 MG capsule  Commonly known as: NEURONTIN     losartan 25 MG tablet  Commonly known as: COZAAR     pantoprazole 40 MG tablet  Commonly known as: PROTONIX     sertraline 50 MG tablet  Commonly known as: ZOLOFT            STOP taking these medications      amLODIPine 2.5 MG tablet  Commonly known as: NORVASC     amLODIPine 5 MG tablet  Commonly known as: NORVASC     calcium-vitamin D3-magnesium 200 mg calcium- 1.25 mcg Cap     cetirizine 10 MG tablet  Commonly known as: ZYRTEC     diazePAM 5 MG tablet  Commonly known as: VALIUM     dicyclomine 20 mg tablet  Commonly known as: BENTYL     famotidine 20 MG tablet  Commonly known as: PEPCID     methocarbamoL 500 MG Tab  Commonly known as: ROBAXIN     metoclopramide HCl 5 MG tablet  Commonly known as: REGLAN     omeprazole 20 MG capsule  Commonly known as: PRILOSEC     oxyCODONE-acetaminophen  mg per tablet  Commonly known as: PERCOCET     venlafaxine 75 MG tablet  Commonly known as: EFFEXOR               Where to Get Your Medications        These medications were sent to [facility] ([place]) - [facility] [place] - [address] [place] [address]  [address] [place] [address] [place] [address]      Phone: [phone]   carvediloL 12.5 MG tablet       Information about where to get these medications is not yet available    Ask your nurse  or doctor about these medications  aspirin 81 MG EC tablet  atorvastatin 40 MG tablet          Explained in detail to the patient about the discharge plan, medications, and follow-up visits. Pt understands and agrees with the treatment plan  Discharge Disposition: Home with HH   Discharged Condition: stable  Diet-   Dietary Orders (From admission, onward)       Start     Ordered    08/28/23 1422  Dietary nutrition supplements Boost Very High Calorie Nutritional Drink - Strawberry; BID  Continuous        Question Answer Comment   Select PO Supplement: Boost Very High Calorie Nutritional Drink - Strawberry    Frequency: BID        08/28/23 1421    08/28/23 1318  Diet heart healthy  Diet effective now         08/28/23 1317                   Medications Per DC med rec  Activities as tolerated   Follow-up Information       Kane Leach MD. Go on 9/13/2023.    Specialties: Neurology, Interventional Neurology  Why: Follow up in clinic in 2-4 weeks after discharge.  Follow up appointment on 9-13@8:30 am  Contact information:  [address] [place] [address] Drive  [address]  [place] [address]  [phone]               Sheila Moreira, DO Follow up in 2 week(s).    Specialty: Internal Medicine  Why: Call Dr Moreira's office and make a follow up appointment [phone]  Contact information:  [address] [place] [address] [phone]  [place] [address]  [phone]               [facility] Follow up.    Specialty: Home Health Services  Why: This is your home health provider. You may contact the office for any questions or concerns regarding your home health services.  Contact information:  [address] [place], [place] [id number]  [phone]                           For further questions contact hospitalist office    Discharge time 33 minutes    For worsening symptoms, chest pain, shortness of breath, increased abdominal pain, high grade fever, stroke or stroke like symptoms, immediately go to the nearest Emergency Room or call  911 as soon as possible.      Cristopher Pierce M.D, on 8/29/2023. at 10:28 AM.

## 2023-08-29 NOTE — ASSESSMENT & PLAN NOTE
TIA - recurrent left facial droop and slurred speech    Continue aspirin 81 mg daily  Continue atorvastatin 40 mg daily  PT/OT recommending SNF  Will need to follow up with Dr Leach 2-4 weeks after hospital discharge (or 6 weeks if she goes to inpatient rehab).    No further stroke specific recommendations at this time.  Signing off for now ... Please call if there are any further questions or concerns.         Antithrombotics for secondary stroke prevention: Antiplatelets: Aspirin: 81 mg daily    Statins for secondary stroke prevention and hyperlipidemia, if present: Statins: Atorvastatin- 40 mg daily    Aggressive risk factor modification: HTN, HLD, Obesity, CAD     Rehab efforts: The patient has been evaluated by a stroke team provider and the therapy needs have been fully considered based off the presenting complaints and exam findings. The following therapy evaluations are needed: PT evaluate and treat, OT evaluate and treat, SLP evaluate and treat    Diagnostics ordered/pending: None   Stroke workup limited, unclear if spinal stimulator is MRI compatible.  -CTH:  No acute intracranial abnormality   -CTA h/n:   1. 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  2. There is fetal origin of the right posterior cerebral artery with a hypoplastic P1 segment. There is fetal origin of the left posterior cerebral artery with an aplastic P1 segment.  3. The left vertebral artery terminates into posterior inferior cerebellar artery.  4.  No hemodynamically significant flow-limiting stenosis identified.  -repeat CTh: Chronic age-related changes.  No acute process.  -ECHO:  EF 55-60%, bubble study negative, mildly dilated LA  -CUS:  Negative  -LDL: 78  -A1c:  5.6  -TSH:  2.167  -not on antiplatelet, anticoagulation, or statin therapy prior to hospital admission  -repeat CTh (8/27): No acute intracranial process identified.   -Cerebral angiogram (8/28):  demonstrated no significant  stenoses    VTE prophylaxis: Enoxaparin 40 mg SQ every 24 hours  Mechanical prophylaxis: Place SCDs    BP parameters: TIA: Normalize blood pressure, onset greater than 24 hours

## 2023-08-29 NOTE — PLAN OF CARE
08/29/23 1011   Discharge Assessment   Assessment Type Discharge Planning Assessment   Confirmed/corrected address, phone number and insurance Yes   Confirmed Demographics Correct on Facesheet   Source of Information patient   Communicated AMBROSIO with patient/caregiver Yes   Reason For Admission Facial droop   People in Home alone   Do you expect to return to your current living situation? Yes   Current cognitive status: Alert/Oriented   Walking or Climbing Stairs ambulation difficulty, requires equipment   Mobility Management Walker   Dressing/Bathing bathing difficulty, requires equipment   Dressing/Bathing Management Shower chair   Home Accessibility stairs to enter home;stairs within home   Number of Stairs, Within Home, Primary none   Number of Stairs, Main Entrance none   Equipment Currently Used at Home cane, straight;shower chair;rollator;walker, rolling   Do you currently have service(s) that help you manage your care at home? Yes   Name and Contact number of agency [facility] [phone]   Is the pt/caregiver preference to resume services with current agency Yes   Do you take prescription medications? Yes   Do you have any problems affording any of your prescribed medications? No   Is the patient taking medications as prescribed? yes   Who is going to help you get home at discharge? Son Jake   How do you get to doctors appointments? family or friend will provide;agency   Are you on dialysis? No   Do you take coumadin? No   DME Needed Upon Discharge  none   Discharge Plan discussed with: Patient   Transition of Care Barriers None   Discharge Plan A Home Health   Discharge Plan B [facility]     Notified [facility]  of discharge today. Discharge information faxed [phone].

## 2023-08-29 NOTE — PLAN OF CARE
Problem: Adult Inpatient Plan of Care  Goal: Plan of Care Review  Outcome: Ongoing, Progressing  Flowsheets (Taken 8/29/2023 0250)  Plan of Care Reviewed With:   patient   family  Goal: Patient-Specific Goal (Individualized)  Outcome: Ongoing, Progressing  Flowsheets (Taken 8/29/2023 0250)  Anxieties, Fears or Concerns: pain control, nausea  Individualized Care Needs: symptom management, angio site, neuro checks  Goal: Absence of Hospital-Acquired Illness or Injury  Outcome: Ongoing, Progressing  Goal: Optimal Comfort and Wellbeing  Outcome: Ongoing, Progressing  Goal: Readiness for Transition of Care  Outcome: Ongoing, Progressing     Problem: Infection  Goal: Absence of Infection Signs and Symptoms  Outcome: Ongoing, Progressing     Problem: Adjustment to Illness (Stroke, Ischemic/Transient Ischemic Attack)  Goal: Optimal Coping  Outcome: Ongoing, Progressing     Problem: Bowel Elimination Impaired (Stroke, Ischemic/Transient Ischemic Attack)  Goal: Effective Bowel Elimination  Outcome: Ongoing, Progressing     Problem: Cerebral Tissue Perfusion (Stroke, Ischemic/Transient Ischemic Attack)  Goal: Optimal Cerebral Tissue Perfusion  Outcome: Ongoing, Progressing     Problem: Cognitive Impairment (Stroke, Ischemic/Transient Ischemic Attack)  Goal: Optimal Cognitive Function  Outcome: Ongoing, Progressing     Problem: Communication Impairment (Stroke, Ischemic/Transient Ischemic Attack)  Goal: Improved Communication Skills  Outcome: Ongoing, Progressing     Problem: Functional Ability Impaired (Stroke, Ischemic/Transient Ischemic Attack)  Goal: Optimal Functional Ability  Outcome: Ongoing, Progressing     Problem: Respiratory Compromise (Stroke, Ischemic/Transient Ischemic Attack)  Goal: Effective Oxygenation and Ventilation  Outcome: Ongoing, Progressing     Problem: Sensorimotor Impairment (Stroke, Ischemic/Transient Ischemic Attack)  Goal: Improved Sensorimotor Function  Outcome: Ongoing, Progressing     Problem:  Swallowing Impairment (Stroke, Ischemic/Transient Ischemic Attack)  Goal: Optimal Eating and Swallowing without Aspiration  Outcome: Ongoing, Progressing     Problem: Urinary Elimination Impaired (Stroke, Ischemic/Transient Ischemic Attack)  Goal: Effective Urinary Elimination  Outcome: Ongoing, Progressing     Problem: Impaired Wound Healing  Goal: Optimal Wound Healing  Outcome: Ongoing, Progressing

## 2023-08-29 NOTE — PLAN OF CARE
08/29/23 1023   Final Note   Assessment Type Final Discharge Note   Anticipated Discharge Disposition Home-Health  ([facility] )   Post-Acute Status   Post-Acute Authorization Home Health   Home Health Status Set-up Complete/Auth obtained   Discharge Delays None known at this time

## 2023-08-29 NOTE — PROGRESS NOTES
[facility] - 4th Floor Medical Telemetry  Neurology  Progress Note    Patient Name: Katty Veronica  MRN: [id number]  Admission Date: 8/25/2023  Hospital Length of Stay: 1 days  Code Status: Full Code   Attending Provider: Cristopher Davis MD  Primary Care Physician: Sheila Moreira DO   Principal Problem:TIA (transient ischemic attack)    HPI:   78-year-old female with past medical history of COPD, CAD s/p stent, GERD, HTN, stroke (with left-sided weakness), presented to ED on 08/25 with report of left-sided facial twisting and slurred speech.  Symptom onset was 8/25 at approximately 9:30 p.m. symptoms had resolved prior to arrival to ED.  CTh showed no acute intracranial abnormality.  She was admitted to the hospitalist service and Neurology was consulted for stroke workup.    On 08/26, stroke alert was activated at 9:43 a.m. due to recurring slurred speech and left facial droop.  Symptoms resolved quickly; she was back to baseline prior to going for stat CTh.  Repeat CTh showed no acute intracranial abnormalities.  Discussed with Dr. Leach ...  No recommendation for TNK due to symptom resolution.      Overview/Hospital Course:  No notes on file        Subjective:     Interval History: Sleeping, easily awakened.  No new neurological issues overnight.  Updated son that cerebral angiogram was normal.      Current Facility-Administered Medications   Medication Dose Route Frequency Provider Last Rate Last Admin    acetaminophen tablet 650 mg  650 mg Oral Q6H PRN Chandler Urrutia MD   650 mg at 08/27/23 2141    aspirin EC tablet 81 mg  81 mg Oral Daily Chandler Urrutia MD   81 mg at 08/28/23 0849    atorvastatin tablet 40 mg  40 mg Per NG tube Daily Chandler Urrutia MD   40 mg at 08/27/23 0838    bisacodyL suppository 10 mg  10 mg Rectal Daily PRN Chandler Urrutia MD        carvediloL tablet 6.25 mg  6.25 mg Oral BID Eileen Castillo MD   6.25 mg at 08/28/23 2305    enoxaparin injection 40 mg  40  mg Subcutaneous Daily Chandler Urrutia MD   40 mg at 08/28/23 2305    gabapentin capsule 300 mg  300 mg Oral TID Eileen Castillo MD   300 mg at 08/28/23 2305    ketorolac injection 15 mg  15 mg Intravenous Q6H PRN Cristopher Davis MD   15 mg at 08/29/23 0344    labetaloL injection 10 mg  10 mg Intravenous Q30 Min PRN Chandler Urrutia MD        methocarbamoL tablet 500 mg  500 mg Oral QID PRN Eileen Castillo MD   500 mg at 08/27/23 2141    ondansetron injection 4 mg  4 mg Intravenous Q4H PRN Chandler Urrutia MD   4 mg at 08/27/23 2141    pantoprazole EC tablet 40 mg  40 mg Oral Daily Eileen Castillo MD   40 mg at 08/27/23 1505    polyethylene glycol packet 17 g  17 g Oral BID PRN Chandler Urrutia MD        prochlorperazine injection Soln 5 mg  5 mg Intravenous Q6H PRN Chandler Urrutia MD   5 mg at 08/28/23 2114    senna-docusate 8.6-50 mg per tablet 1 tablet  1 tablet Oral BID PRN Chandler Urrutia MD        sertraline tablet 50 mg  50 mg Oral Daily Eileen Castillo MD   50 mg at 08/27/23 1505    sodium chloride 0.9% flush 10 mL  10 mL Intravenous PRN Chandler Urrutia MD        sodium chloride 0.9% flush 10 mL  10 mL Intravenous Q6H Chandler Urrutia MD   10 mL at 08/28/23 0553    And    sodium chloride 0.9% flush 10 mL  10 mL Intravenous PRN Chandler Urrutia MD        venlafaxine tablet 75 mg  75 mg Oral BID Eileen Castillo MD   75 mg at 08/28/23 2305       Review of Systems   Neurological:  Positive for weakness (left side (chronic)). Negative for dizziness, tremors, seizures, syncope, facial asymmetry, speech difficulty, light-headedness, numbness and headaches.   All other systems reviewed and are negative.    Objective:     Vital Signs (Most Recent):  Temp: 98.3 °F (36.8 °C) (08/29/23 0449)  Pulse: 65 (08/29/23 0449)  Resp: 18 (08/29/23 0449)  BP: (!) 149/76 (08/29/23 0449)  SpO2: 99 % (08/29/23 0449) Vital Signs (24h Range):  Temp:  [98 °F (36.7 °C)-98.7 °F (37.1 °C)] 98.3 °F (36.8 °C)  Pulse:  [65-78] 65  Resp:  [16-20]  "18  SpO2:  [84 %-100 %] 99 %  BP: (131-177)/(71-87) 149/76     Weight: 87.1 kg (192 lb)  Body mass index is 34.01 kg/m².     Physical Exam  Vitals reviewed.   Constitutional:       General: She is awake. She is not in acute distress.     Appearance: She is obese. She is not ill-appearing or toxic-appearing.   HENT:      Mouth/Throat:      Mouth: Mucous membranes are dry.   Eyes:      General: Vision grossly intact. No visual field deficit.     Extraocular Movements: Extraocular movements intact.      Pupils: Pupils are equal, round, and reactive to light.   Cardiovascular:      Rate and Rhythm: Normal rate.      Pulses:           Dorsalis pedis pulses are 2+ on the right side and 2+ on the left side.   Pulmonary:      Effort: Pulmonary effort is normal.   Skin:     General: Skin is warm and dry.      Capillary Refill: Capillary refill takes less than 2 seconds.      Comments: Right groin puncture site with gauze/transparent dressing intact ... Soft, flat, no evidence of hematoma.   Neurological:      Mental Status: She is alert and oriented to person, place, and time. Mental status is at baseline.      Cranial Nerves: No dysarthria or facial asymmetry.      Sensory: Sensation is intact.      Motor: Weakness (Mild Left sided weakness (chronic), left hand grasp slightly weaker) present. No tremor, atrophy or seizure activity.      Coordination: Coordination is intact.   Psychiatric:         Attention and Perception: Attention normal.         Mood and Affect: Mood and affect normal.         Speech: Speech normal.         Behavior: Behavior normal. Behavior is cooperative.          Significant Labs: Hemoglobin A1c:   Recent Labs   Lab 08/26/23  0651   HGBA1C 5.6     BMP: No results for input(s): "GLU", "NA", "K", "CL", "CO2", "BUN", "CREATININE", "CALCIUM", "MG" in the last 48 hours.  CBC: No results for input(s): "WBC", "HGB", "HCT", "PLT" in the last 48 hours.  Inflammatory Markers: No results for input(s): "SEDRATE", " ""CRP", "PROCAL" in the last 48 hours.    Significant Imaging: I have reviewed all pertinent imaging results/findings within the past 24 hours.      Assessment and Plan:     * TIA (transient ischemic attack)  TIA - recurrent left facial droop and slurred speech    Continue aspirin 81 mg daily  Continue atorvastatin 40 mg daily  PT/OT recommending SNF  Will need to follow up with Dr Leach in 1-2 weeks after hospital discharge to discuss back pain and possible kyphoplasty.    No further stroke specific recommendations at this time.  Signing off for now ... Please call if there are any further questions or concerns.         Antithrombotics for secondary stroke prevention: Antiplatelets: Aspirin: 81 mg daily    Statins for secondary stroke prevention and hyperlipidemia, if present: Statins: Atorvastatin- 40 mg daily    Aggressive risk factor modification: HTN, HLD, Obesity, CAD     Rehab efforts: The patient has been evaluated by a stroke team provider and the therapy needs have been fully considered based off the presenting complaints and exam findings. The following therapy evaluations are needed: PT evaluate and treat, OT evaluate and treat, SLP evaluate and treat    Diagnostics ordered/pending: None   Stroke workup limited, unclear if spinal stimulator is MRI compatible.  -CTH:  No acute intracranial abnormality   -CTA h/n:   1. 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  2. There is fetal origin of the right posterior cerebral artery with a hypoplastic P1 segment. There is fetal origin of the left posterior cerebral artery with an aplastic P1 segment.  3. The left vertebral artery terminates into posterior inferior cerebellar artery.  4.  No hemodynamically significant flow-limiting stenosis identified.  -repeat CTh: Chronic age-related changes.  No acute process.  -ECHO:  EF 55-60%, bubble study negative, mildly dilated LA  -CUS:  Negative  -LDL: 78  -A1c:  5.6  -TSH:  " 2.167  -not on antiplatelet, anticoagulation, or statin therapy prior to hospital admission  -repeat CTh (8/27): No acute intracranial process identified.   -Cerebral angiogram (8/28):  demonstrated no significant stenoses    VTE prophylaxis: Enoxaparin 40 mg SQ every 24 hours  Mechanical prophylaxis: Place SCDs    BP parameters: TIA: Normalize blood pressure, onset greater than 24 hours            VTE Risk Mitigation (From admission, onward)           Ordered     enoxaparin injection 40 mg  Daily         08/26/23 0636     Place sequential compression device  Until discontinued         08/26/23 0636                    BRENTON Ashley  Neurology  [facility] - 4th Floor Medical Telemetry

## 2023-08-29 NOTE — SUBJECTIVE & OBJECTIVE
Subjective:     Interval History: Sleeping, easily awakened.  No new neurological issues overnight.  Updated son that cerebral angiogram was normal.      Current Facility-Administered Medications   Medication Dose Route Frequency Provider Last Rate Last Admin    acetaminophen tablet 650 mg  650 mg Oral Q6H PRN Chandler Urrutia MD   650 mg at 08/27/23 2141    aspirin EC tablet 81 mg  81 mg Oral Daily Chandler Urrutia MD   81 mg at 08/28/23 0849    atorvastatin tablet 40 mg  40 mg Per NG tube Daily Chandler Urrutia MD   40 mg at 08/27/23 0838    bisacodyL suppository 10 mg  10 mg Rectal Daily PRN Chandler Urrutia MD        carvediloL tablet 6.25 mg  6.25 mg Oral BID Eileen Castillo MD   6.25 mg at 08/28/23 2305    enoxaparin injection 40 mg  40 mg Subcutaneous Daily Chandler Urrutia MD   40 mg at 08/28/23 2305    gabapentin capsule 300 mg  300 mg Oral TID Eileen Castillo MD   300 mg at 08/28/23 2305    ketorolac injection 15 mg  15 mg Intravenous Q6H PRN Cristopher Davis MD   15 mg at 08/29/23 0344    labetaloL injection 10 mg  10 mg Intravenous Q30 Min PRN Chandler Urrutia MD        methocarbamoL tablet 500 mg  500 mg Oral QID PRN Eileen Castillo MD   500 mg at 08/27/23 2141    ondansetron injection 4 mg  4 mg Intravenous Q4H PRN Chandler Urrutia MD   4 mg at 08/27/23 2141    pantoprazole EC tablet 40 mg  40 mg Oral Daily Eileen Castillo MD   40 mg at 08/27/23 1505    polyethylene glycol packet 17 g  17 g Oral BID PRN Chandler Urrutia MD        prochlorperazine injection Soln 5 mg  5 mg Intravenous Q6H PRN Chandler Urrutia MD   5 mg at 08/28/23 2114    senna-docusate 8.6-50 mg per tablet 1 tablet  1 tablet Oral BID PRN Chandler Urrutia MD        sertraline tablet 50 mg  50 mg Oral Daily Eileen Castillo MD   50 mg at 08/27/23 1505    sodium chloride 0.9% flush 10 mL  10 mL Intravenous PRN Chandler Urrutia MD        sodium chloride 0.9% flush 10 mL  10 mL Intravenous Q6H Chandler Urrutia MD   10 mL at 08/28/23 0553    And    sodium  chloride 0.9% flush 10 mL  10 mL Intravenous PRN Chandler Urrutia MD        venlafaxine tablet 75 mg  75 mg Oral BID Eileen Castillo MD   75 mg at 08/28/23 2305       Review of Systems   Neurological:  Positive for weakness (left side (chronic)). Negative for dizziness, tremors, seizures, syncope, facial asymmetry, speech difficulty, light-headedness, numbness and headaches.   All other systems reviewed and are negative.    Objective:     Vital Signs (Most Recent):  Temp: 98.3 °F (36.8 °C) (08/29/23 0449)  Pulse: 65 (08/29/23 0449)  Resp: 18 (08/29/23 0449)  BP: (!) 149/76 (08/29/23 0449)  SpO2: 99 % (08/29/23 0449) Vital Signs (24h Range):  Temp:  [98 °F (36.7 °C)-98.7 °F (37.1 °C)] 98.3 °F (36.8 °C)  Pulse:  [65-78] 65  Resp:  [16-20] 18  SpO2:  [84 %-100 %] 99 %  BP: (131-177)/(71-87) 149/76     Weight: 87.1 kg (192 lb)  Body mass index is 34.01 kg/m².     Physical Exam  Vitals reviewed.   Constitutional:       General: She is awake. She is not in acute distress.     Appearance: She is obese. She is not ill-appearing or toxic-appearing.   HENT:      Mouth/Throat:      Mouth: Mucous membranes are dry.   Eyes:      General: Vision grossly intact. No visual field deficit.     Extraocular Movements: Extraocular movements intact.      Pupils: Pupils are equal, round, and reactive to light.   Cardiovascular:      Rate and Rhythm: Normal rate.      Pulses:           Dorsalis pedis pulses are 2+ on the right side and 2+ on the left side.   Pulmonary:      Effort: Pulmonary effort is normal.   Skin:     General: Skin is warm and dry.      Capillary Refill: Capillary refill takes less than 2 seconds.      Comments: Right groin puncture site with gauze/transparent dressing intact ... Soft, flat, no evidence of hematoma.   Neurological:      Mental Status: She is alert and oriented to person, place, and time. Mental status is at baseline.      Cranial Nerves: No dysarthria or facial asymmetry.      Sensory: Sensation is  "intact.      Motor: Weakness (Mild Left sided weakness (chronic), left hand grasp slightly weaker) present. No tremor, atrophy or seizure activity.      Coordination: Coordination is intact.   Psychiatric:         Attention and Perception: Attention normal.         Mood and Affect: Mood and affect normal.         Speech: Speech normal.         Behavior: Behavior normal. Behavior is cooperative.          Significant Labs: Hemoglobin A1c:   Recent Labs   Lab 08/26/23  0651   HGBA1C 5.6     BMP: No results for input(s): "GLU", "NA", "K", "CL", "CO2", "BUN", "CREATININE", "CALCIUM", "MG" in the last 48 hours.  CBC: No results for input(s): "WBC", "HGB", "HCT", "PLT" in the last 48 hours.  Inflammatory Markers: No results for input(s): "SEDRATE", "CRP", "PROCAL" in the last 48 hours.    Significant Imaging: I have reviewed all pertinent imaging results/findings within the past 24 hours.    "

## 2023-08-30 ENCOUNTER — PATIENT OUTREACH (OUTPATIENT)
Dept: ADMINISTRATIVE | Facility: CLINIC | Age: 78
End: 2023-08-30
Payer: MEDICARE

## 2023-08-30 LAB
LEFT CCA DIST DIAS: 11 CM/S
LEFT CCA DIST SYS: 69 CM/S
LEFT CCA PROX DIAS: 0 CM/S
LEFT CCA PROX SYS: 82 CM/S
LEFT ECA DIAS: 4 CM/S
LEFT ECA SYS: 107 CM/S
LEFT ICA DIST DIAS: 18 CM/S
LEFT ICA DIST SYS: 84 CM/S
LEFT ICA MID DIAS: 10 CM/S
LEFT ICA MID SYS: 64 CM/S
LEFT ICA PROX DIAS: 13 CM/S
LEFT ICA PROX SYS: 58 CM/S
LEFT VERTEBRAL SYS: 39 CM/S
OHS CV CAROTID RIGHT ICA EDV HIGHEST: 16
OHS CV CAROTID ULTRASOUND LEFT ICA/CCA RATIO: 1.22
OHS CV CAROTID ULTRASOUND RIGHT ICA/CCA RATIO: 1.36
OHS CV PV CAROTID LEFT HIGHEST CCA: 82
OHS CV PV CAROTID LEFT HIGHEST ICA: 84
OHS CV PV CAROTID RIGHT HIGHEST CCA: 91
OHS CV PV CAROTID RIGHT HIGHEST ICA: 90
OHS CV US CAROTID LEFT HIGHEST EDV: 18
RIGHT CCA DIST DIAS: 12 CM/S
RIGHT CCA DIST SYS: 66 CM/S
RIGHT CCA PROX DIAS: 15 CM/S
RIGHT CCA PROX SYS: 91 CM/S
RIGHT ECA DIAS: 0 CM/S
RIGHT ECA SYS: 83 CM/S
RIGHT ICA DIST DIAS: 14 CM/S
RIGHT ICA DIST SYS: 85 CM/S
RIGHT ICA MID DIAS: 16 CM/S
RIGHT ICA MID SYS: 90 CM/S
RIGHT ICA PROX DIAS: 13 CM/S
RIGHT ICA PROX SYS: 84 CM/S
RIGHT VERTEBRAL DIAS: 6 CM/S
RIGHT VERTEBRAL SYS: 34 CM/S

## 2023-08-30 NOTE — PROGRESS NOTES
C3 nurse spoke with Katty Veronica  for a TCC post hospital discharge follow up call. The patient has a scheduled HOSFU appointment with Dr. Leach on 09/13/2023 @ 830 am.  The patient does not have a scheduled HOSFU appointment with Sheila Moreira DO  within 5-7 days post hospital discharge date 08/29/2023.    Message sent to PCP staff requesting they contact patient and schedule follow up appointment.

## 2023-09-13 ENCOUNTER — OFFICE VISIT (OUTPATIENT)
Dept: NEUROLOGY | Facility: CLINIC | Age: 78
End: 2023-09-13
Payer: MEDICARE

## 2023-09-13 VITALS
DIASTOLIC BLOOD PRESSURE: 92 MMHG | HEIGHT: 63 IN | BODY MASS INDEX: 34.02 KG/M2 | WEIGHT: 192 LBS | SYSTOLIC BLOOD PRESSURE: 148 MMHG | HEART RATE: 67 BPM

## 2023-09-13 DIAGNOSIS — G45.9 TIA (TRANSIENT ISCHEMIC ATTACK): Primary | ICD-10-CM

## 2023-09-13 DIAGNOSIS — I69.354 HEMIPLEGIA AND HEMIPARESIS FOLLOWING CEREBRAL INFARCTION AFFECTING LEFT NON-DOMINANT SIDE: ICD-10-CM

## 2023-09-13 PROCEDURE — 99999 PR PBB SHADOW E&M-EST. PATIENT-LVL IV: CPT | Mod: PBBFAC,,, | Performed by: NURSE PRACTITIONER

## 2023-09-13 PROCEDURE — 99214 OFFICE O/P EST MOD 30 MIN: CPT | Mod: S$PBB,,, | Performed by: NURSE PRACTITIONER

## 2023-09-13 PROCEDURE — 99999 PR PBB SHADOW E&M-EST. PATIENT-LVL IV: ICD-10-PCS | Mod: PBBFAC,,, | Performed by: NURSE PRACTITIONER

## 2023-09-13 PROCEDURE — 99214 OFFICE O/P EST MOD 30 MIN: CPT | Mod: PBBFAC | Performed by: NURSE PRACTITIONER

## 2023-09-13 PROCEDURE — 99214 PR OFFICE/OUTPT VISIT, EST, LEVL IV, 30-39 MIN: ICD-10-PCS | Mod: S$PBB,,, | Performed by: NURSE PRACTITIONER

## 2023-09-13 RX ORDER — TIMOLOL MALEATE 5 MG/ML
1 SOLUTION/ DROPS OPHTHALMIC 2 TIMES DAILY
COMMUNITY
Start: 2023-09-08

## 2023-09-13 RX ORDER — FAMOTIDINE 20 MG/1
1 TABLET, FILM COATED ORAL NIGHTLY
COMMUNITY

## 2023-09-13 RX ORDER — OXYCODONE AND ACETAMINOPHEN 7.5; 325 MG/1; MG/1
TABLET ORAL
Status: ON HOLD | COMMUNITY
Start: 2023-07-29 | End: 2023-11-19 | Stop reason: CLARIF

## 2023-09-13 RX ORDER — AMLODIPINE BESYLATE 5 MG/1
TABLET ORAL
COMMUNITY

## 2023-09-13 NOTE — PROGRESS NOTES
Subjective:      Patient ID: Katty Veronica is a 78 y.o. female.    Chief Complaint:  Hospital Follow Up (Patient denies HA or dizziness. Patient states blurred vision in left eye due to floater. Also states weakness on left and right side. )      History of Present Illness  Patient past medical history of COPD, CAD s/p stent, GERD, HTN, stroke (with left-sided weakness) presents for hospital follow up of TIA. Presented to [facility] with reports of left facial twisting and slurred speech. Symptoms resolved prior to ED arrival. CT head on admit normal. Patient had recurring slurred speech and left facial droop on 8/26; repeat CT head again normal and symptoms resolved quickly. Cerebral angiogram performed by Dr. Leach and it was normal. CUS with no hemodynamically significant stenosis. Patient was reportedly not on antiplatelet, anticoagulation, or statin therapy prior to hospital admission. Today, patient denies HA or dizziness. Patient states blurred vision in left eye due to floater. Also states weakness on left and right side which is chronic in nature. Sees Dr. Moran, cardiology, for 2 cardiac stents and LAD. Recent left TKA last year with residual left leg numbness. Sees Dr. Barton for pain management, has a nevro spinal cord stimulator.  Reports she is seeing Dr. Arpit Bush for compression fractures of her spine. States that she had recent repeat MRI as well as follow up with him in 2 weeks. Currently on Percocet for pain. Was seen by Dr. Sheila Moreira as her PCP but patient states she will need a new PCP due to Dr. Moreira closing her practice.       ECHO  Left Ventricle: The left ventricle is normal in size. Normal wall thickness. Normal wall motion. There is normal systolic function with a visually estimated ejection fraction of 55 - 60%.    Left Atrium: Left atrium is mildly dilated.    Right Ventricle: Normal right ventricular cavity size. Wall thickness is normal. Right ventricle wall motion  is  normal. Systolic function is normal.    Mitral Valve: There is no stenosis. The mean pressure gradient across the mitral valve is 5 mmHg. There is mild regurgitation.    Tricuspid Valve: There is mild regurgitation.    CEREBRAL ANGIOGRAM  DATE OF SURGERY:    08/28/2023 00:00:00     SURGEON:  Kane Leach MD     PREOPERATIVE DIAGNOSIS:  TIA.     POSTOPERATIVE DIAGNOSIS:  Normal angiogram.     PROCEDURE PERFORMED:  Cerebral angiogram with catheter insertion in the   following arteries:  Right common carotid, right internal carotid, right   external carotid, right subclavian, left subclavian, left vertebral, left common   carotid.     LEVEL OF SEDATION:  Conscious sedation.  Sedation administered by independent   trained observer under attending supervision with continuous monitoring of the   patient's level of consciousness and physiologic status.  Total intraservice   sedation time 30 minutes.     COMPLICATIONS:  None.     DETAILED DESCRIPTION:  Following informed consent, the patient was prepped and   draped in the usual sterile fashion.  I infiltrated the right groin with local   anesthetic and punctured the right femoral artery, placing a 5-Kyrgyz sheath   without incident.  I introduced my catheter and wire and advanced them to the   aortic arch.  I selected the right common carotid artery.  Angiographic images   demonstrated a normal bifurcation.  I selected the right internal carotid   artery.  Cerebral AP and lateral views demonstrated no evidence of significant   stenosis, mass effect, vascular malformation, or early venous drainage.  I then   selected the right external carotid artery.  Angiographic images demonstrated   normal external carotid circulation.  I then selected the right subclavian   artery.  Angiographic images demonstrated no stenosis at the origin of the right   vertebral artery.  Cerebral AP and lateral views via right subclavian artery   injection demonstrated no evidence of  significant stenosis, mass effect,   vascular malformation, or early venous drainage.  I then selected the left   subclavian artery.  Angiographic images demonstrated no stenosis at the origin   of left vertebral artery.  I selected the left vertebral artery.  Cerebral AP   and lateral views demonstrated that the left vertebral artery ends in the left   posterior inferior cerebellar artery.  There was no evidence of significant   stenosis, mass effect, vascular malformation, or early venous drainage.  I then   selected the left common carotid artery.  Angiographic images demonstrated a   normal bifurcation.  Cerebral AP and lateral views via left common carotid   artery injection demonstrated no evidence of significant stenosis, mass effect,   vascular malformation, or early venous drainage.  I removed the catheter.    Hemostasis was achieved using a Mynx closure device.  The patient tolerated   procedure well without apparent complication.           ______________________________  Kane Leach MD     DEP/AQS  DD:  08/28/2023  Time:  01:17PM      CT Head Without Contrast  Narrative & Impression     Technique:CT of the head was performed without intravenous contrast with axial as well as coronal and sagittal images.     Comparison:Comparison is with study dated 2023-08-26 09:59:33.     Dosage Information:Automated exposure control was utilized.     Clinical history:Stroke follow up.     Findings:     Hemorrhage:No acute intracranial hemorrhage is seen.     CSF spaces:The ventricles, sulci and basal cisterns all appear mildly prominent suggesting an element of global cerebral atrophy.     Brain parenchyma:Unremarkable with preservation of the grey white junction throughout.  There is no acute large vessel territory infarct     Cerebellum:Unremarkable.     Vascular:Note the right jugular foramen is prominent.     Sella and skull base:The sella appears to be within normal limits for age.     Calvarium:No acute  linear or depressed skull fracture is seen.     Maxillofacial Structures:     Paranasal sinuses:The visualized paranasal sinuses appear clear with no mucoperiosteal thickening or air fluid levels identified.     Visualized upper cervical spine:The visualized cervical spine appears unremarkable.     Impression:  Impression:     No acute intracranial process identified. Details and other findings as noted above.     No significant discrepancy with overnight report.        Electronically signed by: Anastacio Preston  Date:                                            08/27/2023  Time:                                           07:22        CT HEAD WITHOUT CONTRAST     CLINICAL HISTORY:  Transient ischemic attack (TIA);     TECHNIQUE:  Axial images of the head were obtained without IV contrast administration.  Coronal and sagittal reconstructions were provided.  Three dimensional and MIP images were obtained and evaluated.  Total DLP was 910 mGy-cm. Dose lowering technique and automated exposure control were utilized for this exam.     COMPARISON:  CT of the head 10/03/2022.     FINDINGS:  There is normal brain formation.  There is normal gray-white matter differentiation.  There is no hemorrhage, hydrocephalus, or midline shift.  There is no cytotoxic or vasogenic edema.  There is no intra or extra-axial fluid collection.  There is no herniation.     The calvarium is intact.  There is no fracture.  The bilateral orbits are normal.  The paranasal sinuses and mastoid air cells are normally developed and free of disease.     Impression:     No acute intracranial abnormality.        Electronically signed by: Ketan Morris MD  Date:                                            08/25/2023  Time:                                           22:04  CTA HEAD/NECK  Technique:CT angiogram of the intracranial vessels was performed without and with intravenous contrast with direct axial as well as sagittal and coronal reformations. CT angiogram of  the neck vessels was performed without and with intravenous contrast with direct axial as well as sagittal and coronal reformations.     Automated exposure control was utilized to minimize radiation dose.  DLP 2388     Comparison:Comparison is with study dated 2023-08-25 22:00:20.     Clinical history:Slurred speech, facial droop.     FINDINGS:  Carotid arteries are assessed in accordance with the NASCET criteria.     Intracranial Vascular structures:     Internal carotid arteries:Mild atheromatous calcification of the cavernous clinoid and supraclinoid segments of the bilateral internal carotid arteries is seen with only minimal associated narrowing of the associated segments.     Middle cerebral arteries:Unremarkable.     Anterior cerebral arteries:Moderate hypoplasia of the A1 segment of the left anterior cerebral artery is seen. The right anterior cerebral artery is unremarkable.     Vertebral arteries:The left vertebral artery terminates into posterior inferior cerebellar artery. The right vertebral artery is patent. Minimal atheromatous calcification is seen in the right distal vertebral artery.     Basilar artery:Unremarkable.     Posterior cerebral arteries:There is fetal origin of the right posterior cerebral artery with a hypoplastic P1 segment. There is fetal origin of the left posterior cerebral artery with an aplastic P1 segment.     Carotids:     Common carotid arteries:The right and the left common carotid arteries appear unremarkable.     Internal carotid artery:The right and the left internal carotid arteries appear unremarkable.     Vertebral arteries:Right vertebral artery is dominant. Both vertebral arteries are patent and normal in caliber. The origins of both vertebral arteries are unremarkable.     Brain parenchyma:No abnormal leptomeningeal or parenchymal enhancement is seen.     Visualized portion lungs are remarkable congestive changes.     Impression:  Impression:     1. 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     2. There is fetal origin of the right posterior cerebral artery with a hypoplastic P1 segment. There is fetal origin of the left posterior cerebral artery with an aplastic P1 segment.     3. The left vertebral artery terminates into posterior inferior cerebellar artery.     4.  No hemodynamically significant flow-limiting stenosis identified.     No significant discrepancy with overnight report.        Electronically signed by: Anastacio Preston  Date:                                            08/26/2023  Time:                                           08:31      Past Medical History:   Diagnosis Date    Anxiety     COPD (chronic obstructive pulmonary disease)     Coronary artery disease     s/p 2 stents    Depression     GERD (gastroesophageal reflux disease)     Glaucoma     Hypertension     Obstructive sleep apnea     on cpap    Stroke     Urinary, incontinence, stress female        Past Surgical History:   Procedure Laterality Date    APPENDECTOMY      BACK SURGERY      CHOLECYSTECTOMY      EXPLORATION OF COMMON BILE DUCT      HERNIA REPAIR      incisional hernia times 2, central abdomen    HYSTERECTOMY      OVARIAN CYST REMOVAL      TONSILLECTOMY         Family History   Problem Relation Age of Onset    Heart disease Mother     Cancer Father        Social History     Socioeconomic History    Marital status:    Tobacco Use    Smoking status: Never   Substance and Sexual Activity    Alcohol use: Not Currently    Drug use: Not Currently   Social History Narrative    ** Merged History Encounter **            Current Outpatient Medications   Medication Sig Dispense Refill    amLODIPine (NORVASC) 5 MG tablet 1 tablet Orally Once a day      aspirin (ECOTRIN) 81 MG EC tablet Take 1 tablet (81 mg total) by mouth once daily.  0    atorvastatin (LIPITOR) 40 MG tablet Take 1 tablet (40 mg total) by mouth once daily. 90 tablet 3    carvediloL  (COREG) 12.5 MG tablet Take 1 tablet (12.5 mg total) by mouth 2 (two) times daily. 60 tablet 11    famotidine (PEPCID) 20 MG tablet Take 1 tablet by mouth every evening.      gabapentin (NEURONTIN) 300 MG capsule Take 300 mg by mouth 3 (three) times daily.      losartan (COZAAR) 25 MG tablet Take 25 mg by mouth 2 (two) times daily.      oxyCODONE-acetaminophen (PERCOCET) 7.5-325 mg per tablet TAKE ONE TABLET BY MOUTH THREE TIMES DAILY FOR PAIN. MAY CAUSE DROWSINESS      pantoprazole (PROTONIX) 40 MG tablet Take 40 mg by mouth.      sertraline (ZOLOFT) 50 MG tablet Take 50 mg by mouth.      timolol maleate 0.5% (TIMOPTIC) 0.5 % Drop Place 1 drop into both eyes 2 (two) times daily.       No current facility-administered medications for this visit.       Review of patient's allergies indicates:   Allergen Reactions    Morphine     Norco [hydrocodone-acetaminophen]         Vitals:    09/13/23 0826   BP: (!) 148/92   Pulse: 67       Review of Systems  Review of Systems   Neurological:  Positive for weakness.   All other systems reviewed and are negative.    Objective:     Neurologic Exam     Mental Status   Oriented to person, place, and time.   Speech: speech is normal   Level of consciousness: alert    Cranial Nerves   Cranial nerves II through XII intact.     Motor Exam   Muscle bulk: normalMinimal left sided weakness noted     Sensory Exam   Light touch normal.   Decreased sensation from knee down to her toes on left lower extremity     Gait, Coordination, and Reflexes Ambulates with rolling walker; antalgic       Physical Exam  Vitals reviewed.   Cardiovascular:      Rate and Rhythm: Normal rate and regular rhythm.   Pulmonary:      Effort: Pulmonary effort is normal.      Breath sounds: Normal breath sounds.   Neurological:      Mental Status: She is oriented to person, place, and time.      Cranial Nerves: Cranial nerves 2-12 are intact.   Psychiatric:         Speech: Speech normal.        Assessment:     1. TIA  (transient ischemic attack)    2. Hemiplegia and hemiparesis following cerebral infarction affecting left non-dominant side        Plan:   Recommend RF modification compliance  Continue ASA 81 mg daily  Continue Lipitor 40 mg daily  Referral to internal medicine, Dr. Verma  Secondary stroke prevention measures discussed. Education provided on signs and symptoms of stroke; advised to call 9-1-1 with any new onset of numbness, tingling or weakness, difficulty with speech or facial droop.

## 2023-10-11 ENCOUNTER — TELEPHONE (OUTPATIENT)
Dept: INTERNAL MEDICINE | Facility: CLINIC | Age: 78
End: 2023-10-11
Payer: MEDICARE

## 2023-10-11 NOTE — TELEPHONE ENCOUNTER
----- Message from Maranda Diana LPN sent at 10/11/2023  2:48 PM CDT -----  Please advise   ----- Message -----  From: Connie Valle  Sent: 10/11/2023   2:20 PM CDT  To: Luci BARRAZA Staff    .Type:  Needs Medical Advice    Who Called: pt  Symptoms (please be specific):    How long has patient had these symptoms:    Pharmacy name and phone #:    Would the patient rather a call back or a response via [facility]?   Best Call Back Number: [phone]  Additional Information: pt returning call to schedule new pt appt

## 2023-11-18 ENCOUNTER — HOSPITAL ENCOUNTER (OUTPATIENT)
Facility: HOSPITAL | Age: 78
Discharge: HOME | End: 2023-11-20
Attending: EMERGENCY MEDICINE | Admitting: INTERNAL MEDICINE
Payer: MEDICARE

## 2023-11-18 DIAGNOSIS — R07.9 CHEST PAIN: Primary | ICD-10-CM

## 2023-11-18 LAB
ALBUMIN SERPL-MCNC: 3.4 G/DL (ref 3.4–4.8)
ALBUMIN/GLOB SERPL: 1.2 RATIO (ref 1.1–2)
ALP SERPL-CCNC: 95 UNIT/L (ref 40–150)
ALT SERPL-CCNC: 12 UNIT/L (ref 0–55)
AST SERPL-CCNC: 17 UNIT/L (ref 5–34)
BASOPHILS # BLD AUTO: 0.04 X10(3)/MCL
BASOPHILS NFR BLD AUTO: 0.5 %
BILIRUB SERPL-MCNC: 0.6 MG/DL
BNP BLD-MCNC: 151.7 PG/ML
BUN SERPL-MCNC: 19.5 MG/DL (ref 9.8–20.1)
CALCIUM SERPL-MCNC: 8.6 MG/DL (ref 8.4–10.2)
CHLORIDE SERPL-SCNC: 109 MMOL/L (ref 98–107)
CO2 SERPL-SCNC: 24 MMOL/L (ref 23–31)
CREAT SERPL-MCNC: 0.77 MG/DL (ref 0.55–1.02)
EOSINOPHIL # BLD AUTO: 0.13 X10(3)/MCL (ref 0–0.9)
EOSINOPHIL NFR BLD AUTO: 1.5 %
ERYTHROCYTE [DISTWIDTH] IN BLOOD BY AUTOMATED COUNT: 14.3 % (ref 11.5–17)
GFR SERPLBLD CREATININE-BSD FMLA CKD-EPI: >60 MLS/MIN/1.73/M2
GLOBULIN SER-MCNC: 2.9 GM/DL (ref 2.4–3.5)
GLUCOSE SERPL-MCNC: 92 MG/DL (ref 82–115)
HCT VFR BLD AUTO: 33.4 % (ref 37–47)
HGB BLD-MCNC: 10.8 G/DL (ref 12–16)
IMM GRANULOCYTES # BLD AUTO: 0.03 X10(3)/MCL (ref 0–0.04)
IMM GRANULOCYTES NFR BLD AUTO: 0.3 %
INR PPP: 2.5
LYMPHOCYTES # BLD AUTO: 3.55 X10(3)/MCL (ref 0.6–4.6)
LYMPHOCYTES NFR BLD AUTO: 40.1 %
MCH RBC QN AUTO: 28.4 PG (ref 27–31)
MCHC RBC AUTO-ENTMCNC: 32.3 G/DL (ref 33–36)
MCV RBC AUTO: 87.9 FL (ref 80–94)
MONOCYTES # BLD AUTO: 0.72 X10(3)/MCL (ref 0.1–1.3)
MONOCYTES NFR BLD AUTO: 8.1 %
NEUTROPHILS # BLD AUTO: 4.38 X10(3)/MCL (ref 2.1–9.2)
NEUTROPHILS NFR BLD AUTO: 49.5 %
NRBC BLD AUTO-RTO: 0 %
PLATELET # BLD AUTO: 227 X10(3)/MCL (ref 130–400)
PMV BLD AUTO: 10.4 FL (ref 7.4–10.4)
POTASSIUM SERPL-SCNC: 3.8 MMOL/L (ref 3.5–5.1)
PROT SERPL-MCNC: 6.3 GM/DL (ref 5.8–7.6)
PROTHROMBIN TIME: 26.7 SECONDS (ref 12.5–14.5)
RBC # BLD AUTO: 3.8 X10(6)/MCL (ref 4.2–5.4)
SODIUM SERPL-SCNC: 141 MMOL/L (ref 136–145)
TROPONIN I SERPL-MCNC: <0.01 NG/ML (ref 0–0.04)
TROPONIN I SERPL-MCNC: <0.01 NG/ML (ref 0–0.04)
WBC # SPEC AUTO: 8.85 X10(3)/MCL (ref 4.5–11.5)

## 2023-11-18 PROCEDURE — G0378 HOSPITAL OBSERVATION PER HR: HCPCS

## 2023-11-18 PROCEDURE — 80053 COMPREHEN METABOLIC PANEL: CPT | Performed by: EMERGENCY MEDICINE

## 2023-11-18 PROCEDURE — 93005 ELECTROCARDIOGRAM TRACING: CPT

## 2023-11-18 PROCEDURE — 99285 EMERGENCY DEPT VISIT HI MDM: CPT | Mod: 25

## 2023-11-18 PROCEDURE — 85610 PROTHROMBIN TIME: CPT | Performed by: EMERGENCY MEDICINE

## 2023-11-18 PROCEDURE — 83880 ASSAY OF NATRIURETIC PEPTIDE: CPT | Performed by: EMERGENCY MEDICINE

## 2023-11-18 PROCEDURE — 84484 ASSAY OF TROPONIN QUANT: CPT | Mod: 91 | Performed by: EMERGENCY MEDICINE

## 2023-11-18 PROCEDURE — 85025 COMPLETE CBC W/AUTO DIFF WBC: CPT | Performed by: EMERGENCY MEDICINE

## 2023-11-18 RX ORDER — ASPIRIN 325 MG
325 TABLET, DELAYED RELEASE (ENTERIC COATED) ORAL ONCE
Status: DISCONTINUED | OUTPATIENT
Start: 2023-11-19 | End: 2023-11-19

## 2023-11-18 RX ORDER — ASPIRIN 325 MG
325 TABLET, DELAYED RELEASE (ENTERIC COATED) ORAL
Status: DISCONTINUED | OUTPATIENT
Start: 2023-11-18 | End: 2023-11-18

## 2023-11-18 RX ORDER — SODIUM CHLORIDE 0.9 % (FLUSH) 0.9 %
10 SYRINGE (ML) INJECTION
Status: DISCONTINUED | OUTPATIENT
Start: 2023-11-19 | End: 2023-11-20 | Stop reason: HOSPADM

## 2023-11-18 RX ORDER — TALC
6 POWDER (GRAM) TOPICAL NIGHTLY PRN
Status: DISCONTINUED | OUTPATIENT
Start: 2023-11-19 | End: 2023-11-20 | Stop reason: HOSPADM

## 2023-11-19 LAB — TROPONIN I SERPL-MCNC: <0.01 NG/ML (ref 0–0.04)

## 2023-11-19 PROCEDURE — 96372 THER/PROPH/DIAG INJ SC/IM: CPT | Performed by: NURSE PRACTITIONER

## 2023-11-19 PROCEDURE — G0378 HOSPITAL OBSERVATION PER HR: HCPCS

## 2023-11-19 PROCEDURE — 63600175 PHARM REV CODE 636 W HCPCS: Performed by: NURSE PRACTITIONER

## 2023-11-19 PROCEDURE — 84484 ASSAY OF TROPONIN QUANT: CPT | Performed by: EMERGENCY MEDICINE

## 2023-11-19 PROCEDURE — 25000003 PHARM REV CODE 250: Performed by: NURSE PRACTITIONER

## 2023-11-19 PROCEDURE — 25000003 PHARM REV CODE 250: Performed by: EMERGENCY MEDICINE

## 2023-11-19 RX ORDER — ONDANSETRON 2 MG/ML
4 INJECTION INTRAMUSCULAR; INTRAVENOUS EVERY 6 HOURS PRN
Status: DISCONTINUED | OUTPATIENT
Start: 2023-11-19 | End: 2023-11-20 | Stop reason: HOSPADM

## 2023-11-19 RX ORDER — OXYCODONE AND ACETAMINOPHEN 10; 325 MG/1; MG/1
1 TABLET ORAL EVERY 8 HOURS PRN
COMMUNITY
End: 2024-02-09 | Stop reason: CLARIF

## 2023-11-19 RX ORDER — CETIRIZINE HYDROCHLORIDE 10 MG/1
10 TABLET ORAL DAILY
COMMUNITY

## 2023-11-19 RX ORDER — CARVEDILOL 12.5 MG/1
25 TABLET ORAL 2 TIMES DAILY
Status: DISCONTINUED | OUTPATIENT
Start: 2023-11-19 | End: 2023-11-20 | Stop reason: HOSPADM

## 2023-11-19 RX ORDER — PANTOPRAZOLE SODIUM 40 MG/1
40 TABLET, DELAYED RELEASE ORAL DAILY
Status: DISCONTINUED | OUTPATIENT
Start: 2023-11-19 | End: 2023-11-20 | Stop reason: HOSPADM

## 2023-11-19 RX ORDER — CETIRIZINE HYDROCHLORIDE 10 MG/1
10 TABLET ORAL DAILY
Status: DISCONTINUED | OUTPATIENT
Start: 2023-11-19 | End: 2023-11-20 | Stop reason: HOSPADM

## 2023-11-19 RX ORDER — METHOCARBAMOL 500 MG/1
500 TABLET, FILM COATED ORAL 2 TIMES DAILY PRN
Status: DISCONTINUED | OUTPATIENT
Start: 2023-11-19 | End: 2023-11-20 | Stop reason: HOSPADM

## 2023-11-19 RX ORDER — METHOCARBAMOL 500 MG/1
500 TABLET, FILM COATED ORAL 2 TIMES DAILY
COMMUNITY

## 2023-11-19 RX ORDER — OXYCODONE AND ACETAMINOPHEN 10; 325 MG/1; MG/1
1 TABLET ORAL EVERY 8 HOURS PRN
Status: DISCONTINUED | OUTPATIENT
Start: 2023-11-19 | End: 2023-11-20 | Stop reason: HOSPADM

## 2023-11-19 RX ORDER — ONDANSETRON 2 MG/ML
INJECTION INTRAMUSCULAR; INTRAVENOUS
Status: COMPLETED
Start: 2023-11-19 | End: 2023-11-19

## 2023-11-19 RX ORDER — FAMOTIDINE 20 MG/1
20 TABLET, FILM COATED ORAL NIGHTLY
Status: DISCONTINUED | OUTPATIENT
Start: 2023-11-19 | End: 2023-11-20 | Stop reason: HOSPADM

## 2023-11-19 RX ORDER — ASPIRIN 81 MG/1
81 TABLET ORAL DAILY
Status: DISCONTINUED | OUTPATIENT
Start: 2023-11-19 | End: 2023-11-20 | Stop reason: HOSPADM

## 2023-11-19 RX ORDER — SERTRALINE HYDROCHLORIDE 50 MG/1
50 TABLET, FILM COATED ORAL DAILY
Status: DISCONTINUED | OUTPATIENT
Start: 2023-11-19 | End: 2023-11-20 | Stop reason: HOSPADM

## 2023-11-19 RX ORDER — GABAPENTIN 300 MG/1
300 CAPSULE ORAL 3 TIMES DAILY
Status: DISCONTINUED | OUTPATIENT
Start: 2023-11-19 | End: 2023-11-20 | Stop reason: HOSPADM

## 2023-11-19 RX ORDER — METOCLOPRAMIDE 5 MG/1
5 TABLET ORAL
COMMUNITY

## 2023-11-19 RX ORDER — ERGOCALCIFEROL 1.25 MG/1
50000 CAPSULE ORAL
COMMUNITY

## 2023-11-19 RX ORDER — ENOXAPARIN SODIUM 100 MG/ML
1 INJECTION SUBCUTANEOUS EVERY 12 HOURS
Status: DISCONTINUED | OUTPATIENT
Start: 2023-11-19 | End: 2023-11-20

## 2023-11-19 RX ORDER — AMLODIPINE BESYLATE 5 MG/1
5 TABLET ORAL DAILY
Status: DISCONTINUED | OUTPATIENT
Start: 2023-11-19 | End: 2023-11-20 | Stop reason: HOSPADM

## 2023-11-19 RX ORDER — METOCLOPRAMIDE HYDROCHLORIDE 5 MG/5ML
5 SOLUTION ORAL
Status: DISCONTINUED | OUTPATIENT
Start: 2023-11-19 | End: 2023-11-20 | Stop reason: HOSPADM

## 2023-11-19 RX ORDER — ATORVASTATIN CALCIUM 40 MG/1
40 TABLET, FILM COATED ORAL DAILY
Status: DISCONTINUED | OUTPATIENT
Start: 2023-11-19 | End: 2023-11-20 | Stop reason: HOSPADM

## 2023-11-19 RX ADMIN — CETIRIZINE HYDROCHLORIDE 10 MG: 10 TABLET, FILM COATED ORAL at 10:11

## 2023-11-19 RX ADMIN — PANTOPRAZOLE SODIUM 40 MG: 40 TABLET, DELAYED RELEASE ORAL at 10:11

## 2023-11-19 RX ADMIN — METOCLOPRAMIDE HYDROCHLORIDE 5 MG: 5 SOLUTION ORAL at 04:11

## 2023-11-19 RX ADMIN — OXYCODONE AND ACETAMINOPHEN 1 TABLET: 10; 325 TABLET ORAL at 02:11

## 2023-11-19 RX ADMIN — NITROGLYCERIN 0.5 INCH: 20 OINTMENT TOPICAL at 06:11

## 2023-11-19 RX ADMIN — GABAPENTIN 300 MG: 300 CAPSULE ORAL at 08:11

## 2023-11-19 RX ADMIN — ENOXAPARIN SODIUM 100 MG: 100 INJECTION SUBCUTANEOUS at 06:11

## 2023-11-19 RX ADMIN — CARVEDILOL 25 MG: 12.5 TABLET, FILM COATED ORAL at 08:11

## 2023-11-19 RX ADMIN — ATORVASTATIN CALCIUM 40 MG: 40 TABLET, FILM COATED ORAL at 10:11

## 2023-11-19 RX ADMIN — GABAPENTIN 300 MG: 300 CAPSULE ORAL at 04:11

## 2023-11-19 RX ADMIN — AMLODIPINE BESYLATE 5 MG: 5 TABLET ORAL at 10:11

## 2023-11-19 RX ADMIN — SERTRALINE HYDROCHLORIDE 50 MG: 50 TABLET ORAL at 10:11

## 2023-11-19 RX ADMIN — METOCLOPRAMIDE HYDROCHLORIDE 5 MG: 5 SOLUTION ORAL at 10:11

## 2023-11-19 RX ADMIN — METOCLOPRAMIDE HYDROCHLORIDE 5 MG: 5 SOLUTION ORAL at 08:11

## 2023-11-19 RX ADMIN — ENOXAPARIN SODIUM 100 MG: 100 INJECTION SUBCUTANEOUS at 04:11

## 2023-11-19 RX ADMIN — GABAPENTIN 300 MG: 300 CAPSULE ORAL at 10:11

## 2023-11-19 RX ADMIN — FAMOTIDINE 20 MG: 20 TABLET, FILM COATED ORAL at 08:11

## 2023-11-19 RX ADMIN — NITROGLYCERIN 0.5 INCH: 20 OINTMENT TOPICAL at 11:11

## 2023-11-19 RX ADMIN — ASPIRIN 81 MG: 81 TABLET, COATED ORAL at 10:11

## 2023-11-19 RX ADMIN — CARVEDILOL 25 MG: 12.5 TABLET, FILM COATED ORAL at 10:11

## 2023-11-19 NOTE — NURSING
Nurses Note -- 4 Eyes      11/19/2023   2:42 AM      Skin assessed during: Admit      [] No Altered Skin Integrity Present    []Prevention Measures Documented      [x] Yes- Altered Skin Integrity Present or Discovered   [x] LDA Added if Not in Epic (Describe Wound)   [x] New Altered Skin Integrity was Present on Admit and Documented in LDA   [] Wound Image Taken    Wound Care Consulted? Yes    Attending Nurse:  ALFRED Samaniego    Second RN/Staff Member:   ALFRED Boland

## 2023-11-19 NOTE — H&P
Cardiovascular Admission H&P    Patient Name: Katty Veronica  Age: 78 y.o.  : 1945  MRN: [id number]  Admission Date: 2023  Primary Cardiologist: elly  ?  Chief Complaint:   Chief Complaint   Patient presents with    Chest Pain     Pt arrives via [facility], EMS  / Pt reports chest tightness, palpatations, and SOB starting this evening. Pt reports extensive cardiac hx. Pt is AAOx 4, no apparent distress.        History of Present Illness:  Katty Veronica is a 78 y.o. female with past medical history including CAD and prior PCI x2 to the LAD, COPD, hypertension, prior CVA, and paroxysmal atrial fib patient on Xarelto therapy.      Patient was seen by Dr. Moran in the outpatient clinic on 11/10/2023 with complaints of chest discomfort.  PET stress test was ordered for 2023.    Patient presented to the [facility] ER on 2023 with complaints of chest tightness, shortness of breath, and palpitations.  She admits that symptoms are different than prior angina.  Troponin x2 have both been negative.  Blood pressure stable.  EKG normal sinus rhythm.  Patient did take Xarelto yesterday morning.      Patient is seen this morning.  No current chest discomfort or shortness of breath.      Review of Systems:  Review of Systems - 12 point review of systems was performed and reviewed with the patient and was negative except as indicated in the History of Present Illness.    Health Status  Review of patient's allergies indicates:   Allergen Reactions    Morphine     Norco [hydrocodone-acetaminophen]        Past Medical History:   Diagnosis Date    Anxiety     COPD (chronic obstructive pulmonary disease)     Coronary artery disease     s/p 2 stents    Depression     GERD (gastroesophageal reflux disease)     Glaucoma     Hypertension     Obstructive sleep apnea     on cpap    Stroke     Urinary, incontinence, stress female        Current Facility-Administered Medications   Medication Dose Route  "Frequency Provider Last Rate Last Admin    aspirin EC tablet 325 mg  325 mg Oral Once Renard Barlow MD        melatonin tablet 6 mg  6 mg Oral Nightly PRN Renard Barlow MD        nitroGLYCERIN 2% TD oint ointment 0.5 inch  0.5 inch Topical (Top) Q6H Renard Barlow MD        sodium chloride 0.9% flush 10 mL  10 mL Intravenous PRN Renard Barlow MD           Family History   Problem Relation Age of Onset    Heart disease Mother     Cancer Father        Past Surgical History:   Procedure Laterality Date    APPENDECTOMY      BACK SURGERY      CHOLECYSTECTOMY      EXPLORATION OF COMMON BILE DUCT      HERNIA REPAIR      incisional hernia times 2, central abdomen    HYSTERECTOMY      OVARIAN CYST REMOVAL      TONSILLECTOMY         Social History     Socioeconomic History    Marital status:    Tobacco Use    Smoking status: Never   Substance and Sexual Activity    Alcohol use: Not Currently    Drug use: Not Currently   Social History Narrative    ** Merged History Encounter **            Physical Examination:  Vital signs:  Temp:  [97.2 °F (36.2 °C)-98 °F (36.7 °C)] 97.5 °F (36.4 °C)  Pulse:  [64-76] 66  Resp:  [17-20] 20  SpO2:  [93 %-100 %] 94 %  BP: (131-147)/(62-79) 131/62  Patient Vitals for the past 8 hrs:   BP Temp Temp src Pulse Resp SpO2 Height Weight   11/19/23 0400 -- -- -- 66 -- (!) 94 % -- --   11/19/23 0338 131/62 97.5 °F (36.4 °C) Oral 68 20 95 % -- --   11/19/23 0210 134/75 97.9 °F (36.6 °C) Oral 68 18 95 % -- --   11/19/23 0131 (!) 147/79 -- -- 65 -- -- -- --   11/19/23 0128 139/63 98 °F (36.7 °C) -- 76 18 96 % 5' 2" (1.575 m) 95 kg (209 lb 7 oz)   11/19/23 0107 -- -- -- 67 -- (!) 93 % -- --   11/19/23 0103 -- 98 °F (36.7 °C) Oral 76 18 96 % -- --        Recent Results (from the past 24 hour(s))   Comprehensive metabolic panel    Collection Time: 11/18/23  8:15 PM   Result Value Ref Range    Sodium Level 141 136 - 145 mmol/L    Potassium Level 3.8 3.5 - 5.1 mmol/L    " Chloride 109 (H) 98 - 107 mmol/L    Carbon Dioxide 24 23 - 31 mmol/L    Glucose Level 92 82 - 115 mg/dL    Blood Urea Nitrogen 19.5 9.8 - 20.1 mg/dL    Creatinine 0.77 0.55 - 1.02 mg/dL    Calcium Level Total 8.6 8.4 - 10.2 mg/dL    Protein Total 6.3 5.8 - 7.6 gm/dL    Albumin Level 3.4 3.4 - 4.8 g/dL    Globulin 2.9 2.4 - 3.5 gm/dL    Albumin/Globulin Ratio 1.2 1.1 - 2.0 ratio    Bilirubin Total 0.6 <=1.5 mg/dL    Alkaline Phosphatase 95 40 - 150 unit/L    Alanine Aminotransferase 12 0 - 55 unit/L    Aspartate Aminotransferase 17 5 - 34 unit/L    eGFR >60 mls/min/1.73/m2   Brain natriuretic peptide    Collection Time: 11/18/23  8:15 PM   Result Value Ref Range    Natriuretic Peptide 151.7 (H) <=100.0 pg/mL   Troponin I    Collection Time: 11/18/23  8:15 PM   Result Value Ref Range    Troponin-I <0.010 0.000 - 0.045 ng/mL   Protime-INR    Collection Time: 11/18/23  8:15 PM   Result Value Ref Range    PT 26.7 (H) 12.5 - 14.5 seconds    INR 2.5 (H) <=1.3   CBC with Differential    Collection Time: 11/18/23  8:15 PM   Result Value Ref Range    WBC 8.85 4.50 - 11.50 x10(3)/mcL    RBC 3.80 (L) 4.20 - 5.40 x10(6)/mcL    Hgb 10.8 (L) 12.0 - 16.0 g/dL    Hct 33.4 (L) 37.0 - 47.0 %    MCV 87.9 80.0 - 94.0 fL    MCH 28.4 27.0 - 31.0 pg    MCHC 32.3 (L) 33.0 - 36.0 g/dL    RDW 14.3 11.5 - 17.0 %    Platelet 227 130 - 400 x10(3)/mcL    MPV 10.4 7.4 - 10.4 fL    Neut % 49.5 %    Lymph % 40.1 %    Mono % 8.1 %    Eos % 1.5 %    Basophil % 0.5 %    Lymph # 3.55 0.6 - 4.6 x10(3)/mcL    Neut # 4.38 2.1 - 9.2 x10(3)/mcL    Mono # 0.72 0.1 - 1.3 x10(3)/mcL    Eos # 0.13 0 - 0.9 x10(3)/mcL    Baso # 0.04 <=0.2 x10(3)/mcL    IG# 0.03 0 - 0.04 x10(3)/mcL    IG% 0.3 %    NRBC% 0.0 %   Troponin I    Collection Time: 11/18/23 11:19 PM   Result Value Ref Range    Troponin-I <0.010 0.000 - 0.045 ng/mL     [unfilled]  Wt Readings from Last 3 Encounters:   11/19/23 95 kg (209 lb 7 oz)   09/13/23 87.1 kg (192 lb)   08/27/23 87.1 kg (192 lb)          Physical Exam   Constitutional: Oriented to person, place, and time and well-developed, well-nourished, and in no distress.   Eyes: Conjunctivae and EOM are normal. Pupils are equal, round, and reactive to light.   Neck: Normal range of motion. Neck supple.   Cardiovascular: Normal rate, regular rhythm and normal heart sounds.   Pulmonary/Chest: Effort normal and breath sounds normal.   Abdominal: Soft. Bowel sounds are normal. There is no tenderness.   Musculoskeletal: Normal range of motion.   Neurological: Alert and oriented to person, place, and time. Gait normal.   Skin: Skin is warm and dry.   Psychiatric: Affect normal.       Assessment/Plan:    Chest discomfort, known CAD with prior PCI   -troponin x2 negative   -patient with similar complaints in the outpatient setting with plans for outpatient PET stress test   -okay for patient to eat today   -patient NPO after midnight for possible left heart catheterization in the morning    PAF   -last dose Xarelto 11/18/2023   -start Lovenox 1 milligram/kilogram b.i.d.  -monitor closely on telemetry    Hypertension   -medical therapy     CVA   -monitor        *Patient of Dr. Moran.         BRENTON Jack-C  [facility]     Dr. Wilkerson

## 2023-11-19 NOTE — ED PROVIDER NOTES
Encounter Date: 11/18/2023       History     Chief Complaint   Patient presents with    Chest Pain     Pt arrives via [facility], EMS  / Pt reports chest tightness, palpatations, and SOB starting this evening. Pt reports extensive cardiac hx. Pt is AAOx 4, no apparent distress.      This is a 78-year-old female with an extensive cardiac history she is got 2 stents in her LAD in the past and sees Dr. Montalvo with Cardiology.  Patient says that she is been having a sensation of chest pain shortness of breath and palpitations since mid afternoon today.  She is had these symptoms before.  She does have a history of atrial fibrillation.    Physical exam is unremarkable EKG shows sinus rhythm with occasional PVCs no STEMI no ectopy otherwise normal intervals.      Review of patient's allergies indicates:   Allergen Reactions    Morphine     Norco [hydrocodone-acetaminophen]      Past Medical History:   Diagnosis Date    Anxiety     COPD (chronic obstructive pulmonary disease)     Coronary artery disease     s/p 2 stents    Depression     GERD (gastroesophageal reflux disease)     Glaucoma     Hypertension     Obstructive sleep apnea     on cpap    Stroke     Urinary, incontinence, stress female      Past Surgical History:   Procedure Laterality Date    APPENDECTOMY      BACK SURGERY      CHOLECYSTECTOMY      EXPLORATION OF COMMON BILE DUCT      HERNIA REPAIR      incisional hernia times 2, central abdomen    HYSTERECTOMY      OVARIAN CYST REMOVAL      TONSILLECTOMY       Family History   Problem Relation Age of Onset    Heart disease Mother     Cancer Father      Social History     Tobacco Use    Smoking status: Never   Substance Use Topics    Alcohol use: Not Currently    Drug use: Not Currently     Review of Systems   Respiratory:  Positive for shortness of breath.    Cardiovascular:  Positive for chest pain.       Physical Exam     Initial Vitals [11/18/23 2001]   BP Pulse Resp Temp SpO2   139/63 64 17 97.2 °F (36.2 °C) 100 %       MAP       --         Physical Exam    HENT:   Head: Normocephalic.   Eyes: EOM are normal. Left eye exhibits no discharge. No scleral icterus.   Cardiovascular:  Regular rhythm.           Pulmonary/Chest: No stridor. No respiratory distress.   Abdominal: She exhibits no distension.   Musculoskeletal:         General: Normal range of motion.     Neurological: She is alert and oriented to person, place, and time. She has normal strength.   Skin: Skin is dry. No rash noted. No erythema. No pallor.   Psychiatric: She has a normal mood and affect. Her behavior is normal. Judgment and thought content normal.         ED Course   Procedures  Labs Reviewed   COMPREHENSIVE METABOLIC PANEL - Abnormal; Notable for the following components:       Result Value    Chloride 109 (*)     All other components within normal limits   B-TYPE NATRIURETIC PEPTIDE - Abnormal; Notable for the following components:    Natriuretic Peptide 151.7 (*)     All other components within normal limits   PROTIME-INR - Abnormal; Notable for the following components:    PT 26.7 (*)     INR 2.5 (*)     All other components within normal limits   CBC WITH DIFFERENTIAL - Abnormal; Notable for the following components:    RBC 3.80 (*)     Hgb 10.8 (*)     Hct 33.4 (*)     MCHC 32.3 (*)     All other components within normal limits   TROPONIN I - Normal   TROPONIN I - Normal   CBC W/ AUTO DIFFERENTIAL    Narrative:     The following orders were created for panel order CBC auto differential.  Procedure                               Abnormality         Status                     ---------                               -----------         ------                     CBC with Differential[[id number]]       Abnormal            Final result                 Please view results for these tests on the individual orders.     EKG Readings: (Independently Interpreted)   EKG per my interpretation rate of 70 normal sinus rhythm occasional PVCs no STEMI.  Time was 8:09  p.m.     ECG Results              EKG 12-lead (Final result)  Result time 11/19/23 07:36:49      Final result by Interface, Lab In [facility] (11/19/23 07:36:49)                   Narrative:    Test Reason : R07.9,    Vent. Rate : 070 BPM     Atrial Rate : 070 BPM     P-R Int : 140 ms          QRS Dur : 080 ms      QT Int : 424 ms       P-R-T Axes : 000 -02 039 degrees     QTc Int : 457 ms    Sinus rhythm with Premature supraventricular complexes  Otherwise normal ECG  When compared with ECG of 25-AUG-2023 21:16,  Sinus rhythm has replaced Ectopic atrial rhythm  Confirmed by Jimmy Menchaca MD ([id number]) on 11/19/2023 7:36:39 AM    Referred By:             Confirmed By:Jimmy Menchaca MD                                  Imaging Results              X-Ray Chest 1 View (Final result)  Result time 11/18/23 21:14:50      Final result by Anastacio Preston MD (11/18/23 21:14:50)                   Impression:      NO ACUTE CARDIOPULMONARY PROCESS IDENTIFIED.      Electronically signed by: Anastacio Preston  Date:    11/18/2023  Time:    21:14               Narrative:    EXAMINATION:  XR CHEST 1 VIEW    CLINICAL HISTORY:  chest pain;    TECHNIQUE:  One view    COMPARISON:  August 25, 2023.    FINDINGS:  Cardiopericardial silhouette is within normal limits.  No acute dense focal or segmental consolidation, congestive process, pleural effusions or pneumothorax.  Thoracic spinal neurostimulator electrode                                       Medications   ondansetron 4 mg/2 mL injection (  Return to Cabinet 11/19/23 1630)   influenza 65up-adj (QUADRIVALENT ADJUVANTED PF) vaccine 0.5 mL (0.5 mLs Intramuscular Given 11/20/23 1234)     Medical Decision Making  Amount and/or Complexity of Data Reviewed  Labs: ordered. Decision-making details documented in ED Course.  Radiology: ordered and independent interpretation performed. Decision-making details documented in ED Course.  ECG/medicine tests: ordered and independent interpretation performed.  Decision-making details documented in ED Course.    Risk  OTC drugs.               ED Course as of 11/21/23 0454   Sat Nov 18, 2023 2125 Dr mejía paged [NL]      ED Course User Index  [NL] Renard Barlow MD                        Clinical Impression:  Final diagnoses:  [R07.9] Chest pain (Primary)          ED Disposition Condition    Observation                 Renard Barlow MD  11/21/23 0454

## 2023-11-20 VITALS
BODY MASS INDEX: 38.54 KG/M2 | DIASTOLIC BLOOD PRESSURE: 74 MMHG | WEIGHT: 209.44 LBS | TEMPERATURE: 98 F | HEIGHT: 62 IN | SYSTOLIC BLOOD PRESSURE: 146 MMHG | RESPIRATION RATE: 20 BRPM | OXYGEN SATURATION: 96 % | HEART RATE: 71 BPM

## 2023-11-20 PROBLEM — R07.9 CHEST PAIN: Status: ACTIVE | Noted: 2023-11-20

## 2023-11-20 PROBLEM — R07.9 CHEST PAIN: Status: RESOLVED | Noted: 2023-11-20 | Resolved: 2023-11-20

## 2023-11-20 PROCEDURE — 76937 US GUIDE VASCULAR ACCESS: CPT | Mod: 59

## 2023-11-20 PROCEDURE — 25000003 PHARM REV CODE 250: Performed by: NURSE PRACTITIONER

## 2023-11-20 PROCEDURE — C1751 CATH, INF, PER/CENT/MIDLINE: HCPCS

## 2023-11-20 PROCEDURE — 25000003 PHARM REV CODE 250: Performed by: INTERNAL MEDICINE

## 2023-11-20 PROCEDURE — G0008 ADMIN INFLUENZA VIRUS VAC: HCPCS | Performed by: NURSE PRACTITIONER

## 2023-11-20 PROCEDURE — 36573 INSJ PICC RS&I 5 YR+: CPT

## 2023-11-20 PROCEDURE — A4216 STERILE WATER/SALINE, 10 ML: HCPCS | Performed by: NURSE PRACTITIONER

## 2023-11-20 PROCEDURE — 90471 IMMUNIZATION ADMIN: CPT | Performed by: NURSE PRACTITIONER

## 2023-11-20 PROCEDURE — G0378 HOSPITAL OBSERVATION PER HR: HCPCS

## 2023-11-20 PROCEDURE — 63600175 PHARM REV CODE 636 W HCPCS: Performed by: NURSE PRACTITIONER

## 2023-11-20 PROCEDURE — 25000003 PHARM REV CODE 250: Performed by: EMERGENCY MEDICINE

## 2023-11-20 PROCEDURE — 90694 VACC AIIV4 NO PRSRV 0.5ML IM: CPT | Performed by: NURSE PRACTITIONER

## 2023-11-20 PROCEDURE — 36410 VNPNXR 3YR/> PHY/QHP DX/THER: CPT | Mod: 59

## 2023-11-20 RX ORDER — MICONAZOLE NITRATE 2 %
POWDER (GRAM) TOPICAL 2 TIMES DAILY
Status: DISCONTINUED | OUTPATIENT
Start: 2023-11-20 | End: 2023-11-20 | Stop reason: HOSPADM

## 2023-11-20 RX ORDER — NITROGLYCERIN 0.4 MG/1
0.4 TABLET SUBLINGUAL EVERY 5 MIN PRN
Qty: 20 TABLET | Refills: 1 | Status: SHIPPED | OUTPATIENT
Start: 2023-11-20 | End: 2023-11-20 | Stop reason: SDUPTHER

## 2023-11-20 RX ORDER — CARVEDILOL 25 MG/1
25 TABLET ORAL 2 TIMES DAILY
Qty: 60 TABLET | Refills: 11 | Status: SHIPPED | OUTPATIENT
Start: 2023-11-20

## 2023-11-20 RX ORDER — SODIUM CHLORIDE 0.9 % (FLUSH) 0.9 %
10 SYRINGE (ML) INJECTION EVERY 6 HOURS
Status: DISCONTINUED | OUTPATIENT
Start: 2023-11-20 | End: 2023-11-20 | Stop reason: HOSPADM

## 2023-11-20 RX ORDER — NITROGLYCERIN 0.4 MG/1
0.4 TABLET SUBLINGUAL EVERY 5 MIN PRN
Qty: 20 TABLET | Refills: 1 | Status: SHIPPED | OUTPATIENT
Start: 2023-11-20

## 2023-11-20 RX ORDER — SODIUM CHLORIDE 0.9 % (FLUSH) 0.9 %
10 SYRINGE (ML) INJECTION
Status: DISCONTINUED | OUTPATIENT
Start: 2023-11-20 | End: 2023-11-20 | Stop reason: HOSPADM

## 2023-11-20 RX ORDER — ENOXAPARIN SODIUM 100 MG/ML
1 INJECTION SUBCUTANEOUS EVERY 12 HOURS
Status: DISCONTINUED | OUTPATIENT
Start: 2023-11-20 | End: 2023-11-20 | Stop reason: HOSPADM

## 2023-11-20 RX ADMIN — INFLUENZA A VIRUS A/VICTORIA/4897/2022 IVR-238 (H1N1) ANTIGEN (FORMALDEHYDE INACTIVATED), INFLUENZA A VIRUS A/DARWIN/6/2021 IVR-227 (H3N2) ANTIGEN (FORMALDEHYDE INACTIVATED), INFLUENZA B VIRUS B/AUSTRIA/1359417/2021 BVR-26 ANTIGEN (FORMALDEHYDE INACTIVATED), INFLUENZA B VIRUS B/PHUKET/3073/2013 BVR-1B ANTIGEN (FORMALDEHYDE INACTIVATED) 0.5 ML: 15; 15; 15; 15 INJECTION, SUSPENSION INTRAMUSCULAR at 12:11

## 2023-11-20 RX ADMIN — GABAPENTIN 300 MG: 300 CAPSULE ORAL at 08:11

## 2023-11-20 RX ADMIN — AMLODIPINE BESYLATE 5 MG: 5 TABLET ORAL at 08:11

## 2023-11-20 RX ADMIN — METOCLOPRAMIDE HYDROCHLORIDE 5 MG: 5 SOLUTION ORAL at 08:11

## 2023-11-20 RX ADMIN — CETIRIZINE HYDROCHLORIDE 10 MG: 10 TABLET, FILM COATED ORAL at 08:11

## 2023-11-20 RX ADMIN — CARVEDILOL 25 MG: 12.5 TABLET, FILM COATED ORAL at 08:11

## 2023-11-20 RX ADMIN — SERTRALINE HYDROCHLORIDE 50 MG: 50 TABLET ORAL at 08:11

## 2023-11-20 RX ADMIN — PANTOPRAZOLE SODIUM 40 MG: 40 TABLET, DELAYED RELEASE ORAL at 08:11

## 2023-11-20 RX ADMIN — MICONAZOLE NITRATE: 20 POWDER TOPICAL at 01:11

## 2023-11-20 RX ADMIN — ATORVASTATIN CALCIUM 40 MG: 40 TABLET, FILM COATED ORAL at 08:11

## 2023-11-20 RX ADMIN — NITROGLYCERIN 0.5 INCH: 20 OINTMENT TOPICAL at 08:11

## 2023-11-20 RX ADMIN — SODIUM CHLORIDE, PRESERVATIVE FREE 10 ML: 5 INJECTION INTRAVENOUS at 04:11

## 2023-11-20 RX ADMIN — ASPIRIN 81 MG: 81 TABLET, COATED ORAL at 08:11

## 2023-11-20 RX ADMIN — NITROGLYCERIN 0.5 INCH: 20 OINTMENT TOPICAL at 11:11

## 2023-11-20 RX ADMIN — SODIUM CHLORIDE, PRESERVATIVE FREE 10 ML: 5 INJECTION INTRAVENOUS at 11:11

## 2023-11-20 RX ADMIN — METOCLOPRAMIDE HYDROCHLORIDE 5 MG: 5 SOLUTION ORAL at 11:11

## 2023-11-20 NOTE — PROGRESS NOTES
Pt discharged, iv and tele removed. Vs stable, aaox4. Discharge info reviewed with pt and son at bedside, along with medications and follow-ups. Pt wheeled down to ED by transport employee and left via personal vehicle.

## 2023-11-20 NOTE — DISCHARGE SUMMARY
ADMISSION INFORMATION     Admit Date: 11/18/2023 Primary Care Physician: Sheila Moreira DO   Admitting Physician: Sharad Moran MD Primary Care Phone: [phone]    Consulting Provider(s):     DISCHARGE INFORMATION     Discharge Date: 11/20/2023  Primary Discharge Diagnosis: Chest pain   Discharge Physician: Sharad Moran MD Secondary Discharge Diagnosis:   Active Hospital Problems   No active problems to display.      Resolved Hospital Problems    Diagnosis Date Resolved POA    *Chest pain [R07.9] 11/20/2023 Yes            Discharge Condition: good     Discharge Disposition: home    DETAILS OF HOSPITAL STAY     Presenting Problem: Chest pain [R07.9]    Hospital Course:     Katty Veronica is a 78 y.o. female with past medical history including CAD and prior PCI x2 to the LAD, COPD, hypertension, prior CVA, and paroxysmal atrial fib patient on Xarelto therapy.       Patient was seen by Dr. Moran in the outpatient clinic on 11/10/2023 with complaints of chest discomfort.  PET stress test was ordered for 11/29/2023.     Patient presented to the [facility] ER on 11- with complaints of chest tightness, shortness of breath, and palpitations.  She admits that symptoms are different than prior angina.  Troponin x2 have both been negative.  Blood pressure stable.  EKG normal sinus rhythm.  Patient did take Xarelto yesterday morning which was held during hospital stay.     Troponins have remained negative. Patient wishes for outpatient stress test as scheduled. She has not had any recurrent chest discomfort since her hospital admission. She will be discharged home with similar medical therapy. Did send a script for SL nitro tablets PRN chest pain. She is to call with any questions or concerns. She is to present back to the ER with recurrent chest pain.   Significant Diagnostic Studies/Procedures: noen  Complications: not detected      Physical Exam   Constitutional:Oriented to person, place, and time  and well-developed, well-nourished, and in no distress.   Neck: Normal range of motion. Neck supple.   Cardiovascular: Normal rate, regular rhythm and normal heart sounds. NSR   Pulmonary/Chest: Effort normal and breath sounds normal, but diminished at the bases otherwise clear to auscultation patient is on room air  Abdominal: Soft. Bowel sounds are normal. There is no tenderness.   Musculoskeletal: Normal range of motion.   Neurological: Alert and oriented to person, place, and time. Gait normal.   Skin: Skin is warm and dry.   Psychiatric: Affect normal.   DISCHARGE PLAN        Medication List        START taking these medications      nitroGLYCERIN 0.4 MG SL tablet  Commonly known as: NITROSTAT  Place 1 tablet (0.4 mg total) under the tongue every 5 (five) minutes as needed for Chest pain.            CHANGE how you take these medications      carvediloL 25 MG tablet  Commonly known as: COREG  Take 1 tablet (25 mg total) by mouth 2 (two) times daily.  What changed:   medication strength  how much to take            CONTINUE taking these medications      amLODIPine 5 MG tablet  Commonly known as: NORVASC     aspirin 81 MG EC tablet  Commonly known as: ECOTRIN  Take 1 tablet (81 mg total) by mouth once daily.     atorvastatin 40 MG tablet  Commonly known as: LIPITOR  Take 1 tablet (40 mg total) by mouth once daily.     cetirizine 10 MG tablet  Commonly known as: ZYRTEC     famotidine 20 MG tablet  Commonly known as: PEPCID     gabapentin 300 MG capsule  Commonly known as: NEURONTIN     methocarbamoL 500 MG Tab  Commonly known as: ROBAXIN     metoclopramide HCl 5 MG tablet  Commonly known as: REGLAN     oxyCODONE-acetaminophen  mg per tablet  Commonly known as: PERCOCET     pantoprazole 40 MG tablet  Commonly known as: PROTONIX     rivaroxaban 20 mg Tab  Commonly known as: XARELTO     sertraline 50 MG tablet  Commonly known as: ZOLOFT     timolol maleate 0.5% 0.5 % Drop  Commonly known as: TIMOPTIC     VITAMIN  D2 50,000 unit Cap  Generic drug: ergocalciferol               Where to Get Your Medications        These medications were sent to [facility] ([place]) - [place], [place] - [address] [facility]  [address] [place] [address] [place] [address]      Phone: [phone]   carvediloL 25 MG tablet  nitroGLYCERIN 0.4 MG SL tablet          Future Appointments   Date Time Provider Department Center   3/13/2024  8:30 AM Sada Rondon, [facility] [address] [facility]        < 30 minutes      Princess Blood NP  11-  [facility]

## 2023-11-20 NOTE — PROCEDURES
"Katty Veronica is a 78 y.o. female patient.    Temp: 98.1 °F (36.7 °C) (11/19/23 2318)  Pulse: 69 (11/19/23 2318)  Resp: (!) 21 (11/19/23 2318)  BP: 125/67 (11/19/23 2318)  SpO2: (!) 93 % (11/19/23 2318)  Weight: 95 kg (209 lb 7 oz) (11/19/23 0128)  Height: 5' 2" (157.5 cm) (11/19/23 0128)    PICC  Date/Time: 11/20/2023 1:28 AM  Performed by: Judd Lorenz RN  Consent Done: Yes  Time out: Immediately prior to procedure a time out was called to verify the correct patient, procedure, equipment, support staff and site/side marked as required  Indications: med administration and vascular access  Anesthesia: local infiltration  Local anesthetic: lidocaine 1% without epinephrine  Anesthetic Total (mL): 5  Preparation: skin prepped with ChloraPrep  Skin prep agent dried: skin prep agent completely dried prior to procedure  Sterile barriers: all five maximum sterile barriers used - cap, mask, sterile gown, sterile gloves, and large sterile sheet  Hand hygiene: hand hygiene performed prior to central venous catheter insertion  Location details: right basilic  Catheter type: single lumen  Catheter size: 4 Fr  Catheter Length: 15cm    Ultrasound guidance: yes  Vessel Caliber: medium and patent, compressibility normal  Needle advanced into vessel with real time Ultrasound guidance.  Guidewire confirmed in vessel.  Sterile sheath used.  Number of attempts: 1  Post-procedure: blood return through all ports and sterile dressing applied    Assessment: free fluid flow          Name Judd Lorenz RN  11/20/2023    "

## 2023-11-20 NOTE — PROGRESS NOTES
[facility] [address]  Wound Care    Patient Name:  Katty Veronica   MRN:  [id number]  Date: 11/20/2023  Diagnosis: Chest pain    History:     Past Medical History:   Diagnosis Date    Anxiety     COPD (chronic obstructive pulmonary disease)     Coronary artery disease     s/p 2 stents    Depression     GERD (gastroesophageal reflux disease)     Glaucoma     Hypertension     Obstructive sleep apnea     on cpap    Stroke     Urinary, incontinence, stress female        Social History     Socioeconomic History    Marital status:    Tobacco Use    Smoking status: Never   Substance and Sexual Activity    Alcohol use: Not Currently    Drug use: Not Currently   Social History Narrative    ** Merged History Encounter **            Precautions:     Allergies as of 11/18/2023 - Reviewed 11/18/2023   Allergen Reaction Noted    Morphine  07/09/2020    Norco [hydrocodone-acetaminophen]  07/09/2020       WOC Assessment Details/Treatment      11/20/23 0842   WOCN Assessment   Visit Date 11/20/23   Visit Time 0842   Consult Type New   WOCN Speciality Wound   Intervention chart review;assessed;applied;orders   Teaching on-going        Rash 11/19/23 0100  Upper quadrant   Date of First Assessment/Time of First Assessment: 11/19/23 0100   Present Prior to Hospital Arrival?: Yes  Side: (c)   Location: Upper quadrant  Additional Comments: Bilateral breast fold- rash   Distribution localized   Configuration/Shape asymmetric;other (see comments)  (Underneath bilateral breast and in abd fold)   Groupings isolated;discrete   Borders irregular   Characteristics redness/erythema;pruritus/itching;other (see comments)  (Yeasty odor with moisture in areas)   Color red   Rash Care cleansed with;soap and water;skin barrier applied;other (see comments)  (Miconazole powder)               WOCN consulted for bilateral breast fold. No family at bedside. Discussed care with nurse Carr. Patient was agreeable  to visit. Treatment recommendations put into place. Bilat breast: Cleanse area with soap and water, dry well. Apply miconazole powder to bilateral breast folds as well as abd folds BID and PRN with soilage. Nursing to continue with treatment recommendations and hygiene measures. Will follow up.  11/20/2023

## 2023-11-27 PROBLEM — G45.9 TIA (TRANSIENT ISCHEMIC ATTACK): Status: RESOLVED | Noted: 2023-08-26 | Resolved: 2023-11-27

## 2024-01-30 ENCOUNTER — OFFICE VISIT (OUTPATIENT)
Dept: URGENT CARE | Facility: CLINIC | Age: 79
End: 2024-01-30
Payer: MEDICARE

## 2024-01-30 VITALS
RESPIRATION RATE: 20 BRPM | DIASTOLIC BLOOD PRESSURE: 69 MMHG | HEIGHT: 62 IN | HEART RATE: 59 BPM | WEIGHT: 193 LBS | TEMPERATURE: 99 F | OXYGEN SATURATION: 99 % | BODY MASS INDEX: 35.51 KG/M2 | SYSTOLIC BLOOD PRESSURE: 112 MMHG

## 2024-01-30 DIAGNOSIS — M25.551 RIGHT HIP PAIN: ICD-10-CM

## 2024-01-30 DIAGNOSIS — R22.2 MASS OF SKIN OF BACK: ICD-10-CM

## 2024-01-30 DIAGNOSIS — M16.10 ARTHRITIS, HIP: Primary | ICD-10-CM

## 2024-01-30 PROCEDURE — 99204 OFFICE O/P NEW MOD 45 MIN: CPT | Mod: ,,, | Performed by: FAMILY MEDICINE

## 2024-01-30 RX ORDER — SULFAMETHOXAZOLE AND TRIMETHOPRIM 800; 160 MG/1; MG/1
1 TABLET ORAL 2 TIMES DAILY
Qty: 14 TABLET | Refills: 0 | Status: SHIPPED | OUTPATIENT
Start: 2024-01-30 | End: 2024-02-06

## 2024-01-30 NOTE — PATIENT INSTRUCTIONS
Plan:   X-ray:  Degenerative changes of the hip  Medications sent to pharmacy  Apply warm compresses to the left lower back area 2 to 3 times a day for 10 minutes. Likely the start of an infected cyst.   Return to clinic this Friday for recheck  Since you were already established with LOS for your back and knee, call their office and make an appointment for your right hip.  Take your pain medications as needed you may also take Tylenol if needed.  Contact this clinic with any concerns

## 2024-01-30 NOTE — PROGRESS NOTES
"Subjective:      Patient ID: Katty Veronica is a 78 y.o. female.    Vitals:  height is 5' 2" (1.575 m) and weight is 87.5 kg (193 lb). Her oral temperature is 99.2 °F (37.3 °C). Her blood pressure is 112/69 and her pulse is 59 (abnormal). Her respiration is 20 and oxygen saturation is 99%.     Chief Complaint: Mass (Painful bruised mass on left middle back since this morning. No known injury. ) and Hip Pain (Right hip pain since fall in June, pain has worsened in the last week. )    78-year-old female presents to clinic complaining of a painful bump on the left lower back area.  States she was in the seated position and she felt some discomfort in the area when she reached back she felt a tender bump there.  Denies any drainage from the area.  Denies have her having a bump in that area.  Denies any fever.    Patient is also complaining of right hip pain.  States last year in June she fell and fracture 3 vertebrae.  Patient states her hip started hurting after that fall but states everyone was focused on her fractured vertebrae that her hip pain never got addressed.  Has been having pain since then and it is worse now.  Having some difficulty moving the hip as well now.        Constitution: Negative.   HENT: Negative.     Cardiovascular: Negative.    Eyes: Negative.    Respiratory: Negative.     Gastrointestinal: Negative.    Genitourinary: Negative.    Musculoskeletal:  Positive for joint pain.   Skin: Negative.    Allergic/Immunologic: Negative.    Neurological: Negative.    Hematologic/Lymphatic: Negative.       Objective:     Physical Exam   Constitutional: She is oriented to person, place, and time.  Non-toxic appearance. She does not appear ill. No distress.   HENT:   Head: Normocephalic and atraumatic.   Eyes: Conjunctivae are normal.   Abdominal: Normal appearance.   Musculoskeletal:         General: Tenderness (tenderness to palpation right lower back hip area.) present.   Neurological: She is alert " and oriented to person, place, and time.   Skin: Skin is not diaphoretic. bruising (There is approximately a 1.5 cm by 1 cm cylinder shaped firm subcu mass of the left lower back area.  It is mobile.  There is no fluctuance.  There is no overlying erythema.  There is no warmth.  No drainage.)   Psychiatric: Her behavior is normal. Mood, judgment and thought content normal.   Vitals reviewed.         Previous History      Review of patient's allergies indicates:   Allergen Reactions    Morphine Itching    Norco [hydrocodone-acetaminophen]        Past Medical History:   Diagnosis Date    Anxiety     COPD (chronic obstructive pulmonary disease)     Coronary artery disease     s/p 2 stents    Depression     GERD (gastroesophageal reflux disease)     Glaucoma     Hypertension     Obstructive sleep apnea     on cpap    Stroke     Urinary, incontinence, stress female      Current Outpatient Medications   Medication Instructions    amLODIPine (NORVASC) 5 MG tablet 1 tablet Orally Once a day    aspirin (ECOTRIN) 81 mg, Oral, Daily    atorvastatin (LIPITOR) 40 mg, Oral, Daily    carvediloL (COREG) 25 mg, Oral, 2 times daily    cetirizine (ZYRTEC) 10 mg, Oral, Daily    ergocalciferol (VITAMIN D2) 50,000 Units, Oral, Every 7 days    famotidine (PEPCID) 20 MG tablet 1 tablet, Oral, Nightly    gabapentin (NEURONTIN) 300 mg, Oral, 3 times daily    methocarbamoL (ROBAXIN) 500 mg, Oral, 2 times daily PRN    metoclopramide HCl (REGLAN) 5 mg, Oral, Before meals & nightly    nitroGLYCERIN (NITROSTAT) 0.4 mg, Sublingual, Every 5 min PRN    ondansetron (ZOFRAN-ODT) 8 mg, Oral, Every 6 hours PRN    oxyCODONE-acetaminophen (PERCOCET)  mg per tablet 1 tablet, Oral, Every 8 hours PRN    pantoprazole (PROTONIX) 40 mg, Oral    rivaroxaban (XARELTO) 20 mg, Oral, With dinner    sertraline (ZOLOFT) 50 mg, Oral, Daily    sulfamethoxazole-trimethoprim 800-160mg (BACTRIM DS) 800-160 mg Tab 1 tablet, Oral, 2 times daily    timolol maleate 0.5%  "(TIMOPTIC) 0.5 % Drop 1 drop, Both Eyes, 2 times daily     Past Surgical History:   Procedure Laterality Date    APPENDECTOMY      BACK SURGERY      CHOLECYSTECTOMY      EXPLORATION OF COMMON BILE DUCT      HERNIA REPAIR      incisional hernia times 2, central abdomen    HYSTERECTOMY      OVARIAN CYST REMOVAL      TONSILLECTOMY       Family History   Problem Relation Age of Onset    Heart disease Mother     Cancer Father        Social History     Tobacco Use    Smoking status: Never   Substance Use Topics    Alcohol use: Not Currently    Drug use: Not Currently        Physical Exam      Vital Signs Reviewed   /69   Pulse (!) 59   Temp 99.2 °F (37.3 °C) (Oral)   Resp 20   Ht 5' 2" (1.575 m)   Wt 87.5 kg (193 lb)   SpO2 99%   BMI 35.30 kg/m²        Procedures    Procedures     Labs     Results for orders placed or performed during the hospital encounter of 11/18/23   Comprehensive metabolic panel   Result Value Ref Range    Sodium Level 141 136 - 145 mmol/L    Potassium Level 3.8 3.5 - 5.1 mmol/L    Chloride 109 (H) 98 - 107 mmol/L    Carbon Dioxide 24 23 - 31 mmol/L    Glucose Level 92 82 - 115 mg/dL    Blood Urea Nitrogen 19.5 9.8 - 20.1 mg/dL    Creatinine 0.77 0.55 - 1.02 mg/dL    Calcium Level Total 8.6 8.4 - 10.2 mg/dL    Protein Total 6.3 5.8 - 7.6 gm/dL    Albumin Level 3.4 3.4 - 4.8 g/dL    Globulin 2.9 2.4 - 3.5 gm/dL    Albumin/Globulin Ratio 1.2 1.1 - 2.0 ratio    Bilirubin Total 0.6 <=1.5 mg/dL    Alkaline Phosphatase 95 40 - 150 unit/L    Alanine Aminotransferase 12 0 - 55 unit/L    Aspartate Aminotransferase 17 5 - 34 unit/L    eGFR >60 mls/min/1.73/m2   Brain natriuretic peptide   Result Value Ref Range    Natriuretic Peptide 151.7 (H) <=100.0 pg/mL   Troponin I   Result Value Ref Range    Troponin-I <0.010 0.000 - 0.045 ng/mL   Protime-INR   Result Value Ref Range    PT 26.7 (H) 12.5 - 14.5 seconds    INR 2.5 (H) <=1.3   CBC with Differential   Result Value Ref Range    WBC 8.85 4.50 - " 11.50 x10(3)/mcL    RBC 3.80 (L) 4.20 - 5.40 x10(6)/mcL    Hgb 10.8 (L) 12.0 - 16.0 g/dL    Hct 33.4 (L) 37.0 - 47.0 %    MCV 87.9 80.0 - 94.0 fL    MCH 28.4 27.0 - 31.0 pg    MCHC 32.3 (L) 33.0 - 36.0 g/dL    RDW 14.3 11.5 - 17.0 %    Platelet 227 130 - 400 x10(3)/mcL    MPV 10.4 7.4 - 10.4 fL    Neut % 49.5 %    Lymph % 40.1 %    Mono % 8.1 %    Eos % 1.5 %    Basophil % 0.5 %    Lymph # 3.55 0.6 - 4.6 x10(3)/mcL    Neut # 4.38 2.1 - 9.2 x10(3)/mcL    Mono # 0.72 0.1 - 1.3 x10(3)/mcL    Eos # 0.13 0 - 0.9 x10(3)/mcL    Baso # 0.04 <=0.2 x10(3)/mcL    IG# 0.03 0 - 0.04 x10(3)/mcL    IG% 0.3 %    NRBC% 0.0 %   Troponin I   Result Value Ref Range    Troponin-I <0.010 0.000 - 0.045 ng/mL   Troponin I   Result Value Ref Range    Troponin-I <0.010 0.000 - 0.045 ng/mL       Assessment:     1. Arthritis, hip    2. Right hip pain    3. Mass of skin of back        Plan:   X-ray:  Degenerative changes of the hip  Medications sent to pharmacy  Apply warm compresses to the left lower back area 2 to 3 times a day for 10 minutes. Likely the start of an infected cyst.   Return to clinic this Friday for recheck  Since you were already established with LOS for your back and knee, call their office and make an appointment for your right hip.  Take your pain medications as needed you may also take Tylenol if needed.  Contact this clinic with any concerns  Arthritis, hip    Right hip pain  -     X-Ray Hip 2 or 3 views Right (with Pelvis when performed); Future; Expected date: 01/30/2024    Mass of skin of back    Other orders  -     sulfamethoxazole-trimethoprim 800-160mg (BACTRIM DS) 800-160 mg Tab; Take 1 tablet by mouth 2 (two) times daily. for 7 days  Dispense: 14 tablet; Refill: 0

## 2024-02-09 ENCOUNTER — HOSPITAL ENCOUNTER (INPATIENT)
Facility: HOSPITAL | Age: 79
LOS: 4 days | Discharge: HOME HEALTH | DRG: 057 | End: 2024-02-13
Attending: EMERGENCY MEDICINE | Admitting: INTERNAL MEDICINE
Payer: MEDICARE

## 2024-02-09 DIAGNOSIS — R53.1 LEFT-SIDED WEAKNESS: Primary | ICD-10-CM

## 2024-02-09 DIAGNOSIS — I63.9 CVA (CEREBRAL VASCULAR ACCIDENT): ICD-10-CM

## 2024-02-09 DIAGNOSIS — R55 SYNCOPAL EPISODES: ICD-10-CM

## 2024-02-09 PROBLEM — R09.89 SUSPECTED CEREBROVASCULAR ACCIDENT (CVA): Status: ACTIVE | Noted: 2024-02-09

## 2024-02-09 LAB
ALBUMIN SERPL-MCNC: 3.9 G/DL (ref 3.4–4.8)
ALBUMIN/GLOB SERPL: 1.1 RATIO (ref 1.1–2)
ALP SERPL-CCNC: 97 UNIT/L (ref 40–150)
ALT SERPL-CCNC: 30 UNIT/L (ref 0–55)
APPEARANCE UR: CLEAR
APTT PPP: 57.2 SECONDS (ref 23.2–33.7)
AST SERPL-CCNC: 31 UNIT/L (ref 5–34)
BACTERIA #/AREA URNS AUTO: NORMAL /HPF
BASOPHILS # BLD AUTO: 0.04 X10(3)/MCL
BASOPHILS NFR BLD AUTO: 0.4 %
BILIRUB SERPL-MCNC: 0.5 MG/DL
BILIRUB UR QL STRIP.AUTO: NEGATIVE
BUN SERPL-MCNC: 19 MG/DL (ref 9.8–20.1)
CALCIUM SERPL-MCNC: 9.3 MG/DL (ref 8.4–10.2)
CHLORIDE SERPL-SCNC: 108 MMOL/L (ref 98–107)
CHOLEST SERPL-MCNC: 108 MG/DL
CHOLEST/HDLC SERPL: 2 {RATIO} (ref 0–5)
CO2 SERPL-SCNC: 22 MMOL/L (ref 23–31)
COLOR UR AUTO: NORMAL
CREAT SERPL-MCNC: 0.95 MG/DL (ref 0.55–1.02)
EOSINOPHIL # BLD AUTO: 0.09 X10(3)/MCL (ref 0–0.9)
EOSINOPHIL NFR BLD AUTO: 1 %
ERYTHROCYTE [DISTWIDTH] IN BLOOD BY AUTOMATED COUNT: 14.8 % (ref 11.5–17)
GFR SERPLBLD CREATININE-BSD FMLA CKD-EPI: >60 MLS/MIN/1.73/M2
GLOBULIN SER-MCNC: 3.6 GM/DL (ref 2.4–3.5)
GLUCOSE SERPL-MCNC: 112 MG/DL (ref 82–115)
GLUCOSE UR QL STRIP.AUTO: NORMAL
HCT VFR BLD AUTO: 46.7 % (ref 37–47)
HDLC SERPL-MCNC: 50 MG/DL (ref 35–60)
HGB BLD-MCNC: 14.9 G/DL (ref 12–16)
IMM GRANULOCYTES # BLD AUTO: 0.04 X10(3)/MCL (ref 0–0.04)
IMM GRANULOCYTES NFR BLD AUTO: 0.4 %
INR PPP: 3.1
KETONES UR QL STRIP.AUTO: NEGATIVE
LDLC SERPL CALC-MCNC: 42 MG/DL (ref 50–140)
LEUKOCYTE ESTERASE UR QL STRIP.AUTO: NEGATIVE
LYMPHOCYTES # BLD AUTO: 3.4 X10(3)/MCL (ref 0.6–4.6)
LYMPHOCYTES NFR BLD AUTO: 38 %
MCH RBC QN AUTO: 28.3 PG (ref 27–31)
MCHC RBC AUTO-ENTMCNC: 31.9 G/DL (ref 33–36)
MCV RBC AUTO: 88.6 FL (ref 80–94)
MONOCYTES # BLD AUTO: 0.51 X10(3)/MCL (ref 0.1–1.3)
MONOCYTES NFR BLD AUTO: 5.7 %
NEUTROPHILS # BLD AUTO: 4.86 X10(3)/MCL (ref 2.1–9.2)
NEUTROPHILS NFR BLD AUTO: 54.5 %
NITRITE UR QL STRIP.AUTO: NEGATIVE
NRBC BLD AUTO-RTO: 0 %
PH UR STRIP.AUTO: 5 [PH]
PLATELET # BLD AUTO: 268 X10(3)/MCL (ref 130–400)
PMV BLD AUTO: 10.2 FL (ref 7.4–10.4)
POTASSIUM SERPL-SCNC: 3.9 MMOL/L (ref 3.5–5.1)
PROT SERPL-MCNC: 7.5 GM/DL (ref 5.8–7.6)
PROT UR QL STRIP.AUTO: NEGATIVE
PROTHROMBIN TIME: 31.1 SECONDS (ref 12.5–14.5)
RBC # BLD AUTO: 5.27 X10(6)/MCL (ref 4.2–5.4)
RBC #/AREA URNS AUTO: NORMAL /HPF
RBC UR QL AUTO: NEGATIVE
SODIUM SERPL-SCNC: 141 MMOL/L (ref 136–145)
SP GR UR STRIP.AUTO: 1.01 (ref 1–1.03)
SQUAMOUS #/AREA URNS LPF: NORMAL /HPF
TRIGL SERPL-MCNC: 79 MG/DL (ref 37–140)
TSH SERPL-ACNC: 1.24 UIU/ML (ref 0.35–4.94)
UROBILINOGEN UR STRIP-ACNC: NORMAL
VLDLC SERPL CALC-MCNC: 16 MG/DL
WBC # SPEC AUTO: 8.94 X10(3)/MCL (ref 4.5–11.5)
WBC #/AREA URNS AUTO: NORMAL /HPF

## 2024-02-09 PROCEDURE — 85730 THROMBOPLASTIN TIME PARTIAL: CPT | Performed by: NURSE PRACTITIONER

## 2024-02-09 PROCEDURE — 25000003 PHARM REV CODE 250: Performed by: INTERNAL MEDICINE

## 2024-02-09 PROCEDURE — 80061 LIPID PANEL: CPT | Performed by: NURSE PRACTITIONER

## 2024-02-09 PROCEDURE — 80053 COMPREHEN METABOLIC PANEL: CPT | Performed by: NURSE PRACTITIONER

## 2024-02-09 PROCEDURE — 81001 URINALYSIS AUTO W/SCOPE: CPT | Performed by: NURSE PRACTITIONER

## 2024-02-09 PROCEDURE — 85025 COMPLETE CBC W/AUTO DIFF WBC: CPT | Performed by: NURSE PRACTITIONER

## 2024-02-09 PROCEDURE — 84443 ASSAY THYROID STIM HORMONE: CPT | Performed by: NURSE PRACTITIONER

## 2024-02-09 PROCEDURE — 85610 PROTHROMBIN TIME: CPT | Performed by: NURSE PRACTITIONER

## 2024-02-09 PROCEDURE — 63600175 PHARM REV CODE 636 W HCPCS: Performed by: NURSE PRACTITIONER

## 2024-02-09 PROCEDURE — 11000001 HC ACUTE MED/SURG PRIVATE ROOM

## 2024-02-09 PROCEDURE — 25500020 PHARM REV CODE 255: Performed by: INTERNAL MEDICINE

## 2024-02-09 RX ORDER — SERTRALINE HYDROCHLORIDE 50 MG/1
50 TABLET, FILM COATED ORAL DAILY
Status: DISCONTINUED | OUTPATIENT
Start: 2024-02-10 | End: 2024-02-13 | Stop reason: HOSPADM

## 2024-02-09 RX ORDER — BISACODYL 10 MG/1
10 SUPPOSITORY RECTAL DAILY PRN
Status: DISCONTINUED | OUTPATIENT
Start: 2024-02-09 | End: 2024-02-13 | Stop reason: HOSPADM

## 2024-02-09 RX ORDER — PANTOPRAZOLE SODIUM 40 MG/1
40 TABLET, DELAYED RELEASE ORAL DAILY
Status: DISCONTINUED | OUTPATIENT
Start: 2024-02-10 | End: 2024-02-13 | Stop reason: HOSPADM

## 2024-02-09 RX ORDER — HEPARIN SODIUM 5000 [USP'U]/ML
5000 INJECTION, SOLUTION INTRAVENOUS; SUBCUTANEOUS EVERY 8 HOURS
Status: DISCONTINUED | OUTPATIENT
Start: 2024-02-09 | End: 2024-02-09

## 2024-02-09 RX ORDER — GABAPENTIN 300 MG/1
300 CAPSULE ORAL 3 TIMES DAILY
Status: DISCONTINUED | OUTPATIENT
Start: 2024-02-09 | End: 2024-02-13 | Stop reason: HOSPADM

## 2024-02-09 RX ORDER — ATORVASTATIN CALCIUM 40 MG/1
80 TABLET, FILM COATED ORAL NIGHTLY
Status: DISCONTINUED | OUTPATIENT
Start: 2024-02-09 | End: 2024-02-13 | Stop reason: HOSPADM

## 2024-02-09 RX ORDER — METOPROLOL TARTRATE 1 MG/ML
5 INJECTION, SOLUTION INTRAVENOUS EVERY 6 HOURS PRN
Status: DISCONTINUED | OUTPATIENT
Start: 2024-02-09 | End: 2024-02-13 | Stop reason: HOSPADM

## 2024-02-09 RX ORDER — TIMOLOL MALEATE 5 MG/ML
1 SOLUTION/ DROPS OPHTHALMIC 2 TIMES DAILY
Status: DISCONTINUED | OUTPATIENT
Start: 2024-02-09 | End: 2024-02-13 | Stop reason: HOSPADM

## 2024-02-09 RX ORDER — HYDRALAZINE HYDROCHLORIDE 20 MG/ML
10 INJECTION INTRAMUSCULAR; INTRAVENOUS EVERY 4 HOURS PRN
Status: DISCONTINUED | OUTPATIENT
Start: 2024-02-09 | End: 2024-02-13 | Stop reason: HOSPADM

## 2024-02-09 RX ORDER — ONDANSETRON HYDROCHLORIDE 2 MG/ML
4 INJECTION, SOLUTION INTRAVENOUS EVERY 4 HOURS PRN
Status: DISCONTINUED | OUTPATIENT
Start: 2024-02-09 | End: 2024-02-13 | Stop reason: HOSPADM

## 2024-02-09 RX ORDER — CARVEDILOL 12.5 MG/1
25 TABLET ORAL 2 TIMES DAILY
Status: DISCONTINUED | OUTPATIENT
Start: 2024-02-10 | End: 2024-02-13 | Stop reason: HOSPADM

## 2024-02-09 RX ORDER — FAMOTIDINE 20 MG/1
20 TABLET, FILM COATED ORAL NIGHTLY
Status: DISCONTINUED | OUTPATIENT
Start: 2024-02-09 | End: 2024-02-13 | Stop reason: HOSPADM

## 2024-02-09 RX ORDER — ASPIRIN 325 MG
325 TABLET, DELAYED RELEASE (ENTERIC COATED) ORAL DAILY
Status: DISCONTINUED | OUTPATIENT
Start: 2024-02-10 | End: 2024-02-09

## 2024-02-09 RX ORDER — ATORVASTATIN CALCIUM 80 MG/1
80 TABLET, FILM COATED ORAL NIGHTLY
Status: ON HOLD | COMMUNITY
End: 2024-06-05 | Stop reason: HOSPADM

## 2024-02-09 RX ORDER — ASPIRIN 81 MG/1
81 TABLET ORAL DAILY
Status: DISCONTINUED | OUTPATIENT
Start: 2024-02-10 | End: 2024-02-13 | Stop reason: HOSPADM

## 2024-02-09 RX ORDER — ATORVASTATIN CALCIUM 40 MG/1
40 TABLET, FILM COATED ORAL DAILY
Status: DISCONTINUED | OUTPATIENT
Start: 2024-02-10 | End: 2024-02-09

## 2024-02-09 RX ORDER — ACETAMINOPHEN 500 MG
1000 TABLET ORAL EVERY 6 HOURS PRN
Status: DISCONTINUED | OUTPATIENT
Start: 2024-02-09 | End: 2024-02-13 | Stop reason: HOSPADM

## 2024-02-09 RX ORDER — METHOCARBAMOL 500 MG/1
500 TABLET, FILM COATED ORAL 2 TIMES DAILY PRN
Status: DISCONTINUED | OUTPATIENT
Start: 2024-02-09 | End: 2024-02-13 | Stop reason: HOSPADM

## 2024-02-09 RX ORDER — ERGOCALCIFEROL 1.25 MG/1
50000 CAPSULE ORAL
Status: DISCONTINUED | OUTPATIENT
Start: 2024-02-10 | End: 2024-02-13 | Stop reason: HOSPADM

## 2024-02-09 RX ORDER — LABETALOL HYDROCHLORIDE 5 MG/ML
10 INJECTION, SOLUTION INTRAVENOUS
Status: DISCONTINUED | OUTPATIENT
Start: 2024-02-09 | End: 2024-02-09

## 2024-02-09 RX ORDER — SODIUM CHLORIDE 0.9 % (FLUSH) 0.9 %
10 SYRINGE (ML) INJECTION
Status: DISCONTINUED | OUTPATIENT
Start: 2024-02-09 | End: 2024-02-13 | Stop reason: HOSPADM

## 2024-02-09 RX ORDER — CETIRIZINE HYDROCHLORIDE 10 MG/1
10 TABLET ORAL DAILY
Status: DISCONTINUED | OUTPATIENT
Start: 2024-02-10 | End: 2024-02-13 | Stop reason: HOSPADM

## 2024-02-09 RX ORDER — AMLODIPINE BESYLATE 5 MG/1
5 TABLET ORAL DAILY
Status: DISCONTINUED | OUTPATIENT
Start: 2024-02-10 | End: 2024-02-13 | Stop reason: HOSPADM

## 2024-02-09 RX ORDER — ACETAMINOPHEN 10 MG/ML
1000 INJECTION, SOLUTION INTRAVENOUS ONCE
Status: COMPLETED | OUTPATIENT
Start: 2024-02-09 | End: 2024-02-09

## 2024-02-09 RX ORDER — LABETALOL HYDROCHLORIDE 5 MG/ML
10 INJECTION, SOLUTION INTRAVENOUS EVERY 4 HOURS PRN
Status: DISCONTINUED | OUTPATIENT
Start: 2024-02-09 | End: 2024-02-13 | Stop reason: HOSPADM

## 2024-02-09 RX ADMIN — GABAPENTIN 300 MG: 300 CAPSULE ORAL at 09:02

## 2024-02-09 RX ADMIN — ATORVASTATIN CALCIUM 80 MG: 40 TABLET, FILM COATED ORAL at 09:02

## 2024-02-09 RX ADMIN — TIMOLOL MALEATE 1 DROP: 5 SOLUTION OPHTHALMIC at 09:02

## 2024-02-09 RX ADMIN — IOPAMIDOL 50 ML: 755 INJECTION, SOLUTION INTRAVENOUS at 09:02

## 2024-02-09 RX ADMIN — FAMOTIDINE 20 MG: 20 TABLET, FILM COATED ORAL at 09:02

## 2024-02-09 RX ADMIN — ACETAMINOPHEN 1000 MG: 10 INJECTION, SOLUTION INTRAVENOUS at 07:02

## 2024-02-09 NOTE — ED PROVIDER NOTES
Encounter Date: 2/9/2024    SCRIBE #1 NOTE: I, Milka Patterson, am scribing for, and in the presence of,  Negrito Spencer III, MD. I have scribed the following portions of the note - Other sections scribed: HPI, ROS, PE, MDM.       History     Chief Complaint   Patient presents with    Extremity Weakness     Left arm weakness since Wednesday. Was told by home health to come to ER today for eval. Hx TIA     78 year old female with a history of stroke, COPD, HTN, and CAD presents to ED complaining of left arm weakness that began 2 days ago when she woke up.  She is also complaining of shortness of breath and nausea.  She denies any fever, CP, vomiting or abdominal pain. Pt denies any speech changes. Pt is right-handed.      The history is provided by the patient.     Review of patient's allergies indicates:   Allergen Reactions    Morphine Itching    Norco [hydrocodone-acetaminophen]      Past Medical History:   Diagnosis Date    Anxiety     COPD (chronic obstructive pulmonary disease)     Coronary artery disease     s/p 2 stents    Depression     GERD (gastroesophageal reflux disease)     Glaucoma     Hypertension     Obstructive sleep apnea     on cpap    Stroke     Urinary, incontinence, stress female      Past Surgical History:   Procedure Laterality Date    APPENDECTOMY      BACK SURGERY      CHOLECYSTECTOMY      EXPLORATION OF COMMON BILE DUCT      HERNIA REPAIR      incisional hernia times 2, central abdomen    HYSTERECTOMY      OVARIAN CYST REMOVAL      TONSILLECTOMY       Family History   Problem Relation Age of Onset    Heart disease Mother     Cancer Father      Social History     Tobacco Use    Smoking status: Never   Substance Use Topics    Alcohol use: Not Currently    Drug use: Not Currently     Review of Systems   Constitutional:  Negative for fatigue, fever and unexpected weight change.   HENT:  Negative for congestion and rhinorrhea.    Eyes:  Negative for pain.   Respiratory:  Negative for chest  tightness, shortness of breath and wheezing.    Cardiovascular:  Negative for chest pain.   Gastrointestinal:  Negative for abdominal pain, constipation, diarrhea, nausea and vomiting.   Genitourinary:  Negative for dysuria.   Musculoskeletal:  Negative for back pain and neck pain.   Skin:  Negative for rash.   Allergic/Immunologic: Negative for environmental allergies, food allergies and immunocompromised state.   Neurological:  Positive for weakness. Negative for dizziness and speech difficulty.   Hematological:  Does not bruise/bleed easily.   Psychiatric/Behavioral:  Negative for sleep disturbance and suicidal ideas.        Physical Exam     Initial Vitals [02/09/24 1305]   BP Pulse Resp Temp SpO2   119/76 73 18 98.4 °F (36.9 °C) 96 %      MAP       --         Physical Exam    Nursing note and vitals reviewed.  Constitutional: No distress.   HENT:   Head: Normocephalic and atraumatic.   Neck: Trachea normal.   Cardiovascular:  Normal rate and regular rhythm.           No murmur heard.  Pulmonary/Chest: Breath sounds normal. No respiratory distress.   Abdominal: Abdomen is soft. Bowel sounds are normal. She exhibits no distension. There is no abdominal tenderness.   Musculoskeletal:         General: Normal range of motion.      Lumbar back: Normal range of motion.     Neurological: She is alert and oriented to person, place, and time. No cranial nerve deficit.   4/5 left arm strength; 4/5 left leg strength.  Facial asymmetry.   Skin: Skin is warm and dry. No rash noted.   Psychiatric: She has a normal mood and affect. Judgment normal.         ED Course   Procedures  Labs Reviewed   CBC WITH DIFFERENTIAL - Abnormal; Notable for the following components:       Result Value    MCHC 31.9 (*)     All other components within normal limits   COMPREHENSIVE METABOLIC PANEL - Abnormal; Notable for the following components:    Chloride 108 (*)     Carbon Dioxide 22 (*)     Globulin 3.6 (*)     All other components within  normal limits   PROTIME-INR - Abnormal; Notable for the following components:    PT 31.1 (*)     INR 3.1 (*)     All other components within normal limits   APTT - Abnormal; Notable for the following components:    PTT 57.2 (*)     All other components within normal limits   URINALYSIS, REFLEX TO URINE CULTURE   URINALYSIS, REFLEX TO URINE CULTURE   LIPID PANEL   TSH          Imaging Results              CT Head Without Contrast (Final result)  Result time 02/09/24 14:23:21      Final result by Gunnar Dutton MD (02/09/24 14:23:21)                   Impression:      No acute intracranial findings.      Electronically signed by: Gunnar Dutton  Date:    02/09/2024  Time:    14:23               Narrative:    EXAMINATION:  CT HEAD WITHOUT CONTRAST    CLINICAL HISTORY:  Transient ischemic attack (TIA);    TECHNIQUE:  CT imaging of the head performed from the skull base to the vertex without intravenous contrast.  mGycm. Automatic exposure control, adjustment of mA/kV or iterative reconstruction technique was used to reduce radiation.    COMPARISON:  27 August 2023    FINDINGS:  There is no acute cortical infarct, hemorrhage or mass lesion.  No new parenchymal attenuation abnormality.  Ventricular size is stable.  There are vascular calcifications.    Visualized paranasal sinuses and mastoid air cells are clear.                                       Medications   sodium chloride 0.9% flush 10 mL (has no administration in time range)   labetaloL injection 10 mg (has no administration in time range)   bisacodyL suppository 10 mg (has no administration in time range)   heparin (porcine) injection 5,000 Units (has no administration in time range)   atorvastatin tablet 40 mg (has no administration in time range)   aspirin EC tablet 325 mg (has no administration in time range)     Medical Decision Making  Differential diagnosis includes, but is not limited to: stroke, TIA, radiculopathy, neuropathy, electrolyte  disturbance, dehydration.    CBC chemistry without abnormality patient elevated PT PTT is on Xarelto patient with a NIH of 4 patient's CT head without abnormality discussed case with Theresa with stroke neurology will see in consult no recommendations other than routine stroke workup discussed case with Hospital Medicine will admit    Problems Addressed:  CVA (cerebral vascular accident): complicated acute illness or injury that poses a threat to life or bodily functions    Amount and/or Complexity of Data Reviewed  Labs: ordered.  Radiology: ordered.    Risk  Decision regarding hospitalization.            Scribe Attestation:   Scribe #1: I performed the above scribed service and the documentation accurately describes the services I performed. I attest to the accuracy of the note.    Attending Attestation:           Physician Attestation for Scribe:  Physician Attestation Statement for Scribe #1: I, Negrito Spencer III, MD, reviewed documentation, as scribed by Milka Patterson in my presence, and it is both accurate and complete.             ED Course as of 02/09/24 1827 Fri Feb 09, 2024   1627 Paged stroke neurology. [BP]      ED Course User Index  [BP] Milka Patterson                           Clinical Impression:  Final diagnoses:  [I63.9] CVA (cerebral vascular accident)          ED Disposition Condition    Admit                 Negrito Spencer III, MD  02/09/24 1827

## 2024-02-10 LAB
ALBUMIN SERPL-MCNC: 3.1 G/DL (ref 3.4–4.8)
ALBUMIN/GLOB SERPL: 1.1 RATIO (ref 1.1–2)
ALP SERPL-CCNC: 80 UNIT/L (ref 40–150)
ALT SERPL-CCNC: 23 UNIT/L (ref 0–55)
AST SERPL-CCNC: 20 UNIT/L (ref 5–34)
AV INDEX (PROSTH): 0.58
AV MEAN GRADIENT: 5 MMHG
AV PEAK GRADIENT: 10 MMHG
AV VALVE AREA BY VELOCITY RATIO: 1.66 CM²
AV VALVE AREA: 1.81 CM²
AV VELOCITY RATIO: 0.53
BASOPHILS # BLD AUTO: 0.05 X10(3)/MCL
BASOPHILS NFR BLD AUTO: 0.6 %
BILIRUB SERPL-MCNC: 0.5 MG/DL
BUN SERPL-MCNC: 14.6 MG/DL (ref 9.8–20.1)
CALCIUM SERPL-MCNC: 8.8 MG/DL (ref 8.4–10.2)
CHLORIDE SERPL-SCNC: 111 MMOL/L (ref 98–107)
CO2 SERPL-SCNC: 21 MMOL/L (ref 23–31)
CREAT SERPL-MCNC: 0.72 MG/DL (ref 0.55–1.02)
CV ECHO LV RWT: 0.61 CM
DOP CALC AO PEAK VEL: 1.57 M/S
DOP CALC AO VTI: 36.5 CM
DOP CALC LVOT AREA: 3.1 CM2
DOP CALC LVOT DIAMETER: 2 CM
DOP CALC LVOT PEAK VEL: 0.83 M/S
DOP CALC LVOT STROKE VOLUME: 65.94 CM3
DOP CALC MV VTI: 42.8 CM
DOP CALCLVOT PEAK VEL VTI: 21 CM
E WAVE DECELERATION TIME: 269 MSEC
E/A RATIO: 0.86
E/E' RATIO: 16.15 M/S
ECHO LV POSTERIOR WALL: 1.29 CM (ref 0.6–1.1)
EOSINOPHIL # BLD AUTO: 0.08 X10(3)/MCL (ref 0–0.9)
EOSINOPHIL NFR BLD AUTO: 1 %
ERYTHROCYTE [DISTWIDTH] IN BLOOD BY AUTOMATED COUNT: 14.6 % (ref 11.5–17)
EST. AVERAGE GLUCOSE BLD GHB EST-MCNC: 116.9 MG/DL
FRACTIONAL SHORTENING: 28 % (ref 28–44)
GFR SERPLBLD CREATININE-BSD FMLA CKD-EPI: >60 MLS/MIN/1.73/M2
GLOBULIN SER-MCNC: 2.7 GM/DL (ref 2.4–3.5)
GLUCOSE SERPL-MCNC: 102 MG/DL (ref 82–115)
HBA1C MFR BLD: 5.7 %
HCT VFR BLD AUTO: 38.7 % (ref 37–47)
HGB BLD-MCNC: 12.7 G/DL (ref 12–16)
HR MV ECHO: 60 BPM
IMM GRANULOCYTES # BLD AUTO: 0.03 X10(3)/MCL (ref 0–0.04)
IMM GRANULOCYTES NFR BLD AUTO: 0.4 %
INTERVENTRICULAR SEPTUM: 1.01 CM (ref 0.6–1.1)
LEFT ATRIUM SIZE: 3.4 CM
LEFT ATRIUM VOLUME MOD: 36 CM3
LEFT CCA DIST DIAS: 8 CM/S
LEFT CCA DIST SYS: 68 CM/S
LEFT CCA PROX DIAS: 13 CM/S
LEFT CCA PROX SYS: 66 CM/S
LEFT ECA DIAS: 5 CM/S
LEFT ECA SYS: 79 CM/S
LEFT ICA DIST DIAS: 28 CM/S
LEFT ICA DIST SYS: 108 CM/S
LEFT ICA MID DIAS: 22 CM/S
LEFT ICA MID SYS: 74 CM/S
LEFT ICA PROX DIAS: 13 CM/S
LEFT ICA PROX SYS: 56 CM/S
LEFT INTERNAL DIMENSION IN SYSTOLE: 3.04 CM (ref 2.1–4)
LEFT VENTRICLE DIASTOLIC VOLUME: 79.5 ML
LEFT VENTRICLE SYSTOLIC VOLUME: 36.2 ML
LEFT VENTRICULAR INTERNAL DIMENSION IN DIASTOLE: 4.22 CM (ref 3.5–6)
LEFT VENTRICULAR MASS: 168.68 G
LEFT VERTEBRAL DIAS: 8 CM/S
LEFT VERTEBRAL SYS: 32 CM/S
LV LATERAL E/E' RATIO: 11.67 M/S
LV SEPTAL E/E' RATIO: 26.25 M/S
LVOT MG: 1 MMHG
LVOT MV: 0.54 CM/S
LYMPHOCYTES # BLD AUTO: 3.3 X10(3)/MCL (ref 0.6–4.6)
LYMPHOCYTES NFR BLD AUTO: 41.1 %
MAGNESIUM SERPL-MCNC: 2.1 MG/DL (ref 1.6–2.6)
MCH RBC QN AUTO: 28.4 PG (ref 27–31)
MCHC RBC AUTO-ENTMCNC: 32.8 G/DL (ref 33–36)
MCV RBC AUTO: 86.6 FL (ref 80–94)
MONOCYTES # BLD AUTO: 0.5 X10(3)/MCL (ref 0.1–1.3)
MONOCYTES NFR BLD AUTO: 6.2 %
MV MEAN GRADIENT: 3 MMHG
MV PEAK A VEL: 1.22 M/S
MV PEAK E VEL: 1.05 M/S
MV PEAK GRADIENT: 6 MMHG
MV STENOSIS PRESSURE HALF TIME: 61 MS
MV VALVE AREA BY CONTINUITY EQUATION: 1.54 CM2
MV VALVE AREA P 1/2 METHOD: 3.61 CM2
NEUTROPHILS # BLD AUTO: 4.06 X10(3)/MCL (ref 2.1–9.2)
NEUTROPHILS NFR BLD AUTO: 50.7 %
NRBC BLD AUTO-RTO: 0 %
OHS CV CAROTID RIGHT ICA EDV HIGHEST: 24
OHS CV CAROTID ULTRASOUND LEFT ICA/CCA RATIO: 1.59
OHS CV CAROTID ULTRASOUND RIGHT ICA/CCA RATIO: 1.25
OHS CV PV CAROTID LEFT HIGHEST CCA: 68
OHS CV PV CAROTID LEFT HIGHEST ICA: 108
OHS CV PV CAROTID RIGHT HIGHEST CCA: 72
OHS CV PV CAROTID RIGHT HIGHEST ICA: 90
OHS CV US CAROTID LEFT HIGHEST EDV: 28
OHS LV EJECTION FRACTION SIMPSONS BIPLANE MOD: 57 %
PHOSPHATE SERPL-MCNC: 3.5 MG/DL (ref 2.3–4.7)
PISA MRMAX VEL: 5.81 M/S
PLATELET # BLD AUTO: 238 X10(3)/MCL (ref 130–400)
PMV BLD AUTO: 10.3 FL (ref 7.4–10.4)
POTASSIUM SERPL-SCNC: 3.8 MMOL/L (ref 3.5–5.1)
PROT SERPL-MCNC: 5.8 GM/DL (ref 5.8–7.6)
PV PEAK GRADIENT: 3 MMHG
PV PEAK VELOCITY: 0.84 M/S
RBC # BLD AUTO: 4.47 X10(6)/MCL (ref 4.2–5.4)
RIGHT CCA DIST DIAS: 14 CM/S
RIGHT CCA DIST SYS: 72 CM/S
RIGHT CCA PROX DIAS: 15 CM/S
RIGHT CCA PROX SYS: 53 CM/S
RIGHT ECA DIAS: 1 CM/S
RIGHT ECA SYS: 92 CM/S
RIGHT ICA DIST DIAS: 24 CM/S
RIGHT ICA DIST SYS: 90 CM/S
RIGHT ICA MID DIAS: 21 CM/S
RIGHT ICA MID SYS: 78 CM/S
RIGHT ICA PROX DIAS: 15 CM/S
RIGHT ICA PROX SYS: 59 CM/S
RIGHT VERTEBRAL DIAS: 0 CM/S
RIGHT VERTEBRAL SYS: 64 CM/S
SINUS: 3.4 CM
SODIUM SERPL-SCNC: 140 MMOL/L (ref 136–145)
TDI LATERAL: 0.09 M/S
TDI SEPTAL: 0.04 M/S
TDI: 0.07 M/S
TRICUSPID ANNULAR PLANE SYSTOLIC EXCURSION: 1.59 CM
WBC # SPEC AUTO: 8.02 X10(3)/MCL (ref 4.5–11.5)

## 2024-02-10 PROCEDURE — 25000003 PHARM REV CODE 250: Performed by: INTERNAL MEDICINE

## 2024-02-10 PROCEDURE — 99285 EMERGENCY DEPT VISIT HI MDM: CPT | Mod: 25

## 2024-02-10 PROCEDURE — 21400001 HC TELEMETRY ROOM

## 2024-02-10 PROCEDURE — 92523 SPEECH SOUND LANG COMPREHEN: CPT

## 2024-02-10 PROCEDURE — 83735 ASSAY OF MAGNESIUM: CPT | Performed by: NURSE PRACTITIONER

## 2024-02-10 PROCEDURE — 84100 ASSAY OF PHOSPHORUS: CPT | Performed by: NURSE PRACTITIONER

## 2024-02-10 PROCEDURE — 11000001 HC ACUTE MED/SURG PRIVATE ROOM

## 2024-02-10 PROCEDURE — 83036 HEMOGLOBIN GLYCOSYLATED A1C: CPT | Performed by: INTERNAL MEDICINE

## 2024-02-10 PROCEDURE — 80053 COMPREHEN METABOLIC PANEL: CPT | Performed by: NURSE PRACTITIONER

## 2024-02-10 PROCEDURE — 85025 COMPLETE CBC W/AUTO DIFF WBC: CPT | Performed by: NURSE PRACTITIONER

## 2024-02-10 PROCEDURE — 97162 PT EVAL MOD COMPLEX 30 MIN: CPT

## 2024-02-10 RX ADMIN — CETIRIZINE HYDROCHLORIDE 10 MG: 10 TABLET, FILM COATED ORAL at 08:02

## 2024-02-10 RX ADMIN — FAMOTIDINE 20 MG: 20 TABLET, FILM COATED ORAL at 08:02

## 2024-02-10 RX ADMIN — RIVAROXABAN 20 MG: 10 TABLET, FILM COATED ORAL at 04:02

## 2024-02-10 RX ADMIN — SERTRALINE HYDROCHLORIDE 50 MG: 50 TABLET ORAL at 08:02

## 2024-02-10 RX ADMIN — METHOCARBAMOL 500 MG: 500 TABLET ORAL at 08:02

## 2024-02-10 RX ADMIN — TIMOLOL MALEATE 1 DROP: 5 SOLUTION OPHTHALMIC at 08:02

## 2024-02-10 RX ADMIN — ACETAMINOPHEN 1000 MG: 500 TABLET ORAL at 01:02

## 2024-02-10 RX ADMIN — AMLODIPINE BESYLATE 5 MG: 5 TABLET ORAL at 08:02

## 2024-02-10 RX ADMIN — ERGOCALCIFEROL 50000 UNITS: 1.25 CAPSULE ORAL at 08:02

## 2024-02-10 RX ADMIN — GABAPENTIN 300 MG: 300 CAPSULE ORAL at 04:02

## 2024-02-10 RX ADMIN — ATORVASTATIN CALCIUM 80 MG: 40 TABLET, FILM COATED ORAL at 08:02

## 2024-02-10 RX ADMIN — ASPIRIN 81 MG: 81 TABLET, COATED ORAL at 08:02

## 2024-02-10 RX ADMIN — GABAPENTIN 300 MG: 300 CAPSULE ORAL at 08:02

## 2024-02-10 RX ADMIN — TIMOLOL MALEATE 1 DROP: 5 SOLUTION OPHTHALMIC at 09:02

## 2024-02-10 RX ADMIN — CARVEDILOL 25 MG: 12.5 TABLET, FILM COATED ORAL at 08:02

## 2024-02-10 RX ADMIN — PANTOPRAZOLE SODIUM 40 MG: 40 TABLET, DELAYED RELEASE ORAL at 08:02

## 2024-02-10 NOTE — PT/OT/SLP EVAL
[facility]  Speech Language Pathology Department  Cognitive-Communication Evaluation    Patient Name:  Katty Veronica   MRN:  [id number]    Recommendations:     General recommendations:  SLP intervention not indicated  Communication strategies:  none    Discharge therapy intensity: No Therapy Indicated  Barriers to safe discharge: none    History:     Katty Veronica is a/n 78 y.o. female admitted with left arm weakness.CT head showed no acute intracranial findings.  Pt passed swallow screener.     Past Medical History:   Diagnosis Date    Anxiety     COPD (chronic obstructive pulmonary disease)     Coronary artery disease     s/p 2 stents    Depression     GERD (gastroesophageal reflux disease)     Glaucoma     Hypertension     Obstructive sleep apnea     on cpap    Stroke     Urinary, incontinence, stress female      Past Surgical History:   Procedure Laterality Date    APPENDECTOMY      BACK SURGERY      CHOLECYSTECTOMY      EXPLORATION OF COMMON BILE DUCT      HERNIA REPAIR      incisional hernia times 2, central abdomen    HYSTERECTOMY      OVARIAN CYST REMOVAL      TONSILLECTOMY         Previous level of Function  Education: 8th grade  Occupation: retired  Lives: alone  Handed: Right  Glasses: yes  Hearing Aids: yes    Imaging   Results for orders placed during the hospital encounter of 02/09/24    MRI Brain Without Contrast    Narrative  EXAMINATION:  MRI BRAIN WITHOUT CONTRAST    CLINICAL HISTORY:  Stroke, follow up;    TECHNIQUE:  Multiplanar MRI sequences were performed of the brain without contrast.    COMPARISON:  MRI brain and November 12, 2014.  CT brain February 9, 2024    FINDINGS:  Interval progression of chronic microvascular ischemia which is now moderate and is seen in the periventricular deep white matter T2 FLAIR hyperintense signals.  There are right cerebral and bilateral basal ganglia old lacunar infarcts.  Generalized cerebral and cerebellar cortical  volume loss. Gradient echo sequences demonstrate no evidence of de phasing artifact to suggest hemorrhagic byproducts. No evidence of diffusion restriction or ADC map signal drop out to suggest acute infarct. The sella and suprasellar areas are unremarkable.    The cerebellar tonsils are normally positioned. There is no acute intracranial hemorrhage, hydrocephalus, midline shift or mass effect. No acute extra axial fluid collections identified. The mastoid air cells are clear.    Impression  1.  No acute intracranial findings identified.    2.  Old lacunar infarcts, chronic microangiopathic ischemia and atrophy.      Electronically signed by: Anastacio Preston  Date:    2024  Time:    21:31    Subjective     Patient awake and alert.  Spiritual/Cultural/Jainism Beliefs/Practices that affect care:      Objective:     ORAL MUSCULATURE  Dentition: own teeth  Facial Movement: WFL  Buccal Strength & Mobility: WFL  Mandibular Strength & Mobility: WFL    SPEECH PRODUCTION  Phoneme Production: adequate  Voice Quality: adequate  Voice Production: adequate  Speech Rate: appropriate  Loudness: acceptable  Speech Intelligibility  Known Context: Greater that 90%  Unknown Context: Greater that 90%    AUDITORY COMPREHENSION  Identification:  Body parts: 100%  Objects: 100%  Following Directions:  1-Step: 100%  2-Step: 100%  Yes/No Questions:  Biographical: 100%  Environmental: 100%  Simple: 100%  Complex: 100%    VERBAL EXPRESSION  Automatic Speech:  Functional needs: 100%  Months of the year: 100%  Countin%  Phrase Completion:  100%  Confrontation Naming  Body Parts: 100%  Objects: 100%  Wh- Questions:  Object name: 100%  Object function: 100%    COGNITION  Orientation:  Person: yes  Place: yes  Time: yes  Situation: yes   Memory:  Immediate: 100%  Delayed: 100%  Short Term: 100%  Long Term: 100%  Problem Solving  Functional simple: 100%  Functional complex: 100%  Organization:  Convergent thinkin%  Divergent  thinkin%  Sequencin%    Assessment:     Pt presents with functional speech and language skills, cognitive linguistic skills note to be at baseline. No skilled SLP intervention is warranted at this time.     Patient Education:     Patient provided with verbal education regarding POC.  Understanding was verbalized.       Time Tracking:     SLP Treatment Date:    2/10/24  Speech Start Time:   845  Speech Stop Time:    0900    Speech Total Time (min):   15    Billable minutes:  Evaluation of Speech Sound Production with Comprehension and Expression, 15 minutes     02/10/2024

## 2024-02-10 NOTE — ED NOTES
Attempted to call report, asked to call back in 10 minutes. Advised the patient will be put in transport due to new policy, however, I will call back.

## 2024-02-10 NOTE — PROGRESS NOTES
[facility] Hospital Medicine Progress Note        Chief Complaint: Inpatient Follow-up for     HPI:  Katty Veronica is a 78 year old female who PMH includes Anxiety, COPD (chronic obstructive pulmonary disease), Coronary artery disease, Depression, GERD (gastroesophageal reflux disease), Glaucoma, Hypertension, Obstructive sleep apnea, Stroke, and Urinary, incontinence, stress; presents to the ED at [facility] on 2/9/2024 with reports of left arm weakness since Wednesday. Pt reports she was told by home health services to go to the ED on their visit today secondary to her left side weakness. Pt reports history of CVA in the past with no residual deficits.  Lab work reviewed demonstrated PT 31.1, INR 3.1, PTT 57.2, chloride 108, CO2 22, other indices unremarkable.  CT of the head without contrast impression reviewed demonstrated no acute intracranial findings. PT reports she initially thought she slept wrong on her left side. She has been sleeping on a recliner as she has had vertebral fracture with prior kyphoplasty done June 2023 and reports its difficult to lay flat and she sleeps in a recliner.   Initial vital signs /76 pulse 73 respirations 18 temperature 98.4° F O2 saturation 96% on room air.  Patient received hydration,, and was placed on CVA workup.  Patient is admitted to hospital medicine services for further management.  Neurology Services have been consulted.    Interval Hx:   Seen and examined. Passed bedside swallow   Reports left side weakness  Working w therapy  vss    Objective/physical exam:  General: In no acute distress, afebrile  Chest: Clear to auscultation bilaterally  Heart: RRR, +S1, S2, no appreciable murmur  Abdomen: Soft, nontender, BS +  MSK: Warm, no lower extremity edema, no clubbing or cyanosis  Neurologic: Alert and oriented x4, Cranial nerve II-XII intact, dec left  strength  VITAL SIGNS: 24 HRS MIN & MAX LAST   Temp  Min: 98.4 °F (36.9 °C)   Max: 98.5 °F (36.9 °C) 98.5 °F (36.9 °C)   BP  Min: 119/76  Max: 183/75 (!) 148/74   Pulse  Min: 51  Max: 73  61   Resp  Min: 13  Max: 22 20   SpO2  Min: 94 %  Max: 98 % 96 %     I have reviewed the following labs:  Recent Labs   Lab 02/09/24  1344 02/10/24  0424   WBC 8.94 8.02   RBC 5.27 4.47   HGB 14.9 12.7   HCT 46.7 38.7   MCV 88.6 86.6   MCH 28.3 28.4   MCHC 31.9* 32.8*   RDW 14.8 14.6    238   MPV 10.2 10.3     Recent Labs   Lab 02/09/24  1344 02/10/24  0424    140   K 3.9 3.8   CO2 22* 21*   BUN 19.0 14.6   CREATININE 0.95 0.72   CALCIUM 9.3 8.8   MG  --  2.10   ALBUMIN 3.9 3.1*   ALKPHOS 97 80   ALT 30 23   AST 31 20   BILITOT 0.5 0.5     Microbiology Results (last 7 days)       ** No results found for the last 168 hours. **             See below for Radiology    Scheduled Med:   amLODIPine  5 mg Oral Daily    aspirin  81 mg Oral Daily    atorvastatin  80 mg Oral QHS    carvediloL  25 mg Oral BID    cetirizine  10 mg Oral Daily    ergocalciferol  50,000 Units Oral Q7 Days    famotidine  20 mg Oral QHS    gabapentin  300 mg Oral TID    pantoprazole  40 mg Oral Daily    rivaroxaban  20 mg Oral Daily with dinner    sertraline  50 mg Oral Daily    timolol maleate 0.5%  1 drop Both Eyes BID      Continuous Infusions:     PRN Meds:  acetaminophen, bisacodyL, hydrALAZINE, labetalol, methocarbamoL, metoprolol, ondansetron, sodium chloride 0.9%     Assessment/Plan:  Acute left-sided weakness-secondary to suspected CVA-POA   HTN- essential- POA  CAD- s/p stent placement- POA  COPD- no acute exacerbation- POA  Hx of CVA- POA     Hx of chronic back pain, PAF, long term anticoagulation therapy, anxiety, depression, GERD, glaucoma, SABINA, stress incontinence     PLAN:  Consult Neurology Services appreciate assistance and recommendations   Fall precautions   Continue with diagnostics and imaging per CVA workup  Mri brain- No acute intracranial findings identified.  2.  Old lacunar infarcts, chronic  microangiopathic ischemia and atrophy.  Consult therapy services   IV hydration   Cont remaining wilma emeds   Passed bedside swallow .   Repeat lab work in a.m.  Consulted pt/ot  Recommending rehab placement   CM consulted         -VTE Prophylaxis: Home Xarelto          All diagnosis and differential diagnosis have been reviewed; assessment and plan has been documented; I have personally reviewed the labs and test results that are presently available; I have reviewed the patients medication list; I have reviewed the consulting providers response and recommendations. I have reviewed or attempted to review medical records based upon their availability    All of the patient's questions have been  addressed and answered. Patient's is agreeable to the above stated plan. I will continue to monitor closely and make adjustments to medical management as needed.  _____________________________________________________________________    Nutrition Status:    Radiology:  I have personally reviewed the following imaging and agree with the radiologist.     MRI Brain Without Contrast  Narrative: EXAMINATION:  MRI BRAIN WITHOUT CONTRAST    CLINICAL HISTORY:  Stroke, follow up;    TECHNIQUE:  Multiplanar MRI sequences were performed of the brain without contrast.    COMPARISON:  MRI brain and November 12, 2014.  CT brain February 9, 2024    FINDINGS:  Interval progression of chronic microvascular ischemia which is now moderate and is seen in the periventricular deep white matter T2 FLAIR hyperintense signals.  There are right cerebral and bilateral basal ganglia old lacunar infarcts.  Generalized cerebral and cerebellar cortical volume loss. Gradient echo sequences demonstrate no evidence of de phasing artifact to suggest hemorrhagic byproducts. No evidence of diffusion restriction or ADC map signal drop out to suggest acute infarct. The sella and suprasellar areas are unremarkable.    The cerebellar tonsils are normally positioned.  There is no acute intracranial hemorrhage, hydrocephalus, midline shift or mass effect. No acute extra axial fluid collections identified. The mastoid air cells are clear.  Impression: 1.  No acute intracranial findings identified.    2.  Old lacunar infarcts, chronic microangiopathic ischemia and atrophy.    Electronically signed by: Anastacio Preston  Date:    02/09/2024  Time:    21:31  CTA Head and Neck (xpd)  Narrative: EXAMINATION:  CTA HEAD AND NECK (XPD)    CLINICAL HISTORY:  Stroke/TIA    TECHNIQUE:  Brain and imaging was performed from skull base to vertex prior to intravenous contrast. CT Angiogram of head and was subsequently performed following intravenous contrast administration.  Coronal and sagittal MPR reconstructions were performed in addition to coronal and sagittal MIP reconstructions.    Dose length product was 1404 mGycm. Automated exposure control was utilized to minimize radiation dose.    COMPARISON:  CT brain and MRI brain same date.    FINDINGS:  Carotid artery assessed in accordance with the NASCET criteria.    Visualized portion of the thoracic aorta is without aneurysmal dilatation or dissection.  There is no hemodynamically significant stenosis involving the brachiocephalic trunk and the subclavian arteries.  There is mild tortuosity of the proximal common carotid arteries which otherwise show unremarkable contrast opacification without hemodynamically significant stenosis, intraluminal thrombus, dissection or aneurysmal prominence.  Bilateral internal carotid arteries are patent without hemodynamically significant stenosis.  There are mild calcified plaques which involve the cavernous and supraclinoid portion of the internal carotid arteries without significant stenosis.    There is unremarkable flow within the prox M1 segment right middle cerebral artery.  There is marked hemodynamically significant stenosis of the distal M1 segment of the right middle cerebral artery but with  unremarkable flow distally within the sylvian and the temporal branches.  This is best seen on the reformatted image 9 series 01/07/2004.  The A1 segment of the left anterior cerebral artery is hypoplastic.  A1 segment of the right anterior cerebral artery, bilateral A2 segments of the anterior cerebral arteries and the left middle cerebral artery major branches are patent.    There is flow seen bilaterally within the vertebral arteries.  The right vertebral artery is relatively dominant.  Flow is also seen within the basilar artery.  Right posterior cerebral artery is unremarkable.  There is hypoplasia versus narrowing of the P1 segment of the left posterior cerebral artery.  P2 segment left posterior cerebral artery is patent.  Impression: 1.  Hemodynamically significant stenosis of the distal M1 segment of the right middle cerebral artery.    2.  Details of the findings above.    Electronically signed by: Anastacio Preston  Date:    02/09/2024  Time:    21:31  CT Head Without Contrast  Narrative: EXAMINATION:  CT HEAD WITHOUT CONTRAST    CLINICAL HISTORY:  Transient ischemic attack (TIA);    TECHNIQUE:  CT imaging of the head performed from the skull base to the vertex without intravenous contrast.  mGycm. Automatic exposure control, adjustment of mA/kV or iterative reconstruction technique was used to reduce radiation.    COMPARISON:  27 August 2023    FINDINGS:  There is no acute cortical infarct, hemorrhage or mass lesion.  No new parenchymal attenuation abnormality.  Ventricular size is stable.  There are vascular calcifications.    Visualized paranasal sinuses and mastoid air cells are clear.  Impression: No acute intracranial findings.    Electronically signed by: Gunnar Dutton  Date:    02/09/2024  Time:    14:23      Cecilia Ross MD   02/10/2024

## 2024-02-10 NOTE — H&P
[facility] Hospital Medicine   History & Physical Note      Patient Name: Katty Veronica  : 1945  MRN: [id number]  Patient Class: IP- Inpatient   Admission Date: 2024   Length of Stay: 0  Admitting Service:  Service  Attending Physician: Dr. Gill Urrutia  PCP: Sheila Moreira DO  History Source: Patient, patient's family, and EMR.   Face-to-Face encounter date: 2024  Code status: full code    Chief Complaint   Extremity Weakness (Left arm weakness since Wednesday. Was told by home health to come to ER today for eval. Hx TIA)      History of Present Illness    Katty Veronica is a 78 year old female who PMH includes Anxiety, COPD (chronic obstructive pulmonary disease), Coronary artery disease, Depression, GERD (gastroesophageal reflux disease), Glaucoma, Hypertension, Obstructive sleep apnea, Stroke, and Urinary, incontinence, stress; presents to the ED at [facility] on 2024 with reports of left arm weakness since Wednesday. Pt reports she was told by home health services to go to the ED on their visit today secondary to her left side weakness. Pt reports history of CVA in the past with no residual deficits.  Lab work reviewed demonstrated PT 31.1, INR 3.1, PTT 57.2, chloride 108, CO2 22, other indices unremarkable.  CT of the head without contrast impression reviewed demonstrated no acute intracranial findings. PT reports she initially thought she slept wrong on her left side. She has been sleeping on a recliner as she has had vertebral fracture with prior kyphoplasty done 2023 and reports its difficult to lay flat and she sleeps in a recliner.   Initial vital signs /76 pulse 73 respirations 18 temperature 98.4° F O2 saturation 96% on room air.  Patient received hydration,, and was placed on CVA workup.  Patient is admitted to hospital medicine services for further management.  Neurology Services have been consulted.    ROS   Except as documented,  all other systems reviewed and negative     Past Medical History     Past Medical History:   Diagnosis Date    Anxiety     COPD (chronic obstructive pulmonary disease)     Coronary artery disease     s/p 2 stents    Depression     GERD (gastroesophageal reflux disease)     Glaucoma     Hypertension     Obstructive sleep apnea     on cpap    Stroke     Urinary, incontinence, stress female        Past Surgical History   Appendectomy  Kyphoplasty  Left TKR  Cholecystectomy  Exploration bile duct  Hernia repair  Hysterectomy  Ovarian cyst removal  Tonsillectomy  Endoscopy    Social History   Denies any alcohol, tobacco, or illicit drug use   Lives alone ; has family    Family History   Reviewed and negative    Allergies   Morphine and Norco [hydrocodone-acetaminophen]    Home Medications   As documented  Prior to Admission medications    Medication Sig Start Date End Date Taking? Authorizing Provider   amLODIPine (NORVASC) 5 MG tablet 1 tablet Orally Once a day   Yes Provider, Historical   aspirin (ECOTRIN) 81 MG EC tablet Take 1 tablet (81 mg total) by mouth once daily. 8/30/23 8/29/24 Yes Cristopher Davis MD   atorvastatin (LIPITOR) 80 MG tablet Take 80 mg by mouth every evening.   Yes Provider, Historical   carvediloL (COREG) 25 MG tablet Take 1 tablet (25 mg total) by mouth 2 (two) times daily. 11/20/23  Yes Princess Blood FNP   cetirizine (ZYRTEC) 10 MG tablet Take 10 mg by mouth once daily.   Yes Provider, Historical   ergocalciferol (VITAMIN D2) 50,000 unit Cap Take 50,000 Units by mouth every 7 days.   Yes Provider, Historical   famotidine (PEPCID) 20 MG tablet Take 1 tablet by mouth every evening.   Yes Provider, Historical   gabapentin (NEURONTIN) 300 MG capsule Take 300 mg by mouth 3 (three) times daily. 8/25/23  Yes Provider, Historical   methocarbamoL (ROBAXIN) 500 MG Tab Take 500 mg by mouth 2 (two) times a day.   Yes Provider, Historical   pantoprazole (PROTONIX) 40 MG tablet Take 40 mg by  mouth. 8/24/23  Yes Provider, Historical   rivaroxaban (XARELTO) 20 mg Tab Take 20 mg by mouth daily with dinner or evening meal.   Yes Provider, Historical   sertraline (ZOLOFT) 50 MG tablet Take 50 mg by mouth once daily. 8/24/23  Yes Provider, Historical   timolol maleate 0.5% (TIMOPTIC) 0.5 % Drop Place 1 drop into both eyes 2 (two) times daily. 9/8/23  Yes Provider, Historical   metoclopramide HCl (REGLAN) 5 MG tablet Take 5 mg by mouth 4 (four) times daily before meals and nightly.    Provider, Historical   nitroGLYCERIN (NITROSTAT) 0.4 MG SL tablet Place 1 tablet (0.4 mg total) under the tongue every 5 (five) minutes as needed for Chest pain. 11/20/23   Princess Blood FNP   ondansetron (ZOFRAN-ODT) 8 MG TbDL Take 8 mg by mouth every 6 (six) hours as needed.    Provider, Historical   atorvastatin (LIPITOR) 40 MG tablet Take 1 tablet (40 mg total) by mouth once daily. 8/30/23 2/9/24  Cristopher Davis MD   oxyCODONE-acetaminophen (PERCOCET)  mg per tablet Take 1 tablet by mouth every 8 (eight) hours as needed for Pain.  2/9/24  Provider, Historical        Inpatient Medications   Scheduled Meds   [START ON 2/10/2024] aspirin  325 mg Oral Daily    [START ON 2/10/2024] atorvastatin  40 mg Oral Daily    heparin (porcine)  5,000 Units Subcutaneous Q8H     Continuous Infusions: see mar    PRN Meds  bisacodyL, labetalol, sodium chloride 0.9%    Physical Exam   Vital Signs  Temp:  [98.4 °F (36.9 °C)-98.5 °F (36.9 °C)]   Pulse:  [59-73]   Resp:  [16-22]   BP: (119-173)/(76-81)   SpO2:  [94 %-97 %]    General:  Well-developed well-nourished elderly female chronically ill-appearing; nontoxic, appears comfortable, NAD, no family at bedside  HEENT: NC/AT  Neck:  No JVD  Chest: CTABL  CVS: Regular rhythm. Normal S1/S2.  Abdomen: nondistended, normoactive BS, soft and non-tender.  MSK: No obvious deformity or joint swelling  Skin: Warm and dry  Neuro: Neuro  CN 2, 3, 4, 6: EOMI, nl visual fields, nl gross  "vision test  CN 5: nl chewing, nl facial sensation  CN 7: nl smile, nl brow wrinkle, nl eyes closure  CN 8: nl hearing  CN 9, 10: nl uvular elevation on phonation  CN 11: nl shoulder shrug  CN 12: nl tongue protrusion, midline, nl lateral deviation   Strength: nl strength proximally and distally, upper and lower extremity on right, left arm side weakness  Sensation: nl to light touch proximally and distally, upper and lower extremity  Speech: clear, no dysarthria  Thinking: clear, goal oriented, no delusions  Psych: Cooperative    Labs     Recent Labs     02/09/24  1344   WBC 8.94   RBC 5.27   HGB 14.9   HCT 46.7   MCV 88.6   MCH 28.3   MCHC 31.9*   RDW 14.8        No results for input(s): "LACTIC" in the last 72 hours.  Recent Labs     02/09/24  1451   INR 3.1*     Recent Labs     02/09/24  1344   CHOL 108   TRIG 79   LDL 42.00*   VLDL 16   HDL 50      Recent Labs     02/09/24  1344      K 3.9   CHLORIDE 108*   CO2 22*   BUN 19.0   CREATININE 0.95   GLUCOSE 112   CALCIUM 9.3   ALBUMIN 3.9   GLOBULIN 3.6*   ALKPHOS 97   ALT 30   AST 31   BILITOT 0.5   TSH 1.242     No results for input(s): "BNP", "CPK", "TROPONINI" in the last 72 hours.       Microbiology Results (last 7 days)       ** No results found for the last 168 hours. **           Imaging     CT Head Without Contrast   Final Result      No acute intracranial findings.         Electronically signed by: Gunnar Dutton   Date:    02/09/2024   Time:    14:23      MRI Brain Without Contrast    (Results Pending)     Assessment & Plan   ASSESSMENT:  Acute CVA-rule out-POA   Acute left-sided weakness-secondary to suspected CVA-POA   HTN- essential- POA  CAD- s/p stent placement- POA  COPD- no acute exacerbation- POA  Hx of CVA- POA    Hx of chronic back pain, PAF, long term anticoagulation therapy, anxiety, depression, GERD, glaucoma, SABINA, stress incontinence    PLAN:  Consult Neurology Services appreciate assistance and recommendations   Fall precautions "   Continue with diagnostics and imaging per CVA workup  Consult therapy services   IV hydration   Resume home medication as deemed necessary once patient is able to take p.o.   Repeat lab work in a.m.      -VTE Prophylaxis: Home Meg Ibarra FNP have reviewed and discussed the case with Dr. Urrutia. Please see the following addendum for further assessment and plan from there attending MD.  BRENTON Arriaga   02/09/2024

## 2024-02-10 NOTE — PT/OT/SLP EVAL
Physical Therapy Evaluation    Patient Name:  Katty Veronica   MRN:  [id number]    Recommendations:     Discharge therapy intensity: High Intensity Therapy   Discharge Equipment Recommendations: none   Barriers to discharge: Impaired mobility    Assessment:     Katty Veronica is a 78 y.o. female admitted with a medical diagnosis of possible cva.  She presents with the following impairments/functional limitations: weakness, impaired self care skills, impaired endurance, gait instability, impaired functional mobility, impaired balance possible CVA.    Rehab Prognosis: Good; patient would benefit from acute skilled PT services to address these deficits and reach maximum level of function.    Recent Surgery: * No surgery found *      Plan:     During this hospitalization, patient to be seen 6 x/week to address the identified rehab impairments via gait training, therapeutic activities, therapeutic exercises and progress toward the following goals:    Plan of Care Expires:  03/02/24    Subjective     Chief Complaint:   Patient/Family Comments/goals:   Pain/Comfort:       Patients cultural, spiritual, Jainism conflicts given the current situation:      Living Environment:  Lives alone in a 1st floor APT, but will probably move in with daughter in a house with 3 steps and a rail  Prior to admission, patients level of function was mod ind.  Equipment used at home: cane, straight, walker, rolling, rollator, shower chair.  DME owned (not currently used):  Upon discharge, patient will have assistance from daughter.    Objective:     Communicated with nurse prior to session.  Patient found supine with blood pressure cuff, telemetry, pulse ox (continuous), PureWick, peripheral IV  upon PT entry to room.    General Precautions: Standard, fall  Orthopedic Precautions:N/A   Braces: N/A  Respiratory Status: Room air  Blood Pressure:       Exams:  RLE ROM: WFL  RLE Strength: 3+/5  LLE ROM: WFL  LLE Strength: 3 to  3+/5  Skin integrity: Visible skin intact      Functional Mobility:  Bed Mobility:     Scooting: minimum assistance  Supine to Sit: minimum assistance  Sit to Supine: minimum assistance  Transfers:     Sit to Stand:  minimum assistance with rolling walker  Bed to Chair: minimum assistance with  rolling walker  using  Step Transfer  Gait: pt ambulated about 10ft with rw with cga. Pt was slightly unsteady      AM-PAC 6 CLICK MOBILITY  Total Score:        Treatment & Education:      Patient provided with verbal education education regarding PT role/goals/POC.  Understanding was verbalized.     Patient left supine with all lines intact and call button in reach.    GOALS:   Multidisciplinary Problems       Physical Therapy Goals          Problem: Physical Therapy    Goal Priority Disciplines Outcome Goal Variances Interventions   Physical Therapy Goal     PT, PT/OT Ongoing, Progressing     Description: Pt will be seen for the following goals  1. Pt will be ind with bed mobility  2. Pt will be mod ind with transfers  3. Pt will ambulate with a rw 150ft sba                       History:     Past Medical History:   Diagnosis Date    Anxiety     COPD (chronic obstructive pulmonary disease)     Coronary artery disease     s/p 2 stents    Depression     GERD (gastroesophageal reflux disease)     Glaucoma     Hypertension     Obstructive sleep apnea     on cpap    Stroke     Urinary, incontinence, stress female        Past Surgical History:   Procedure Laterality Date    APPENDECTOMY      BACK SURGERY      CHOLECYSTECTOMY      EXPLORATION OF COMMON BILE DUCT      HERNIA REPAIR      incisional hernia times 2, central abdomen    HYSTERECTOMY      OVARIAN CYST REMOVAL      TONSILLECTOMY         Time Tracking:     PT Received On:    PT Start Time: 0830     PT Stop Time: 0847  PT Total Time (min): 17 min     Billable Minutes: Evaluation 17      02/10/2024

## 2024-02-10 NOTE — PLAN OF CARE
Problem: Physical Therapy  Goal: Physical Therapy Goal  Description: Pt will be seen for the following goals  1. Pt will be ind with bed mobility  2. Pt will be mod ind with transfers  3. Pt will ambulate with a rw 150ft sba  Outcome: Ongoing, Progressing

## 2024-02-10 NOTE — NURSING
Nurses Note -- 4 Eyes      2/10/2024   5:28 PM      Skin assessed during: Admit      [x] No Altered Skin Integrity Present    []Prevention Measures Documented      [] Yes- Altered Skin Integrity Present or Discovered   [] LDA Added if Not in Epic (Describe Wound)   [] New Altered Skin Integrity was Present on Admit and Documented in LDA   [] Wound Image Taken    Wound Care Consulted? No    Attending Nurse:  Joi Coronado RN    Second RN/Staff Member:   Elizabeth Robertson RN

## 2024-02-11 LAB
ALBUMIN SERPL-MCNC: 3 G/DL (ref 3.4–4.8)
ALBUMIN/GLOB SERPL: 1 RATIO (ref 1.1–2)
ALP SERPL-CCNC: 77 UNIT/L (ref 40–150)
ALT SERPL-CCNC: 23 UNIT/L (ref 0–55)
AST SERPL-CCNC: 17 UNIT/L (ref 5–34)
BASOPHILS # BLD AUTO: 0.03 X10(3)/MCL
BASOPHILS NFR BLD AUTO: 0.4 %
BILIRUB SERPL-MCNC: 0.6 MG/DL
BUN SERPL-MCNC: 15.1 MG/DL (ref 9.8–20.1)
CALCIUM SERPL-MCNC: 8.9 MG/DL (ref 8.4–10.2)
CHLORIDE SERPL-SCNC: 108 MMOL/L (ref 98–107)
CO2 SERPL-SCNC: 22 MMOL/L (ref 23–31)
CREAT SERPL-MCNC: 0.76 MG/DL (ref 0.55–1.02)
EOSINOPHIL # BLD AUTO: 0.11 X10(3)/MCL (ref 0–0.9)
EOSINOPHIL NFR BLD AUTO: 1.3 %
ERYTHROCYTE [DISTWIDTH] IN BLOOD BY AUTOMATED COUNT: 14.6 % (ref 11.5–17)
GFR SERPLBLD CREATININE-BSD FMLA CKD-EPI: >60 MLS/MIN/1.73/M2
GLOBULIN SER-MCNC: 3.1 GM/DL (ref 2.4–3.5)
GLUCOSE SERPL-MCNC: 106 MG/DL (ref 82–115)
HCT VFR BLD AUTO: 41.4 % (ref 37–47)
HGB BLD-MCNC: 13.4 G/DL (ref 12–16)
IMM GRANULOCYTES # BLD AUTO: 0.03 X10(3)/MCL (ref 0–0.04)
IMM GRANULOCYTES NFR BLD AUTO: 0.4 %
LYMPHOCYTES # BLD AUTO: 3.97 X10(3)/MCL (ref 0.6–4.6)
LYMPHOCYTES NFR BLD AUTO: 48.1 %
MCH RBC QN AUTO: 28.4 PG (ref 27–31)
MCHC RBC AUTO-ENTMCNC: 32.4 G/DL (ref 33–36)
MCV RBC AUTO: 87.7 FL (ref 80–94)
MONOCYTES # BLD AUTO: 0.59 X10(3)/MCL (ref 0.1–1.3)
MONOCYTES NFR BLD AUTO: 7.2 %
NEUTROPHILS # BLD AUTO: 3.52 X10(3)/MCL (ref 2.1–9.2)
NEUTROPHILS NFR BLD AUTO: 42.6 %
NRBC BLD AUTO-RTO: 0 %
PLATELET # BLD AUTO: 216 X10(3)/MCL (ref 130–400)
PMV BLD AUTO: 9.9 FL (ref 7.4–10.4)
POTASSIUM SERPL-SCNC: 3.8 MMOL/L (ref 3.5–5.1)
PROT SERPL-MCNC: 6.1 GM/DL (ref 5.8–7.6)
RBC # BLD AUTO: 4.72 X10(6)/MCL (ref 4.2–5.4)
SODIUM SERPL-SCNC: 139 MMOL/L (ref 136–145)
WBC # SPEC AUTO: 8.25 X10(3)/MCL (ref 4.5–11.5)

## 2024-02-11 PROCEDURE — 97165 OT EVAL LOW COMPLEX 30 MIN: CPT

## 2024-02-11 PROCEDURE — 80053 COMPREHEN METABOLIC PANEL: CPT | Performed by: NURSE PRACTITIONER

## 2024-02-11 PROCEDURE — 85025 COMPLETE CBC W/AUTO DIFF WBC: CPT | Performed by: NURSE PRACTITIONER

## 2024-02-11 PROCEDURE — 21400001 HC TELEMETRY ROOM

## 2024-02-11 PROCEDURE — 25000003 PHARM REV CODE 250: Performed by: INTERNAL MEDICINE

## 2024-02-11 RX ADMIN — CETIRIZINE HYDROCHLORIDE 10 MG: 10 TABLET, FILM COATED ORAL at 09:02

## 2024-02-11 RX ADMIN — RIVAROXABAN 20 MG: 10 TABLET, FILM COATED ORAL at 04:02

## 2024-02-11 RX ADMIN — ATORVASTATIN CALCIUM 80 MG: 40 TABLET, FILM COATED ORAL at 08:02

## 2024-02-11 RX ADMIN — ACETAMINOPHEN 1000 MG: 500 TABLET ORAL at 04:02

## 2024-02-11 RX ADMIN — FAMOTIDINE 20 MG: 20 TABLET, FILM COATED ORAL at 08:02

## 2024-02-11 RX ADMIN — ASPIRIN 81 MG: 81 TABLET, COATED ORAL at 09:02

## 2024-02-11 RX ADMIN — PANTOPRAZOLE SODIUM 40 MG: 40 TABLET, DELAYED RELEASE ORAL at 09:02

## 2024-02-11 RX ADMIN — AMLODIPINE BESYLATE 5 MG: 5 TABLET ORAL at 09:02

## 2024-02-11 RX ADMIN — TIMOLOL MALEATE 1 DROP: 5 SOLUTION OPHTHALMIC at 08:02

## 2024-02-11 RX ADMIN — ACETAMINOPHEN 1000 MG: 500 TABLET ORAL at 08:02

## 2024-02-11 RX ADMIN — GABAPENTIN 300 MG: 300 CAPSULE ORAL at 08:02

## 2024-02-11 RX ADMIN — GABAPENTIN 300 MG: 300 CAPSULE ORAL at 09:02

## 2024-02-11 RX ADMIN — METHOCARBAMOL 500 MG: 500 TABLET ORAL at 04:02

## 2024-02-11 RX ADMIN — METHOCARBAMOL 500 MG: 500 TABLET ORAL at 08:02

## 2024-02-11 RX ADMIN — ACETAMINOPHEN 1000 MG: 500 TABLET ORAL at 01:02

## 2024-02-11 RX ADMIN — SERTRALINE HYDROCHLORIDE 50 MG: 50 TABLET ORAL at 09:02

## 2024-02-11 RX ADMIN — CARVEDILOL 25 MG: 12.5 TABLET, FILM COATED ORAL at 08:02

## 2024-02-11 RX ADMIN — CARVEDILOL 25 MG: 12.5 TABLET, FILM COATED ORAL at 09:02

## 2024-02-11 RX ADMIN — GABAPENTIN 300 MG: 300 CAPSULE ORAL at 03:02

## 2024-02-11 NOTE — PT/OT/SLP EVAL
Occupational Therapy   Evaluation and Discharge Note    Name: Katty Veronica  MRN: [id number]  Admitting Diagnosis: Suspected CVA  Recent Surgery: * No surgery found *      Recommendations:     Discharge therapy intensity: Low Intensity Therapy   Discharge Equipment Recommendations: none  Barriers to discharge:   none    Assessment:     Katty Veronica is a 78 y.o. female with a hx of CVA presenting with left side weakness and medical diagnosis of possible CVA. Brain MRI showed no acute intracranial findings identified.  On eval, patient pt was in the bathroom completing yue cares independently when therapist arrived. Pt is overall at her baseline, demo'ing slight weakness in LUE. Skilled OT services are not warranted at this time. Pt would benefit from continued HH therapy services she was receiving PTA.     Plan:     OT to sign off as acute OT services are not warranted at this time.  Please re-consult if situation changes during this hospitalization.    Plan of Care Reviewed with: patient    Subjective     Chief Complaint: Having diarrhea   Patient/Family Comments/goals: Leave the hospital    Occupational Profile:  Living Environment: Pt lives in a senior apartment where she has a tub, and was receiving HH services PTA.   Previous level of function: Pt was overall Mod I/ Ind with ADL's, using a RW at baseline. Pt sponge bathes for safety with assist from a bath aide 1x/week. Pt's daughter provides meals that are then frozen and pt is able to warm them up.   Roles and Routines: Mother, grandmother  Equipment Used at Home: cane, straight, wheelchair, walker, rolling, shower chair  Assistance upon Discharge: Pt has assist from dtr for IADL's, a bath aide, and HH therapy services     Pain/Comfort:  Pain Rating 1: 0/10    Patients cultural, spiritual, Yazidi conflicts given the current situation: no    Objective:     OT communicated with nurse prior to session.      Patient was found  sitting on the  toilet  with telemetry, pulse ox (continuous), peripheral IV upon OT entry to room.    General Precautions: Standard, fall  Orthopedic Precautions: N/A  Braces: N/A    Bed Mobility:    Patient completed Sit to Supine with minimum assistance for bringing LLE onto bed, pt reported she is bale to complete this transfer at home without assist 2/2 lower bed level.    Functional Mobility/Transfers:  Patient completed Sit <> Stand Transfer with modified independence  with  rolling walker   Patient completed Toilet Transfer with modified independence with  rolling walker and grab bars  Functional Mobility: Pt was overall Mod I with RW, requiring education on safety with RW management as pt would let go of walker and use surrounding furniture for balance. No LOB noted during session.     Activities of Daily Living:  Grooming: modified independence hand washing in stand at the sink with RW  Lower Body Dressing: modified independence doffing/ donning socks while seated EOB  Toileting: minimum assistance assist for donning brief. Pt able to complete all yue cares while seated on the toilet.    Functional Cognition:  Intact    Visual Perceptual Skills:  Intact    Upper Extremity Function:  Right Upper Extremity:   WFL    Left Upper Extremity:  Range of Motion: WFL  Strength: WFL except shoulder 3+/5, elbow 3+/5, wrist 3+/5  Coordination: WFL    Balance:   Intact    Therapeutic Positioning  Risk for acquired pressure injuries is decreased due to ability to get to BSC/toilet with assist and ability to mobilize independently .    Patient Education:  Patient provided with verbal education education regarding OT role/goals/POC, fall prevention, and safety awareness.  Understanding was verbalized.     Patient left supine with all lines intact and call button in reach    History:     Past Medical History:   Diagnosis Date    Anxiety     COPD (chronic obstructive pulmonary disease)     Coronary artery disease     s/p 2 stents     Depression     GERD (gastroesophageal reflux disease)     Glaucoma     Hypertension     Obstructive sleep apnea     on cpap    Stroke     Urinary, incontinence, stress female          Past Surgical History:   Procedure Laterality Date    APPENDECTOMY      BACK SURGERY      CHOLECYSTECTOMY      EXPLORATION OF COMMON BILE DUCT      HERNIA REPAIR      incisional hernia times 2, central abdomen    HYSTERECTOMY      OVARIAN CYST REMOVAL      TONSILLECTOMY         Time Tracking:     OT Date of Treatment: 02/11/24  OT Start Time: 1353  OT Stop Time: 1414  OT Total Time (min): 21 min    Billable Minutes:Evaluation Low complexity 21 minutes    2/11/2024

## 2024-02-11 NOTE — PROGRESS NOTES
[facility] Hospital Medicine Progress Note        Chief Complaint: Inpatient Follow-up for     HPI:  Katty Veronica is a 78 year old female who PMH includes Anxiety, COPD (chronic obstructive pulmonary disease), Coronary artery disease, Depression, GERD (gastroesophageal reflux disease), Glaucoma, Hypertension, Obstructive sleep apnea, Stroke, and Urinary, incontinence, stress; presents to the ED at [facility] on 2/9/2024 with reports of left arm weakness since Wednesday. Pt reports she was told by home health services to go to the ED on their visit today secondary to her left side weakness. Pt reports history of CVA in the past with no residual deficits.  Lab work reviewed demonstrated PT 31.1, INR 3.1, PTT 57.2, chloride 108, CO2 22, other indices unremarkable.  CT of the head without contrast impression reviewed demonstrated no acute intracranial findings. PT reports she initially thought she slept wrong on her left side. She has been sleeping on a recliner as she has had vertebral fracture with prior kyphoplasty done June 2023 and reports its difficult to lay flat and she sleeps in a recliner.   Initial vital signs /76 pulse 73 respirations 18 temperature 98.4° F O2 saturation 96% on room air.  Patient received hydration,, and was placed on CVA workup.  Patient is admitted to hospital medicine services for further management.  Neurology Services have been consulted.    Interval Hx:   Seen and examined. Passed bedside swallow   Reports left side weakness  Working w therapy  Recommended rehab  Pt is willing   vss    Objective/physical exam:  General: In no acute distress, afebrile  Chest: Clear to auscultation bilaterally  Heart: RRR, +S1, S2, no appreciable murmur  Abdomen: Soft, nontender, BS +  MSK: Warm, no lower extremity edema, no clubbing or cyanosis  Neurologic: Alert and oriented x4, Cranial nerve II-XII intact, dec left  strength  VITAL SIGNS: 24 HRS MIN & MAX  LAST   Temp  Min: 97.4 °F (36.3 °C)  Max: 97.9 °F (36.6 °C) 97.8 °F (36.6 °C)   BP  Min: 132/77  Max: 152/92 135/82   Pulse  Min: 60  Max: 66  62   Resp  Min: 16  Max: 20 16   SpO2  Min: 93 %  Max: 97 % 97 %     I have reviewed the following labs:  Recent Labs   Lab 02/09/24  1344 02/10/24  0424 02/11/24  0508   WBC 8.94 8.02 8.25   RBC 5.27 4.47 4.72   HGB 14.9 12.7 13.4   HCT 46.7 38.7 41.4   MCV 88.6 86.6 87.7   MCH 28.3 28.4 28.4   MCHC 31.9* 32.8* 32.4*   RDW 14.8 14.6 14.6    238 216   MPV 10.2 10.3 9.9       Recent Labs   Lab 02/09/24  1344 02/10/24  0424 02/11/24  0508    140 139   K 3.9 3.8 3.8   CO2 22* 21* 22*   BUN 19.0 14.6 15.1   CREATININE 0.95 0.72 0.76   CALCIUM 9.3 8.8 8.9   MG  --  2.10  --    ALBUMIN 3.9 3.1* 3.0*   ALKPHOS 97 80 77   ALT 30 23 23   AST 31 20 17   BILITOT 0.5 0.5 0.6       Microbiology Results (last 7 days)       ** No results found for the last 168 hours. **             See below for Radiology    Scheduled Med:   amLODIPine  5 mg Oral Daily    aspirin  81 mg Oral Daily    atorvastatin  80 mg Oral QHS    carvediloL  25 mg Oral BID    cetirizine  10 mg Oral Daily    ergocalciferol  50,000 Units Oral Q7 Days    famotidine  20 mg Oral QHS    gabapentin  300 mg Oral TID    pantoprazole  40 mg Oral Daily    rivaroxaban  20 mg Oral Daily with dinner    sertraline  50 mg Oral Daily    timolol maleate 0.5%  1 drop Both Eyes BID      Continuous Infusions:     PRN Meds:  acetaminophen, bisacodyL, hydrALAZINE, labetalol, methocarbamoL, metoprolol, ondansetron, sodium chloride 0.9%     Assessment/Plan:  Acute left-sided weakness-secondary to suspected CVA-POA   HTN- essential- POA  CAD- s/p stent placement- POA  COPD- no acute exacerbation- POA  Hx of CVA- POA     Hx of chronic back pain, PAF, long term anticoagulation therapy, anxiety, depression, GERD, glaucoma, SABINA, stress incontinence     PLAN:  Consult Neurology Services appreciate assistance and recommendations   Fall  precautions   Continue with diagnostics and imaging per CVA workup  Mri brain- No acute intracranial findings identified.  2.  Old lacunar infarcts, chronic microangiopathic ischemia and atrophy.  Consult therapy services   IV hydration   Cont remaining wilma emeds   Cont pt/ot  Recommending rehab placement   CM consulted         -VTE Prophylaxis: Home Xarelto          All diagnosis and differential diagnosis have been reviewed; assessment and plan has been documented; I have personally reviewed the labs and test results that are presently available; I have reviewed the patients medication list; I have reviewed the consulting providers response and recommendations. I have reviewed or attempted to review medical records based upon their availability    All of the patient's questions have been  addressed and answered. Patient's is agreeable to the above stated plan. I will continue to monitor closely and make adjustments to medical management as needed.  _____________________________________________________________________    Nutrition Status:    Radiology:  I have personally reviewed the following imaging and agree with the radiologist.     Echo Saline Bubble? Yes    Left Ventricle: The left ventricle is normal in size. Ventricular mass   is normal. Mildly increased wall thickness. There is concentric   remodeling. Normal wall motion. There is normal systolic function with a   visually estimated ejection fraction of 55 - 60%. Grade I diastolic   dysfunction.    Right Ventricle: Normal right ventricular cavity size. Systolic   function is normal.    Left Atrium: Normal left atrial size. Bubble study with agitated saline   is inconclusive.    Right Atrium: Normal right atrial size.    Aortic Valve: The aortic valve is a trileaflet valve. There is annular   calcification present. There is no stenosis. There is trace aortic   regurgitation.    Mitral Valve: The mitral valve is structurally normal. There is no   stenosis.  There is trace regurgitation.    Tricuspid Valve: The tricuspid valve is structurally normal. There is   trace regurgitation.    Aorta: Aortic root is normal in size measuring 3.4 cm.    IVC/SVC: IVC was not well visualized due to poor acoustic window.    Pericardium: There is no pericardial effusion.  CV Ultrasound Bilateral Doppler Carotid  The bilateral internal carotid artery demonstrated less than 50% stenosis.     The bilateral vertebral arteries were patent with antegrade flow.       Cecilia Ross MD   02/11/2024

## 2024-02-12 PROBLEM — R53.1 LEFT-SIDED WEAKNESS: Status: ACTIVE | Noted: 2024-02-12

## 2024-02-12 LAB
ANION GAP SERPL CALC-SCNC: 9 MEQ/L
BUN SERPL-MCNC: 18.9 MG/DL (ref 9.8–20.1)
CALCIUM SERPL-MCNC: 8.9 MG/DL (ref 8.4–10.2)
CHLORIDE SERPL-SCNC: 106 MMOL/L (ref 98–107)
CO2 SERPL-SCNC: 24 MMOL/L (ref 23–31)
CREAT SERPL-MCNC: 0.85 MG/DL (ref 0.55–1.02)
CREAT/UREA NIT SERPL: 22
GFR SERPLBLD CREATININE-BSD FMLA CKD-EPI: >60 MLS/MIN/1.73/M2
GLUCOSE SERPL-MCNC: 126 MG/DL (ref 82–115)
MAGNESIUM SERPL-MCNC: 2.1 MG/DL (ref 1.6–2.6)
PHOSPHATE SERPL-MCNC: 3.3 MG/DL (ref 2.3–4.7)
POTASSIUM SERPL-SCNC: 4.9 MMOL/L (ref 3.5–5.1)
SODIUM SERPL-SCNC: 139 MMOL/L (ref 136–145)

## 2024-02-12 PROCEDURE — 84100 ASSAY OF PHOSPHORUS: CPT | Performed by: STUDENT IN AN ORGANIZED HEALTH CARE EDUCATION/TRAINING PROGRAM

## 2024-02-12 PROCEDURE — 83735 ASSAY OF MAGNESIUM: CPT | Performed by: STUDENT IN AN ORGANIZED HEALTH CARE EDUCATION/TRAINING PROGRAM

## 2024-02-12 PROCEDURE — 21400001 HC TELEMETRY ROOM

## 2024-02-12 PROCEDURE — 25000003 PHARM REV CODE 250: Performed by: INTERNAL MEDICINE

## 2024-02-12 PROCEDURE — 80048 BASIC METABOLIC PNL TOTAL CA: CPT | Performed by: STUDENT IN AN ORGANIZED HEALTH CARE EDUCATION/TRAINING PROGRAM

## 2024-02-12 PROCEDURE — 97110 THERAPEUTIC EXERCISES: CPT

## 2024-02-12 PROCEDURE — 97530 THERAPEUTIC ACTIVITIES: CPT

## 2024-02-12 RX ADMIN — ASPIRIN 81 MG: 81 TABLET, COATED ORAL at 08:02

## 2024-02-12 RX ADMIN — FAMOTIDINE 20 MG: 20 TABLET, FILM COATED ORAL at 08:02

## 2024-02-12 RX ADMIN — TIMOLOL MALEATE 1 DROP: 5 SOLUTION OPHTHALMIC at 09:02

## 2024-02-12 RX ADMIN — ACETAMINOPHEN 1000 MG: 500 TABLET ORAL at 01:02

## 2024-02-12 RX ADMIN — ACETAMINOPHEN 1000 MG: 500 TABLET ORAL at 03:02

## 2024-02-12 RX ADMIN — RIVAROXABAN 20 MG: 10 TABLET, FILM COATED ORAL at 04:02

## 2024-02-12 RX ADMIN — PANTOPRAZOLE SODIUM 40 MG: 40 TABLET, DELAYED RELEASE ORAL at 08:02

## 2024-02-12 RX ADMIN — GABAPENTIN 300 MG: 300 CAPSULE ORAL at 08:02

## 2024-02-12 RX ADMIN — AMLODIPINE BESYLATE 5 MG: 5 TABLET ORAL at 08:02

## 2024-02-12 RX ADMIN — ACETAMINOPHEN 1000 MG: 500 TABLET ORAL at 08:02

## 2024-02-12 RX ADMIN — CARVEDILOL 25 MG: 12.5 TABLET, FILM COATED ORAL at 08:02

## 2024-02-12 RX ADMIN — TIMOLOL MALEATE 1 DROP: 5 SOLUTION OPHTHALMIC at 08:02

## 2024-02-12 RX ADMIN — METHOCARBAMOL 500 MG: 500 TABLET ORAL at 08:02

## 2024-02-12 RX ADMIN — CETIRIZINE HYDROCHLORIDE 10 MG: 10 TABLET, FILM COATED ORAL at 08:02

## 2024-02-12 RX ADMIN — SERTRALINE HYDROCHLORIDE 50 MG: 50 TABLET ORAL at 08:02

## 2024-02-12 RX ADMIN — GABAPENTIN 300 MG: 300 CAPSULE ORAL at 03:02

## 2024-02-12 RX ADMIN — ATORVASTATIN CALCIUM 80 MG: 40 TABLET, FILM COATED ORAL at 08:02

## 2024-02-12 NOTE — PLAN OF CARE
CM confirmed with Kary Kennedy , Physical Therapy recommendation is low intensity. Patient is active with [facility] and has a  who goes to apartment and helps with bathing and washing hair. JARAD Hernandez will send updates to [facility] agency.

## 2024-02-12 NOTE — PLAN OF CARE
02/12/24 1418   Discharge Assessment   Assessment Type Discharge Planning Assessment   Confirmed/corrected address, phone number and insurance Yes   Confirmed Demographics Correct on Facesheet   Source of Information patient   When was your last doctors appointment?   (Ne PCP Dr Renard Eddy in [place])   Communicated AMBROSIO with patient/caregiver Date not available/Unable to determine   Reason For Admission CVA ( cerebrovascular Accident)   People in Home alone   Do you have help at home or someone to help you manage your care at home? No   Prior to hospitilization cognitive status: Alert/Oriented   Current cognitive status: Alert/Oriented   Walking or Climbing Stairs Difficulty yes   Walking or Climbing Stairs ambulation difficulty, requires equipment   Mobility Management Ambulates with rolling walker   Dressing/Bathing Difficulty yes   Dressing/Bathing bathing difficulty, assistance 1 person   Dressing/Bathing Management Has bathing attendant who goes once a week to help with bath,   Do you have any problems with:   (Daughter does shopping and errands)   Home Layout Able to live on 1st floor   Equipment Currently Used at Home bath bench;walker, rolling;rollator;cane, straight;bedside commode   Readmission within 30 days? No   Patient currently being followed by outpatient case management? No   Do you currently have service(s) that help you manage your care at home? Yes   Name and Contact number of agency Home Health with [facility] [place] on aging helps with medical transportation   Is the pt/caregiver preference to resume services with current agency Yes   Do you take prescription medications? Yes   Do you have prescription coverage? Yes   Coverage Medicare A & B/  BCNS supplement   Do you have any problems affording any of your prescribed medications? No   Is the patient taking medications as prescribed? yes   Who is going to help you get home at discharge? son   How do you get to doctors  appointments? family or friend will provide  ([place] on aging bus)   Are you on dialysis? No   Do you take coumadin? No   Discharge Plan A Home Health;Home with family;Home   Discharge Plan B Home Health;Home with family;Home   DME Needed Upon Discharge  none   Discharge Plan discussed with: Patient   Transition of Care Barriers None   Physical Activity   On average, how many days per week do you engage in moderate to strenuous exercise (like a brisk walk)? 0 days   On average, how many minutes do you engage in exercise at this level? 0 min   Financial Resource Strain   How hard is it for you to pay for the very basics like food, housing, medical care, and heating? Not hard   Housing Stability   In the last 12 months, was there a time when you were not able to pay the mortgage or rent on time? N   In the last 12 months, how many places have you lived? 1   Transportation Needs   In the past 12 months, has lack of transportation kept you from medical appointments or from getting medications? no   In the past 12 months, has lack of transportation kept you from meetings, work, or from getting things needed for daily living? No   Food Insecurity   Within the past 12 months, you worried that your food would run out before you got the money to buy more. Never true   Within the past 12 months, the food you bought just didn't last and you didn't have money to get more. Never true   Stress   Do you feel stress - tense, restless, nervous, or anxious, or unable to sleep at night because your mind is troubled all the time - these days? Not at all   Social Connections   In a typical week, how many times do you talk on the phone with family, friends, or neighbors? Three   How often do you get together with friends or relatives? Three times   How often do you attend Baptism or Roman Catholic services? Never   Do you belong to any clubs or organizations such as Baptism groups, unions, fraternal or athletic groups, or school groups? No    How often do you attend meetings of the clubs or organizations you belong to? Never   Are you , , , , never , or living with a partner?    Alcohol Use   Q1: How often do you have a drink containing alcohol? Never   Q2: How many drinks containing alcohol do you have on a typical day when you are drinking? None   Q3: How often do you have six or more drinks on one occasion? Never

## 2024-02-12 NOTE — HPI
"78 year old female with a past medical history of stroke (no residual deficits), COPD, CAD, HTN, SABINA, and anxiety presented to ED on 2/9 for LUE weakness x 2 days. She reported she 2/6, she woke up on 2/7 with LUE weakness and "light numbness". She thought she slept on her arm wrong. She denies being unable to perform ADLs with LUE or dropping objects. She was seen by home health on 2/9 and instructed to come to ED for further evaluation. CT head was negative. Upon arrival to ED, blood pressure 119/76.  Neurology was consulted for stroke workup.      "

## 2024-02-12 NOTE — ASSESSMENT & PLAN NOTE
-presented with left-sided weakness  -Stroke risk factors:  History of stroke, AF (on Xarelto), HTN, SABINA, CAD   -intervention: None  -etiology:    Stroke workup:    - CT head:  Negative   - CTA head and neck:  Hemodynamically significant stenosis of the right MCA M1   - MRI brain:  Negative for acute infarct  - CUS: Negative   - Echo:  Bubble study inconclusive, LA normal, EF:  55-60%      Plan   -Continue aspirin 81 mg, Xarelto 20 mg, atorvastatin 80 mg   -Therapy eval   -will discuss CTA findings with MD during rounds

## 2024-02-12 NOTE — CONSULTS
"[facility]  Neurology  Consult Note    Patient Name: Katty Veronica  MRN: [id number]  Admission Date: 2/9/2024  Hospital Length of Stay: 3 days  Code Status: Full Code   Attending Provider: Mary Wheatley DO   Consulting Provider: Rosie Vidal NP  Primary Care Physician: Sheila Moreira DO  Principal Problem:Suspected cerebrovascular accident (CVA)    Inpatient consult to Neurology  Consult performed by: Rosie Vidal NP  Consult ordered by: Cecilia Ross MD         Subjective:     Chief Complaint:  left sided weakness      HPI:   78 year old female with a past medical history of stroke (no residual deficits), COPD, CAD, HTN, SABINA, and anxiety presented to ED on 2/9 for LUE weakness x 2 days. She reported she 2/6, she woke up on 2/7 with LUE weakness and "light numbness". She thought she slept on her arm wrong. She denies being unable to perform ADLs with LUE or dropping objects. She was seen by home health on 2/9 and instructed to come to ED for further evaluation. CT head was negative. Upon arrival to ED, blood pressure 119/76.  Neurology was consulted for stroke workup.         Past Medical History:   Diagnosis Date    Anxiety     COPD (chronic obstructive pulmonary disease)     Coronary artery disease     s/p 2 stents    Depression     GERD (gastroesophageal reflux disease)     Glaucoma     Hypertension     Obstructive sleep apnea     on cpap    Stroke     Urinary, incontinence, stress female        Past Surgical History:   Procedure Laterality Date    APPENDECTOMY      BACK SURGERY      CHOLECYSTECTOMY      EXPLORATION OF COMMON BILE DUCT      HERNIA REPAIR      incisional hernia times 2, central abdomen    HYSTERECTOMY      OVARIAN CYST REMOVAL      TONSILLECTOMY         Review of patient's allergies indicates:   Allergen Reactions    Morphine Itching    Norco [hydrocodone-acetaminophen]        Current Neurological Medications:     No current " facility-administered medications on file prior to encounter.     Current Outpatient Medications on File Prior to Encounter   Medication Sig    amLODIPine (NORVASC) 5 MG tablet 1 tablet Orally Once a day    aspirin (ECOTRIN) 81 MG EC tablet Take 1 tablet (81 mg total) by mouth once daily.    atorvastatin (LIPITOR) 80 MG tablet Take 80 mg by mouth every evening.    carvediloL (COREG) 25 MG tablet Take 1 tablet (25 mg total) by mouth 2 (two) times daily.    cetirizine (ZYRTEC) 10 MG tablet Take 10 mg by mouth once daily.    ergocalciferol (VITAMIN D2) 50,000 unit Cap Take 50,000 Units by mouth every 7 days.    famotidine (PEPCID) 20 MG tablet Take 1 tablet by mouth every evening.    gabapentin (NEURONTIN) 300 MG capsule Take 300 mg by mouth 3 (three) times daily.    methocarbamoL (ROBAXIN) 500 MG Tab Take 500 mg by mouth 2 (two) times a day.    pantoprazole (PROTONIX) 40 MG tablet Take 40 mg by mouth.    rivaroxaban (XARELTO) 20 mg Tab Take 20 mg by mouth daily with dinner or evening meal.    sertraline (ZOLOFT) 50 MG tablet Take 50 mg by mouth once daily.    timolol maleate 0.5% (TIMOPTIC) 0.5 % Drop Place 1 drop into both eyes 2 (two) times daily.    metoclopramide HCl (REGLAN) 5 MG tablet Take 5 mg by mouth 4 (four) times daily before meals and nightly.    nitroGLYCERIN (NITROSTAT) 0.4 MG SL tablet Place 1 tablet (0.4 mg total) under the tongue every 5 (five) minutes as needed for Chest pain.    ondansetron (ZOFRAN-ODT) 8 MG TbDL Take 8 mg by mouth every 6 (six) hours as needed.     Family History       Problem Relation (Age of Onset)    Cancer Father    Heart disease Mother          Tobacco Use    Smoking status: Never    Smokeless tobacco: Not on file   Substance and Sexual Activity    Alcohol use: Not Currently    Drug use: Not Currently    Sexual activity: Not on file     Review of Systems   Neurological:  Positive for weakness (LUE) and numbness (LUE). Negative for dizziness, tremors, seizures, syncope, facial  asymmetry, speech difficulty, light-headedness and headaches.   All other systems reviewed and are negative.    Objective:     Vital Signs (Most Recent):  Temp: 98 °F (36.7 °C) (02/12/24 0318)  Pulse: 78 (02/12/24 0318)  Resp: 16 (02/12/24 0318)  BP: 120/81 (02/12/24 0318)  SpO2: (!) 93 % (02/12/24 0318) Vital Signs (24h Range):  Temp:  [97.5 °F (36.4 °C)-98.7 °F (37.1 °C)] 98 °F (36.7 °C)  Pulse:  [64-78] 78  Resp:  [16-18] 16  SpO2:  [93 %-98 %] 93 %  BP: (120-144)/(77-83) 120/81     Weight: 87.5 kg (193 lb)  Body mass index is 36.47 kg/m².     Physical Exam  Vitals and nursing note reviewed.   Constitutional:       General: She is not in acute distress.     Appearance: She is not toxic-appearing.   HENT:      Head: Normocephalic.      Comments: Right cheek edema (chronic)  Eyes:      General: No visual field deficit.     Pupils: Pupils are equal, round, and reactive to light.   Pulmonary:      Effort: Pulmonary effort is normal.   Musculoskeletal:         General: Normal range of motion.      Cervical back: Normal range of motion.      Right lower leg: Edema (nonpitting) present.      Left lower leg: Edema (nonpitting) present.   Skin:     General: Skin is warm and dry.      Capillary Refill: Capillary refill takes less than 2 seconds.   Neurological:      Mental Status: She is alert.      Cranial Nerves: No dysarthria or facial asymmetry.      Sensory: Sensory deficit (decreased sensation to LUE, left hand, chronic LLE numbness) present.      Motor: Weakness (4+/5 LUE, 4/5 LLE (chronic), 5/5 RUE, RLE) and pronator drift (LUE) present. No tremor, atrophy, abnormal muscle tone or seizure activity.      Coordination: Coordination normal. Finger-Nose-Finger Test normal.   Psychiatric:         Mood and Affect: Mood is depressed. Affect is flat.          NEUROLOGICAL EXAMINATION:     CRANIAL NERVES     CN III, IV, VI   Pupils are equal, round, and reactive to light.    GAIT AND COORDINATION      Coordination   Finger  to nose coordination: normal      Significant Labs:   Recent Lab Results       None            Significant Imaging: I have reviewed all pertinent imaging results/findings within the past 24 hours.  Assessment and Plan:     Left-sided weakness  -presented with left-sided weakness  -Stroke risk factors:  History of stroke, AF (on Xarelto), HTN, SABINA, CAD   -intervention: None  -etiology:    Stroke workup:    - CT head:  Negative   - CTA head and neck:  Hemodynamically significant stenosis of the right MCA M1   - MRI brain:  Negative for acute infarct  - CUS: Negative   - Echo:  Bubble study inconclusive, LA normal, EF:  55-60%      Plan   -Continue aspirin 81 mg, Xarelto 20 mg, atorvastatin 80 mg   -Therapy eval   -will discuss CTA findings with MD during rounds            VTE Risk Mitigation (From admission, onward)           Ordered     rivaroxaban tablet 20 mg  With dinner         02/09/24 2035     IP VTE HIGH RISK PATIENT  Once         02/09/24 1814     Place sequential compression device  Until discontinued         02/09/24 1814                    Thank you for your consult.  Further recommendations to follow from MD Rosie Vidal, NP  Neurology  [facility] - Neurology

## 2024-02-12 NOTE — SUBJECTIVE & OBJECTIVE
Past Medical History:   Diagnosis Date    Anxiety     COPD (chronic obstructive pulmonary disease)     Coronary artery disease     s/p 2 stents    Depression     GERD (gastroesophageal reflux disease)     Glaucoma     Hypertension     Obstructive sleep apnea     on cpap    Stroke     Urinary, incontinence, stress female        Past Surgical History:   Procedure Laterality Date    APPENDECTOMY      BACK SURGERY      CHOLECYSTECTOMY      EXPLORATION OF COMMON BILE DUCT      HERNIA REPAIR      incisional hernia times 2, central abdomen    HYSTERECTOMY      OVARIAN CYST REMOVAL      TONSILLECTOMY         Review of patient's allergies indicates:   Allergen Reactions    Morphine Itching    Norco [hydrocodone-acetaminophen]        Current Neurological Medications:     No current facility-administered medications on file prior to encounter.     Current Outpatient Medications on File Prior to Encounter   Medication Sig    amLODIPine (NORVASC) 5 MG tablet 1 tablet Orally Once a day    aspirin (ECOTRIN) 81 MG EC tablet Take 1 tablet (81 mg total) by mouth once daily.    atorvastatin (LIPITOR) 80 MG tablet Take 80 mg by mouth every evening.    carvediloL (COREG) 25 MG tablet Take 1 tablet (25 mg total) by mouth 2 (two) times daily.    cetirizine (ZYRTEC) 10 MG tablet Take 10 mg by mouth once daily.    ergocalciferol (VITAMIN D2) 50,000 unit Cap Take 50,000 Units by mouth every 7 days.    famotidine (PEPCID) 20 MG tablet Take 1 tablet by mouth every evening.    gabapentin (NEURONTIN) 300 MG capsule Take 300 mg by mouth 3 (three) times daily.    methocarbamoL (ROBAXIN) 500 MG Tab Take 500 mg by mouth 2 (two) times a day.    pantoprazole (PROTONIX) 40 MG tablet Take 40 mg by mouth.    rivaroxaban (XARELTO) 20 mg Tab Take 20 mg by mouth daily with dinner or evening meal.    sertraline (ZOLOFT) 50 MG tablet Take 50 mg by mouth once daily.    timolol maleate 0.5% (TIMOPTIC) 0.5 % Drop Place 1 drop into both eyes 2 (two) times daily.     metoclopramide HCl (REGLAN) 5 MG tablet Take 5 mg by mouth 4 (four) times daily before meals and nightly.    nitroGLYCERIN (NITROSTAT) 0.4 MG SL tablet Place 1 tablet (0.4 mg total) under the tongue every 5 (five) minutes as needed for Chest pain.    ondansetron (ZOFRAN-ODT) 8 MG TbDL Take 8 mg by mouth every 6 (six) hours as needed.     Family History       Problem Relation (Age of Onset)    Cancer Father    Heart disease Mother          Tobacco Use    Smoking status: Never    Smokeless tobacco: Not on file   Substance and Sexual Activity    Alcohol use: Not Currently    Drug use: Not Currently    Sexual activity: Not on file     Review of Systems   Neurological:  Positive for weakness (LUE) and numbness (LUE). Negative for dizziness, tremors, seizures, syncope, facial asymmetry, speech difficulty, light-headedness and headaches.   All other systems reviewed and are negative.    Objective:     Vital Signs (Most Recent):  Temp: 98 °F (36.7 °C) (02/12/24 0318)  Pulse: 78 (02/12/24 0318)  Resp: 16 (02/12/24 0318)  BP: 120/81 (02/12/24 0318)  SpO2: (!) 93 % (02/12/24 0318) Vital Signs (24h Range):  Temp:  [97.5 °F (36.4 °C)-98.7 °F (37.1 °C)] 98 °F (36.7 °C)  Pulse:  [64-78] 78  Resp:  [16-18] 16  SpO2:  [93 %-98 %] 93 %  BP: (120-144)/(77-83) 120/81     Weight: 87.5 kg (193 lb)  Body mass index is 36.47 kg/m².     Physical Exam  Vitals and nursing note reviewed.   Constitutional:       General: She is not in acute distress.     Appearance: She is not toxic-appearing.   HENT:      Head: Normocephalic.      Comments: Right cheek edema (chronic)  Eyes:      General: No visual field deficit.     Pupils: Pupils are equal, round, and reactive to light.   Pulmonary:      Effort: Pulmonary effort is normal.   Musculoskeletal:         General: Normal range of motion.      Cervical back: Normal range of motion.      Right lower leg: Edema (nonpitting) present.      Left lower leg: Edema (nonpitting) present.   Skin:      General: Skin is warm and dry.      Capillary Refill: Capillary refill takes less than 2 seconds.   Neurological:      Mental Status: She is alert.      Cranial Nerves: No dysarthria or facial asymmetry.      Sensory: Sensory deficit (decreased sensation to LUE, left hand, chronic LLE numbness) present.      Motor: Weakness (4+/5 LUE, 4/5 LLE (chronic), 5/5 RUE, RLE) and pronator drift (LUE) present. No tremor, atrophy, abnormal muscle tone or seizure activity.      Coordination: Coordination normal. Finger-Nose-Finger Test normal.   Psychiatric:         Mood and Affect: Mood is depressed. Affect is flat.          NEUROLOGICAL EXAMINATION:     CRANIAL NERVES     CN III, IV, VI   Pupils are equal, round, and reactive to light.    GAIT AND COORDINATION      Coordination   Finger to nose coordination: normal      Significant Labs:   Recent Lab Results       None            Significant Imaging: I have reviewed all pertinent imaging results/findings within the past 24 hours.

## 2024-02-12 NOTE — PROGRESS NOTES
[facility] Hospital Medicine Progress Note        Chief Complaint: Inpatient Follow-up for     HPI:  Katty Veronica is a 78 year old female who PMH includes Anxiety, COPD (chronic obstructive pulmonary disease), Coronary artery disease, Depression, GERD (gastroesophageal reflux disease), Glaucoma, Hypertension, Obstructive sleep apnea, Stroke, and Urinary, incontinence, stress; presents to the ED at [facility] on 2/9/2024 with reports of left arm weakness since Wednesday. Pt reports she was told by home health services to go to the ED on their visit today secondary to her left side weakness. Pt reports history of CVA in the past with no residual deficits.  Lab work reviewed demonstrated PT 31.1, INR 3.1, PTT 57.2, chloride 108, CO2 22, other indices unremarkable.  CT of the head without contrast impression reviewed demonstrated no acute intracranial findings. PT reports she initially thought she slept wrong on her left side. She has been sleeping on a recliner as she has had vertebral fracture with prior kyphoplasty done June 2023 and reports its difficult to lay flat and she sleeps in a recliner.   Initial vital signs /76 pulse 73 respirations 18 temperature 98.4° F O2 saturation 96% on room air.  Patient received hydration,, and was placed on CVA workup.  Patient is admitted to hospital medicine services for further management.  Neurology Services have been consulted.    Interval Hx:   Patient seen and examined by bedside.  Awake and alert.  States left sided weakness is improving.  No acute overnight events.  Eleven beat nonsustained V-tach observed in tele.      Objective/physical exam:  General: In no acute distress, afebrile  Chest: Clear to auscultation bilaterally  Heart: RRR, +S1, S2, no appreciable murmur  Abdomen: Soft, nontender, BS +  MSK: Warm, no lower extremity edema, no clubbing or cyanosis  Neurologic: Alert and oriented x4, Cranial nerve II-XII intact,  dec left  strength  VITAL SIGNS: 24 HRS MIN & MAX LAST   Temp  Min: 97.5 °F (36.4 °C)  Max: 98.7 °F (37.1 °C) 98.6 °F (37 °C)   BP  Min: 120/81  Max: 144/77 135/78   Pulse  Min: 64  Max: 81  81   Resp  Min: 16  Max: 18 16   SpO2  Min: 93 %  Max: 98 % 96 %     I have reviewed the following labs:  Recent Labs   Lab 02/09/24  1344 02/10/24  0424 02/11/24  0508   WBC 8.94 8.02 8.25   RBC 5.27 4.47 4.72   HGB 14.9 12.7 13.4   HCT 46.7 38.7 41.4   MCV 88.6 86.6 87.7   MCH 28.3 28.4 28.4   MCHC 31.9* 32.8* 32.4*   RDW 14.8 14.6 14.6    238 216   MPV 10.2 10.3 9.9       Recent Labs   Lab 02/09/24  1344 02/10/24  0424 02/11/24  0508    140 139   K 3.9 3.8 3.8   CO2 22* 21* 22*   BUN 19.0 14.6 15.1   CREATININE 0.95 0.72 0.76   CALCIUM 9.3 8.8 8.9   MG  --  2.10  --    ALBUMIN 3.9 3.1* 3.0*   ALKPHOS 97 80 77   ALT 30 23 23   AST 31 20 17   BILITOT 0.5 0.5 0.6       Microbiology Results (last 7 days)       ** No results found for the last 168 hours. **             See below for Radiology    Scheduled Med:   amLODIPine  5 mg Oral Daily    aspirin  81 mg Oral Daily    atorvastatin  80 mg Oral QHS    carvediloL  25 mg Oral BID    cetirizine  10 mg Oral Daily    ergocalciferol  50,000 Units Oral Q7 Days    famotidine  20 mg Oral QHS    gabapentin  300 mg Oral TID    pantoprazole  40 mg Oral Daily    rivaroxaban  20 mg Oral Daily with dinner    sertraline  50 mg Oral Daily    timolol maleate 0.5%  1 drop Both Eyes BID      Continuous Infusions:     PRN Meds:  acetaminophen, bisacodyL, hydrALAZINE, labetalol, methocarbamoL, metoprolol, ondansetron, sodium chloride 0.9%     Assessment/Plan:  Acute left-sided weakness-secondary to suspected CVA-POA   HTN- essential- POA  CAD- s/p stent placement- POA  COPD- no acute exacerbation- POA  Hx of CVA- POA     Hx of chronic back pain, PAF, long term anticoagulation therapy, anxiety, depression, GERD, glaucoma, SABINA, stress incontinence     PLAN:  Consult Neurology Services  appreciate assistance and recommendations   Fall precautions   Continue with diagnostics and imaging per CVA workup  Mri brain- No acute intracranial findings identified.  2.  Old lacunar infarcts, chronic microangiopathic ischemia and atrophy.  Consult therapy services,   IV hydration   Cont remaining home meds   Hemodynamically significant stenosis noted in the right MCA M1 in the CTA head and neck   Awaiting neurology's recommendations, may possibly need DCA   Recommend to continue aspirin, Xarelto, and statin  Recommending rehab placement   CM consulted   11 beat nonsustained V-tach rhythm noted and tele, we will order labs        -VTE Prophylaxis: Home Xarelto        All diagnosis and differential diagnosis have been reviewed; assessment and plan has been documented; I have personally reviewed the labs and test results that are presently available; I have reviewed the patients medication list; I have reviewed the consulting providers response and recommendations. I have reviewed or attempted to review medical records based upon their availability    All of the patient's questions have been  addressed and answered. Patient's is agreeable to the above stated plan. I will continue to monitor closely and make adjustments to medical management as needed.  _____________________________________________________________________    Nutrition Status:    Radiology:  I have personally reviewed the following imaging and agree with the radiologist.     Echo Saline Bubble? Yes    Left Ventricle: The left ventricle is normal in size. Ventricular mass   is normal. Mildly increased wall thickness. There is concentric   remodeling. Normal wall motion. There is normal systolic function with a   visually estimated ejection fraction of 55 - 60%. Grade I diastolic   dysfunction.    Right Ventricle: Normal right ventricular cavity size. Systolic   function is normal.    Left Atrium: Normal left atrial size. Bubble study with agitated  saline   is inconclusive.    Right Atrium: Normal right atrial size.    Aortic Valve: The aortic valve is a trileaflet valve. There is annular   calcification present. There is no stenosis. There is trace aortic   regurgitation.    Mitral Valve: The mitral valve is structurally normal. There is no   stenosis. There is trace regurgitation.    Tricuspid Valve: The tricuspid valve is structurally normal. There is   trace regurgitation.    Aorta: Aortic root is normal in size measuring 3.4 cm.    IVC/SVC: IVC was not well visualized due to poor acoustic window.    Pericardium: There is no pericardial effusion.  CV Ultrasound Bilateral Doppler Carotid  The bilateral internal carotid artery demonstrated less than 50% stenosis.     The bilateral vertebral arteries were patent with antegrade flow.     Mary Wheatley DO  Department of Hospital Medicine  [facility]  02/12/2024

## 2024-02-12 NOTE — PT/OT/SLP PROGRESS
Physical Therapy Treatment    Patient Name:  Katty Veronica   MRN:  [id number]    Recommendations:     Discharge therapy intensity: Low Intensity Therapy   Discharge Equipment Recommendations: none  Barriers to discharge: Ongoing medical needs    Assessment:     Katty Veronica is a 78 y.o. female admitted with a suspected CVA.  She presents with the following impairments/functional limitations: weakness, impaired endurance, gait instability, impaired balance, decreased safety awareness.    Rehab Prognosis: Good; patient would benefit from acute skilled PT services to address these deficits and reach maximum level of function.    Recent Surgery: * No surgery found *      Plan:     During this hospitalization, patient to be seen 6 x/week to address the identified rehab impairments via gait training, therapeutic exercises, neuromuscular re-education and progress toward the following goals:    Plan of Care Expires:  03/02/24    Subjective     Chief Complaint: none  Patient/Family Comments/goals: none  Pain/Comfort:  Pain Rating 1: 0/10      Objective:     Communicated with nurse prior to session.  Patient found supine with telemetry, peripheral IV upon PT entry to room.     General Precautions: Standard, fall  Orthopedic Precautions: N/A  Braces: N/A  Respiratory Status: Room air  Skin Integrity: Visible skin intact      Functional Mobility:  Bed Mobility:  Supine to Sit: stand by assistance  Transfers:  Sit to Stand:  contact guard assistance with rolling walker  Gait: 158 ft with RW & CGA. No LOB noted  Balance: fair    Therapeutic Exercises:  Patient performed seated Marches, Heel Raises, LAQ, Glute Sets, Resisted Hip ABD/ADD. All performed 2 x 20 reps.    Education Provided:  Role and goals of PT, transfer training, bed mobility, gait training, balance training, safety awareness, assistive device, strengthening exercises, and importance of participating in PT to return to PLOF.    Patient left up in chair  with all lines intact and call button in reach..    GOALS:   Multidisciplinary Problems       Physical Therapy Goals          Problem: Physical Therapy    Goal Priority Disciplines Outcome Goal Variances Interventions   Physical Therapy Goal     PT, PT/OT Ongoing, Progressing     Description: Pt will be seen for the following goals  1. Pt will be ind with bed mobility  2. Pt will be mod ind with transfers  3. Pt will ambulate with a rw 150ft sba                       Time Tracking:     PT Received On: 02/12/24  PT Start Time: 1114     PT Stop Time: 1139  PT Total Time (min): 25 min     Billable Minutes: Therapeutic Activity 12 minutes and Therapeutic Exercise 13 minutes    Treatment Type: Treatment  PT/PTA: PT     Number of PTA visits since last PT visit: 1 02/12/2024

## 2024-02-13 VITALS
OXYGEN SATURATION: 100 % | RESPIRATION RATE: 18 BRPM | TEMPERATURE: 98 F | DIASTOLIC BLOOD PRESSURE: 73 MMHG | HEIGHT: 61 IN | BODY MASS INDEX: 36.44 KG/M2 | HEART RATE: 62 BPM | SYSTOLIC BLOOD PRESSURE: 133 MMHG | WEIGHT: 193 LBS

## 2024-02-13 PROBLEM — I63.9 CVA (CEREBRAL VASCULAR ACCIDENT): Status: ACTIVE | Noted: 2024-02-13

## 2024-02-13 LAB
ANION GAP SERPL CALC-SCNC: 9 MEQ/L
BUN SERPL-MCNC: 17.9 MG/DL (ref 9.8–20.1)
CALCIUM SERPL-MCNC: 9 MG/DL (ref 8.4–10.2)
CHLORIDE SERPL-SCNC: 109 MMOL/L (ref 98–107)
CO2 SERPL-SCNC: 22 MMOL/L (ref 23–31)
CREAT SERPL-MCNC: 0.77 MG/DL (ref 0.55–1.02)
CREAT/UREA NIT SERPL: 23
ERYTHROCYTE [DISTWIDTH] IN BLOOD BY AUTOMATED COUNT: 14.7 % (ref 11.5–17)
GFR SERPLBLD CREATININE-BSD FMLA CKD-EPI: >60 MLS/MIN/1.73/M2
GLUCOSE SERPL-MCNC: 103 MG/DL (ref 82–115)
HCT VFR BLD AUTO: 41 % (ref 37–47)
HGB BLD-MCNC: 13.6 G/DL (ref 12–16)
MCH RBC QN AUTO: 28.9 PG (ref 27–31)
MCHC RBC AUTO-ENTMCNC: 33.2 G/DL (ref 33–36)
MCV RBC AUTO: 87.2 FL (ref 80–94)
NRBC BLD AUTO-RTO: 0 %
PLATELET # BLD AUTO: 241 X10(3)/MCL (ref 130–400)
PMV BLD AUTO: 11 FL (ref 7.4–10.4)
POTASSIUM SERPL-SCNC: 4.6 MMOL/L (ref 3.5–5.1)
RBC # BLD AUTO: 4.7 X10(6)/MCL (ref 4.2–5.4)
SODIUM SERPL-SCNC: 140 MMOL/L (ref 136–145)
WBC # SPEC AUTO: 8.4 X10(3)/MCL (ref 4.5–11.5)

## 2024-02-13 PROCEDURE — 85027 COMPLETE CBC AUTOMATED: CPT | Performed by: STUDENT IN AN ORGANIZED HEALTH CARE EDUCATION/TRAINING PROGRAM

## 2024-02-13 PROCEDURE — 25000003 PHARM REV CODE 250: Performed by: INTERNAL MEDICINE

## 2024-02-13 PROCEDURE — 80048 BASIC METABOLIC PNL TOTAL CA: CPT | Performed by: STUDENT IN AN ORGANIZED HEALTH CARE EDUCATION/TRAINING PROGRAM

## 2024-02-13 RX ADMIN — TIMOLOL MALEATE 1 DROP: 5 SOLUTION OPHTHALMIC at 09:02

## 2024-02-13 RX ADMIN — ACETAMINOPHEN 1000 MG: 500 TABLET ORAL at 04:02

## 2024-02-13 RX ADMIN — SERTRALINE HYDROCHLORIDE 50 MG: 50 TABLET ORAL at 09:02

## 2024-02-13 RX ADMIN — GABAPENTIN 300 MG: 300 CAPSULE ORAL at 09:02

## 2024-02-13 RX ADMIN — METHOCARBAMOL 500 MG: 500 TABLET ORAL at 11:02

## 2024-02-13 RX ADMIN — CETIRIZINE HYDROCHLORIDE 10 MG: 10 TABLET, FILM COATED ORAL at 09:02

## 2024-02-13 RX ADMIN — ASPIRIN 81 MG: 81 TABLET, COATED ORAL at 09:02

## 2024-02-13 RX ADMIN — CARVEDILOL 25 MG: 12.5 TABLET, FILM COATED ORAL at 09:02

## 2024-02-13 RX ADMIN — PANTOPRAZOLE SODIUM 40 MG: 40 TABLET, DELAYED RELEASE ORAL at 09:02

## 2024-02-13 RX ADMIN — AMLODIPINE BESYLATE 5 MG: 5 TABLET ORAL at 09:02

## 2024-02-13 NOTE — DISCHARGE SUMMARY
[facility] Hospital Medicine Discharge Summary    Admit Date: 2/9/2024  Discharge Date and Time: 2/13/20241:28 PM  Admitting Physician:  Team  Discharging Physician: Mary Wheatley DO.  Primary Care Physician: Sheila Moreira DO  Consults: Neurology    Discharge Diagnoses:  Acute left-sided weakness-stroke ruled out, possibly stroke recrudescence  HTN- essential- POA  CAD- s/p stent placement- POA  COPD- no acute exacerbation- POA  Hx of CVA- POA     Hx of chronic back pain, PAF, long term anticoagulation therapy, anxiety, depression, GERD, glaucoma, SABINA, stress incontinence    Hospital Course:   Katty Veronica is a 78 year old female who PMH includes Anxiety, COPD (chronic obstructive pulmonary disease), Coronary artery disease, Depression, GERD (gastroesophageal reflux disease), Glaucoma, Hypertension, Obstructive sleep apnea, Stroke, and Urinary, incontinence, stress; presents to the ED at [facility] on 2/9/2024 with reports of left arm weakness since Wednesday. Pt reports she was told by home health services to go to the ED on their visit today secondary to her left side weakness. Pt reports history of CVA in the past with no residual deficits.  Lab work reviewed demonstrated PT 31.1, INR 3.1, PTT 57.2, chloride 108, CO2 22, other indices unremarkable.  CT of the head without contrast impression reviewed demonstrated no acute intracranial findings. PT reports she initially thought she slept wrong on her left side. She has been sleeping on a recliner as she has had vertebral fracture with prior kyphoplasty done June 2023 and reports its difficult to lay flat and she sleeps in a recliner.   Initial vital signs /76 pulse 73 respirations 18 temperature 98.4° F O2 saturation 96% on room air.  Patient received hydration,, and was placed on CVA workup.  Patient is admitted to hospital medicine services for further management.  Neurology Services have been consulted.  Consult  Neurology Services appreciate assistance and recommendations   Mri brain- No acute intracranial findings identified.  2.  Old lacunar infarcts, chronic microangiopathic ischemia and atrophy.  Therapy was consulted and worked with patient and recommended low intensity therapy with home health   CT of the head and neck showed Hemodynamically significant stenosis noted in the right MCA M1 in the CTA head and neck .  Neurology recommended to continue aspirin Xarelto and statin.  No stroke was found on workup.  Could be recrudescence due to arrhythmias or hypoperfusion? .  Neuro interventionalist we will follow up with patient in 3 months in clinic for stroke.  Patient's vitals and labs remained stable on day of discharge.  Patient had no concerns.       Pt was seen and examined on the day of discharge  Vitals:  VITAL SIGNS: 24 HRS MIN & MAX LAST   Temp  Min: 97.4 °F (36.3 °C)  Max: 98.1 °F (36.7 °C) 97.5 °F (36.4 °C)   BP  Min: 121/64  Max: 146/71 133/73   Pulse  Min: 55  Max: 65  62   Resp  Min: 16  Max: 20 18   SpO2  Min: 94 %  Max: 100 % 100 %       Physical Exam:  General: In no acute distress, afebrile  Chest: Clear to auscultation bilaterally  Heart: RRR, +S1, S2, no appreciable murmur  Abdomen: Soft, nontender, BS +  MSK: Warm, no lower extremity edema, no clubbing or cyanosis  Neurologic: Alert and oriented x4, Cranial nerve II-XII intact, improving left-sided weakness    Procedures Performed: No admission procedures for hospital encounter.     Significant Diagnostic Studies: See Full reports for all details    Recent Labs   Lab 02/10/24  0424 02/11/24  0508 02/13/24  0425   WBC 8.02 8.25 8.40   RBC 4.47 4.72 4.70   HGB 12.7 13.4 13.6   HCT 38.7 41.4 41.0   MCV 86.6 87.7 87.2   MCH 28.4 28.4 28.9   MCHC 32.8* 32.4* 33.2   RDW 14.6 14.6 14.7    216 241   MPV 10.3 9.9 11.0*       Recent Labs   Lab 02/09/24  1344 02/10/24  0424 02/11/24  0508 02/12/24  1551 02/13/24  0425    140 139 139 140   K 3.9 3.8  3.8 4.9 4.6   CO2 22* 21* 22* 24 22*   BUN 19.0 14.6 15.1 18.9 17.9   CREATININE 0.95 0.72 0.76 0.85 0.77   CALCIUM 9.3 8.8 8.9 8.9 9.0   MG  --  2.10  --  2.10  --    ALBUMIN 3.9 3.1* 3.0*  --   --    ALKPHOS 97 80 77  --   --    ALT 30 23 23  --   --    AST 31 20 17  --   --    BILITOT 0.5 0.5 0.6  --   --         Microbiology Results (last 7 days)       ** No results found for the last 168 hours. **             CTA Head and Neck (xpd)  Addendum: There is a typing error in the previous report.  There is mild stenosis of  distal M1 segment of the right middle cerebral artery only seen on the MIP  images.     Electronically signed by: Anastacio Preston   Date:    02/13/2024   Time:    12:46  Narrative: EXAMINATION:  CTA HEAD AND NECK (XPD)    CLINICAL HISTORY:  Stroke/TIA    TECHNIQUE:  Brain and imaging was performed from skull base to vertex prior to intravenous contrast. CT Angiogram of head and was subsequently performed following intravenous contrast administration.  Coronal and sagittal MPR reconstructions were performed in addition to coronal and sagittal MIP reconstructions.    Dose length product was 1404 mGycm. Automated exposure control was utilized to minimize radiation dose.    COMPARISON:  CT brain and MRI brain same date.    FINDINGS:  Carotid artery assessed in accordance with the NASCET criteria.    Visualized portion of the thoracic aorta is without aneurysmal dilatation or dissection.  There is no hemodynamically significant stenosis involving the brachiocephalic trunk and the subclavian arteries.  There is mild tortuosity of the proximal common carotid arteries which otherwise show unremarkable contrast opacification without hemodynamically significant stenosis, intraluminal thrombus, dissection or aneurysmal prominence.  Bilateral internal carotid arteries are patent without hemodynamically significant stenosis.  There are mild calcified plaques which involve the cavernous and supraclinoid portion of  the internal carotid arteries without significant stenosis.    There is unremarkable flow within the prox M1 segment right middle cerebral artery.  There is marked hemodynamically significant stenosis of the distal M1 segment of the right middle cerebral artery but with unremarkable flow distally within the sylvian and the temporal branches.  This is best seen on the reformatted image 9 series 01/07/2004.  The A1 segment of the left anterior cerebral artery is hypoplastic.  A1 segment of the right anterior cerebral artery, bilateral A2 segments of the anterior cerebral arteries and the left middle cerebral artery major branches are patent.    There is flow seen bilaterally within the vertebral arteries.  The right vertebral artery is relatively dominant.  Flow is also seen within the basilar artery.  Right posterior cerebral artery is unremarkable.  There is hypoplasia versus narrowing of the P1 segment of the left posterior cerebral artery.  P2 segment left posterior cerebral artery is patent.  Impression: 1.  Hemodynamically significant stenosis of the distal M1 segment of the right middle cerebral artery.    2.  Details of the findings above.    Electronically signed by: Anastacio Preston  Date:    02/09/2024  Time:    21:31         Medication List        CONTINUE taking these medications      amLODIPine 5 MG tablet  Commonly known as: NORVASC     aspirin 81 MG EC tablet  Commonly known as: ECOTRIN  Take 1 tablet (81 mg total) by mouth once daily.     atorvastatin 80 MG tablet  Commonly known as: LIPITOR     carvediloL 25 MG tablet  Commonly known as: COREG  Take 1 tablet (25 mg total) by mouth 2 (two) times daily.     cetirizine 10 MG tablet  Commonly known as: ZYRTEC     famotidine 20 MG tablet  Commonly known as: PEPCID     gabapentin 300 MG capsule  Commonly known as: NEURONTIN     methocarbamoL 500 MG Tab  Commonly known as: ROBAXIN     metoclopramide HCl 5 MG tablet  Commonly known as: REGLAN     nitroGLYCERIN  0.4 MG SL tablet  Commonly known as: NITROSTAT  Place 1 tablet (0.4 mg total) under the tongue every 5 (five) minutes as needed for Chest pain.     ondansetron 8 MG Tbdl  Commonly known as: ZOFRAN-ODT     pantoprazole 40 MG tablet  Commonly known as: PROTONIX     rivaroxaban 20 mg Tab  Commonly known as: XARELTO     sertraline 50 MG tablet  Commonly known as: ZOLOFT     timolol maleate 0.5% 0.5 % Drop  Commonly known as: TIMOPTIC     VITAMIN D2 50,000 unit Cap  Generic drug: ergocalciferol               Explained in detail to the patient about the discharge plan, medications, and follow-up visits. Pt understands and agrees with the treatment plan  Discharge Disposition: Home or Self Care   Discharged Condition: stable  Diet-   Dietary Orders (From admission, onward)       Start     Ordered    02/10/24 0728  Diet Cardiac  Diet effective now         02/10/24 0727                   Medications Per DC med rec  Activities as tolerated   Follow-up Information       Sheila Moreira DO. Schedule an appointment as soon as possible for a visit in 1 week(s).    Specialty: Internal Medicine  Why: We will call you with a follow-up appointment.  Contact information:  [address] [place] [address] Dr Fernandez. [facility]  [place] [address]  [phone]               Sada Rondon FNP Follow up in 3 month(s).    Specialty: Neurology  Contact information:  [address] [place] [address] Dr Luis PERRIN [address]  [phone]                           For further questions contact hospitalist office    Discharge time 33 minutes    For worsening symptoms, chest pain, shortness of breath, increased abdominal pain, high grade fever, stroke or stroke like symptoms, immediately go to the nearest Emergency Room or call 911 as soon as possible.    Mary Wheatley DO  Department of Hospital Medicine  [facility]  02/13/2024

## 2024-02-13 NOTE — PLAN OF CARE
Problem: Adult Inpatient Plan of Care  Goal: Plan of Care Review  Outcome: Ongoing, Progressing  Goal: Patient-Specific Goal (Individualized)  Outcome: Ongoing, Progressing  Goal: Absence of Hospital-Acquired Illness or Injury  Outcome: Ongoing, Progressing  Goal: Optimal Comfort and Wellbeing  Outcome: Ongoing, Progressing  Goal: Readiness for Transition of Care  Outcome: Ongoing, Progressing     Problem: Infection  Goal: Absence of Infection Signs and Symptoms  Outcome: Ongoing, Progressing     Problem: Bowel Elimination Impaired (Stroke, Ischemic/Transient Ischemic Attack)  Goal: Effective Bowel Elimination  Outcome: Ongoing, Progressing     Problem: Cerebral Tissue Perfusion (Stroke, Ischemic/Transient Ischemic Attack)  Goal: Optimal Cerebral Tissue Perfusion  Outcome: Ongoing, Progressing     Problem: Communication Impairment (Stroke, Ischemic/Transient Ischemic Attack)  Goal: Improved Communication Skills  Outcome: Ongoing, Progressing     Problem: Functional Ability Impaired (Stroke, Ischemic/Transient Ischemic Attack)  Goal: Optimal Functional Ability  Outcome: Ongoing, Progressing     Problem: Respiratory Compromise (Stroke, Ischemic/Transient Ischemic Attack)  Goal: Effective Oxygenation and Ventilation  Outcome: Ongoing, Progressing     Problem: Sensorimotor Impairment (Stroke, Ischemic/Transient Ischemic Attack)  Goal: Improved Sensorimotor Function  Outcome: Ongoing, Progressing     Problem: Swallowing Impairment (Stroke, Ischemic/Transient Ischemic Attack)  Goal: Optimal Eating and Swallowing without Aspiration  Outcome: Ongoing, Progressing     Problem: Urinary Elimination Impaired (Stroke, Ischemic/Transient Ischemic Attack)  Goal: Effective Urinary Elimination  Outcome: Ongoing, Progressing     Problem: Skin Injury Risk Increased  Goal: Skin Health and Integrity  Outcome: Ongoing, Progressing

## 2024-02-13 NOTE — PROGRESS NOTES
Patient was not seen today, chart reviewed      Left-sided weakness  -presented with left-sided weakness  -Stroke risk factors: History of stroke, AF (on Xarelto), HTN, SABINA, CAD        Stroke workup:    - CT head: Negative   - CTA head and neck: Hemodynamically significant stenosis of the right MCA M1   - MRI brain: Negative for acute infarct  - CUS: Negative   - Echo: Bubble study inconclusive, LA normal, EF:  55-60%        Plan   -Continue aspirin 81 mg, Xarelto 20 mg, atorvastatin 80 mg   -Therapy eval   -no stroke on workup, could be recrudescence 2/2 arrhythmias or hypoperfusion?  -discussed CTA findings with Dr. Sellers, she can follow up with Dr. Sellers in clinic within 3 months of hospital discharge, no recommendations for interventions at this time, MRI brain is negative  -Signing off from a stroke standpoint

## 2024-02-14 ENCOUNTER — PATIENT OUTREACH (OUTPATIENT)
Dept: ADMINISTRATIVE | Facility: CLINIC | Age: 79
End: 2024-02-14
Payer: MEDICARE

## 2024-02-14 NOTE — PROGRESS NOTES
C3 nurse spoke with Katty Veronica  for a TCC post hospital discharge follow up call. The patient has  scheduled hospitals appointments with Dr Renard Eddy (PCP) on 2/15/24 at 9 :30 AM  and BRENTON Tay (Neurology) on Wednesday Mar 13, 2024 @ 8:30 AM

## 2024-02-14 NOTE — PROGRESS NOTES
C3 nurse attempted to contact Katty Veronica  for a TCC post hospital discharge follow up call. The patient is unable to conduct the call @ this time, currently with HH. The patient requested a callback.    The patient has a scheduled HOSFU appointment with BRENTON Tay on Wednesday Mar 13, 2024 @ 8:30 AM.     Patient has Non [facility] PCP, unknown if HOSFU scheduled with PCP.

## 2024-02-16 ENCOUNTER — PATIENT MESSAGE (OUTPATIENT)
Dept: ADMINISTRATIVE | Facility: OTHER | Age: 79
End: 2024-02-16
Payer: MEDICARE

## 2024-03-04 ENCOUNTER — HOSPITAL ENCOUNTER (INPATIENT)
Facility: HOSPITAL | Age: 79
LOS: 6 days | Discharge: NURSING, REHAB OR LONG-TERM CARE | DRG: 605 | End: 2024-03-12
Attending: STUDENT IN AN ORGANIZED HEALTH CARE EDUCATION/TRAINING PROGRAM | Admitting: INTERNAL MEDICINE
Payer: MEDICARE

## 2024-03-04 DIAGNOSIS — S80.02XA HEMATOMA OF LEFT KNEE REGION: ICD-10-CM

## 2024-03-04 DIAGNOSIS — M25.562 ACUTE PAIN OF LEFT KNEE: Primary | ICD-10-CM

## 2024-03-04 DIAGNOSIS — R07.9 CHEST PAIN: ICD-10-CM

## 2024-03-04 DIAGNOSIS — Z74.09 IMPAIRED MOBILITY: ICD-10-CM
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
## 2024-03-04 LAB
ALBUMIN SERPL-MCNC: 3.4 G/DL (ref 3.4–4.8)
ALBUMIN/GLOB SERPL: 1 RATIO (ref 1.1–2)
ALP SERPL-CCNC: 84 UNIT/L (ref 40–150)
ALT SERPL-CCNC: 16 UNIT/L (ref 0–55)
APTT PPP: 32.1 SECONDS (ref 23.2–33.7)
AST SERPL-CCNC: 16 UNIT/L (ref 5–34)
BASOPHILS # BLD AUTO: 0.04 X10(3)/MCL
BASOPHILS NFR BLD AUTO: 0.3 %
BILIRUB SERPL-MCNC: 0.3 MG/DL
BUN SERPL-MCNC: 15.5 MG/DL (ref 9.8–20.1)
CALCIUM SERPL-MCNC: 8.9 MG/DL (ref 8.4–10.2)
CHLORIDE SERPL-SCNC: 107 MMOL/L (ref 98–107)
CO2 SERPL-SCNC: 20 MMOL/L (ref 23–31)
CREAT SERPL-MCNC: 0.84 MG/DL (ref 0.55–1.02)
EOSINOPHIL # BLD AUTO: 0.14 X10(3)/MCL (ref 0–0.9)
EOSINOPHIL NFR BLD AUTO: 1.1 %
ERYTHROCYTE [DISTWIDTH] IN BLOOD BY AUTOMATED COUNT: 16.1 % (ref 11.5–17)
GFR SERPLBLD CREATININE-BSD FMLA CKD-EPI: >60 MLS/MIN/1.73/M2
GLOBULIN SER-MCNC: 3.3 GM/DL (ref 2.4–3.5)
GLUCOSE SERPL-MCNC: 139 MG/DL (ref 82–115)
HCT VFR BLD AUTO: 39.3 % (ref 37–47)
HGB BLD-MCNC: 13 G/DL (ref 12–16)
IMM GRANULOCYTES # BLD AUTO: 0.05 X10(3)/MCL (ref 0–0.04)
IMM GRANULOCYTES NFR BLD AUTO: 0.4 %
INR PPP: 1.8
LYMPHOCYTES # BLD AUTO: 3.67 X10(3)/MCL (ref 0.6–4.6)
LYMPHOCYTES NFR BLD AUTO: 28.9 %
MCH RBC QN AUTO: 28.4 PG (ref 27–31)
MCHC RBC AUTO-ENTMCNC: 33.1 G/DL (ref 33–36)
MCV RBC AUTO: 86 FL (ref 80–94)
MONOCYTES # BLD AUTO: 0.84 X10(3)/MCL (ref 0.1–1.3)
MONOCYTES NFR BLD AUTO: 6.6 %
NEUTROPHILS # BLD AUTO: 7.98 X10(3)/MCL (ref 2.1–9.2)
NEUTROPHILS NFR BLD AUTO: 62.7 %
NRBC BLD AUTO-RTO: 0 %
PLATELET # BLD AUTO: 217 X10(3)/MCL (ref 130–400)
PMV BLD AUTO: 10.8 FL (ref 7.4–10.4)
POTASSIUM SERPL-SCNC: 3.2 MMOL/L (ref 3.5–5.1)
PROT SERPL-MCNC: 6.7 GM/DL (ref 5.8–7.6)
PROTHROMBIN TIME: 20.8 SECONDS (ref 12.5–14.5)
RBC # BLD AUTO: 4.57 X10(6)/MCL (ref 4.2–5.4)
SODIUM SERPL-SCNC: 139 MMOL/L (ref 136–145)
WBC # SPEC AUTO: 12.72 X10(3)/MCL (ref 4.5–11.5)

## 2024-03-04 PROCEDURE — 85730 THROMBOPLASTIN TIME PARTIAL: CPT

## 2024-03-04 PROCEDURE — 85610 PROTHROMBIN TIME: CPT

## 2024-03-04 PROCEDURE — 96375 TX/PRO/DX INJ NEW DRUG ADDON: CPT

## 2024-03-04 PROCEDURE — G0378 HOSPITAL OBSERVATION PER HR: HCPCS

## 2024-03-04 PROCEDURE — 25000003 PHARM REV CODE 250

## 2024-03-04 PROCEDURE — 96374 THER/PROPH/DIAG INJ IV PUSH: CPT

## 2024-03-04 PROCEDURE — 63600175 PHARM REV CODE 636 W HCPCS: Performed by: INTERNAL MEDICINE

## 2024-03-04 PROCEDURE — 85025 COMPLETE CBC W/AUTO DIFF WBC: CPT

## 2024-03-04 PROCEDURE — 99285 EMERGENCY DEPT VISIT HI MDM: CPT | Mod: 25

## 2024-03-04 PROCEDURE — 63600175 PHARM REV CODE 636 W HCPCS

## 2024-03-04 PROCEDURE — 25000003 PHARM REV CODE 250: Performed by: PHYSICIAN ASSISTANT

## 2024-03-04 PROCEDURE — 25000003 PHARM REV CODE 250: Performed by: INTERNAL MEDICINE

## 2024-03-04 PROCEDURE — 80053 COMPREHEN METABOLIC PANEL: CPT

## 2024-03-04 RX ORDER — METHOCARBAMOL 500 MG/1
500 TABLET, FILM COATED ORAL 2 TIMES DAILY
Status: DISCONTINUED | OUTPATIENT
Start: 2024-03-04 | End: 2024-03-12 | Stop reason: HOSPADM

## 2024-03-04 RX ORDER — KETOROLAC TROMETHAMINE 30 MG/ML
30 INJECTION, SOLUTION INTRAMUSCULAR; INTRAVENOUS EVERY 8 HOURS PRN
Status: DISPENSED | OUTPATIENT
Start: 2024-03-04 | End: 2024-03-07

## 2024-03-04 RX ORDER — OXYCODONE AND ACETAMINOPHEN 5; 325 MG/1; MG/1
1 TABLET ORAL EVERY 4 HOURS PRN
Status: DISCONTINUED | OUTPATIENT
Start: 2024-03-04 | End: 2024-03-12 | Stop reason: HOSPADM

## 2024-03-04 RX ORDER — HYDRALAZINE HYDROCHLORIDE 20 MG/ML
10 INJECTION INTRAMUSCULAR; INTRAVENOUS
Status: DISCONTINUED | OUTPATIENT
Start: 2024-03-04 | End: 2024-03-12 | Stop reason: HOSPADM

## 2024-03-04 RX ORDER — IBUPROFEN 200 MG
24 TABLET ORAL
Status: DISCONTINUED | OUTPATIENT
Start: 2024-03-04 | End: 2024-03-12 | Stop reason: HOSPADM

## 2024-03-04 RX ORDER — ACETAMINOPHEN 325 MG/1
650 TABLET ORAL EVERY 4 HOURS PRN
Status: DISCONTINUED | OUTPATIENT
Start: 2024-03-04 | End: 2024-03-12 | Stop reason: HOSPADM

## 2024-03-04 RX ORDER — FAMOTIDINE 20 MG/1
20 TABLET, FILM COATED ORAL NIGHTLY
Status: DISCONTINUED | OUTPATIENT
Start: 2024-03-04 | End: 2024-03-12 | Stop reason: HOSPADM

## 2024-03-04 RX ORDER — ONDANSETRON HYDROCHLORIDE 2 MG/ML
4 INJECTION, SOLUTION INTRAVENOUS EVERY 4 HOURS PRN
Status: DISCONTINUED | OUTPATIENT
Start: 2024-03-04 | End: 2024-03-12 | Stop reason: HOSPADM

## 2024-03-04 RX ORDER — METOCLOPRAMIDE HYDROCHLORIDE 5 MG/5ML
5 SOLUTION ORAL
Status: DISCONTINUED | OUTPATIENT
Start: 2024-03-04 | End: 2024-03-12 | Stop reason: HOSPADM

## 2024-03-04 RX ORDER — OXYCODONE AND ACETAMINOPHEN 10; 325 MG/1; MG/1
1 TABLET ORAL EVERY 6 HOURS PRN
Status: DISCONTINUED | OUTPATIENT
Start: 2024-03-04 | End: 2024-03-12 | Stop reason: HOSPADM

## 2024-03-04 RX ORDER — ONDANSETRON HYDROCHLORIDE 2 MG/ML
4 INJECTION, SOLUTION INTRAVENOUS
Status: COMPLETED | OUTPATIENT
Start: 2024-03-04 | End: 2024-03-04

## 2024-03-04 RX ORDER — ATORVASTATIN CALCIUM 40 MG/1
80 TABLET, FILM COATED ORAL NIGHTLY
Status: DISCONTINUED | OUTPATIENT
Start: 2024-03-04 | End: 2024-03-12 | Stop reason: HOSPADM

## 2024-03-04 RX ORDER — ENOXAPARIN SODIUM 100 MG/ML
40 INJECTION SUBCUTANEOUS EVERY 24 HOURS
Status: DISCONTINUED | OUTPATIENT
Start: 2024-03-05 | End: 2024-03-04

## 2024-03-04 RX ORDER — TIMOLOL MALEATE 5 MG/ML
1 SOLUTION/ DROPS OPHTHALMIC 2 TIMES DAILY
Status: DISCONTINUED | OUTPATIENT
Start: 2024-03-04 | End: 2024-03-12 | Stop reason: HOSPADM

## 2024-03-04 RX ORDER — SODIUM CHLORIDE 0.9 % (FLUSH) 0.9 %
10 SYRINGE (ML) INJECTION EVERY 12 HOURS PRN
Status: DISCONTINUED | OUTPATIENT
Start: 2024-03-04 | End: 2024-03-12 | Stop reason: HOSPADM

## 2024-03-04 RX ORDER — HYDROMORPHONE HYDROCHLORIDE 2 MG/ML
1 INJECTION, SOLUTION INTRAMUSCULAR; INTRAVENOUS; SUBCUTANEOUS
Status: COMPLETED | OUTPATIENT
Start: 2024-03-04 | End: 2024-03-04

## 2024-03-04 RX ORDER — GLUCAGON 1 MG
1 KIT INJECTION
Status: DISCONTINUED | OUTPATIENT
Start: 2024-03-04 | End: 2024-03-12 | Stop reason: HOSPADM

## 2024-03-04 RX ORDER — SERTRALINE HYDROCHLORIDE 50 MG/1
50 TABLET, FILM COATED ORAL DAILY
Status: DISCONTINUED | OUTPATIENT
Start: 2024-03-05 | End: 2024-03-12 | Stop reason: HOSPADM

## 2024-03-04 RX ORDER — OXYCODONE AND ACETAMINOPHEN 5; 325 MG/1; MG/1
1 TABLET ORAL
Status: COMPLETED | OUTPATIENT
Start: 2024-03-04 | End: 2024-03-04

## 2024-03-04 RX ORDER — CARVEDILOL 12.5 MG/1
25 TABLET ORAL 2 TIMES DAILY
Status: DISCONTINUED | OUTPATIENT
Start: 2024-03-05 | End: 2024-03-12 | Stop reason: HOSPADM

## 2024-03-04 RX ORDER — IBUPROFEN 200 MG
16 TABLET ORAL
Status: DISCONTINUED | OUTPATIENT
Start: 2024-03-04 | End: 2024-03-12 | Stop reason: HOSPADM

## 2024-03-04 RX ORDER — ENOXAPARIN SODIUM 100 MG/ML
40 INJECTION SUBCUTANEOUS EVERY 24 HOURS
Status: DISCONTINUED | OUTPATIENT
Start: 2024-03-04 | End: 2024-03-04

## 2024-03-04 RX ORDER — ACETAMINOPHEN 325 MG/1
650 TABLET ORAL EVERY 8 HOURS PRN
Status: DISCONTINUED | OUTPATIENT
Start: 2024-03-04 | End: 2024-03-12 | Stop reason: HOSPADM

## 2024-03-04 RX ORDER — AMLODIPINE BESYLATE 5 MG/1
5 TABLET ORAL DAILY
Status: DISCONTINUED | OUTPATIENT
Start: 2024-03-05 | End: 2024-03-12 | Stop reason: HOSPADM

## 2024-03-04 RX ADMIN — FAMOTIDINE 20 MG: 20 TABLET, FILM COATED ORAL at 10:03

## 2024-03-04 RX ADMIN — METOCLOPRAMIDE HYDROCHLORIDE 5 MG: 5 SOLUTION ORAL at 10:03

## 2024-03-04 RX ADMIN — KETOROLAC TROMETHAMINE 30 MG: 30 INJECTION, SOLUTION INTRAMUSCULAR; INTRAVENOUS at 06:03

## 2024-03-04 RX ADMIN — ONDANSETRON 4 MG: 2 INJECTION INTRAMUSCULAR; INTRAVENOUS at 04:03

## 2024-03-04 RX ADMIN — HYDROMORPHONE HYDROCHLORIDE 1 MG: 2 INJECTION INTRAMUSCULAR; INTRAVENOUS; SUBCUTANEOUS at 04:03

## 2024-03-04 RX ADMIN — OXYCODONE HYDROCHLORIDE AND ACETAMINOPHEN 1 TABLET: 5; 325 TABLET ORAL at 02:03

## 2024-03-04 RX ADMIN — METHOCARBAMOL 500 MG: 500 TABLET ORAL at 08:03

## 2024-03-04 RX ADMIN — ATORVASTATIN CALCIUM 80 MG: 40 TABLET, FILM COATED ORAL at 10:03

## 2024-03-04 RX ADMIN — OXYCODONE AND ACETAMINOPHEN 1 TABLET: 10; 325 TABLET ORAL at 07:03

## 2024-03-04 NOTE — FIRST PROVIDER EVALUATION
"Medical screening examination initiated.  I have conducted a focused provider triage encounter, findings are as follows:    Brief history of present illness:  79 y/o female presents with trip and fall today and having left knee pain and right shoulder area pain. Had knee replacement to the left knee prior. +swelling     Vitals:    03/04/24 1007   BP: (!) 138/48   Pulse: 69   Resp: 18   Temp: 96.8 °F (36 °C)   TempSrc: Temporal   SpO2: 97%   Weight: 90.3 kg (199 lb)   Height: 5' 1" (1.549 m)       Pertinent physical exam:  alert, nonlabored, left knee appears swollen     Brief workup plan:  imaging    Preliminary workup initiated; this workup will be continued and followed by the physician or advanced practice provider that is assigned to the patient when roomed.  "

## 2024-03-04 NOTE — ED PROVIDER NOTES
Encounter Date: 3/4/2024       History     Chief Complaint   Patient presents with    Knee Pain     L knee pain after trip & fall onto knee today. L knee hematoma noted. Did not hit head. On Xarelto.      The patient is a 78 y.o. female with a history of hypertension, COPD, GERD, and A fib who presents to the Emergency Department with a chief complaint of fall. Patient reports she tripped and fell in her apartment. She denies hitting her head or neck. She reports pain to her left knee and right shoulder. Symptoms began today and have been constant since onset. Her pain is currently rated as a 8/10 in severity and described as aching with no radiation. Associated symptoms include nothing. Symptoms are aggravated with movement and there are no alleviating factors. The patient denies numbness or tingling to extremities. She reports taking nothing prior to arrival with no relief of symptoms. No other reported symptoms at this time.      The history is provided by the patient. No  was used.   Knee Pain  This is a new problem. The current episode started 6 to 12 hours ago. The problem occurs constantly. The problem has not changed since onset.Pertinent negatives include no chest pain, no abdominal pain, no headaches and no shortness of breath. The symptoms are aggravated by bending and walking. Nothing relieves the symptoms. She has tried nothing for the symptoms. The treatment provided no relief.     Review of patient's allergies indicates:   Allergen Reactions    Morphine Itching    Norco [hydrocodone-acetaminophen]      Past Medical History:   Diagnosis Date    Anxiety     COPD (chronic obstructive pulmonary disease)     Coronary artery disease     s/p 2 stents    Depression     GERD (gastroesophageal reflux disease)     Glaucoma     Hypertension     Obstructive sleep apnea     on cpap    Stroke     Urinary, incontinence, stress female      Past Surgical History:   Procedure Laterality Date     APPENDECTOMY      BACK SURGERY      CHOLECYSTECTOMY      EXPLORATION OF COMMON BILE DUCT      HERNIA REPAIR      incisional hernia times 2, central abdomen    HYSTERECTOMY      OVARIAN CYST REMOVAL      TONSILLECTOMY       Family History   Problem Relation Age of Onset    Heart disease Mother     Cancer Father      Social History     Tobacco Use    Smoking status: Never   Substance Use Topics    Alcohol use: Not Currently    Drug use: Not Currently     Review of Systems   Constitutional:  Negative for fever.   HENT:  Negative for sore throat.    Respiratory:  Negative for shortness of breath.    Cardiovascular:  Negative for chest pain.   Gastrointestinal:  Negative for abdominal pain and nausea.   Genitourinary:  Negative for dysuria.   Musculoskeletal:  Positive for arthralgias and joint swelling. Negative for back pain.   Skin:  Negative for rash.   Neurological:  Negative for weakness and headaches.   Hematological:  Does not bruise/bleed easily.   All other systems reviewed and are negative.      Physical Exam     Initial Vitals [03/04/24 1007]   BP Pulse Resp Temp SpO2   (!) 138/48 69 18 96.8 °F (36 °C) 97 %      MAP       --         Physical Exam    Nursing note and vitals reviewed.  Constitutional: She appears well-developed and well-nourished.   HENT:   Head: Normocephalic.   Right Ear: Hearing and tympanic membrane normal.   Left Ear: Hearing and tympanic membrane normal.   Mouth/Throat: Uvula is midline, oropharynx is clear and moist and mucous membranes are normal.   Eyes: Conjunctivae and EOM are normal. Pupils are equal, round, and reactive to light.   Cardiovascular:  Normal rate, regular rhythm, normal heart sounds and normal pulses.           Pulmonary/Chest: Effort normal and breath sounds normal.   Abdominal: Abdomen is soft. Bowel sounds are normal. There is no abdominal tenderness.   Musculoskeletal:      Right shoulder: Bony tenderness present. Normal range of motion. Normal strength. Normal  pulse.      Left shoulder: Normal.      Cervical back: Normal.      Thoracic back: Normal.      Lumbar back: Normal.      Right knee: Normal.      Left knee: Swelling and bony tenderness present. Decreased range of motion. Normal pulse.      Comments: Swelling and ecchymosis to left knee   All other adjacent joints normal      Lymphadenopathy:     She has no cervical adenopathy.   Neurological: She is alert. GCS eye subscore is 4. GCS verbal subscore is 5. GCS motor subscore is 6.   Skin: Skin is warm, dry and intact. Capillary refill takes less than 2 seconds.         ED Course   Procedures  Labs Reviewed   COMPREHENSIVE METABOLIC PANEL - Abnormal; Notable for the following components:       Result Value    Potassium Level 3.2 (*)     Carbon Dioxide 20 (*)     Glucose Level 139 (*)     Albumin/Globulin Ratio 1.0 (*)     All other components within normal limits   PROTIME-INR - Abnormal; Notable for the following components:    PT 20.8 (*)     INR 1.8 (*)     All other components within normal limits   CBC WITH DIFFERENTIAL - Abnormal; Notable for the following components:    WBC 12.72 (*)     MPV 10.8 (*)     IG# 0.05 (*)     All other components within normal limits   APTT - Normal   CBC W/ AUTO DIFFERENTIAL    Narrative:     The following orders were created for panel order CBC auto differential.  Procedure                               Abnormality         Status                     ---------                               -----------         ------                     CBC with Differential[[id number]]       Abnormal            Final result                 Please view results for these tests on the individual orders.          Imaging Results              X-Ray Hip 2 or 3 views Right (with Pelvis when performed) (Final result)  Result time 03/04/24 16:44:24      Final result by Hector Marcano MD (03/04/24 16:44:24)                   Impression:      No displaced fracture      Electronically signed by: Hector  MD Jeet  Date:    03/04/2024  Time:    16:44               Narrative:    EXAMINATION:  Three views pelvis and right hip    CLINICAL HISTORY:  Fall    COMPARISON:  01/30/2024    FINDINGS:  No displaced fracture or dislocation.                                       X-Ray Hip 2 or 3 views Left (with Pelvis when performed) (Final result)  Result time 03/04/24 16:44:49      Final result by Hector Marcano MD (03/04/24 16:44:49)                   Impression:      No displaced fracture      Electronically signed by: Hector Marcano MD  Date:    03/04/2024  Time:    16:44               Narrative:    EXAMINATION:  Three views pelvis and left hip    CLINICAL HISTORY:  Fall    COMPARISON:  03/04/2024    FINDINGS:  No displaced fracture or dislocation.                                       CT Knee Without Contrast Left (Final result)  Result time 03/04/24 15:12:21      Final result by Wilbert Moore MD (03/04/24 15:12:21)                   Narrative:    EXAMINATION  CT KNEE WITHOUT CONTRAST LEFT    CLINICAL HISTORY  Knee trauma, occult fracture suspected, xray done;    TECHNIQUE  Non-contrast helical-acquisition CT images of the left knee were obtained.  Multiplanar reconstructions accomplished by a CT technologist at a separate workstation, pushed to PACS for physician review.    COMPARISON  Radiographs obtained earlier the same day.    FINDINGS  Images were reviewed in bone and soft tissue windows.    Exam quality: Limited secondary to arthroplasty hardware resulting in severe streak artifact through the level of the distal femur and proximal tibia.    Within limitations, no convincing gross hardware displacement or osseous loosening is identified.  Joint spacing and alignment are preserved.  There is no definite acutely displaced fracture.  No joint effusion is identified.  There are no destructive osseous lesions.    No convincing acute or focal subcutaneous abnormality is identified.  Regional musculature and tendinous  structures are grossly normal for non-contrast CT appearance.    IMPRESSION  1. Limited exam secondary to regional hardware resulting streak artifact.  2. Within limitations, no convincing evidence of acute abnormality.    RADIATION DOSE  Automated tube current modulation, weight-based exposure dosing, and/or iterative reconstruction technique utilized to reach lowest reasonably achievable exposure rate.    DLP: 246 mGy*cm      Electronically signed by: Wilbert Moore  Date:    03/04/2024  Time:    15:12                                     X-Ray Knee Complete 4 or More Views Left (Final result)  Result time 03/04/24 12:58:11      Final result by Arnold Tate MD (03/04/24 12:58:11)                   Impression:      No acute osseous process appreciated.      Electronically signed by: Arnold Tate  Date:    03/04/2024  Time:    12:58               Narrative:    EXAMINATION:  XR KNEE COMP 4 OR MORE VIEWS LEFT    CLINICAL HISTORY:  Unspecified fall, initial encounter    TECHNIQUE:  AP, lateral, and oblique views of the left knee.    COMPARISON:  None    FINDINGS:  Prior total knee arthroplasty.  Prosthetic components are aligned.  No acute fracture appreciated.  No significant knee effusion.                                       X-Ray Shoulder Complete 2 View Right (Final result)  Result time 03/04/24 12:56:54      Final result by Arnold Tate MD (03/04/24 12:56:54)                   Impression:      No acute osseous process appreciated.      Electronically signed by: Arnold Tate  Date:    03/04/2024  Time:    12:56               Narrative:    EXAMINATION:  XR SHOULDER COMPLETE 2 OR MORE VIEWS RIGHT    CLINICAL HISTORY:  Unspecified fall, initial encounter    TECHNIQUE:  Two or three views of the right shoulder.    COMPARISON:  None    FINDINGS:  No acute fracture identified.  Glenohumeral and AC joints are aligned.                                       Medications   oxyCODONE-acetaminophen 5-325 mg per  tablet 1 tablet (1 tablet Oral Given 3/4/24 1401)   HYDROmorphone (PF) injection 1 mg (1 mg Intravenous Given 3/4/24 1602)   ondansetron injection 4 mg (4 mg Intravenous Given 3/4/24 1602)     Medical Decision Making  The patient is a 78 y.o. female with a history of hypertension, COPD, GERD, and A fib who presents to the Emergency Department with a chief complaint of fall. Patient reports she tripped and fell in her apartment. She denies hitting her head or neck. She reports pain to her left knee and right shoulder. Symptoms began today and have been constant since onset. Her pain is currently rated as a 8/10 in severity and described as aching with no radiation. Associated symptoms include nothing. Symptoms are aggravated with movement and there are no alleviating factors. The patient denies numbness or tingling to extremities. She reports taking nothing prior to arrival with no relief of symptoms. No other reported symptoms at this time.      Differential diagnosis include but are not limited to knee fracture, sprain, strain, knee contusion, hematoma, shoulder strain, shoulder fracture     Problems Addressed:  Acute pain of left knee: acute illness or injury  Impaired mobility: acute illness or injury    Amount and/or Complexity of Data Reviewed  Labs: ordered.  Radiology: ordered. Decision-making details documented in ED Course.  Discussion of management or test interpretation with external provider(s): Discussed with ED attending, , he had face to face encounter with patient.     Discussed with KAUSHIK Weber with hospital medicine. Accepts admission for Dr. Lanny Timmons  Prescription drug management.               ED Course as of 03/04/24 1803   Mon Mar 04, 2024   1353 X-Ray Knee Complete 4 or More Views Left  Impression:     No acute osseous process appreciated.      [LM]   1353 X-Ray Shoulder Complete 2 View Right  Impression:     No acute osseous process appreciated.      [LM]   1630 Patient is unable  to ambulate. Discussed with Dr. Rosado. Patient lives alone. Recommends admission.  [LM]   1653 X-Ray Hip 2 or 3 views Left (with Pelvis when performed)  Impression:     No displaced fracture   [LM]   1653 X-Ray Hip 2 or 3 views Right (with Pelvis when performed)  Impression:     No displaced fracture   [LM]   1754 Discussed with KAUSHIK Weber with hospitals medicine. Accepts admission for Dr. Ca [LM]      ED Course User Index  [LM] Radha Andrews NP                             Clinical Impression:  Final diagnoses:  [W19.XXXA] Fall  [M25.562] Acute pain of left knee (Primary)  [Z74.09] Impaired mobility          ED Disposition Condition    Observation Stable                Radha Andrews NP  03/04/24 3945

## 2024-03-05 PROBLEM — S80.02XA HEMATOMA OF LEFT KNEE REGION: Status: ACTIVE | Noted: 2024-03-05

## 2024-03-05 LAB
ALBUMIN SERPL-MCNC: 3.2 G/DL (ref 3.4–4.8)
ALBUMIN/GLOB SERPL: 1 RATIO (ref 1.1–2)
ALP SERPL-CCNC: 86 UNIT/L (ref 40–150)
ALT SERPL-CCNC: 16 UNIT/L (ref 0–55)
APPEARANCE UR: CLEAR
AST SERPL-CCNC: 15 UNIT/L (ref 5–34)
BACTERIA #/AREA URNS AUTO: ABNORMAL /HPF
BASOPHILS # BLD AUTO: 0.03 X10(3)/MCL
BASOPHILS NFR BLD AUTO: 0.3 %
BILIRUB SERPL-MCNC: 0.6 MG/DL
BILIRUB UR QL STRIP.AUTO: NEGATIVE
BUN SERPL-MCNC: 13.8 MG/DL (ref 9.8–20.1)
CALCIUM SERPL-MCNC: 9 MG/DL (ref 8.4–10.2)
CHLORIDE SERPL-SCNC: 108 MMOL/L (ref 98–107)
CO2 SERPL-SCNC: 25 MMOL/L (ref 23–31)
COLOR UR AUTO: ABNORMAL
CREAT SERPL-MCNC: 0.78 MG/DL (ref 0.55–1.02)
EOSINOPHIL # BLD AUTO: 0.11 X10(3)/MCL (ref 0–0.9)
EOSINOPHIL NFR BLD AUTO: 1.1 %
ERYTHROCYTE [DISTWIDTH] IN BLOOD BY AUTOMATED COUNT: 15.9 % (ref 11.5–17)
GFR SERPLBLD CREATININE-BSD FMLA CKD-EPI: >60 MLS/MIN/1.73/M2
GLOBULIN SER-MCNC: 3.2 GM/DL (ref 2.4–3.5)
GLUCOSE SERPL-MCNC: 112 MG/DL (ref 82–115)
GLUCOSE UR QL STRIP.AUTO: NORMAL
HCT VFR BLD AUTO: 39 % (ref 37–47)
HGB BLD-MCNC: 12.5 G/DL (ref 12–16)
IMM GRANULOCYTES # BLD AUTO: 0.03 X10(3)/MCL (ref 0–0.04)
IMM GRANULOCYTES NFR BLD AUTO: 0.3 %
KETONES UR QL STRIP.AUTO: NEGATIVE
LEUKOCYTE ESTERASE UR QL STRIP.AUTO: 75
LYMPHOCYTES # BLD AUTO: 3.65 X10(3)/MCL (ref 0.6–4.6)
LYMPHOCYTES NFR BLD AUTO: 37.2 %
MCH RBC QN AUTO: 28.4 PG (ref 27–31)
MCHC RBC AUTO-ENTMCNC: 32.1 G/DL (ref 33–36)
MCV RBC AUTO: 88.6 FL (ref 80–94)
MONOCYTES # BLD AUTO: 0.69 X10(3)/MCL (ref 0.1–1.3)
MONOCYTES NFR BLD AUTO: 7 %
MUCOUS THREADS URNS QL MICRO: ABNORMAL /LPF
NEUTROPHILS # BLD AUTO: 5.29 X10(3)/MCL (ref 2.1–9.2)
NEUTROPHILS NFR BLD AUTO: 54.1 %
NITRITE UR QL STRIP.AUTO: NEGATIVE
NRBC BLD AUTO-RTO: 0 %
OHS QRS DURATION: 80 MS
OHS QTC CALCULATION: 460 MS
PH UR STRIP.AUTO: 5.5 [PH]
PLATELET # BLD AUTO: 182 X10(3)/MCL (ref 130–400)
PMV BLD AUTO: 9.9 FL (ref 7.4–10.4)
POTASSIUM SERPL-SCNC: 3.5 MMOL/L (ref 3.5–5.1)
PROT SERPL-MCNC: 6.4 GM/DL (ref 5.8–7.6)
PROT UR QL STRIP.AUTO: NEGATIVE
RBC # BLD AUTO: 4.4 X10(6)/MCL (ref 4.2–5.4)
RBC #/AREA URNS AUTO: ABNORMAL /HPF
RBC UR QL AUTO: NEGATIVE
SODIUM SERPL-SCNC: 141 MMOL/L (ref 136–145)
SP GR UR STRIP.AUTO: 1.01 (ref 1–1.03)
SQUAMOUS #/AREA URNS LPF: ABNORMAL /HPF
TROPONIN I SERPL-MCNC: <0.01 NG/ML (ref 0–0.04)
UROBILINOGEN UR STRIP-ACNC: NORMAL
WBC # SPEC AUTO: 9.8 X10(3)/MCL (ref 4.5–11.5)
WBC #/AREA URNS AUTO: ABNORMAL /HPF

## 2024-03-05 PROCEDURE — 84484 ASSAY OF TROPONIN QUANT: CPT | Performed by: NURSE PRACTITIONER

## 2024-03-05 PROCEDURE — 63600175 PHARM REV CODE 636 W HCPCS: Performed by: INTERNAL MEDICINE

## 2024-03-05 PROCEDURE — 93010 ELECTROCARDIOGRAM REPORT: CPT | Mod: ,,, | Performed by: INTERNAL MEDICINE

## 2024-03-05 PROCEDURE — 99223 1ST HOSP IP/OBS HIGH 75: CPT | Mod: ,,, | Performed by: SURGERY

## 2024-03-05 PROCEDURE — 80053 COMPREHEN METABOLIC PANEL: CPT | Performed by: PHYSICIAN ASSISTANT

## 2024-03-05 PROCEDURE — 93005 ELECTROCARDIOGRAM TRACING: CPT

## 2024-03-05 PROCEDURE — 25000003 PHARM REV CODE 250: Performed by: PHYSICIAN ASSISTANT

## 2024-03-05 PROCEDURE — 81001 URINALYSIS AUTO W/SCOPE: CPT | Performed by: PHYSICIAN ASSISTANT

## 2024-03-05 PROCEDURE — 99223 1ST HOSP IP/OBS HIGH 75: CPT | Mod: ,,, | Performed by: ORTHOPAEDIC SURGERY

## 2024-03-05 PROCEDURE — G0378 HOSPITAL OBSERVATION PER HR: HCPCS

## 2024-03-05 PROCEDURE — 85025 COMPLETE CBC W/AUTO DIFF WBC: CPT | Performed by: PHYSICIAN ASSISTANT

## 2024-03-05 PROCEDURE — 63600175 PHARM REV CODE 636 W HCPCS: Performed by: PHYSICIAN ASSISTANT

## 2024-03-05 RX ADMIN — METOCLOPRAMIDE HYDROCHLORIDE 5 MG: 5 SOLUTION ORAL at 04:03

## 2024-03-05 RX ADMIN — ATORVASTATIN CALCIUM 80 MG: 40 TABLET, FILM COATED ORAL at 08:03

## 2024-03-05 RX ADMIN — SERTRALINE HYDROCHLORIDE 50 MG: 50 TABLET ORAL at 09:03

## 2024-03-05 RX ADMIN — FAMOTIDINE 20 MG: 20 TABLET, FILM COATED ORAL at 08:03

## 2024-03-05 RX ADMIN — TIMOLOL MALEATE 1 DROP: 5 SOLUTION/ DROPS OPHTHALMIC at 10:03

## 2024-03-05 RX ADMIN — OXYCODONE HYDROCHLORIDE AND ACETAMINOPHEN 1 TABLET: 5; 325 TABLET ORAL at 12:03

## 2024-03-05 RX ADMIN — HYDRALAZINE HYDROCHLORIDE 10 MG: 20 INJECTION INTRAMUSCULAR; INTRAVENOUS at 05:03

## 2024-03-05 RX ADMIN — CARVEDILOL 25 MG: 12.5 TABLET, FILM COATED ORAL at 08:03

## 2024-03-05 RX ADMIN — METHOCARBAMOL 500 MG: 500 TABLET ORAL at 09:03

## 2024-03-05 RX ADMIN — METOCLOPRAMIDE HYDROCHLORIDE 5 MG: 5 SOLUTION ORAL at 08:03

## 2024-03-05 RX ADMIN — OXYCODONE HYDROCHLORIDE AND ACETAMINOPHEN 1 TABLET: 5; 325 TABLET ORAL at 09:03

## 2024-03-05 RX ADMIN — KETOROLAC TROMETHAMINE 30 MG: 30 INJECTION, SOLUTION INTRAMUSCULAR; INTRAVENOUS at 05:03

## 2024-03-05 RX ADMIN — KETOROLAC TROMETHAMINE 30 MG: 30 INJECTION, SOLUTION INTRAMUSCULAR; INTRAVENOUS at 01:03

## 2024-03-05 RX ADMIN — CARVEDILOL 25 MG: 12.5 TABLET, FILM COATED ORAL at 09:03

## 2024-03-05 RX ADMIN — METHOCARBAMOL 500 MG: 500 TABLET ORAL at 08:03

## 2024-03-05 RX ADMIN — AMLODIPINE BESYLATE 5 MG: 5 TABLET ORAL at 09:03

## 2024-03-05 RX ADMIN — OXYCODONE HYDROCHLORIDE AND ACETAMINOPHEN 1 TABLET: 5; 325 TABLET ORAL at 08:03

## 2024-03-05 RX ADMIN — METOCLOPRAMIDE HYDROCHLORIDE 5 MG: 5 SOLUTION ORAL at 06:03

## 2024-03-05 NOTE — HPI
HPI: Patient is a 78  yr white female with a past medical history of hypertension, hyperlipidemia, coronary artery disease status post stents on Plavix, GERD, CVA, obstructive sleep apnea not on CPAP, glaucoma, atrial fibrillation on Xarelto and chronic lower back pain with spinal stimulator. The patient presented to [facility] on 3/4/2024 with a primary complaint of left knee pain following a fall.  Patient was walking in her apartment with her walker when went to plug in her phone and tripped and fell onto her left side.  Patient reports she was unable to get up following the fall due to significant knee pain.  Prior to the fall she was having no left knee pain.  She reports having a total knee replacement on the left about a year ago.  She denies loss of consciousness head injury with fall.  She reports chronic lower back pain with no in acuity and denies complaints of chest pain, shortness of breath, abdominal pain, nausea, vomiting and diarrhea.  At baseline patient lives alone, ambulates with a walker and completes activities of daily living independently.     X-ray of the right shoulder, x-ray of the left knee, x-ray of the left hip, x-ray of the right hip with no acute abnormalities.  CTA of the left knee was limited exam secondary to regional hardware resulted streaking artifact but within limitations no convincing evidence of acute abnormality.  Patient was admitted to hospital medicine services for further medical management. Ortho consulted for left knee injury and recommended PRS consult for evaluation.     Today: Patient seen today laying in bed, she endorses left knee pain/tenderness.

## 2024-03-05 NOTE — PT/OT/SLP PROGRESS
"Occupational Therapy      Patient Name:  Katty Veronica   MRN:  [id number]    Patient not seen today secondary to ortho hold; "hold until MRI results, pending review of quad tendon." Will follow-up pending ortho recs.    3/5/2024  "

## 2024-03-05 NOTE — PROGRESS NOTES
03/05/24 1009   Discharge Assessment   Assessment Type Discharge Planning Assessment   Confirmed/corrected address, phone number and insurance Yes   Confirmed Demographics Correct on Facesheet   Source of Information patient   When was your last doctors appointment?   (2-3 weeks ago)   Does patient/caregiver understand observation status Yes   Communicated AMBROSIO with patient/caregiver Yes   Reason For Admission Hematoma of left knee region   People in Home alone   Do you expect to return to your current living situation? Yes   Do you have help at home or someone to help you manage your care at home? Yes   Who are your caregiver(s) and their phone number(s)? [facility]   Prior to hospitilization cognitive status: Alert/Oriented   Current cognitive status: Alert/Oriented   Walking or Climbing Stairs Difficulty yes   Walking or Climbing Stairs ambulation difficulty, requires equipment   Mobility Management walker, cane   Dressing/Bathing Difficulty yes   Dressing/Bathing bathing difficulty, requires equipment   Dressing/Bathing Management shower chair   Equipment Currently Used at Home shower chair;cane, straight;walker, standard   Readmission within 30 days? Yes   Patient currently being followed by outpatient case management? No   Do you currently have service(s) that help you manage your care at home? Yes   Name and Contact number of agency [facility]   Is the pt/caregiver preference to resume services with current agency Yes   Do you take prescription medications? Yes   Do you have prescription coverage? Yes   Coverage mcr   Do you have any problems affording any of your prescribed medications? No   Is the patient taking medications as prescribed? yes   Who is going to help you get home at discharge? Jake Veronica  Son  [phone]   How do you get to doctors appointments? public transportation;health plan transportation  ( on aging)   Are you on dialysis? No   Do you take coumadin?  No   Discharge Plan A Home;Home Health   Discharge Plan B Home;Home Health   DME Needed Upon Discharge  none   Discharge Plan discussed with: Patient   Transition of Care Barriers None   Financial Resource Strain   How hard is it for you to pay for the very basics like food, housing, medical care, and heating? Not hard   Housing Stability   In the last 12 months, was there a time when you were not able to pay the mortgage or rent on time? N   In the last 12 months, how many places have you lived? 1   In the last 12 months, was there a time when you did not have a steady place to sleep or slept in a shelter (including now)? N   Transportation Needs   In the past 12 months, has lack of transportation kept you from medical appointments or from getting medications? no   In the past 12 months, has lack of transportation kept you from meetings, work, or from getting things needed for daily living? No   Food Insecurity   Within the past 12 months, you worried that your food would run out before you got the money to buy more. Never true   Within the past 12 months, the food you bought just didn't last and you didn't have money to get more. Never true   Social Connections   In a typical week, how many times do you talk on the phone with family, friends, or neighbors? More than 3   Are you , , , , never , or living with a partner?    Alcohol Use   Q1: How often do you have a drink containing alcohol? Never   Q2: How many drinks containing alcohol do you have on a typical day when you are drinking? None   Q3: How often do you have six or more drinks on one occasion? Never

## 2024-03-05 NOTE — ED NOTES
Patient resting on stretcher. Call light in reach. RR nonlabored, NAD. Patient states she fell at home onto the concrete floor et now has LT knee pain. Noted moderate bruising et swelling to LT knee. Also noted bruising to RT ankle- patient states this is d/t a pedicure et massage. Provided patient w/ pillow et warm blanket. Denies wanting ice pack. Placed purewick for comfort. Patient has eaten supper.

## 2024-03-05 NOTE — PLAN OF CARE
03/05/2024 1113- Observation (MOON) letter/ OBS status reviewed by hospital rm phone w patient. No questions. Emailed [facility] to pls drop off copies, have letter signed, etc. I did let ms. Veronica know someone was coming. ANGY La RN

## 2024-03-05 NOTE — SUBJECTIVE & OBJECTIVE
No current facility-administered medications on file prior to encounter.     Current Outpatient Medications on File Prior to Encounter   Medication Sig    amLODIPine (NORVASC) 5 MG tablet 1 tablet Orally Once a day    aspirin (ECOTRIN) 81 MG EC tablet Take 1 tablet (81 mg total) by mouth once daily.    atorvastatin (LIPITOR) 80 MG tablet Take 80 mg by mouth every evening.    carvediloL (COREG) 25 MG tablet Take 1 tablet (25 mg total) by mouth 2 (two) times daily.    cetirizine (ZYRTEC) 10 MG tablet Take 10 mg by mouth once daily.    ergocalciferol (VITAMIN D2) 50,000 unit Cap Take 50,000 Units by mouth every 7 days. Takes on Sunday AM    famotidine (PEPCID) 20 MG tablet Take 1 tablet by mouth every evening.    gabapentin (NEURONTIN) 300 MG capsule Take 300 mg by mouth 3 (three) times daily.    methocarbamoL (ROBAXIN) 500 MG Tab Take 500 mg by mouth 2 (two) times a day.    metoclopramide HCl (REGLAN) 5 MG tablet Take 5 mg by mouth 4 (four) times daily before meals and nightly.    pantoprazole (PROTONIX) 40 MG tablet Take 40 mg by mouth.    rivaroxaban (XARELTO) 20 mg Tab Take 20 mg by mouth daily with dinner or evening meal.    sertraline (ZOLOFT) 50 MG tablet Take 50 mg by mouth once daily.    timolol maleate 0.5% (TIMOPTIC) 0.5 % Drop Place 1 drop into both eyes 2 (two) times daily.    nitroGLYCERIN (NITROSTAT) 0.4 MG SL tablet Place 1 tablet (0.4 mg total) under the tongue every 5 (five) minutes as needed for Chest pain.    ondansetron (ZOFRAN-ODT) 8 MG TbDL Take 8 mg by mouth every 6 (six) hours as needed.       Review of patient's allergies indicates:   Allergen Reactions    Morphine Itching    Norco [hydrocodone-acetaminophen]        Past Medical History:   Diagnosis Date    Anxiety     COPD (chronic obstructive pulmonary disease)     Coronary artery disease     s/p 2 stents    Depression     GERD (gastroesophageal reflux disease)     Glaucoma     Hypertension     Obstructive sleep apnea     on cpap    Stroke      Urinary, incontinence, stress female      Past Surgical History:   Procedure Laterality Date    APPENDECTOMY      BACK SURGERY      CHOLECYSTECTOMY      EXPLORATION OF COMMON BILE DUCT      HERNIA REPAIR      incisional hernia times 2, central abdomen    HYSTERECTOMY      OVARIAN CYST REMOVAL      TONSILLECTOMY       Family History       Problem Relation (Age of Onset)    Cancer Father    Heart disease Mother          Tobacco Use    Smoking status: Never    Smokeless tobacco: Not on file   Substance and Sexual Activity    Alcohol use: Not Currently    Drug use: Not Currently    Sexual activity: Not on file     Review of Systems  Negative except as detailed in HPI, all other systems reviewed and negative.      Objective:     Vital Signs (Most Recent):  Temp: 97.7 °F (36.5 °C) (03/05/24 0447)  Pulse: 77 (03/05/24 0447)  Resp: 17 (03/05/24 0004)  BP: (!) 167/83 (03/05/24 0447)  SpO2: 96 % (03/05/24 0447) Vital Signs (24h Range):  Temp:  [96.8 °F (36 °C)-97.9 °F (36.6 °C)] 97.7 °F (36.5 °C)  Pulse:  [59-82] 77  Resp:  [17-22] 17  SpO2:  [93 %-97 %] 96 %  BP: (121-189)/(48-99) 167/83     Weight: 91.2 kg (201 lb 1 oz)  Body mass index is 37.99 kg/m².      Physical Exam  Constitutional:       Comments: Pleasant elderly female, NAD   HENT:      Head: Normocephalic.   Eyes:      Extraocular Movements: Extraocular movements intact.   Pulmonary:      Effort: Pulmonary effort is normal.   Musculoskeletal:      Cervical back: Normal range of motion.      Comments: Left knee bruising/swelling with intact skin   Skin:     General: Skin is warm and dry.      Capillary Refill: Capillary refill takes less than 2 seconds.   Neurological:      General: No focal deficit present.      Mental Status: She is oriented to person, place, and time.   Psychiatric:         Mood and Affect: Mood normal.            Significant Labs:  CBC:   Recent Labs   Lab 03/05/24  0558   WBC 9.80   RBC 4.40   HGB 12.5   HCT 39.0      MCV 88.6   MCH  28.4   MCHC 32.1*     BMP:   Recent Labs   Lab 03/05/24  0557      K 3.5   CO2 25   BUN 13.8   CREATININE 0.78   CALCIUM 9.0       Significant Diagnostics:  Results for orders placed or performed during the hospital encounter of 03/04/24 (from the past 2160 hour(s))   CT Knee Without Contrast Left    Narrative    EXAMINATION  CT KNEE WITHOUT CONTRAST LEFT    CLINICAL HISTORY  Knee trauma, occult fracture suspected, xray done;    TECHNIQUE  Non-contrast helical-acquisition CT images of the left knee were obtained.  Multiplanar reconstructions accomplished by a CT technologist at a separate workstation, pushed to PACS for physician review.    COMPARISON  Radiographs obtained earlier the same day.    FINDINGS  Images were reviewed in bone and soft tissue windows.    Exam quality: Limited secondary to arthroplasty hardware resulting in severe streak artifact through the level of the distal femur and proximal tibia.    Within limitations, no convincing gross hardware displacement or osseous loosening is identified.  Joint spacing and alignment are preserved.  There is no definite acutely displaced fracture.  No joint effusion is identified.  There are no destructive osseous lesions.    No convincing acute or focal subcutaneous abnormality is identified.  Regional musculature and tendinous structures are grossly normal for non-contrast CT appearance.    IMPRESSION  1. Limited exam secondary to regional hardware resulting streak artifact.  2. Within limitations, no convincing evidence of acute abnormality.    RADIATION DOSE  Automated tube current modulation, weight-based exposure dosing, and/or iterative reconstruction technique utilized to reach lowest reasonably achievable exposure rate.    DLP: 246 mGy*cm      Electronically signed by: Wilbert Moore  Date:    03/04/2024  Time:    15:12   Results for orders placed or performed during the hospital encounter of 02/09/24 (from the past 2160 hour(s))   CTA Head and  Neck (xpd)    Narrative    EXAMINATION:  CTA HEAD AND NECK (XPD)    CLINICAL HISTORY:  Stroke/TIA    TECHNIQUE:  Brain and imaging was performed from skull base to vertex prior to intravenous contrast. CT Angiogram of head and was subsequently performed following intravenous contrast administration.  Coronal and sagittal MPR reconstructions were performed in addition to coronal and sagittal MIP reconstructions.    Dose length product was 1404 mGycm. Automated exposure control was utilized to minimize radiation dose.    COMPARISON:  CT brain and MRI brain same date.    FINDINGS:  Carotid artery assessed in accordance with the NASCET criteria.    Visualized portion of the thoracic aorta is without aneurysmal dilatation or dissection.  There is no hemodynamically significant stenosis involving the brachiocephalic trunk and the subclavian arteries.  There is mild tortuosity of the proximal common carotid arteries which otherwise show unremarkable contrast opacification without hemodynamically significant stenosis, intraluminal thrombus, dissection or aneurysmal prominence.  Bilateral internal carotid arteries are patent without hemodynamically significant stenosis.  There are mild calcified plaques which involve the cavernous and supraclinoid portion of the internal carotid arteries without significant stenosis.    There is unremarkable flow within the prox M1 segment right middle cerebral artery.  There is marked hemodynamically significant stenosis of the distal M1 segment of the right middle cerebral artery but with unremarkable flow distally within the sylvian and the temporal branches.  This is best seen on the reformatted image 9 series 01/07/2004.  The A1 segment of the left anterior cerebral artery is hypoplastic.  A1 segment of the right anterior cerebral artery, bilateral A2 segments of the anterior cerebral arteries and the left middle cerebral artery major branches are patent.    There is flow seen  bilaterally within the vertebral arteries.  The right vertebral artery is relatively dominant.  Flow is also seen within the basilar artery.  Right posterior cerebral artery is unremarkable.  There is hypoplasia versus narrowing of the P1 segment of the left posterior cerebral artery.  P2 segment left posterior cerebral artery is patent.      Impression    1.  Hemodynamically significant stenosis of the distal M1 segment of the right middle cerebral artery.    2.  Details of the findings above.      Electronically signed by: Anastacio Preston  Date:    02/09/2024  Time:    21:31   CT Head Without Contrast    Narrative    EXAMINATION:  CT HEAD WITHOUT CONTRAST    CLINICAL HISTORY:  Transient ischemic attack (TIA);    TECHNIQUE:  CT imaging of the head performed from the skull base to the vertex without intravenous contrast.  mGycm. Automatic exposure control, adjustment of mA/kV or iterative reconstruction technique was used to reduce radiation.    COMPARISON:  27 August 2023    FINDINGS:  There is no acute cortical infarct, hemorrhage or mass lesion.  No new parenchymal attenuation abnormality.  Ventricular size is stable.  There are vascular calcifications.    Visualized paranasal sinuses and mastoid air cells are clear.      Impression    No acute intracranial findings.      Electronically signed by: Gunnar Dutton  Date:    02/09/2024  Time:    14:23         ASSESSMENT:   Fall with left knee hematoma  HTN  HLD  CAD/stents  Afib on Xarelto  Hx of CVA    PLAN:  Large left knee hematoma after fall on eliquis. Currently, there is no skin necrosis and borders are not yet well demarcated.   Will have to monitor for skin necrosis as hematoma evolves.   Currently no indication for surgical evacuation at this time but if skin necrosis occurs it may warrant evacuation and washout.    Recommend continue bedrest and hold anticoagulation.   Will follow.

## 2024-03-05 NOTE — CONSULTS
[facility] - 5th Floor Med Surg  Plastic Surgery  Consult Note    Patient Name: Katty Veronica  MRN: [id number]  Code Status: Full Code  Admission Date: 3/4/2024  Hospital Length of Stay: 0 days  Attending Physician: Cooper Ca MD  Primary Care Provider: Sheila Moreira DO    Consults    HPI: Patient is a 78  yr white female with a past medical history of hypertension, hyperlipidemia, coronary artery disease status post stents on Plavix, GERD, CVA, obstructive sleep apnea not on CPAP, glaucoma, atrial fibrillation on Xarelto and chronic lower back pain with spinal stimulator. The patient presented to [facility] on 3/4/2024 with a primary complaint of left knee pain following a fall.  Patient was walking in her apartment with her walker when went to plug in her phone and tripped and fell onto her left side.  Patient reports she was unable to get up following the fall due to significant knee pain.  Prior to the fall she was having no left knee pain.  She reports having a total knee replacement on the left about a year ago.  She denies loss of consciousness head injury with fall.  She reports chronic lower back pain with no in acuity and denies complaints of chest pain, shortness of breath, abdominal pain, nausea, vomiting and diarrhea.  At baseline patient lives alone, ambulates with a walker and completes activities of daily living independently.     X-ray of the right shoulder, x-ray of the left knee, x-ray of the left hip, x-ray of the right hip with no acute abnormalities.  CTA of the left knee was limited exam secondary to regional hardware resulted streaking artifact but within limitations no convincing evidence of acute abnormality.  Patient was admitted to hospital medicine services for further medical management. Ortho consulted for left knee injury and recommended PRS consult for evaluation.     Today: Patient seen today laying in bed, she endorses left knee pain/tenderness.     No  current facility-administered medications on file prior to encounter.     Current Outpatient Medications on File Prior to Encounter   Medication Sig    amLODIPine (NORVASC) 5 MG tablet 1 tablet Orally Once a day    aspirin (ECOTRIN) 81 MG EC tablet Take 1 tablet (81 mg total) by mouth once daily.    atorvastatin (LIPITOR) 80 MG tablet Take 80 mg by mouth every evening.    carvediloL (COREG) 25 MG tablet Take 1 tablet (25 mg total) by mouth 2 (two) times daily.    cetirizine (ZYRTEC) 10 MG tablet Take 10 mg by mouth once daily.    ergocalciferol (VITAMIN D2) 50,000 unit Cap Take 50,000 Units by mouth every 7 days. Takes on Sunday AM    famotidine (PEPCID) 20 MG tablet Take 1 tablet by mouth every evening.    gabapentin (NEURONTIN) 300 MG capsule Take 300 mg by mouth 3 (three) times daily.    methocarbamoL (ROBAXIN) 500 MG Tab Take 500 mg by mouth 2 (two) times a day.    metoclopramide HCl (REGLAN) 5 MG tablet Take 5 mg by mouth 4 (four) times daily before meals and nightly.    pantoprazole (PROTONIX) 40 MG tablet Take 40 mg by mouth.    rivaroxaban (XARELTO) 20 mg Tab Take 20 mg by mouth daily with dinner or evening meal.    sertraline (ZOLOFT) 50 MG tablet Take 50 mg by mouth once daily.    timolol maleate 0.5% (TIMOPTIC) 0.5 % Drop Place 1 drop into both eyes 2 (two) times daily.    nitroGLYCERIN (NITROSTAT) 0.4 MG SL tablet Place 1 tablet (0.4 mg total) under the tongue every 5 (five) minutes as needed for Chest pain.    ondansetron (ZOFRAN-ODT) 8 MG TbDL Take 8 mg by mouth every 6 (six) hours as needed.       Review of patient's allergies indicates:   Allergen Reactions    Morphine Itching    Norco [hydrocodone-acetaminophen]        Past Medical History:   Diagnosis Date    Anxiety     COPD (chronic obstructive pulmonary disease)     Coronary artery disease     s/p 2 stents    Depression     GERD (gastroesophageal reflux disease)     Glaucoma     Hypertension     Obstructive sleep apnea     on cpap    Stroke      Urinary, incontinence, stress female      Past Surgical History:   Procedure Laterality Date    APPENDECTOMY      BACK SURGERY      CHOLECYSTECTOMY      EXPLORATION OF COMMON BILE DUCT      HERNIA REPAIR      incisional hernia times 2, central abdomen    HYSTERECTOMY      OVARIAN CYST REMOVAL      TONSILLECTOMY       Family History       Problem Relation (Age of Onset)    Cancer Father    Heart disease Mother          Tobacco Use    Smoking status: Never    Smokeless tobacco: Not on file   Substance and Sexual Activity    Alcohol use: Not Currently    Drug use: Not Currently    Sexual activity: Not on file     Review of Systems  Negative except as detailed in HPI, all other systems reviewed and negative.      Objective:     Vital Signs (Most Recent):  Temp: 97.7 °F (36.5 °C) (03/05/24 0447)  Pulse: 77 (03/05/24 0447)  Resp: 17 (03/05/24 0004)  BP: (!) 167/83 (03/05/24 0447)  SpO2: 96 % (03/05/24 0447) Vital Signs (24h Range):  Temp:  [96.8 °F (36 °C)-97.9 °F (36.6 °C)] 97.7 °F (36.5 °C)  Pulse:  [59-82] 77  Resp:  [17-22] 17  SpO2:  [93 %-97 %] 96 %  BP: (121-189)/(48-99) 167/83     Weight: 91.2 kg (201 lb 1 oz)  Body mass index is 37.99 kg/m².      Physical Exam  Constitutional:       Comments: Pleasant elderly female, NAD   HENT:      Head: Normocephalic.   Eyes:      Extraocular Movements: Extraocular movements intact.   Pulmonary:      Effort: Pulmonary effort is normal.   Musculoskeletal:      Cervical back: Normal range of motion.      Comments: Left knee bruising/swelling with intact skin   Skin:     General: Skin is warm and dry.      Capillary Refill: Capillary refill takes less than 2 seconds.   Neurological:      General: No focal deficit present.      Mental Status: She is oriented to person, place, and time.   Psychiatric:         Mood and Affect: Mood normal.            Significant Labs:  CBC:   Recent Labs   Lab 03/05/24  0558   WBC 9.80   RBC 4.40   HGB 12.5   HCT 39.0      MCV 88.6   MCH 28.4    [facility] 32.1*     BMP:   Recent Labs   Lab 03/05/24  0557      K 3.5   CO2 25   BUN 13.8   CREATININE 0.78   CALCIUM 9.0       Significant Diagnostics:  Results for orders placed or performed during the hospital encounter of 03/04/24 (from the past 2160 hour(s))   CT Knee Without Contrast Left    Narrative    EXAMINATION  CT KNEE WITHOUT CONTRAST LEFT    CLINICAL HISTORY  Knee trauma, occult fracture suspected, xray done;    TECHNIQUE  Non-contrast helical-acquisition CT images of the left knee were obtained.  Multiplanar reconstructions accomplished by a CT technologist at a separate workstation, pushed to PACS for physician review.    COMPARISON  Radiographs obtained earlier the same day.    FINDINGS  Images were reviewed in bone and soft tissue windows.    Exam quality: Limited secondary to arthroplasty hardware resulting in severe streak artifact through the level of the distal femur and proximal tibia.    Within limitations, no convincing gross hardware displacement or osseous loosening is identified.  Joint spacing and alignment are preserved.  There is no definite acutely displaced fracture.  No joint effusion is identified.  There are no destructive osseous lesions.    No convincing acute or focal subcutaneous abnormality is identified.  Regional musculature and tendinous structures are grossly normal for non-contrast CT appearance.    IMPRESSION  1. Limited exam secondary to regional hardware resulting streak artifact.  2. Within limitations, no convincing evidence of acute abnormality.    RADIATION DOSE  Automated tube current modulation, weight-based exposure dosing, and/or iterative reconstruction technique utilized to reach lowest reasonably achievable exposure rate.    DLP: 246 mGy*cm      Electronically signed by: Wilbert Moore  Date:    03/04/2024  Time:    15:12   Results for orders placed or performed during the hospital encounter of 02/09/24 (from the past 2160 hour(s))   CTA Head and Neck  (xpd)    Narrative    EXAMINATION:  CTA HEAD AND NECK (XPD)    CLINICAL HISTORY:  Stroke/TIA    TECHNIQUE:  Brain and imaging was performed from skull base to vertex prior to intravenous contrast. CT Angiogram of head and was subsequently performed following intravenous contrast administration.  Coronal and sagittal MPR reconstructions were performed in addition to coronal and sagittal MIP reconstructions.    Dose length product was 1404 mGycm. Automated exposure control was utilized to minimize radiation dose.    COMPARISON:  CT brain and MRI brain same date.    FINDINGS:  Carotid artery assessed in accordance with the NASCET criteria.    Visualized portion of the thoracic aorta is without aneurysmal dilatation or dissection.  There is no hemodynamically significant stenosis involving the brachiocephalic trunk and the subclavian arteries.  There is mild tortuosity of the proximal common carotid arteries which otherwise show unremarkable contrast opacification without hemodynamically significant stenosis, intraluminal thrombus, dissection or aneurysmal prominence.  Bilateral internal carotid arteries are patent without hemodynamically significant stenosis.  There are mild calcified plaques which involve the cavernous and supraclinoid portion of the internal carotid arteries without significant stenosis.    There is unremarkable flow within the prox M1 segment right middle cerebral artery.  There is marked hemodynamically significant stenosis of the distal M1 segment of the right middle cerebral artery but with unremarkable flow distally within the sylvian and the temporal branches.  This is best seen on the reformatted image 9 series 01/07/2004.  The A1 segment of the left anterior cerebral artery is hypoplastic.  A1 segment of the right anterior cerebral artery, bilateral A2 segments of the anterior cerebral arteries and the left middle cerebral artery major branches are patent.    There is flow seen bilaterally  within the vertebral arteries.  The right vertebral artery is relatively dominant.  Flow is also seen within the basilar artery.  Right posterior cerebral artery is unremarkable.  There is hypoplasia versus narrowing of the P1 segment of the left posterior cerebral artery.  P2 segment left posterior cerebral artery is patent.      Impression    1.  Hemodynamically significant stenosis of the distal M1 segment of the right middle cerebral artery.    2.  Details of the findings above.      Electronically signed by: Anastacio Preston  Date:    02/09/2024  Time:    21:31   CT Head Without Contrast    Narrative    EXAMINATION:  CT HEAD WITHOUT CONTRAST    CLINICAL HISTORY:  Transient ischemic attack (TIA);    TECHNIQUE:  CT imaging of the head performed from the skull base to the vertex without intravenous contrast.  mGycm. Automatic exposure control, adjustment of mA/kV or iterative reconstruction technique was used to reduce radiation.    COMPARISON:  27 August 2023    FINDINGS:  There is no acute cortical infarct, hemorrhage or mass lesion.  No new parenchymal attenuation abnormality.  Ventricular size is stable.  There are vascular calcifications.    Visualized paranasal sinuses and mastoid air cells are clear.      Impression    No acute intracranial findings.      Electronically signed by: Gunnar Dutton  Date:    02/09/2024  Time:    14:23         ASSESSMENT:   Fall with left knee hematoma  HTN  HLD  CAD/stents  Afib on Xarelto  Hx of CVA    PLAN:  Large left knee hematoma after fall on eliquis. Currently, there is no skin necrosis and borders are not yet well demarcated.   Will have to monitor for skin necrosis as hematoma evolves.   Currently no indication for surgical evacuation at this time but if skin necrosis occurs it may warrant evacuation and washout.    Recommend continue bedrest and hold anticoagulation.   Will follow.           Thank you for your consult.     BRENTON Cha  Plastic Surgery  [facility] - 5th Floor Med Surg

## 2024-03-05 NOTE — CONSULTS
[facility] - 5th Floor Med Surg  Orthopedic Trauma  Consult Note    Patient Name: Katty Veronica  MRN: [id number]  Admission Date: 3/4/2024  Hospital Length of Stay: 0 days  Attending Provider: Cooper Ca MD  Primary Care Provider: Sheila Moreira DO        Inpatient consult to Orthopedic Surgery  Consult performed by: Augustine Haney DO  Consult ordered by: Radha Andrews NP        Subjective:         Chief Complaint:   Chief Complaint   Patient presents with    Knee Pain     L knee pain after trip & fall onto knee today. L knee hematoma noted. Did not hit head. On Xarelto.         HPI:  patient is a 78-year-old female who fell onto the left knee today.  She has a left knee hematoma noted.  She was status post total knee arthroplasty.  Plastics on board.  She is dull achy pain left knee weakness with knee extension.  No numbness no tingling.    Past Medical History:   Diagnosis Date    Anxiety     COPD (chronic obstructive pulmonary disease)     Coronary artery disease     s/p 2 stents    Depression     GERD (gastroesophageal reflux disease)     Glaucoma     Hypertension     Obstructive sleep apnea     on cpap    Stroke     Urinary, incontinence, stress female        Past Surgical History:   Procedure Laterality Date    APPENDECTOMY      BACK SURGERY      CHOLECYSTECTOMY      EXPLORATION OF COMMON BILE DUCT      HERNIA REPAIR      incisional hernia times 2, central abdomen    HYSTERECTOMY      OVARIAN CYST REMOVAL      TONSILLECTOMY         Review of patient's allergies indicates:   Allergen Reactions    Morphine Itching    Norco [hydrocodone-acetaminophen]        Current Facility-Administered Medications   Medication    acetaminophen tablet 650 mg    acetaminophen tablet 650 mg    amLODIPine tablet 5 mg    atorvastatin tablet 80 mg    carvediloL tablet 25 mg    dextrose 10% bolus 125 mL 125 mL    dextrose 10% bolus 250 mL 250 mL    famotidine tablet 20 mg    glucagon (human  "recombinant) injection 1 mg    glucose chewable tablet 16 g    glucose chewable tablet 24 g    hydrALAZINE injection 10 mg    ketorolac injection 30 mg    methocarbamoL tablet 500 mg    metoclopramide HCl 5 mg/5 mL solution 5 mg    ondansetron injection 4 mg    oxyCODONE-acetaminophen  mg per tablet 1 tablet    oxyCODONE-acetaminophen 5-325 mg per tablet 1 tablet    sertraline tablet 50 mg    sodium chloride 0.9% flush 10 mL    timolol maleate 0.5% ophthalmic solution 1 drop     Family History       Problem Relation (Age of Onset)    Cancer Father    Heart disease Mother          Tobacco Use    Smoking status: Never    Smokeless tobacco: Not on file   Substance and Sexual Activity    Alcohol use: Not Currently    Drug use: Not Currently    Sexual activity: Not on file       ROS:  Constitutional: Denies fever chills  Eyes: No change in vision  ENT: No ringing or current infections  CV: No chest pain  Resp: No labored breathing  MSK: Pain evident at site of injury located in HPI,   Integ: No signs of abrasions or lacerations  Neuro: No numbness or tingling  Lymphatic: No swelling outside the area of injury   Objective:     Vital Signs (Most Recent):  Temp: 97.7 °F (36.5 °C) (03/05/24 0447)  Pulse: 77 (03/05/24 0447)  Resp: 17 (03/05/24 0004)  BP: (!) 167/83 (03/05/24 0447)  SpO2: 96 % (03/05/24 0447) Vital Signs (24h Range):  Temp:  [96.8 °F (36 °C)-97.9 °F (36.6 °C)] 97.7 °F (36.5 °C)  Pulse:  [59-82] 77  Resp:  [17-22] 17  SpO2:  [93 %-97 %] 96 %  BP: (121-189)/(48-99) 167/83     Weight: 91.2 kg (201 lb 1 oz)  Height: 5' 1" (154.9 cm)  Body mass index is 37.99 kg/m².      Intake/Output Summary (Last 24 hours) at 3/5/2024 6795  Last data filed at 3/5/2024 0512  Gross per 24 hour   Intake --   Output 350 ml   Net -350 ml       Ortho/SPM Exam  General the patient is alert and oriented x3 no acute distress nontoxic-appearing appropriate affect.    Constitutional: Vital signs are examined and stable.  Resp: No " signs of labored breathing             LLE: -Skin:  large amount of ecchymosis and swelling to the anterior medial aspect of the leg.  No sign of blistering at this time           -MSK:  lower extremity strength intact.  0/5 knee extension           -Neuro:   previous numbness from total knee surgery.  No changes           -Lymphatic: No signs of lymphadenopathy           -CV: Capillary refill is less than 2 seconds. DP/PT pulses 2/4. Compartments soft and compressible                      RLE: -Skin:   Straight leg raise intact    Significant Labs:  I have reviewed all labs in relation to Orthopedics  Recent Lab Results  (Last 5 results in the past 72 hours)        03/05/24  0558   03/05/24  0557   03/05/24  0453   03/04/24  1711   03/04/24  1557        Albumin/Globulin Ratio   1.0     1.0         Albumin   3.2     3.4         ALP   86     84         ALT   16     16         Appearance, UA     Clear           PTT         32.1  Comment: For Minimal Heparin Infusion, the goal aPTT 64-85 seconds corresponds to an anti-Xa of 0.3-0.5.    For Low Intensity and High Intensity Heparin, the goal aPTT  seconds corresponds to an anti-Xa of 0.3-0.7       AST   15     16         Bacteria, UA     Moderate           Baso # 0.03         0.04       Basophil % 0.3         0.3       BILIRUBIN TOTAL   0.6     0.3         Bilirubin, UA     Negative           BUN   13.8     15.5         Calcium   9.0     8.9         Chloride   108     107         CO2   25     20         Color, UA     Light-Yellow           Creatinine   0.78     0.84         eGFR   >60     >60         Eos # 0.11         0.14       Eos % 1.1         1.1       Globulin, Total   3.2     3.3         Glucose   112     139         Glucose, UA     Normal           Hematocrit 39.0         39.3       Hemoglobin 12.5         13.0       Immature Grans (Abs) 0.03         0.05       Immature Granulocytes 0.3         0.4       INR         1.8       Ketones, UA     Negative            Leukocyte Esterase, UA     75           Lymph # 3.65         3.67       LYMPH % 37.2         28.9       MCH 28.4         28.4       MCHC 32.1         33.1       MCV 88.6         86.0       Mono # 0.69         0.84       Mono % 7.0         6.6       MPV 9.9         10.8       Mucous, UA     Trace           Neut # 5.29         7.98       Neut % 54.1         62.7       NITRITE UA     Negative           nRBC 0.0         0.0       Blood, UA     Negative           pH, UA     5.5           Platelet Count 182         217       Potassium   3.5     3.2         PROTEIN TOTAL   6.4     6.7         Protein, UA     Negative           PT         20.8       RBC 4.40         4.57       RBC, UA     0-5           RDW 15.9         16.1       Sodium   141     139         Specific Gravity,UA     1.010           Squamous Epithelial Cells, UA     Occasional           Troponin I   <0.010             Urobilinogen, UA     Normal           WBC, UA     6-10           WBC 9.80         12.72                              Significant Imaging: I have reviewed all pertinent imaging results/findings.  X-Ray Chest 1 View    Result Date: 3/4/2024  EXAMINATION: XR CHEST 1 VIEW CLINICAL HISTORY: Leukocytosis; TECHNIQUE: Single frontal view of the chest was performed. COMPARISON: 11/18/2023 FINDINGS: The lungs are clear.  The heart is normal appearance.  The pulmonary vascularity is unremarkable.  Aorta appears grossly unremarkable.  No pleural effusions are seen.  Bones and joints show no acute abnormality.  There is a spinal stimulator device seen at the level the midthoracic spine     No abnormality seen Electronically signed by: Fritz Zheng Date:    03/04/2024 Time:    18:37    X-Ray Hip 2 or 3 views Left (with Pelvis when performed)    Result Date: 3/4/2024  EXAMINATION: Three views pelvis and left hip CLINICAL HISTORY: Fall COMPARISON: 03/04/2024 FINDINGS: No displaced fracture or dislocation.     No displaced fracture Electronically signed  by: Hector Marcano MD Date:    03/04/2024 Time:    16:44    X-Ray Hip 2 or 3 views Right (with Pelvis when performed)    Result Date: 3/4/2024  EXAMINATION: Three views pelvis and right hip CLINICAL HISTORY: Fall COMPARISON: 01/30/2024 FINDINGS: No displaced fracture or dislocation.     No displaced fracture Electronically signed by: Hector Marcano MD Date:    03/04/2024 Time:    16:44    CT Knee Without Contrast Left    Result Date: 3/4/2024  EXAMINATION CT KNEE WITHOUT CONTRAST LEFT CLINICAL HISTORY Knee trauma, occult fracture suspected, xray done; TECHNIQUE Non-contrast helical-acquisition CT images of the left knee were obtained.  Multiplanar reconstructions accomplished by a CT technologist at a separate workstation, pushed to PACS for physician review. COMPARISON Radiographs obtained earlier the same day. FINDINGS Images were reviewed in bone and soft tissue windows. Exam quality: Limited secondary to arthroplasty hardware resulting in severe streak artifact through the level of the distal femur and proximal tibia. Within limitations, no convincing gross hardware displacement or osseous loosening is identified.  Joint spacing and alignment are preserved.  There is no definite acutely displaced fracture.  No joint effusion is identified.  There are no destructive osseous lesions. No convincing acute or focal subcutaneous abnormality is identified.  Regional musculature and tendinous structures are grossly normal for non-contrast CT appearance. IMPRESSION 1. Limited exam secondary to regional hardware resulting streak artifact. 2. Within limitations, no convincing evidence of acute abnormality. RADIATION DOSE Automated tube current modulation, weight-based exposure dosing, and/or iterative reconstruction technique utilized to reach lowest reasonably achievable exposure rate. DLP: 246 mGy*cm Electronically signed by: Wilbert Moore Date:    03/04/2024 Time:    15:12    X-Ray Knee Complete 4 or More Views  Left    Result Date: 3/4/2024  EXAMINATION: XR KNEE COMP 4 OR MORE VIEWS LEFT CLINICAL HISTORY: Unspecified fall, initial encounter TECHNIQUE: AP, lateral, and oblique views of the left knee. COMPARISON: None FINDINGS: Prior total knee arthroplasty.  Prosthetic components are aligned.  No acute fracture appreciated.  No significant knee effusion.     No acute osseous process appreciated. Electronically signed by: Arnold Tate Date:    03/04/2024 Time:    12:58    X-Ray Shoulder Complete 2 View Right    Result Date: 3/4/2024  EXAMINATION: XR SHOULDER COMPLETE 2 OR MORE VIEWS RIGHT CLINICAL HISTORY: Unspecified fall, initial encounter TECHNIQUE: Two or three views of the right shoulder. COMPARISON: None FINDINGS: No acute fracture identified.  Glenohumeral and AC joints are aligned.     No acute osseous process appreciated. Electronically signed by: Arnold Tate Date:    03/04/2024 Time:    12:56    CTA Head and Neck (xpd)    Addendum Date: 2/13/2024    There is a typing error in the previous report.  There is mild stenosis of distal M1 segment of the right middle cerebral artery only seen on the MIP images. Electronically signed by: Anastacio Preston Date:    02/13/2024 Time:    12:46    Result Date: 2/13/2024  EXAMINATION: CTA HEAD AND NECK (XPD) CLINICAL HISTORY: Stroke/TIA TECHNIQUE: Brain and imaging was performed from skull base to vertex prior to intravenous contrast. CT Angiogram of head and was subsequently performed following intravenous contrast administration.  Coronal and sagittal MPR reconstructions were performed in addition to coronal and sagittal MIP reconstructions. Dose length product was 1404 mGycm. Automated exposure control was utilized to minimize radiation dose. COMPARISON: CT brain and MRI brain same date. FINDINGS: Carotid artery assessed in accordance with the NASCET criteria. Visualized portion of the thoracic aorta is without aneurysmal dilatation or dissection.  There is no hemodynamically  significant stenosis involving the brachiocephalic trunk and the subclavian arteries.  There is mild tortuosity of the proximal common carotid arteries which otherwise show unremarkable contrast opacification without hemodynamically significant stenosis, intraluminal thrombus, dissection or aneurysmal prominence.  Bilateral internal carotid arteries are patent without hemodynamically significant stenosis.  There are mild calcified plaques which involve the cavernous and supraclinoid portion of the internal carotid arteries without significant stenosis. There is unremarkable flow within the prox M1 segment right middle cerebral artery.  There is marked hemodynamically significant stenosis of the distal M1 segment of the right middle cerebral artery but with unremarkable flow distally within the sylvian and the temporal branches.  This is best seen on the reformatted image 9 series 01/07/2004.  The A1 segment of the left anterior cerebral artery is hypoplastic.  A1 segment of the right anterior cerebral artery, bilateral A2 segments of the anterior cerebral arteries and the left middle cerebral artery major branches are patent. There is flow seen bilaterally within the vertebral arteries.  The right vertebral artery is relatively dominant.  Flow is also seen within the basilar artery.  Right posterior cerebral artery is unremarkable.  There is hypoplasia versus narrowing of the P1 segment of the left posterior cerebral artery.  P2 segment left posterior cerebral artery is patent.     1.  Hemodynamically significant stenosis of the distal M1 segment of the right middle cerebral artery. 2.  Details of the findings above. Electronically signed by: Anastacio Preston Date:    02/09/2024 Time:    21:31    Echo Saline Bubble? Yes    Result Date: 2/10/2024    Left Ventricle: The left ventricle is normal in size. Ventricular mass is normal. Mildly increased wall thickness. There is concentric remodeling. Normal wall motion. There  is normal systolic function with a visually estimated ejection fraction of 55 - 60%. Grade I diastolic dysfunction.   Right Ventricle: Normal right ventricular cavity size. Systolic function is normal.   Left Atrium: Normal left atrial size. Bubble study with agitated saline is inconclusive.   Right Atrium: Normal right atrial size.   Aortic Valve: The aortic valve is a trileaflet valve. There is annular calcification present. There is no stenosis. There is trace aortic regurgitation.   Mitral Valve: The mitral valve is structurally normal. There is no stenosis. There is trace regurgitation.   Tricuspid Valve: The tricuspid valve is structurally normal. There is trace regurgitation.   Aorta: Aortic root is normal in size measuring 3.4 cm.   IVC/SVC: IVC was not well visualized due to poor acoustic window.   Pericardium: There is no pericardial effusion.     CV Ultrasound Bilateral Doppler Carotid    Result Date: 2/10/2024  The bilateral internal carotid artery demonstrated less than 50% stenosis. The bilateral vertebral arteries were patent with antegrade flow.     MRI Brain Without Contrast    Result Date: 2/9/2024  EXAMINATION: MRI BRAIN WITHOUT CONTRAST CLINICAL HISTORY: Stroke, follow up; TECHNIQUE: Multiplanar MRI sequences were performed of the brain without contrast. COMPARISON: MRI brain and November 12, 2014.  CT brain February 9, 2024 FINDINGS: Interval progression of chronic microvascular ischemia which is now moderate and is seen in the periventricular deep white matter T2 FLAIR hyperintense signals.  There are right cerebral and bilateral basal ganglia old lacunar infarcts.  Generalized cerebral and cerebellar cortical volume loss. Gradient echo sequences demonstrate no evidence of de phasing artifact to suggest hemorrhagic byproducts. No evidence of diffusion restriction or ADC map signal drop out to suggest acute infarct. The sella and suprasellar areas are unremarkable. The cerebellar tonsils are  normally positioned. There is no acute intracranial hemorrhage, hydrocephalus, midline shift or mass effect. No acute extra axial fluid collections identified. The mastoid air cells are clear.     1.  No acute intracranial findings identified. 2.  Old lacunar infarcts, chronic microangiopathic ischemia and atrophy. Electronically signed by: Anastacio Preston Date:    02/09/2024 Time:    21:31    CT Head Without Contrast    Result Date: 2/9/2024  EXAMINATION: CT HEAD WITHOUT CONTRAST CLINICAL HISTORY: Transient ischemic attack (TIA); TECHNIQUE: CT imaging of the head performed from the skull base to the vertex without intravenous contrast.  mGycm. Automatic exposure control, adjustment of mA/kV or iterative reconstruction technique was used to reduce radiation. COMPARISON: 27 August 2023 FINDINGS: There is no acute cortical infarct, hemorrhage or mass lesion.  No new parenchymal attenuation abnormality.  Ventricular size is stable.  There are vascular calcifications. Visualized paranasal sinuses and mastoid air cells are clear.     No acute intracranial findings. Electronically signed by: Gunnar Dutton Date:    02/09/2024 Time:    14:23       Assessment/Plan:     Active Diagnoses:    Diagnosis Date Noted POA    PRINCIPAL PROBLEM:  Hematoma of left knee region [S80.02XA] 03/05/2024 Yes      Problems Resolved During this Admission:       Independent Radiology ordered by other provider:   Two views left knee CT left knee showing no sign of fracture no sign of lipohemarthrosis.  Patella tendon appears to be intact quad tendon difficult to appreciate    Pt has acute injury with risk of severe bodily function with their injury has a left knee.           Discussed case with Plastic surgery.  At this time we will continue to observe the soft tissues.  Patient unable to straight leg raise on exam concern for possible extensor mechanism disruption.  We will order MRI to rule out quad or patella tendon tear.  Patient needs  knee immobilizer to the left leg.  Patient may have a diet for today.          This note/OR report was created with the assistance of  voice recognition software or phone  dictation.  There may be transcription errors as a result of using this technology however minimal. Effort has been made to assure accuracy of transcription but any obvious errors or omissions should be clarified with the author of the document.       Augustine Haney,    Orthopedic Trauma Surgery  [facility] - 5th Floor Med Surg

## 2024-03-05 NOTE — PROGRESS NOTES
[facility] Hospital Medicine Progress Note        Chief Complaint: Inpatient Follow-up for     HPI per admitting team:     Interval Hx:     Case was discussed with patient's nurse and  on the floor.    Objective/physical exam:  General: In no acute distress, afebrile  Chest: Clear to auscultation bilaterally  Heart: RRR, +S1, S2, no appreciable murmur  Abdomen: Soft, nontender, BS +  MSK: Warm, no lower extremity edema, no clubbing or cyanosis  Neurologic: Alert and oriented x4, Cranial nerve II-XII intact, Strength 5/5 in all 4 extremities    VITAL SIGNS: 24 HRS MIN & MAX LAST   Temp  Min: 96.8 °F (36 °C)  Max: 97.9 °F (36.6 °C) 97.7 °F (36.5 °C)   BP  Min: 121/84  Max: 189/98 (!) 167/83   Pulse  Min: 59  Max: 82  77   Resp  Min: 17  Max: 22 17   SpO2  Min: 93 %  Max: 97 % 96 %     I have reviewed the following labs:  Recent Labs   Lab 03/04/24  1557 03/05/24  0558   WBC 12.72* 9.80   RBC 4.57 4.40   HGB 13.0 12.5   HCT 39.3 39.0   MCV 86.0 88.6   MCH 28.4 28.4   MCHC 33.1 32.1*   RDW 16.1 15.9    182   MPV 10.8* 9.9     Recent Labs   Lab 03/04/24  1711 03/05/24  0557    141   K 3.2* 3.5   CO2 20* 25   BUN 15.5 13.8   CREATININE 0.84 0.78   CALCIUM 8.9 9.0   ALBUMIN 3.4 3.2*   ALKPHOS 84 86   ALT 16 16   AST 16 15   BILITOT 0.3 0.6     Microbiology Results (last 7 days)       ** No results found for the last 168 hours. **             See below for Radiology    Scheduled Med:   amLODIPine  5 mg Oral Daily    atorvastatin  80 mg Oral QHS    carvediloL  25 mg Oral BID    famotidine  20 mg Oral QHS    methocarbamoL  500 mg Oral BID    metoclopramide HCl  5 mg Oral QID (AC & HS)    sertraline  50 mg Oral Daily    timolol maleate 0.5%  1 drop Both Eyes BID      Continuous Infusions:     PRN Meds:  acetaminophen, acetaminophen, dextrose 10%, dextrose 10%, glucagon (human recombinant), glucose, glucose, hydrALAZINE, ketorolac, ondansetron, oxyCODONE-acetaminophen,  oxyCODONE-acetaminophen, sodium chloride 0.9%     Assessment/Plan:  Left knee pain secondary to mechanical fall, w/ large hematoma on oral anticoagulation at home  Fall   Essential hypertension  Hyperlipidemia   History of coronary artery disease status post stents  GERD   History of CVA   History of SABINA not on CPAP   Glaucoma  History of AFib    H&H has been fairly stable  MRI of the knee has been ordered to rule out quad or patella tendon tear as per Orthopedics   Plastic surgery was consulted to for now they want to observe all blood thinners on hold and maintain bedrest and if if gets worse then she may require debridement  No plans for surgical intervention today Will start on a diet   Continue with other home medications that includes amlodipine, Coreg, methocarbamol, sertraline, timolol, atorvastatin, famotidine    Prophylaxis SCD no chemical prophylaxis because of large hematoma    VTE prophylaxis:     Patient condition:  Stable/Fair/Guarded/ Serious/ Critical    Anticipated discharge and Disposition:         All diagnosis and differential diagnosis have been reviewed; assessment and plan has been documented; I have personally reviewed the labs and test results that are presently available; I have reviewed the patients medication list; I have reviewed the consulting providers response and recommendations. I have reviewed or attempted to review medical records based upon their availability    All of the patient's questions have been  addressed and answered. Patient's is agreeable to the above stated plan. I will continue to monitor closely and make adjustments to medical management as needed.  _____________________________________________________________________    Nutrition Status:    Radiology:  I have personally reviewed the following imaging and agree with the radiologist.     X-Ray Chest 1 View  Narrative: EXAMINATION:  XR CHEST 1 VIEW    CLINICAL HISTORY:  Leukocytosis;    TECHNIQUE:  Single frontal  view of the chest was performed.    COMPARISON:  11/18/2023    FINDINGS:  The lungs are clear.  The heart is normal appearance.  The pulmonary vascularity is unremarkable.  Aorta appears grossly unremarkable.  No pleural effusions are seen.  Bones and joints show no acute abnormality.  There is a spinal stimulator device seen at the level the midthoracic spine  Impression: No abnormality seen    Electronically signed by: Fritz Zheng  Date:    03/04/2024  Time:    18:37  X-Ray Hip 2 or 3 views Left (with Pelvis when performed)  Narrative: EXAMINATION:  Three views pelvis and left hip    CLINICAL HISTORY:  Fall    COMPARISON:  03/04/2024    FINDINGS:  No displaced fracture or dislocation.  Impression: No displaced fracture    Electronically signed by: Hector Marcano MD  Date:    03/04/2024  Time:    16:44  X-Ray Hip 2 or 3 views Right (with Pelvis when performed)  Narrative: EXAMINATION:  Three views pelvis and right hip    CLINICAL HISTORY:  Fall    COMPARISON:  01/30/2024    FINDINGS:  No displaced fracture or dislocation.  Impression: No displaced fracture    Electronically signed by: Hector Marcano MD  Date:    03/04/2024  Time:    16:44  CT Knee Without Contrast Left  EXAMINATION  CT KNEE WITHOUT CONTRAST LEFT    CLINICAL HISTORY  Knee trauma, occult fracture suspected, xray done;    TECHNIQUE  Non-contrast helical-acquisition CT images of the left knee were obtained.  Multiplanar reconstructions accomplished by a CT technologist at a separate workstation, pushed to PACS for physician review.    COMPARISON  Radiographs obtained earlier the same day.    FINDINGS  Images were reviewed in bone and soft tissue windows.    Exam quality: Limited secondary to arthroplasty hardware resulting in severe streak artifact through the level of the distal femur and proximal tibia.    Within limitations, no convincing gross hardware displacement or osseous loosening is identified.  Joint spacing and alignment are preserved.   There is no definite acutely displaced fracture.  No joint effusion is identified.  There are no destructive osseous lesions.    No convincing acute or focal subcutaneous abnormality is identified.  Regional musculature and tendinous structures are grossly normal for non-contrast CT appearance.    IMPRESSION  1. Limited exam secondary to regional hardware resulting streak artifact.  2. Within limitations, no convincing evidence of acute abnormality.    RADIATION DOSE  Automated tube current modulation, weight-based exposure dosing, and/or iterative reconstruction technique utilized to reach lowest reasonably achievable exposure rate.    DLP: 246 mGy*cm    Electronically signed by: Wilbert Moore  Date:    03/04/2024  Time:    15:12  X-Ray Knee Complete 4 or More Views Left  Narrative: EXAMINATION:  XR KNEE COMP 4 OR MORE VIEWS LEFT    CLINICAL HISTORY:  Unspecified fall, initial encounter    TECHNIQUE:  AP, lateral, and oblique views of the left knee.    COMPARISON:  None    FINDINGS:  Prior total knee arthroplasty.  Prosthetic components are aligned.  No acute fracture appreciated.  No significant knee effusion.  Impression: No acute osseous process appreciated.    Electronically signed by: Arnold Tate  Date:    03/04/2024  Time:    12:58  X-Ray Shoulder Complete 2 View Right  Narrative: EXAMINATION:  XR SHOULDER COMPLETE 2 OR MORE VIEWS RIGHT    CLINICAL HISTORY:  Unspecified fall, initial encounter    TECHNIQUE:  Two or three views of the right shoulder.    COMPARISON:  None    FINDINGS:  No acute fracture identified.  Glenohumeral and AC joints are aligned.  Impression: No acute osseous process appreciated.    Electronically signed by: Arnold Tate  Date:    03/04/2024  Time:    12:56      Eileen Castillo MD  Department of Hospital Medicine   [facility]   03/05/2024

## 2024-03-05 NOTE — PROGRESS NOTES
Pt needs plastic surgery evaluation. No fracture. Large hematoma.     No orthopedic intervention planned.     Lyndon

## 2024-03-05 NOTE — H&P
[facility] Hospital Medicine History & Physical Examination       Patient Name: Katty Veronica  MRN: [id number]  Patient Class: OP- Observation   Admission Date: 3/4/2024   Admitting Physician: Cooper Ca MD   Length of Stay: 0  Attending Physician:  Samuel Rivera MD   Face-to-Face encounter date: 03/04/2024  Code Status: Full Code   Chief Complaint: Knee Pain (L knee pain after trip & fall onto knee today. L knee hematoma noted. Did not hit head. On Xarelto. )        Patient information was obtained from patient, patient's family, past medical records and ER records.     HISTORY OF PRESENT ILLNESS:   Katty Veronica is a 78 y.o. White female with a past medical history of hypertension, hyperlipidemia, coronary artery disease status post stents on Plavix, GERD, CVA, obstructive sleep apnea not on CPAP, glaucoma, atrial fibrillation on Xarelto and chronic lower back pain with spinal stimulator. The patient presented to [facility] on 3/4/2024 with a primary complaint of left knee pain following a fall.  Patient was walking in her apartment with her walker when went to plug in her phone and tripped and fell onto her left side.  Patient reports she was unable to get up following the fall due to significant knee pain.  Prior to the fall she was having no left knee pain.  She reports having a total knee replacement on the left about a year ago.  She denies loss of consciousness head injury with fall.  She reports chronic lower back pain with no in acuity and denies complaints of chest pain, shortness of breath, abdominal pain, nausea, vomiting and diarrhea.  At baseline patient lives alone, ambulates with a walker and completes activities of daily living independently.    Upon presentation to the ED, temperature 96.8° F, heart rate 69, blood pressure 132/48, respiratory rate 18 and SpO2 97% on room air.  Labs significant for WBC 12.72, potassium 3.2, CO2 20, glucose 139, INR  1.8.  X-ray of the right shoulder, x-ray of the left knee, x-ray of the left hip, x-ray of the right hip with no acute abnormalities.  CTA of the left knee was limited exam secondary to regional hardware resulted streaking artifact but within limitations no convincing evidence of acute abnormality.  In ED patient received Dilaudid, Zofran and Percocet.  Patient was admitted to hospital medicine services for further medical management.    PAST MEDICAL HISTORY:     Past Medical History:   Diagnosis Date    Anxiety     COPD (chronic obstructive pulmonary disease)     Coronary artery disease     s/p 2 stents    Depression     GERD (gastroesophageal reflux disease)     Glaucoma     Hypertension     Obstructive sleep apnea     on cpap    Stroke     Urinary, incontinence, stress female        PAST SURGICAL HISTORY:     Past Surgical History:   Procedure Laterality Date    APPENDECTOMY      BACK SURGERY      CHOLECYSTECTOMY      EXPLORATION OF COMMON BILE DUCT      HERNIA REPAIR      incisional hernia times 2, central abdomen    HYSTERECTOMY      OVARIAN CYST REMOVAL      TONSILLECTOMY     Cardiac stents   Left knee replacement    ALLERGIES:   Morphine and Norco [hydrocodone-acetaminophen]    FAMILY HISTORY:   Mother:  Myocardial infarction  Father: Kidney and bladder cancer    SOCIAL HISTORY:   Denies tobacco, alcohol and illicit drug use     HOME MEDICATIONS:     Prior to Admission medications    Medication Sig Start Date End Date Taking? Authorizing Provider   amLODIPine (NORVASC) 5 MG tablet 1 tablet Orally Once a day    Provider, Historical   aspirin (ECOTRIN) 81 MG EC tablet Take 1 tablet (81 mg total) by mouth once daily. 8/30/23 8/29/24  Cristopehr Davis MD   atorvastatin (LIPITOR) 80 MG tablet Take 80 mg by mouth every evening.    Provider, Historical   carvediloL (COREG) 25 MG tablet Take 1 tablet (25 mg total) by mouth 2 (two) times daily. 11/20/23   Princess Blood FNP   cetirizine (ZYRTEC) 10 MG  tablet Take 10 mg by mouth once daily.    Provider, Historical   ergocalciferol (VITAMIN D2) 50,000 unit Cap Take 50,000 Units by mouth every 7 days.    Provider, Historical   famotidine (PEPCID) 20 MG tablet Take 1 tablet by mouth every evening.    Provider, Historical   gabapentin (NEURONTIN) 300 MG capsule Take 300 mg by mouth 3 (three) times daily. 8/25/23   Provider, Historical   methocarbamoL (ROBAXIN) 500 MG Tab Take 500 mg by mouth 2 (two) times a day.    Provider, Historical   metoclopramide HCl (REGLAN) 5 MG tablet Take 5 mg by mouth 4 (four) times daily before meals and nightly.    Provider, Historical   nitroGLYCERIN (NITROSTAT) 0.4 MG SL tablet Place 1 tablet (0.4 mg total) under the tongue every 5 (five) minutes as needed for Chest pain. 11/20/23   Princess Blood FNP   ondansetron (ZOFRAN-ODT) 8 MG TbDL Take 8 mg by mouth every 6 (six) hours as needed.    Provider, Historical   pantoprazole (PROTONIX) 40 MG tablet Take 40 mg by mouth. 8/24/23   Provider, Historical   rivaroxaban (XARELTO) 20 mg Tab Take 20 mg by mouth daily with dinner or evening meal.    Provider, Historical   sertraline (ZOLOFT) 50 MG tablet Take 50 mg by mouth once daily. 8/24/23   Provider, Historical   timolol maleate 0.5% (TIMOPTIC) 0.5 % Drop Place 1 drop into both eyes 2 (two) times daily. 9/8/23   Provider, Historical       REVIEW OF SYSTEMS:   Except as documented, all other systems reviewed and negative     PHYSICAL EXAM:     VITAL SIGNS: 24 HRS MIN & MAX LAST   Temp  Min: 96.8 °F (36 °C)  Max: 96.8 °F (36 °C) 96.8 °F (36 °C)   BP  Min: 138/48  Max: 138/48 (!) 138/48   Pulse  Min: 69  Max: 69  69   Resp  Min: 18  Max: 22 (!) 22   SpO2  Min: 97 %  Max: 97 % 97 %       General appearance: Well-developed, well-nourished female in no apparent distress. No family at bedside.  HEENT: Atraumatic head. Moist mucous membranes of oral cavity.  Lungs: Clear to auscultation bilaterally. On 2L O2 via nasal cannula.   Heart:  Regular rate and rhythm. Edema to BLE.  Abdomen: Obese, soft, non-distended.  Extremities: No cyanosis, clubbing.  Significant swelling and ecchymosis to left knee mostly medial joint with tenderness and coolness to touch.  Limited range of motion secondary to pain.  Skin: No Rash. Warm and dry.  Neuro: Awake, alert and oriented. Motor and sensory exams grossly intact.  Psych/mental status: Appropriate mood and affect. Cooperative. Responds appropriately to questions.       LABS AND IMAGING:     Recent Labs   Lab 03/04/24  1557   WBC 12.72*   RBC 4.57   HGB 13.0   HCT 39.3   MCV 86.0   MCH 28.4   MCHC 33.1   RDW 16.1      MPV 10.8*       Recent Labs   Lab 03/04/24  1711      K 3.2*   CO2 20*   BUN 15.5   CREATININE 0.84   CALCIUM 8.9   ALBUMIN 3.4   ALKPHOS 84   ALT 16   AST 16   BILITOT 0.3       Microbiology Results (last 7 days)       ** No results found for the last 168 hours. **             X-Ray Hip 2 or 3 views Left (with Pelvis when performed)  Narrative: EXAMINATION:  Three views pelvis and left hip    CLINICAL HISTORY:  Fall    COMPARISON:  03/04/2024    FINDINGS:  No displaced fracture or dislocation.  Impression: No displaced fracture    Electronically signed by: Hector Marcano MD  Date:    03/04/2024  Time:    16:44  X-Ray Hip 2 or 3 views Right (with Pelvis when performed)  Narrative: EXAMINATION:  Three views pelvis and right hip    CLINICAL HISTORY:  Fall    COMPARISON:  01/30/2024    FINDINGS:  No displaced fracture or dislocation.  Impression: No displaced fracture    Electronically signed by: Hector Marcano MD  Date:    03/04/2024  Time:    16:44  CT Knee Without Contrast Left  EXAMINATION  CT KNEE WITHOUT CONTRAST LEFT    CLINICAL HISTORY  Knee trauma, occult fracture suspected, xray done;    TECHNIQUE  Non-contrast helical-acquisition CT images of the left knee were obtained.  Multiplanar reconstructions accomplished by a CT technologist at a separate workstation, pushed to PACS for  physician review.    COMPARISON  Radiographs obtained earlier the same day.    FINDINGS  Images were reviewed in bone and soft tissue windows.    Exam quality: Limited secondary to arthroplasty hardware resulting in severe streak artifact through the level of the distal femur and proximal tibia.    Within limitations, no convincing gross hardware displacement or osseous loosening is identified.  Joint spacing and alignment are preserved.  There is no definite acutely displaced fracture.  No joint effusion is identified.  There are no destructive osseous lesions.    No convincing acute or focal subcutaneous abnormality is identified.  Regional musculature and tendinous structures are grossly normal for non-contrast CT appearance.    IMPRESSION  1. Limited exam secondary to regional hardware resulting streak artifact.  2. Within limitations, no convincing evidence of acute abnormality.    RADIATION DOSE  Automated tube current modulation, weight-based exposure dosing, and/or iterative reconstruction technique utilized to reach lowest reasonably achievable exposure rate.    DLP: 246 mGy*cm    Electronically signed by: Wilbert Moore  Date:    03/04/2024  Time:    15:12  X-Ray Knee Complete 4 or More Views Left  Narrative: EXAMINATION:  XR KNEE COMP 4 OR MORE VIEWS LEFT    CLINICAL HISTORY:  Unspecified fall, initial encounter    TECHNIQUE:  AP, lateral, and oblique views of the left knee.    COMPARISON:  None    FINDINGS:  Prior total knee arthroplasty.  Prosthetic components are aligned.  No acute fracture appreciated.  No significant knee effusion.  Impression: No acute osseous process appreciated.    Electronically signed by: Arnold Tate  Date:    03/04/2024  Time:    12:58  X-Ray Shoulder Complete 2 View Right  Narrative: EXAMINATION:  XR SHOULDER COMPLETE 2 OR MORE VIEWS RIGHT    CLINICAL HISTORY:  Unspecified fall, initial encounter    TECHNIQUE:  Two or three views of the right  shoulder.    COMPARISON:  None    FINDINGS:  No acute fracture identified.  Glenohumeral and AC joints are aligned.  Impression: No acute osseous process appreciated.    Electronically signed by: Arnold Tate  Date:    03/04/2024  Time:    12:56        ASSESSMENT & PLAN:   Assessment:  Left knee pain secondary to mechanical fall, w/ large hematoma    Hx: of hypertension, hyperlipidemia, coronary artery disease status post stents stents, GERD, CVA, obstructive sleep apnea not on CPAP, glaucoma, atrial fibrillation on Xarelto    Plan:  - Physical and occupational therapy   - Percocet as needed for pain   - UA chest x-ray ordered for leukocytosis  - Ortho was consulted in the ED and recommended plastic surgery evaluation as patient has no fractures  - IV hydralazine as needed for hypertension  - Resume appropriate home medications when deemed necessary   - Labs in AM    VTE Prophylaxis: hold given hematoma, pending surgical eval     I, KAUSHIK Grimaldo, have reviewed and discussed the case with Dr. Samuel Rivera MD. Please see the following addendum for further assessment and plan from there attending MD.    KAUSHIK Ramachandran-NNEKA  03/04/2024    ________________________________________________________________________  I, Dr. Samuel Rivera assumed care of this patient.  For the patient encounter, I performed the substantive portion of the visit, I reviewed the NPPA documentation, treatment plan, and medical decision making.  I had face to face time with this patient.  I have personally reviewed the labs and test results that are presently available. I have reviewed or attempted to review medical records based upon their availability. If patient was admitted under observational status it is with my approval.      78-year-old female with AFib on Xarelto had a mechanical fall prior to arrival resulting in a large hematoma in her left knee.  Orthopedic surgery recommends Plastic surgery evaluation.  Currently on  exam she has a swollen, non erythematous left knee with significant bruising and firmness concerning for underlying hematoma.  Hold all anticoagulation, Plastic surgery consulted per ortho recommendations, analgesics after midnight.    Time seen: 9PM 3/4  Samuel Rivera MD

## 2024-03-05 NOTE — PLAN OF CARE
Problem: Adult Inpatient Plan of Care  Goal: Plan of Care Review  Outcome: Ongoing, Progressing  Goal: Patient-Specific Goal (Individualized)  Outcome: Ongoing, Progressing  Goal: Absence of Hospital-Acquired Illness or Injury  Outcome: Ongoing, Progressing  Goal: Optimal Comfort and Wellbeing  Outcome: Ongoing, Progressing  Goal: Readiness for Transition of Care  Outcome: Ongoing, Progressing     Problem: Impaired Wound Healing  Goal: Optimal Wound Healing  Outcome: Ongoing, Progressing     Problem: Skin Injury Risk Increased  Goal: Skin Health and Integrity  Outcome: Ongoing, Progressing     Problem: Fall Injury Risk  Goal: Absence of Fall and Fall-Related Injury  Outcome: Ongoing, Progressing     Problem: Pain Acute  Goal: Acceptable Pain Control and Functional Ability  Outcome: Ongoing, Progressing

## 2024-03-05 NOTE — NURSING
Nurses Note -- 4 Eyes      3/4/2024   11:05 PM      Skin assessed during: Admit      [] No Altered Skin Integrity Present    []Prevention Measures Documented      [x] Yes- Altered Skin Integrity Present or Discovered   [] LDA Added if Not in Epic (Describe Wound)   [] New Altered Skin Integrity was Present on Admit and Documented in LDA   [] Wound Image Taken    Wound Care Consulted? No    Attending Nurse:  Macey Clemente RN    Second RN/Staff Member:   Daylin Yen RN

## 2024-03-05 NOTE — PT/OT/SLP PROGRESS
"Physical Therapy      Patient Name:  Katty Veronica   MRN:  [id number]    Patient not seen today secondary to per ortho "hold until MRI results, pending review of quad tendon"  . Will follow-up pending orthopedic recommendations.    "

## 2024-03-06 LAB
BASOPHILS # BLD AUTO: 0.02 X10(3)/MCL
BASOPHILS NFR BLD AUTO: 0.3 %
EOSINOPHIL # BLD AUTO: 0.06 X10(3)/MCL (ref 0–0.9)
EOSINOPHIL NFR BLD AUTO: 0.8 %
ERYTHROCYTE [DISTWIDTH] IN BLOOD BY AUTOMATED COUNT: 15.9 % (ref 11.5–17)
HCT VFR BLD AUTO: 31 % (ref 37–47)
HGB BLD-MCNC: 10.4 G/DL (ref 12–16)
IMM GRANULOCYTES # BLD AUTO: 0.02 X10(3)/MCL (ref 0–0.04)
IMM GRANULOCYTES NFR BLD AUTO: 0.3 %
LYMPHOCYTES # BLD AUTO: 2.26 X10(3)/MCL (ref 0.6–4.6)
LYMPHOCYTES NFR BLD AUTO: 30.8 %
MCH RBC QN AUTO: 29.5 PG (ref 27–31)
MCHC RBC AUTO-ENTMCNC: 33.5 G/DL (ref 33–36)
MCV RBC AUTO: 88.1 FL (ref 80–94)
MONOCYTES # BLD AUTO: 0.64 X10(3)/MCL (ref 0.1–1.3)
MONOCYTES NFR BLD AUTO: 8.7 %
NEUTROPHILS # BLD AUTO: 4.34 X10(3)/MCL (ref 2.1–9.2)
NEUTROPHILS NFR BLD AUTO: 59.1 %
NRBC BLD AUTO-RTO: 0 %
PLATELET # BLD AUTO: 178 X10(3)/MCL (ref 130–400)
PMV BLD AUTO: 10.1 FL (ref 7.4–10.4)
RBC # BLD AUTO: 3.52 X10(6)/MCL (ref 4.2–5.4)
WBC # SPEC AUTO: 7.34 X10(3)/MCL (ref 4.5–11.5)

## 2024-03-06 PROCEDURE — 25000003 PHARM REV CODE 250: Performed by: INTERNAL MEDICINE

## 2024-03-06 PROCEDURE — 63600175 PHARM REV CODE 636 W HCPCS: Performed by: INTERNAL MEDICINE

## 2024-03-06 PROCEDURE — 99231 SBSQ HOSP IP/OBS SF/LOW 25: CPT | Mod: ,,, | Performed by: SURGERY

## 2024-03-06 PROCEDURE — 85025 COMPLETE CBC W/AUTO DIFF WBC: CPT | Performed by: INTERNAL MEDICINE

## 2024-03-06 PROCEDURE — 25000003 PHARM REV CODE 250: Performed by: PHYSICIAN ASSISTANT

## 2024-03-06 PROCEDURE — 21400001 HC TELEMETRY ROOM

## 2024-03-06 RX ADMIN — METOCLOPRAMIDE HYDROCHLORIDE 5 MG: 5 SOLUTION ORAL at 09:03

## 2024-03-06 RX ADMIN — FAMOTIDINE 20 MG: 20 TABLET, FILM COATED ORAL at 09:03

## 2024-03-06 RX ADMIN — METOCLOPRAMIDE HYDROCHLORIDE 5 MG: 5 SOLUTION ORAL at 05:03

## 2024-03-06 RX ADMIN — OXYCODONE AND ACETAMINOPHEN 1 TABLET: 10; 325 TABLET ORAL at 03:03

## 2024-03-06 RX ADMIN — METOCLOPRAMIDE HYDROCHLORIDE 5 MG: 5 SOLUTION ORAL at 03:03

## 2024-03-06 RX ADMIN — TIMOLOL MALEATE 1 DROP: 5 SOLUTION/ DROPS OPHTHALMIC at 09:03

## 2024-03-06 RX ADMIN — METHOCARBAMOL 500 MG: 500 TABLET ORAL at 09:03

## 2024-03-06 RX ADMIN — AMLODIPINE BESYLATE 5 MG: 5 TABLET ORAL at 09:03

## 2024-03-06 RX ADMIN — SERTRALINE HYDROCHLORIDE 50 MG: 50 TABLET ORAL at 09:03

## 2024-03-06 RX ADMIN — METOCLOPRAMIDE HYDROCHLORIDE 5 MG: 5 SOLUTION ORAL at 10:03

## 2024-03-06 RX ADMIN — CARVEDILOL 25 MG: 12.5 TABLET, FILM COATED ORAL at 09:03

## 2024-03-06 RX ADMIN — ATORVASTATIN CALCIUM 80 MG: 40 TABLET, FILM COATED ORAL at 09:03

## 2024-03-06 RX ADMIN — KETOROLAC TROMETHAMINE 30 MG: 30 INJECTION, SOLUTION INTRAMUSCULAR; INTRAVENOUS at 09:03

## 2024-03-06 RX ADMIN — KETOROLAC TROMETHAMINE 30 MG: 30 INJECTION, SOLUTION INTRAMUSCULAR; INTRAVENOUS at 01:03

## 2024-03-06 RX ADMIN — OXYCODONE AND ACETAMINOPHEN 1 TABLET: 10; 325 TABLET ORAL at 10:03

## 2024-03-06 NOTE — PROGRESS NOTES
Hospital Medicine  Progress Note    Patient Name: Katty Veronica  MRN: [id number]  Status: OP- Observation   Admission Date: 3/4/2024  Length of Stay: 0  Date of Service: 03/06/2024       CC: hospital follow-up for knee hematoma       SUBJECTIVE   78 y.o. white female with a history that includes CAD, CVA on ASA, and atrial fibrillation on Xarelto, as well as chronic lower back pain s/p spinal stimulator, presented to [facility] on 3/4 with left knee pain following a fall.  Patient was walking in her apartment with her walker when went to plug in her phone and tripped and fell onto her left side.  Patient reports she was unable to get up following the fall due to significant knee pain.  She reports having a total replacement on that left knee about a year ago.  She denied loss of consciousness or head injury with fall.  At baseline patient lives alone, ambulates with a walker and completes activities of daily living independently.  Evaluation in ED noted patient HDS, with a large hematoma overlying left knee.  XR noted no acute abnormality. CT of the left knee was limited exam secondary to hardware, but within limitations there was no convincing evidence of acute bony abnormality, no evidence of hemarthrosis.   Patient was admitted to hospital medicine services for further medical management.  Orthopedics originally consulted, but suggested Plastics evaluation as well.    Today: Patient seen and examined at bedside, and chart reviewed.  H&H has been trending down ever-so slowly, Hgb 10.4 today (13 on admit).  Patient reports knee feels a little better, pain improved.      MEDICATIONS   Scheduled   amLODIPine  5 mg Oral Daily    atorvastatin  80 mg Oral QHS    carvediloL  25 mg Oral BID    famotidine  20 mg Oral QHS    methocarbamoL  500 mg Oral BID    metoclopramide HCl  5 mg Oral QID (AC & HS)    sertraline  50 mg Oral Daily    timolol maleate 0.5%  1 drop Both Eyes BID     Continuous Infusions  None      PHYSICAL  EXAM   VITALS: T 97.9 °F (36.6 °C)   BP (!) 153/70   P 73   RR 17   O2 95 %    GENERAL: Awake and in NAD  LUNGS: CTA B/L  CVS: Normal rate  GI/: Soft, NT, bowel sounds positive.  EXTREMITIES: No peripheral edema  NEURO: AAOx3  PSYCH: Cooperative      LABS   CBC  Recent Labs     03/05/24  0558 03/06/24  0634   WBC 9.80 7.34   RBC 4.40 3.52*   HGB 12.5 10.4*   HCT 39.0 31.0*   MCV 88.6 88.1   MCH 28.4 29.5   MCHC 32.1* 33.5   RDW 15.9 15.9    178     CHEM  Recent Labs     03/04/24  1711 03/05/24  0557    141   K 3.2* 3.5   CHLORIDE 107 108*   CO2 20* 25   BUN 15.5 13.8   CREATININE 0.84 0.78   GLUCOSE 139* 112   CALCIUM 8.9 9.0   ALBUMIN 3.4 3.2*   GLOBULIN 3.3 3.2   ALKPHOS 84 86   ALT 16 16   AST 16 15   BILITOT 0.3 0.6       ASSESSMENT   Large left knee hematoma, without evidence of hemarthrosis  Afib on Xarelto  Essential hypertension  h/o CAD s/p prior stents, CVA, on ASA  h/o  SABINA not on CPAP       PLAN   Continue to hold AC/ASA, monitor H&H  Awaiting MRI knee  Appreciate Plastics and Ortho input  More to follow with MRI results  Otherwise continue current management and monitoring in the interim        Prophylaxis: B/L SCDs        Cooper Ca MD  [facility]

## 2024-03-06 NOTE — PROGRESS NOTES
PRS     Patient doing well. Awaiting MRI ordered per ortho.   Left knee hematoma with swelling/ecchymosis and some superficial blistering.     PLAN:   Recommend continue bedrest and holding anticoagulation.   Awaiting MRI from ortho.   Superficial skin blistering of left knee but currently no indication for debridement or reconstruction from PRS standpoint.   Will continue to monitor for potential skin necrosis and follow in the periphery.

## 2024-03-06 NOTE — PT/OT/SLP PROGRESS
Physical Therapy    Missed Treatment Session    Patient Name:  Katty Veronica   MRN:  [id number]      Patient not seen at this time secondary to plastics recommending continued bedrest as well as MRI pending. PT to f/u 3/7

## 2024-03-06 NOTE — PROGRESS NOTES
Inpatient Nutrition Evaluation    Admit Date: 3/4/2024   Total duration of encounter: 2 days   Patient Age: 78 y.o.    Nutrition Recommendation/Prescription     Continue Diet heart healthy, encouraged increased intake as tolerated.   Antiemetics as needed.     Nutrition Assessment     Chart Review    Reason Seen: malnutrition screening tool (MST)    Malnutrition Screening Tool Results   Have you recently lost weight without trying?: Unsure  Have you been eating poorly because of a decreased appetite?: No   MST Score: 2   Diagnosis:  Large left knee hematoma, without evidence of hemarthrosis  Afib on Xarelto  Essential hypertension    Relevant Medical History: CAD, CVA on ASA, and atrial fibrillation on Xarelto, as well as chronic lower back pain s/p spinal stimulator    Scheduled Medications:  amLODIPine, 5 mg, Daily  atorvastatin, 80 mg, QHS  carvediloL, 25 mg, BID  famotidine, 20 mg, QHS  methocarbamoL, 500 mg, BID  metoclopramide HCl, 5 mg, QID (AC & HS)  sertraline, 50 mg, Daily  timolol maleate 0.5%, 1 drop, BID    Continuous Infusions:   PRN Medications: acetaminophen, acetaminophen, dextrose 10%, dextrose 10%, glucagon (human recombinant), glucose, glucose, hydrALAZINE, ketorolac, ondansetron, oxyCODONE-acetaminophen, oxyCODONE-acetaminophen, sodium chloride 0.9%    Recent Labs   Lab 03/04/24  1557 03/04/24  1711 03/05/24  0557 03/05/24  0558 03/06/24  0634   NA  --  139 141  --   --    K  --  3.2* 3.5  --   --    CALCIUM  --  8.9 9.0  --   --    CHLORIDE  --  107 108*  --   --    CO2  --  20* 25  --   --    BUN  --  15.5 13.8  --   --    CREATININE  --  0.84 0.78  --   --    EGFRNORACEVR  --  >60 >60  --   --    GLUCOSE  --  139* 112  --   --    BILITOT  --  0.3 0.6  --   --    ALKPHOS  --  84 86  --   --    ALT  --  16 16  --   --    AST  --  16 15  --   --    ALBUMIN  --  3.4 3.2*  --   --    WBC 12.72*  --   --  9.80 7.34   HGB 13.0  --   --  12.5 10.4*   HCT 39.3  --   --  39.0 31.0*     Nutrition  "Orders:  Diet heart healthy      Appetite/Oral Intake: poor/0-25% of meals  Factors Affecting Nutritional Intake: decreased appetite, nausea, and pain  Food/Gnosticism/Cultural Preferences: none reported  Food Allergies: none reported  Last Bowel Movement: 24  Wound(s):        Altered Skin Integrity 24 230 Left anterior Knee Contusion Purple or maroon localized area of discolored intact skin or non-intact skin or a blood-filled blister. 1 day        Altered Skin Integrity 24 230 Right anterior;distal;lower Leg Contusion Purple or maroon localized area of discolored intact skin or non-intact skin or a blood-filled blister.        Comments    3/6/24 Reports decreased appetite last 2-3 days post fall s/t pain. Intermittent nausea; receiving antiemetics as needed. Consuming mostly liquids (jello, broths, water, Sprite) as tolerated. Prior to accident, no changes in appetite and UBW ~190-192lbs.     Anthropometrics    Height: 5' 1" (154.9 cm), Height Method: Stated  Last Weight: 91.2 kg (201 lb 1 oz) (24 0101), Weight Method: Bed Scale  BMI (Calculated): 38  BMI Classification: obese grade II (BMI 35-39.9)     Ideal Body Weight (IBW), Female: 105 lb     % Ideal Body Weight, Female (lb): 189.52 %                    Usual Body Weight (UBW), k.2 kg  % Usual Body Weight: 104.81     Usual Weight Provided By: patient    Wt Readings from Last 5 Encounters:   24 91.2 kg (201 lb 1 oz)   02/10/24 87.5 kg (193 lb)   24 87.5 kg (193 lb)   24 87.5 kg (193 lb)   23 95 kg (209 lb 7 oz)     Weight Change(s) Since Admission:   Wt Readings from Last 1 Encounters:   24 0101 91.2 kg (201 lb 1 oz)   24 1007 90.3 kg (199 lb)   Admit Weight: 90.3 kg (199 lb) (24 1007), Weight Method: Stated    Patient Education     Not applicable.    Nutrition Goals & Monitoring     Dietitian will monitor: energy intake    Nutrition Risk/Follow-Up: low (follow-up in 5-7 days)  Patients " assigned 'low nutrition risk' status do not qualify for a full nutritional assessment but will be monitored and re-evaluated in a 5-7 day time period. Please consult if re-evaluation needed sooner.

## 2024-03-07 LAB
ANION GAP SERPL CALC-SCNC: 8 MEQ/L
BUN SERPL-MCNC: 10.3 MG/DL (ref 9.8–20.1)
CALCIUM SERPL-MCNC: 8.4 MG/DL (ref 8.4–10.2)
CHLORIDE SERPL-SCNC: 109 MMOL/L (ref 98–107)
CO2 SERPL-SCNC: 25 MMOL/L (ref 23–31)
CREAT SERPL-MCNC: 0.67 MG/DL (ref 0.55–1.02)
CREAT/UREA NIT SERPL: 15
ERYTHROCYTE [DISTWIDTH] IN BLOOD BY AUTOMATED COUNT: 15.7 % (ref 11.5–17)
GFR SERPLBLD CREATININE-BSD FMLA CKD-EPI: >60 MLS/MIN/1.73/M2
GLUCOSE SERPL-MCNC: 117 MG/DL (ref 82–115)
HCT VFR BLD AUTO: 31.3 % (ref 37–47)
HGB BLD-MCNC: 10.4 G/DL (ref 12–16)
MCH RBC QN AUTO: 29.3 PG (ref 27–31)
MCHC RBC AUTO-ENTMCNC: 33.2 G/DL (ref 33–36)
MCV RBC AUTO: 88.2 FL (ref 80–94)
NRBC BLD AUTO-RTO: 0 %
PLATELET # BLD AUTO: 160 X10(3)/MCL (ref 130–400)
PMV BLD AUTO: 10.9 FL (ref 7.4–10.4)
POTASSIUM SERPL-SCNC: 3.4 MMOL/L (ref 3.5–5.1)
RBC # BLD AUTO: 3.55 X10(6)/MCL (ref 4.2–5.4)
SODIUM SERPL-SCNC: 142 MMOL/L (ref 136–145)
WBC # SPEC AUTO: 8.18 X10(3)/MCL (ref 4.5–11.5)

## 2024-03-07 PROCEDURE — 63600175 PHARM REV CODE 636 W HCPCS: Performed by: INTERNAL MEDICINE

## 2024-03-07 PROCEDURE — 97162 PT EVAL MOD COMPLEX 30 MIN: CPT

## 2024-03-07 PROCEDURE — 63600175 PHARM REV CODE 636 W HCPCS: Performed by: PHYSICIAN ASSISTANT

## 2024-03-07 PROCEDURE — 25000003 PHARM REV CODE 250: Performed by: INTERNAL MEDICINE

## 2024-03-07 PROCEDURE — 80048 BASIC METABOLIC PNL TOTAL CA: CPT | Performed by: INTERNAL MEDICINE

## 2024-03-07 PROCEDURE — 97535 SELF CARE MNGMENT TRAINING: CPT

## 2024-03-07 PROCEDURE — 25000003 PHARM REV CODE 250: Performed by: PHYSICIAN ASSISTANT

## 2024-03-07 PROCEDURE — 97165 OT EVAL LOW COMPLEX 30 MIN: CPT

## 2024-03-07 PROCEDURE — 21400001 HC TELEMETRY ROOM

## 2024-03-07 PROCEDURE — 85027 COMPLETE CBC AUTOMATED: CPT | Performed by: INTERNAL MEDICINE

## 2024-03-07 RX ORDER — POTASSIUM CHLORIDE 20 MEQ/1
20 TABLET, EXTENDED RELEASE ORAL ONCE
Status: COMPLETED | OUTPATIENT
Start: 2024-03-07 | End: 2024-03-07

## 2024-03-07 RX ADMIN — ONDANSETRON 4 MG: 2 INJECTION INTRAMUSCULAR; INTRAVENOUS at 05:03

## 2024-03-07 RX ADMIN — KETOROLAC TROMETHAMINE 30 MG: 30 INJECTION, SOLUTION INTRAMUSCULAR; INTRAVENOUS at 02:03

## 2024-03-07 RX ADMIN — TIMOLOL MALEATE 1 DROP: 5 SOLUTION/ DROPS OPHTHALMIC at 08:03

## 2024-03-07 RX ADMIN — FAMOTIDINE 20 MG: 20 TABLET, FILM COATED ORAL at 08:03

## 2024-03-07 RX ADMIN — OXYCODONE HYDROCHLORIDE AND ACETAMINOPHEN 1 TABLET: 5; 325 TABLET ORAL at 01:03

## 2024-03-07 RX ADMIN — POTASSIUM CHLORIDE 20 MEQ: 1500 TABLET, EXTENDED RELEASE ORAL at 10:03

## 2024-03-07 RX ADMIN — METOCLOPRAMIDE HYDROCHLORIDE 5 MG: 5 SOLUTION ORAL at 08:03

## 2024-03-07 RX ADMIN — ONDANSETRON 4 MG: 2 INJECTION INTRAMUSCULAR; INTRAVENOUS at 12:03

## 2024-03-07 RX ADMIN — METOCLOPRAMIDE HYDROCHLORIDE 5 MG: 5 SOLUTION ORAL at 04:03

## 2024-03-07 RX ADMIN — METHOCARBAMOL 500 MG: 500 TABLET ORAL at 08:03

## 2024-03-07 RX ADMIN — KETOROLAC TROMETHAMINE 30 MG: 30 INJECTION, SOLUTION INTRAMUSCULAR; INTRAVENOUS at 07:03

## 2024-03-07 RX ADMIN — METOCLOPRAMIDE HYDROCHLORIDE 5 MG: 5 SOLUTION ORAL at 07:03

## 2024-03-07 RX ADMIN — ATORVASTATIN CALCIUM 80 MG: 40 TABLET, FILM COATED ORAL at 08:03

## 2024-03-07 RX ADMIN — OXYCODONE AND ACETAMINOPHEN 1 TABLET: 10; 325 TABLET ORAL at 10:03

## 2024-03-07 RX ADMIN — METOCLOPRAMIDE HYDROCHLORIDE 5 MG: 5 SOLUTION ORAL at 10:03

## 2024-03-07 RX ADMIN — OXYCODONE AND ACETAMINOPHEN 1 TABLET: 10; 325 TABLET ORAL at 05:03

## 2024-03-07 RX ADMIN — SERTRALINE HYDROCHLORIDE 50 MG: 50 TABLET ORAL at 08:03

## 2024-03-07 RX ADMIN — CARVEDILOL 25 MG: 12.5 TABLET, FILM COATED ORAL at 08:03

## 2024-03-07 RX ADMIN — AMLODIPINE BESYLATE 5 MG: 5 TABLET ORAL at 08:03

## 2024-03-07 NOTE — PROGRESS NOTES
Ultrasound for the left knee is negative for quad tendon or patella tendon tear.  Patient has Plastic surgery on  to evaluate the soft tissues.  Patient may follow up outpatient with her orthopedic surgeon that completed her left total knee arthroplasty.  Patient may be is a weightbearing as tolerated for pain control.  Follow up outpatient with joint specialist    This note/OR report was created with the assistance of  voice recognition software or phone  dictation.  There may be transcription errors as a result of using this technology however minimal. Effort has been made to assure accuracy of transcription but any obvious errors or omissions should be clarified with the author of the document.       Augustine Haney, DO  Orthopedic Trauma Surgery

## 2024-03-07 NOTE — PT/OT/SLP EVAL
Occupational Therapy  Evaluation    Name: Katty Veronica  MRN: [id number]  Admitting Diagnosis: Hematoma of L knee  Recent Surgery: * No surgery found *      Recommendations:     Discharge therapy intensity: Moderate Intensity Therapy   Discharge Equipment Recommendations:  to be determined by next level of care  Barriers to discharge:  Decreased caregiver support (Pt lives alone, ongoing medical needs)    Assessment:     Katty Veronica is a 78 y.o. female presenting s/p fall with L knee hematoma. Ultrasound for the left knee is negative for quad tendon or patella tendon tear. WBAT, no brace needed. Pt was overall CGA for functional transfers with RW, limited on further ambulation 2/2 nausea/ dizziness.  She presents with the following performance deficits affecting function: weakness, gait instability, impaired endurance, impaired self care skills, impaired functional mobility, edema, pain, impaired balance, decreased lower extremity function.     Rehab Prognosis: Good; patient would benefit from acute skilled OT services to address these deficits and reach maximum level of function.       Plan:     Patient to be seen 3 x/week to address the above listed problems via self-care/home management, therapeutic activities, therapeutic exercises  Plan of Care Expires: 04/04/24  Plan of Care Reviewed with: patient    Subjective     Chief Complaint: No appetite, nausea  Patient/Family Comments/goals: Return to baseline    Occupational Profile:  Living Environment: Pt lives in a senior apartment with no RADHA, a tub with tub bench.   Previous level of function: PTA, pt was overall Ind with ADL's, using a RW or rollator. Pt's dtr would provide meals. Pt uses transportation system for community mobility.   Roles and Routines: Mother  Equipment Used at Home: bath bench, cane, straight, walker, rolling, rollator  Assistance upon Discharge: Pt lives alone, will not have assist from family at discharge.      Pain/Comfort:  Pain Rating 1:  (Rating not provided)    Patients cultural, spiritual, Quaker conflicts given the current situation: no    Objective:     OT communicated with nurse prior to session.      Patient was found HOB elevated with PureWick, telemetry, pulse ox (continuous) upon OT entry to room.    General Precautions: Standard, fall  Orthopedic Precautions: LLE weight bearing as tolerated  Braces: N/A    Vital Signs: Blood Pressure: 150/71 (sitting in recliner after reporting dizziness)    Bed Mobility:    Patient completed Supine to Sit with minimum assistance    Functional Mobility/Transfers:  Patient completed Sit <> Stand Transfer with contact guard assistance  with  rolling walker   Functional Mobility: Pt was overall CGA for functional transfer and stand pivot transfer during bed>chair.     Activities of Daily Living:  Lower Body Dressing: total assistance donning socks while in supine  Toileting: total assistance purewick in place    Functional Cognition:  Intact    Visual Perceptual Skills:  Intact    Upper Extremity Function:  Right Upper Extremity:   WFL    Left Upper Extremity:  WFL    Balance:   Intact    Therapeutic Positioning  Risk for acquired pressure injuries is increased due to relative decrease in mobility d/t hospitalization  and impaired mobility.    OT interventions performed during the course of today's session:   Education was provided on benefits of and recommendations for therapeutic positioning     Findings: known area of altered skin integrity at L knee    OT recommendations for therapeutic positioning throughout hospitalization:   Follow [facility] Pressure Injury Prevention Protocol    Patient Education:  Patient and son/s were provided with verbal education and demonstrations education regarding OT role/goals/POC, fall prevention, and safety awareness.  Understanding was verbalized.     Patient left up in chair with all lines intact, call button in reach, nurse notified,  and son present.    GOALS:   Multidisciplinary Problems       Occupational Therapy Goals          Problem: Occupational Therapy    Goal Priority Disciplines Outcome Interventions   Occupational Therapy Goal     OT, PT/OT Ongoing, Progressing    Description: Goals to be met by: 4/4/24     Patient will increase functional independence with ADLs by performing:    LE Dressing with Supervision and AE as needed.  Grooming while standing at sink with Supervision.  Toileting from toilet with Minimal Assistance for hygiene and clothing management.   Toilet transfer to toilet with Contact Guard Assistance.                         History:     Past Medical History:   Diagnosis Date    Anxiety     COPD (chronic obstructive pulmonary disease)     Coronary artery disease     s/p 2 stents    Depression     GERD (gastroesophageal reflux disease)     Glaucoma     Hypertension     Obstructive sleep apnea     on cpap    Stroke     Urinary, incontinence, stress female          Past Surgical History:   Procedure Laterality Date    APPENDECTOMY      BACK SURGERY      CHOLECYSTECTOMY      EXPLORATION OF COMMON BILE DUCT      HERNIA REPAIR      incisional hernia times 2, central abdomen    HYSTERECTOMY      OVARIAN CYST REMOVAL      TONSILLECTOMY         Time Tracking:     OT Date of Treatment: 03/07/24  OT Start Time: 1323  OT Stop Time: 1348  OT Total Time (min): 25 min    Billable Minutes:Evaluation Low Complexity 15 minutes  Self Care/Home Management 10 minutes    3/7/2024

## 2024-03-07 NOTE — PROGRESS NOTES
MRI of left knee complete. Dr. Moore to complete an ultrasound for us on the extensor mechanism. We will await diagnosis. Patient can likely follow up with her previous joint surgeon.

## 2024-03-07 NOTE — PT/OT/SLP EVAL
Physical Therapy Evaluation    Patient Name:  Katty Veronica   MRN:  [id number]    Recommendations:     Discharge therapy intensity: Moderate Intensity Therapy   Discharge Equipment Recommendations: none   Barriers to discharge: Decreased caregiver support and Ongoing medical needs    Assessment:     Katty Veronica is a 78 y.o. female admitted with a medical diagnosis of s/p fall with L knee hematoma. Ultrasound for the left knee is negative for quad tendon or patella tendon tear. WBAT, no brace needed per ortho. Cleared by plastics for mobility. She presents with the following impairments/functional limitations: weakness, impaired functional mobility, impaired endurance, gait instability, impaired balance, decreased lower extremity function, pain. Pt tolerated session fair, limited due to dizziness with mobility; however, BP stable throughout. Pt with significant bruising and swelling of LLE, antalgic stepping pattern, unable to ambulate away from bedside at this time, but able to perform stand step to recliner with RW CGA. Pt lives alone and is at significant risk for falls, recommending moderate intensity therapy at d/c.     Rehab Prognosis: Good; patient would benefit from acute skilled PT services to address these deficits and reach maximum level of function.    Recent Surgery: * No surgery found *      Plan:     During this hospitalization, patient to be seen 5 x/week to address the identified rehab impairments via gait training, therapeutic activities, therapeutic exercises and progress toward the following goals:    Plan of Care Expires:  04/13/24    Subjective     Chief Complaint: dizziness/nausea  Patient/Family Comments/goals: to get stronger  Pain/Comfort:  Pain Rating 1:  (not rated)    Patients cultural, spiritual, Episcopal conflicts given the current situation:      Living Environment:  Pt lives alone in a ground level apt with flat entrance. Fall while turning in apartment causing  bruising/swelling to knee.     Prior to admission, patients level of function was Sruthi with RW.  Equipment used at home: bath bench, walker, rolling (rollator).  DME owned (not currently used): single point cane.  Upon discharge, patient will have assistance from TBD.    Objective:     Communicated with RN and MD prior to session.  Patient found HOB elevated with peripheral IV, PureWick, telemetry  upon PT entry to room.    General Precautions: Standard, fall  Orthopedic Precautions:LLE weight bearing as tolerated (ROMAT)   Braces: N/A  Respiratory Status: Room air  Blood Pressure: 140/80, 150/80 with mobility       Exams:  Sensation:    -       Intact  RLE Strength: WFL  LLE Strength: Deficits: 2+ hip flexion, 3- knee extension, 3 DF   Skin integrity:  significant bruising and edema to L knee       Functional Mobility:  Bed Mobility:     Supine to Sit: minimum assistance  Transfers:     Sit to Stand:  contact guard assistance with rolling walker, increased trunk flexion during standing t/f.   Bed to Chair: contact guard assistance with  rolling walker  using  Step Transfer, attempted further ambulation; however, limited due to dizziness. Antalgic gait pattern on LLE.       AM-PAC 6 CLICK MOBILITY  Total Score:15       Treatment & Education:    Patient provided with verbal education education regarding PT role/goals/POC, fall prevention, and safety awareness.  Understanding was verbalized.     Patient left up in chair with all lines intact, call button in reach, RN notified, and son present.    GOALS:   Multidisciplinary Problems       Physical Therapy Goals          Problem: Physical Therapy    Goal Priority Disciplines Outcome Goal Variances Interventions   Physical Therapy Goal     PT, PT/OT Ongoing, Progressing     Description: Goals to be met by: d/c     Patient will increase functional independence with mobility by performin. Supine to sit with Stand-by Assistance  2. Sit to supine with Stand-by  Assistance  3. Sit to stand transfer with Stand-by Assistance  4. Bed to chair transfer with Stand-by Assistance using Rolling Walker  5. Gait  x 150 feet with Stand-by Assistance using Rolling Walker.                          History:     Past Medical History:   Diagnosis Date    Anxiety     COPD (chronic obstructive pulmonary disease)     Coronary artery disease     s/p 2 stents    Depression     GERD (gastroesophageal reflux disease)     Glaucoma     Hypertension     Obstructive sleep apnea     on cpap    Stroke     Urinary, incontinence, stress female        Past Surgical History:   Procedure Laterality Date    APPENDECTOMY      BACK SURGERY      CHOLECYSTECTOMY      EXPLORATION OF COMMON BILE DUCT      HERNIA REPAIR      incisional hernia times 2, central abdomen    HYSTERECTOMY      OVARIAN CYST REMOVAL      TONSILLECTOMY         Time Tracking:     PT Received On: 03/07/24  PT Start Time: 1323     PT Stop Time: 1349  PT Total Time (min): 26 min     Billable Minutes: Evaluation 26      03/07/2024

## 2024-03-07 NOTE — PROGRESS NOTES
Hospital Medicine  Progress Note    Patient Name: Katty Veronica  MRN: [id number]  Status: IP- Inpatient   Admission Date: 3/4/2024  Length of Stay: 1  Date of Service: 03/07/2024       CC: hospital follow-up for knee hematoma       SUBJECTIVE   78 y.o. white female with a history that includes CAD, CVA on ASA, and atrial fibrillation on Xarelto, as well as chronic lower back pain s/p spinal stimulator, presented to [facility] on 3/4 with left knee pain following a fall.  Patient was walking in her apartment with her walker when went to plug in her phone and tripped and fell onto her left side.  Patient reports she was unable to get up following the fall due to significant knee pain.  She reports having a total replacement on that left knee about a year ago.  She denied loss of consciousness or head injury with fall.  At baseline patient lives alone, ambulates with a walker and completes activities of daily living independently.  Evaluation in ED noted patient HDS, with a large hematoma overlying left knee.  XR noted no acute abnormality. CT of the left knee was limited exam secondary to hardware, but within limitations there was no convincing evidence of acute bony abnormality, no evidence of hemarthrosis.   Patient was admitted to hospital medicine services for further medical management.  Orthopedics originally consulted, but suggested Plastics evaluation as well.    Today:   No acute events reported overnight  Patient seen  at bedside,  no family member at bedside, she reported pain in left knee, no other complaints         MEDICATIONS   Scheduled   amLODIPine  5 mg Oral Daily    atorvastatin  80 mg Oral QHS    carvediloL  25 mg Oral BID    famotidine  20 mg Oral QHS    methocarbamoL  500 mg Oral BID    metoclopramide HCl  5 mg Oral QID (AC & HS)    sertraline  50 mg Oral Daily    timolol maleate 0.5%  1 drop Both Eyes BID     Continuous Infusions  None      PHYSICAL EXAM   VITALS: T 98.6 °F (37 °C)   BP (!)  164/91   P 65   RR 18   O2 (!) 94 %    GENERAL: Awake and in NAD  LUNGS: unlabored breathing on room air   CVS: Normal rate  GI/: Soft, NT  EXTREMITIES: Left knee hematoma with swelling, blister noted  NEURO: AAOx3  PSYCH: Cooperative      LABS   CBC  Recent Labs     03/06/24  0634 03/07/24  0456   WBC 7.34 8.18   RBC 3.52* 3.55*   HGB 10.4* 10.4*   HCT 31.0* 31.3*   MCV 88.1 88.2   MCH 29.5 29.3   MCHC 33.5 33.2   RDW 15.9 15.7    160     CHEM  Recent Labs     03/04/24  1711 03/05/24  0557 03/07/24  0456    141 142   K 3.2* 3.5 3.4*   CHLORIDE 107 108* 109*   CO2 20* 25 25   BUN 15.5 13.8 10.3   CREATININE 0.84 0.78 0.67   GLUCOSE 139* 112 117*   CALCIUM 8.9 9.0 8.4   ALBUMIN 3.4 3.2*  --    GLOBULIN 3.3 3.2  --    ALKPHOS 84 86  --    ALT 16 16  --    AST 16 15  --    BILITOT 0.3 0.6  --        ASSESSMENT   Large left knee hematoma, without evidence of hemarthrosis  Afib on Xarelto  Essential hypertension  h/o CAD s/p prior stents, CVA, on ASA  h/o  SABINA not on CPAP       PLAN   Labs from this AM noted hb 10.4,plt 160K, k 3.4,K repleted  MRI knee -Markedly limited, essentially nondiagnostic evaluation, Possibility of focal/high-grade tendon rupture is not excluded.  Follow-up evaluation with focused ultrasound  if there is ongoing clinical concern   USG pending  Orthopedics team following  Continue to hold AC/ASA,will cont to  monitor H&H, noted drop in HH from 13 on admit to 10.4 but stable from yesterday  Appreciate Plastics and Ortho input  MMPC  PT/OT   Care discussed with pt's nurse, CM       Prophylaxis: B/L SCDs        Molly Dickerson MD  [facility]

## 2024-03-07 NOTE — PLAN OF CARE
Problem: Occupational Therapy  Goal: Occupational Therapy Goal  Description: Goals to be met by: 4/4/24     Patient will increase functional independence with ADLs by performing:    LE Dressing with Supervision and AE as needed.  Grooming while standing at sink with Supervision.  Toileting from toilet with Minimal Assistance for hygiene and clothing management.   Toilet transfer to toilet with Contact Guard Assistance.    Outcome: Ongoing, Progressing

## 2024-03-08 LAB
BASOPHILS # BLD AUTO: 0.03 X10(3)/MCL
BASOPHILS NFR BLD AUTO: 0.4 %
EOSINOPHIL # BLD AUTO: 0.09 X10(3)/MCL (ref 0–0.9)
EOSINOPHIL NFR BLD AUTO: 1.1 %
ERYTHROCYTE [DISTWIDTH] IN BLOOD BY AUTOMATED COUNT: 15.7 % (ref 11.5–17)
FERRITIN SERPL-MCNC: 62.25 NG/ML (ref 4.63–204)
HCT VFR BLD AUTO: 31.7 % (ref 37–47)
HGB BLD-MCNC: 10.7 G/DL (ref 12–16)
IMM GRANULOCYTES # BLD AUTO: 0.02 X10(3)/MCL (ref 0–0.04)
IMM GRANULOCYTES NFR BLD AUTO: 0.2 %
IRON SATN MFR SERPL: 22 % (ref 20–50)
IRON SERPL-MCNC: 47 UG/DL (ref 50–170)
LYMPHOCYTES # BLD AUTO: 2.33 X10(3)/MCL (ref 0.6–4.6)
LYMPHOCYTES NFR BLD AUTO: 28.5 %
MCH RBC QN AUTO: 29.6 PG (ref 27–31)
MCHC RBC AUTO-ENTMCNC: 33.8 G/DL (ref 33–36)
MCV RBC AUTO: 87.8 FL (ref 80–94)
MONOCYTES # BLD AUTO: 0.68 X10(3)/MCL (ref 0.1–1.3)
MONOCYTES NFR BLD AUTO: 8.3 %
NEUTROPHILS # BLD AUTO: 5.03 X10(3)/MCL (ref 2.1–9.2)
NEUTROPHILS NFR BLD AUTO: 61.5 %
NRBC BLD AUTO-RTO: 0 %
PLATELET # BLD AUTO: 188 X10(3)/MCL (ref 130–400)
PMV BLD AUTO: 10.2 FL (ref 7.4–10.4)
RBC # BLD AUTO: 3.61 X10(6)/MCL (ref 4.2–5.4)
TIBC SERPL-MCNC: 168 UG/DL (ref 70–310)
TIBC SERPL-MCNC: 215 UG/DL (ref 250–450)
TRANSFERRIN SERPL-MCNC: 206 MG/DL (ref 173–360)
WBC # SPEC AUTO: 8.18 X10(3)/MCL (ref 4.5–11.5)

## 2024-03-08 PROCEDURE — 21400001 HC TELEMETRY ROOM

## 2024-03-08 PROCEDURE — 85025 COMPLETE CBC W/AUTO DIFF WBC: CPT | Performed by: INTERNAL MEDICINE

## 2024-03-08 PROCEDURE — 25000003 PHARM REV CODE 250: Performed by: INTERNAL MEDICINE

## 2024-03-08 PROCEDURE — 97110 THERAPEUTIC EXERCISES: CPT | Mod: CQ

## 2024-03-08 PROCEDURE — 83540 ASSAY OF IRON: CPT | Performed by: INTERNAL MEDICINE

## 2024-03-08 PROCEDURE — 97116 GAIT TRAINING THERAPY: CPT | Mod: CQ

## 2024-03-08 PROCEDURE — 25000003 PHARM REV CODE 250: Performed by: PHYSICIAN ASSISTANT

## 2024-03-08 PROCEDURE — 63600175 PHARM REV CODE 636 W HCPCS: Performed by: PHYSICIAN ASSISTANT

## 2024-03-08 PROCEDURE — 82728 ASSAY OF FERRITIN: CPT | Performed by: INTERNAL MEDICINE

## 2024-03-08 RX ORDER — ASPIRIN 81 MG/1
81 TABLET ORAL DAILY
Status: DISCONTINUED | OUTPATIENT
Start: 2024-03-09 | End: 2024-03-12 | Stop reason: HOSPADM

## 2024-03-08 RX ORDER — GABAPENTIN 300 MG/1
300 CAPSULE ORAL 3 TIMES DAILY
Status: DISCONTINUED | OUTPATIENT
Start: 2024-03-08 | End: 2024-03-12 | Stop reason: HOSPADM

## 2024-03-08 RX ADMIN — METHOCARBAMOL 500 MG: 500 TABLET ORAL at 09:03

## 2024-03-08 RX ADMIN — TIMOLOL MALEATE 1 DROP: 5 SOLUTION/ DROPS OPHTHALMIC at 09:03

## 2024-03-08 RX ADMIN — OXYCODONE AND ACETAMINOPHEN 1 TABLET: 10; 325 TABLET ORAL at 12:03

## 2024-03-08 RX ADMIN — OXYCODONE AND ACETAMINOPHEN 1 TABLET: 10; 325 TABLET ORAL at 09:03

## 2024-03-08 RX ADMIN — AMLODIPINE BESYLATE 5 MG: 5 TABLET ORAL at 09:03

## 2024-03-08 RX ADMIN — GABAPENTIN 300 MG: 300 CAPSULE ORAL at 09:03

## 2024-03-08 RX ADMIN — CARVEDILOL 25 MG: 12.5 TABLET, FILM COATED ORAL at 09:03

## 2024-03-08 RX ADMIN — METOCLOPRAMIDE HYDROCHLORIDE 5 MG: 5 SOLUTION ORAL at 05:03

## 2024-03-08 RX ADMIN — RIVAROXABAN 20 MG: 10 TABLET, FILM COATED ORAL at 05:03

## 2024-03-08 RX ADMIN — METOCLOPRAMIDE HYDROCHLORIDE 5 MG: 5 SOLUTION ORAL at 09:03

## 2024-03-08 RX ADMIN — METOCLOPRAMIDE HYDROCHLORIDE 5 MG: 5 SOLUTION ORAL at 11:03

## 2024-03-08 RX ADMIN — ATORVASTATIN CALCIUM 80 MG: 40 TABLET, FILM COATED ORAL at 09:03

## 2024-03-08 RX ADMIN — METOCLOPRAMIDE HYDROCHLORIDE 5 MG: 5 SOLUTION ORAL at 06:03

## 2024-03-08 RX ADMIN — SERTRALINE HYDROCHLORIDE 50 MG: 50 TABLET ORAL at 09:03

## 2024-03-08 RX ADMIN — OXYCODONE AND ACETAMINOPHEN 1 TABLET: 10; 325 TABLET ORAL at 02:03

## 2024-03-08 RX ADMIN — ONDANSETRON 4 MG: 2 INJECTION INTRAMUSCULAR; INTRAVENOUS at 04:03

## 2024-03-08 RX ADMIN — FAMOTIDINE 20 MG: 20 TABLET, FILM COATED ORAL at 09:03

## 2024-03-08 RX ADMIN — OXYCODONE AND ACETAMINOPHEN 1 TABLET: 10; 325 TABLET ORAL at 06:03

## 2024-03-08 NOTE — PROGRESS NOTES
Hospital Medicine  Progress Note    Patient Name: Katty Veronica  MRN: [id number]  Status: IP- Inpatient   Admission Date: 3/4/2024  Length of Stay: 2  Date of Service: 03/08/2024       CC: hospital follow-up for knee hematoma       SUBJECTIVE   78 y.o. white female with a history that includes CAD, CVA on ASA, and atrial fibrillation on Xarelto, as well as chronic lower back pain s/p spinal stimulator, presented to [facility] on 3/4 with left knee pain following a fall.  Patient was walking in her apartment with her walker when went to plug in her phone and tripped and fell onto her left side.  Patient reports she was unable to get up following the fall due to significant knee pain.  She reports having a total replacement on that left knee about a year ago.  She denied loss of consciousness or head injury with fall.  At baseline patient lives alone, ambulates with a walker and completes activities of daily living independently.  Evaluation in ED noted patient HDS, with a large hematoma overlying left knee.  XR noted no acute abnormality. CT of the left knee was limited exam secondary to hardware, but within limitations there was no convincing evidence of acute bony abnormality, no evidence of hemarthrosis.   Patient was admitted to hospital medicine services for further medical management.  Orthopedics originally consulted, but suggested Plastics evaluation as well.    MRI left knee was obtained, which was inconclusive for tendon rupture, ultrasound left knee was recommended given concern for patella tendon versus quad rupture    Today:   No acute events reported overnight  Patient seen  at bedside, she was sitting in the chair, discussed Orthopedic, Plastic surgery inputs discussed physical therapy recommending moderate intensity patient agreeable to go knee facility to gain strength and she is planning on moving with her daughter and son-in-law post discharge from facility.      no family member at bedside, she  reported improving pain and swelling in left knee, no other complaints         MEDICATIONS   Scheduled   amLODIPine  5 mg Oral Daily    atorvastatin  80 mg Oral QHS    carvediloL  25 mg Oral BID    famotidine  20 mg Oral QHS    methocarbamoL  500 mg Oral BID    metoclopramide HCl  5 mg Oral QID (AC & HS)    sertraline  50 mg Oral Daily    timolol maleate 0.5%  1 drop Both Eyes BID     Continuous Infusions  None      PHYSICAL EXAM   VITALS: T 98.9 °F (37.2 °C)   BP (!) 156/65   P 63   RR 19   O2 (!) 94 %    GENERAL: Awake and in NAD  LUNGS: unlabored breathing on room air   CVS: Normal rate  GI/: Soft, NT  EXTREMITIES: Left knee hematoma with swelling, medial blister noted  NEURO: AAOx3  PSYCH: Cooperative      LABS   CBC  Recent Labs     03/07/24  0456 03/08/24  0609   WBC 8.18 8.18   RBC 3.55* 3.61*   HGB 10.4* 10.7*   HCT 31.3* 31.7*   MCV 88.2 87.8   MCH 29.3 29.6   MCHC 33.2 33.8   RDW 15.7 15.7    188     CHEM  Recent Labs     03/07/24  0456 03/08/24  0609     --    K 3.4*  --    CHLORIDE 109*  --    CO2 25  --    BUN 10.3  --    CREATININE 0.67  --    GLUCOSE 117*  --    CALCIUM 8.4  --    FERRITIN  --  62.25   IRON  --  47*   TIBC  --  215*       ASSESSMENT   Large left knee hematoma, without evidence of hemarthrosis  Normocytic anemia  Afib on Xarelto  Essential hypertension  h/o CAD s/p prior stents, CVA, on ASA  h/o  SABINA not on CPAP       PLAN   Labs from this AM noted hb 10.7 stable, iron level 47, iron binding capacity 215, ferritin 62  MRI knee -Markedly limited, essentially nondiagnostic evaluation, Possibility of focal/high-grade tendon rupture is not excluded.  Follow-up evaluation with focused ultrasound  if there is ongoing clinical concern   USG -no sonographic evidence of high-grade/full-thickness disruption involving the quadriceps or patellar tendon widespread periarticular subcutaneous edema with the multiple suspected areas of superficial hematoma  Plastic surgery team  evaluated today, no procedures planned, cleared for discharge from plastic surgery standpoint  Orthopedics team signed off recommended outpatient follow up with her orthopedic surgeon that completed her left total knee arthroplasty, weight-bearing as tolerated  We will resume anticoagulation as no procedures planned and monitor H&H   Appreciate Plastics and Ortho input  Continue MMPC resume home med gabapentin  PT/OT   Care discussed with pt's nurse, DORIAN ,  will be working on nursing facility choices      Prophylaxis:  Josette Dickerson MD  [facility]

## 2024-03-08 NOTE — PLAN OF CARE
Foc signed by patient, Clinicals sent to [facility] via [facility].

## 2024-03-08 NOTE — PT/OT/SLP PROGRESS
Physical Therapy Treatment    Patient Name:  Katty Veronica   MRN:  [id number]    Recommendations:     Discharge therapy intensity: Moderate Intensity Therapy   Discharge Equipment Recommendations: none  Barriers to discharge:  placement    Assessment:     Katty Veronica is a 78 y.o. female admitted with a medical diagnosis of s/p fall with L knee hematoma. Ultrasound for the left knee is negative for quad tendon or patella tendon tear. WBAT, no brace needed per ortho. .  She presents with the following impairments/functional limitations: weakness, impaired endurance, impaired functional mobility, gait instability, decreased safety awareness .    Rehab Prognosis: Good; patient would benefit from acute skilled PT services to address these deficits and reach maximum level of function.    Recent Surgery: * No surgery found *      Plan:     During this hospitalization, patient to be seen 5 x/week to address the identified rehab impairments via gait training, therapeutic activities, therapeutic exercises and progress toward the following goals:    Plan of Care Expires:  04/13/24    Subjective     Chief Complaint: knee pain  Patient/Family Comments/goals: return to PLOF  Pain/Comfort:         Objective:     Communicated with RN prior to session.  Patient found HOB elevated with PureWick upon PT entry to room.     General Precautions: Standard, fall  Orthopedic Precautions: LLE weight bearing as tolerated (ROMAT)  Braces: N/A  Respiratory Status: Room air  Skin Integrity:  LLE knee edema      Functional Mobility:  Bed Mobility:     Supine to Sit: stand by assistance  Transfers:     Sit to Stand:  contact guard assistance with rolling walker  Gait: Pt amb 42ft with RW cga. Antalgic gait pattern.     Therapeutic Activities/Exercises:  Performed LAQ, Ap, heel slides, 10x1 UIC    Education:  Patient provided with verbal education education regarding PT role/goals/POC, fall prevention, and safety awareness.   Understanding was verbalized, however additional teaching warranted.     Patient left up in chair with all lines intact, call button in reach, and nsg notified    GOALS:   Multidisciplinary Problems       Physical Therapy Goals          Problem: Physical Therapy    Goal Priority Disciplines Outcome Goal Variances Interventions   Physical Therapy Goal     PT, PT/OT Ongoing, Progressing     Description: Goals to be met by: d/c     Patient will increase functional independence with mobility by performin. Supine to sit with Stand-by Assistance  2. Sit to supine with Stand-by Assistance  3. Sit to stand transfer with Stand-by Assistance  4. Bed to chair transfer with Stand-by Assistance using Rolling Walker  5. Gait  x 150 feet with Stand-by Assistance using Rolling Walker.                          Time Tracking:     PT Received On: 24  PT Start Time: 1410     PT Stop Time: 1433  PT Total Time (min): 23 min     Billable Minutes: Gait Training 15 and Therapeutic Exercise 8    Treatment Type: Treatment  PT/PTA: PTA     Number of PTA visits since last PT visit: 2024

## 2024-03-08 NOTE — PROGRESS NOTES
PRS     Patient doing well, denies complaints.   Left knee swelling/bruising significantly improved with superficial blister medially.     A/P:   Left knee hematoma improving, superficial blistering should slough off and resolve without any intervention.   No reconstructive or soft tissue surgical interventions warranted at this time.   Weight bearing per ortho recs.   Patient to follow up with her orthopedic surgeon upon discharge.   Stable for discharge from PRS standpoint.   Will sign off.

## 2024-03-08 NOTE — PLAN OF CARE
Spoke with patient and daughter guido about snf placement, choices given to patient and also sent to guido phone, awaiting choices.

## 2024-03-09 LAB
BASOPHILS # BLD AUTO: 0.02 X10(3)/MCL
BASOPHILS NFR BLD AUTO: 0.2 %
EOSINOPHIL # BLD AUTO: 0.08 X10(3)/MCL (ref 0–0.9)
EOSINOPHIL NFR BLD AUTO: 0.9 %
ERYTHROCYTE [DISTWIDTH] IN BLOOD BY AUTOMATED COUNT: 15.8 % (ref 11.5–17)
HCT VFR BLD AUTO: 30.8 % (ref 37–47)
HGB BLD-MCNC: 10 G/DL (ref 12–16)
IMM GRANULOCYTES # BLD AUTO: 0.02 X10(3)/MCL (ref 0–0.04)
IMM GRANULOCYTES NFR BLD AUTO: 0.2 %
LYMPHOCYTES # BLD AUTO: 2.61 X10(3)/MCL (ref 0.6–4.6)
LYMPHOCYTES NFR BLD AUTO: 30.1 %
MCH RBC QN AUTO: 29 PG (ref 27–31)
MCHC RBC AUTO-ENTMCNC: 32.5 G/DL (ref 33–36)
MCV RBC AUTO: 89.3 FL (ref 80–94)
MONOCYTES # BLD AUTO: 0.84 X10(3)/MCL (ref 0.1–1.3)
MONOCYTES NFR BLD AUTO: 9.7 %
NEUTROPHILS # BLD AUTO: 5.09 X10(3)/MCL (ref 2.1–9.2)
NEUTROPHILS NFR BLD AUTO: 58.9 %
NRBC BLD AUTO-RTO: 0 %
PLATELET # BLD AUTO: 193 X10(3)/MCL (ref 130–400)
PMV BLD AUTO: 10.5 FL (ref 7.4–10.4)
RBC # BLD AUTO: 3.45 X10(6)/MCL (ref 4.2–5.4)
WBC # SPEC AUTO: 8.66 X10(3)/MCL (ref 4.5–11.5)

## 2024-03-09 PROCEDURE — 85025 COMPLETE CBC W/AUTO DIFF WBC: CPT | Performed by: INTERNAL MEDICINE

## 2024-03-09 PROCEDURE — 25000003 PHARM REV CODE 250: Performed by: NURSE PRACTITIONER

## 2024-03-09 PROCEDURE — 25000003 PHARM REV CODE 250: Performed by: PHYSICIAN ASSISTANT

## 2024-03-09 PROCEDURE — 25000003 PHARM REV CODE 250: Performed by: INTERNAL MEDICINE

## 2024-03-09 PROCEDURE — 21400001 HC TELEMETRY ROOM

## 2024-03-09 RX ORDER — BISACODYL 10 MG/1
10 SUPPOSITORY RECTAL DAILY PRN
Status: DISCONTINUED | OUTPATIENT
Start: 2024-03-09 | End: 2024-03-12 | Stop reason: HOSPADM

## 2024-03-09 RX ORDER — POLYETHYLENE GLYCOL 3350 17 G/17G
17 POWDER, FOR SOLUTION ORAL 2 TIMES DAILY PRN
Status: DISCONTINUED | OUTPATIENT
Start: 2024-03-09 | End: 2024-03-12 | Stop reason: HOSPADM

## 2024-03-09 RX ADMIN — METOCLOPRAMIDE HYDROCHLORIDE 5 MG: 5 SOLUTION ORAL at 05:03

## 2024-03-09 RX ADMIN — GABAPENTIN 300 MG: 300 CAPSULE ORAL at 08:03

## 2024-03-09 RX ADMIN — AMLODIPINE BESYLATE 5 MG: 5 TABLET ORAL at 08:03

## 2024-03-09 RX ADMIN — TIMOLOL MALEATE 1 DROP: 5 SOLUTION/ DROPS OPHTHALMIC at 08:03

## 2024-03-09 RX ADMIN — SERTRALINE HYDROCHLORIDE 50 MG: 50 TABLET ORAL at 08:03

## 2024-03-09 RX ADMIN — METOCLOPRAMIDE HYDROCHLORIDE 5 MG: 5 SOLUTION ORAL at 08:03

## 2024-03-09 RX ADMIN — GABAPENTIN 300 MG: 300 CAPSULE ORAL at 04:03

## 2024-03-09 RX ADMIN — ASPIRIN 81 MG: 81 TABLET, COATED ORAL at 08:03

## 2024-03-09 RX ADMIN — ATORVASTATIN CALCIUM 80 MG: 40 TABLET, FILM COATED ORAL at 08:03

## 2024-03-09 RX ADMIN — RIVAROXABAN 20 MG: 10 TABLET, FILM COATED ORAL at 04:03

## 2024-03-09 RX ADMIN — METOCLOPRAMIDE HYDROCHLORIDE 5 MG: 5 SOLUTION ORAL at 04:03

## 2024-03-09 RX ADMIN — OXYCODONE AND ACETAMINOPHEN 1 TABLET: 10; 325 TABLET ORAL at 08:03

## 2024-03-09 RX ADMIN — METHOCARBAMOL 500 MG: 500 TABLET ORAL at 08:03

## 2024-03-09 RX ADMIN — METOCLOPRAMIDE HYDROCHLORIDE 5 MG: 5 SOLUTION ORAL at 11:03

## 2024-03-09 RX ADMIN — CARVEDILOL 25 MG: 12.5 TABLET, FILM COATED ORAL at 08:03

## 2024-03-09 RX ADMIN — OXYCODONE AND ACETAMINOPHEN 1 TABLET: 10; 325 TABLET ORAL at 11:03

## 2024-03-09 RX ADMIN — OXYCODONE AND ACETAMINOPHEN 1 TABLET: 10; 325 TABLET ORAL at 05:03

## 2024-03-09 RX ADMIN — POLYETHYLENE GLYCOL 3350 17 G: 17 POWDER, FOR SOLUTION ORAL at 09:03

## 2024-03-09 RX ADMIN — FAMOTIDINE 20 MG: 20 TABLET, FILM COATED ORAL at 08:03

## 2024-03-09 NOTE — PLAN OF CARE
Problem: Impaired Wound Healing  Goal: Optimal Wound Healing  Outcome: Ongoing, Progressing  Intervention: Promote Wound Healing  Flowsheets (Taken 3/8/2024 1804)  Oral Nutrition Promotion: physical activity promoted  Activity Management: Arm raise - L1  Pain Management Interventions:   pain management plan reviewed with patient/caregiver   medication offered   quiet environment facilitated   position adjusted   relaxation techniques promoted     Problem: Skin Injury Risk Increased  Goal: Skin Health and Integrity  Outcome: Ongoing, Progressing  Intervention: Optimize Skin Protection  Flowsheets (Taken 3/8/2024 1804)  Pressure Reduction Techniques:   frequent weight shift encouraged   positioned off wounds   pressure points protected  Pressure Reduction Devices:   foam padding utilized   positioning supports utilized  Head of Bed (HOB) Positioning: HOB at 30-45 degrees  Intervention: Promote and Optimize Oral Intake  Flowsheets (Taken 3/8/2024 1804)  Oral Nutrition Promotion: physical activity promoted     Problem: Fall Injury Risk  Goal: Absence of Fall and Fall-Related Injury  Outcome: Ongoing, Progressing  Intervention: Identify and Manage Contributors  Flowsheets (Taken 3/8/2024 1804)  Self-Care Promotion:   independence encouraged   meal set-up provided  Medication Review/Management: medications reviewed  Intervention: Promote Injury-Free Environment  Flowsheets (Taken 3/8/2024 1804)  Safety Promotion/Fall Prevention:   nonskid shoes/socks when out of bed   side rails raised x 3   high risk medications identified   bedside commode chair   Fall Risk signage in place     Problem: Pain Acute  Goal: Acceptable Pain Control and Functional Ability  Outcome: Ongoing, Progressing  Intervention: Develop Pain Management Plan  Flowsheets (Taken 3/8/2024 1804)  Pain Management Interventions:   pain management plan reviewed with patient/caregiver   medication offered   quiet environment facilitated   position adjusted    relaxation techniques promoted

## 2024-03-10 LAB
ANION GAP SERPL CALC-SCNC: 8 MEQ/L
BASOPHILS # BLD AUTO: 0.04 X10(3)/MCL
BASOPHILS NFR BLD AUTO: 0.5 %
BUN SERPL-MCNC: 15.9 MG/DL (ref 9.8–20.1)
CALCIUM SERPL-MCNC: 8.4 MG/DL (ref 8.4–10.2)
CHLORIDE SERPL-SCNC: 105 MMOL/L (ref 98–107)
CO2 SERPL-SCNC: 26 MMOL/L (ref 23–31)
CREAT SERPL-MCNC: 0.62 MG/DL (ref 0.55–1.02)
CREAT/UREA NIT SERPL: 26
EOSINOPHIL # BLD AUTO: 0.06 X10(3)/MCL (ref 0–0.9)
EOSINOPHIL NFR BLD AUTO: 0.7 %
ERYTHROCYTE [DISTWIDTH] IN BLOOD BY AUTOMATED COUNT: 15.9 % (ref 11.5–17)
GFR SERPLBLD CREATININE-BSD FMLA CKD-EPI: >60 MLS/MIN/1.73/M2
GLUCOSE SERPL-MCNC: 109 MG/DL (ref 82–115)
HCT VFR BLD AUTO: 32.2 % (ref 37–47)
HGB BLD-MCNC: 10 G/DL (ref 12–16)
IMM GRANULOCYTES # BLD AUTO: 0.03 X10(3)/MCL (ref 0–0.04)
IMM GRANULOCYTES NFR BLD AUTO: 0.3 %
LYMPHOCYTES # BLD AUTO: 2.09 X10(3)/MCL (ref 0.6–4.6)
LYMPHOCYTES NFR BLD AUTO: 23.6 %
MCH RBC QN AUTO: 29.3 PG (ref 27–31)
MCHC RBC AUTO-ENTMCNC: 31.1 G/DL (ref 33–36)
MCV RBC AUTO: 94.4 FL (ref 80–94)
MONOCYTES # BLD AUTO: 0.94 X10(3)/MCL (ref 0.1–1.3)
MONOCYTES NFR BLD AUTO: 10.6 %
NEUTROPHILS # BLD AUTO: 5.69 X10(3)/MCL (ref 2.1–9.2)
NEUTROPHILS NFR BLD AUTO: 64.3 %
NRBC BLD AUTO-RTO: 0 %
PLATELET # BLD AUTO: 174 X10(3)/MCL (ref 130–400)
PLATELETS.RETICULATED NFR BLD AUTO: 2.9 % (ref 0.9–11.2)
PMV BLD AUTO: 11.2 FL (ref 7.4–10.4)
POTASSIUM SERPL-SCNC: 4.5 MMOL/L (ref 3.5–5.1)
RBC # BLD AUTO: 3.41 X10(6)/MCL (ref 4.2–5.4)
SODIUM SERPL-SCNC: 139 MMOL/L (ref 136–145)
WBC # SPEC AUTO: 8.85 X10(3)/MCL (ref 4.5–11.5)

## 2024-03-10 PROCEDURE — 25000003 PHARM REV CODE 250: Performed by: INTERNAL MEDICINE

## 2024-03-10 PROCEDURE — 21400001 HC TELEMETRY ROOM

## 2024-03-10 PROCEDURE — 25000003 PHARM REV CODE 250: Performed by: NURSE PRACTITIONER

## 2024-03-10 PROCEDURE — 80048 BASIC METABOLIC PNL TOTAL CA: CPT | Performed by: INTERNAL MEDICINE

## 2024-03-10 PROCEDURE — 85025 COMPLETE CBC W/AUTO DIFF WBC: CPT | Performed by: INTERNAL MEDICINE

## 2024-03-10 PROCEDURE — 25000003 PHARM REV CODE 250: Performed by: PHYSICIAN ASSISTANT

## 2024-03-10 RX ADMIN — OXYCODONE AND ACETAMINOPHEN 1 TABLET: 10; 325 TABLET ORAL at 09:03

## 2024-03-10 RX ADMIN — GABAPENTIN 300 MG: 300 CAPSULE ORAL at 08:03

## 2024-03-10 RX ADMIN — OXYCODONE AND ACETAMINOPHEN 1 TABLET: 10; 325 TABLET ORAL at 08:03

## 2024-03-10 RX ADMIN — RIVAROXABAN 20 MG: 10 TABLET, FILM COATED ORAL at 03:03

## 2024-03-10 RX ADMIN — METOCLOPRAMIDE HYDROCHLORIDE 5 MG: 5 SOLUTION ORAL at 03:03

## 2024-03-10 RX ADMIN — AMLODIPINE BESYLATE 5 MG: 5 TABLET ORAL at 08:03

## 2024-03-10 RX ADMIN — TIMOLOL MALEATE 1 DROP: 5 SOLUTION/ DROPS OPHTHALMIC at 09:03

## 2024-03-10 RX ADMIN — ATORVASTATIN CALCIUM 80 MG: 40 TABLET, FILM COATED ORAL at 09:03

## 2024-03-10 RX ADMIN — OXYCODONE AND ACETAMINOPHEN 1 TABLET: 10; 325 TABLET ORAL at 03:03

## 2024-03-10 RX ADMIN — METOCLOPRAMIDE HYDROCHLORIDE 5 MG: 5 SOLUTION ORAL at 10:03

## 2024-03-10 RX ADMIN — METOCLOPRAMIDE HYDROCHLORIDE 5 MG: 5 SOLUTION ORAL at 06:03

## 2024-03-10 RX ADMIN — SERTRALINE HYDROCHLORIDE 50 MG: 50 TABLET ORAL at 08:03

## 2024-03-10 RX ADMIN — FAMOTIDINE 20 MG: 20 TABLET, FILM COATED ORAL at 09:03

## 2024-03-10 RX ADMIN — GABAPENTIN 300 MG: 300 CAPSULE ORAL at 03:03

## 2024-03-10 RX ADMIN — ASPIRIN 81 MG: 81 TABLET, COATED ORAL at 08:03

## 2024-03-10 RX ADMIN — METOCLOPRAMIDE HYDROCHLORIDE 5 MG: 5 SOLUTION ORAL at 09:03

## 2024-03-10 RX ADMIN — CARVEDILOL 25 MG: 12.5 TABLET, FILM COATED ORAL at 09:03

## 2024-03-10 RX ADMIN — METHOCARBAMOL 500 MG: 500 TABLET ORAL at 09:03

## 2024-03-10 RX ADMIN — GABAPENTIN 300 MG: 300 CAPSULE ORAL at 09:03

## 2024-03-10 RX ADMIN — METHOCARBAMOL 500 MG: 500 TABLET ORAL at 08:03

## 2024-03-10 RX ADMIN — CARVEDILOL 25 MG: 12.5 TABLET, FILM COATED ORAL at 08:03

## 2024-03-10 RX ADMIN — TIMOLOL MALEATE 1 DROP: 5 SOLUTION/ DROPS OPHTHALMIC at 08:03

## 2024-03-10 RX ADMIN — POLYETHYLENE GLYCOL 3350 17 G: 17 POWDER, FOR SOLUTION ORAL at 03:03

## 2024-03-10 NOTE — PROGRESS NOTES
Hospital Medicine  Progress Note    Patient Name: Katty Veronica  MRN: [id number]  Status: IP- Inpatient   Admission Date: 3/4/2024  Length of Stay: 4  Date of Service: 03/10/2024       CC: hospital follow-up for knee hematoma       SUBJECTIVE   78 y.o. white female with a history that includes CAD, CVA on ASA, and atrial fibrillation on Xarelto, as well as chronic lower back pain s/p spinal stimulator, presented to [facility] on 3/4 with left knee pain following a fall.  Patient was walking in her apartment with her walker when went to plug in her phone and tripped and fell onto her left side.  Patient reports she was unable to get up following the fall due to significant knee pain.  She reports having a total replacement on that left knee about a year ago.  She denied loss of consciousness or head injury with fall.  At baseline patient lives alone, ambulates with a walker and completes activities of daily living independently.  Evaluation in ED noted patient HDS, with a large hematoma overlying left knee.  XR noted no acute abnormality. CT of the left knee was limited exam secondary to hardware, but within limitations there was no convincing evidence of acute bony abnormality, no evidence of hemarthrosis.   Patient was admitted to hospital medicine services for further medical management.  Orthopedics originally consulted, but suggested Plastics evaluation as well.    MRI knee -Markedly limited, essentially nondiagnostic evaluation, Possibility of focal/high-grade tendon rupture is not excluded.  Follow-up evaluation with focused ultrasound  if there is ongoing clinical concern   USG -no sonographic evidence of high-grade/full-thickness disruption involving the quadriceps or patellar tendon widespread periarticular subcutaneous edema with the multiple suspected areas of superficial hematoma    Plastic surgery, orthopedics team signed off.  No intervention recommended.  Orthopedics team recommended to follow up with  patient's primary orthopedic surgeon who operated on her knee.    Patient worked with Physical therapy recommended moderate intensity therapy.  Case management consulted for placement.    Anticoagulation recommended monitoring her hemoglobin      Today:   No acute events reported overnight  Patient seen at bedside, she voiced no complaints reported left knee swelling feeling improved       MEDICATIONS   Scheduled   amLODIPine  5 mg Oral Daily    aspirin  81 mg Oral Daily    atorvastatin  80 mg Oral QHS    carvediloL  25 mg Oral BID    famotidine  20 mg Oral QHS    gabapentin  300 mg Oral TID    methocarbamoL  500 mg Oral BID    metoclopramide HCl  5 mg Oral QID (AC & HS)    rivaroxaban  20 mg Oral Daily with dinner    sertraline  50 mg Oral Daily    timolol maleate 0.5%  1 drop Both Eyes BID     Continuous Infusions  None      PHYSICAL EXAM   VITALS: T 98.9 °F (37.2 °C)   /63   P 83   RR 18   O2 95 %    GENERAL: Awake and in NAD  LUNGS: unlabored breathing on room air   CVS: Normal rate  GI/: Soft, NT  EXTREMITIES: Left knee hematoma with swelling, blister  noted  NEURO: AAOx3  PSYCH: Cooperative      LABS   CBC  Recent Labs     03/08/24  0609 03/09/24  0509   WBC 8.18 8.66   RBC 3.61* 3.45*   HGB 10.7* 10.0*   HCT 31.7* 30.8*   MCV 87.8 89.3   MCH 29.6 29.0   MCHC 33.8 32.5*   RDW 15.7 15.8    193     CHEM  Recent Labs     03/08/24  0609 03/10/24  0714   NA  --  139   K  --  4.5   CHLORIDE  --  105   CO2  --  26   BUN  --  15.9   CREATININE  --  0.62   GLUCOSE  --  109   CALCIUM  --  8.4   FERRITIN 62.25  --    IRON 47*  --    TIBC 215*  --        ASSESSMENT   Large left knee hematoma, without evidence of hemarthrosis  Normocytic anemia  Afib on Xarelto  Essential hypertension  h/o CAD s/p prior stents, CVA, on ASA  h/o  SABINA not on CPAP       PLAN   Labs from this AM noted stable hemoglobin at 10, BMP with normal electrolytes   Orthopedics team signed off recommended outpatient follow up with her  orthopedic surgeon that completed her left total knee arthroplasty, weight-bearing as tolerated  Plastic surgery team signed off, no procedures planned, cleared for discharge from plastic surgery standpoint  Continue aspirin, Xarelto   Continue MMPC , gabapentin  Continue  PT/OT services-recommended moderate intensity therapy  Care discussed with pt's nurse      Prophylaxis:  Josette Dickerson MD  [facility]

## 2024-03-11 ENCOUNTER — TELEPHONE (OUTPATIENT)
Dept: NEUROLOGY | Facility: CLINIC | Age: 79
End: 2024-03-11
Payer: MEDICARE

## 2024-03-11 PROCEDURE — 97530 THERAPEUTIC ACTIVITIES: CPT

## 2024-03-11 PROCEDURE — 63600175 PHARM REV CODE 636 W HCPCS: Performed by: PHYSICIAN ASSISTANT

## 2024-03-11 PROCEDURE — 97535 SELF CARE MNGMENT TRAINING: CPT

## 2024-03-11 PROCEDURE — 21400001 HC TELEMETRY ROOM

## 2024-03-11 PROCEDURE — 25000003 PHARM REV CODE 250: Performed by: INTERNAL MEDICINE

## 2024-03-11 PROCEDURE — 25000003 PHARM REV CODE 250: Performed by: PHYSICIAN ASSISTANT

## 2024-03-11 PROCEDURE — 97110 THERAPEUTIC EXERCISES: CPT

## 2024-03-11 RX ORDER — LACTULOSE 10 G/15ML
10 SOLUTION ORAL 3 TIMES DAILY
Status: DISPENSED | OUTPATIENT
Start: 2024-03-11 | End: 2024-03-12

## 2024-03-11 RX ADMIN — AMLODIPINE BESYLATE 5 MG: 5 TABLET ORAL at 10:03

## 2024-03-11 RX ADMIN — METHOCARBAMOL 500 MG: 500 TABLET ORAL at 08:03

## 2024-03-11 RX ADMIN — GABAPENTIN 300 MG: 300 CAPSULE ORAL at 08:03

## 2024-03-11 RX ADMIN — FAMOTIDINE 20 MG: 20 TABLET, FILM COATED ORAL at 08:03

## 2024-03-11 RX ADMIN — TIMOLOL MALEATE 1 DROP: 5 SOLUTION/ DROPS OPHTHALMIC at 09:03

## 2024-03-11 RX ADMIN — ATORVASTATIN CALCIUM 80 MG: 40 TABLET, FILM COATED ORAL at 08:03

## 2024-03-11 RX ADMIN — CARVEDILOL 25 MG: 12.5 TABLET, FILM COATED ORAL at 09:03

## 2024-03-11 RX ADMIN — METOCLOPRAMIDE HYDROCHLORIDE 5 MG: 5 SOLUTION ORAL at 05:03

## 2024-03-11 RX ADMIN — SERTRALINE HYDROCHLORIDE 50 MG: 50 TABLET ORAL at 10:03

## 2024-03-11 RX ADMIN — OXYCODONE AND ACETAMINOPHEN 1 TABLET: 10; 325 TABLET ORAL at 08:03

## 2024-03-11 RX ADMIN — OXYCODONE AND ACETAMINOPHEN 1 TABLET: 10; 325 TABLET ORAL at 11:03

## 2024-03-11 RX ADMIN — RIVAROXABAN 20 MG: 10 TABLET, FILM COATED ORAL at 05:03

## 2024-03-11 RX ADMIN — ONDANSETRON 4 MG: 2 INJECTION INTRAMUSCULAR; INTRAVENOUS at 02:03

## 2024-03-11 RX ADMIN — METOCLOPRAMIDE HYDROCHLORIDE 5 MG: 5 SOLUTION ORAL at 11:03

## 2024-03-11 RX ADMIN — OXYCODONE AND ACETAMINOPHEN 1 TABLET: 10; 325 TABLET ORAL at 05:03

## 2024-03-11 RX ADMIN — CARVEDILOL 25 MG: 12.5 TABLET, FILM COATED ORAL at 10:03

## 2024-03-11 RX ADMIN — ASPIRIN 81 MG: 81 TABLET, COATED ORAL at 10:03

## 2024-03-11 RX ADMIN — METOCLOPRAMIDE HYDROCHLORIDE 5 MG: 5 SOLUTION ORAL at 08:03

## 2024-03-11 NOTE — PLAN OF CARE
Facility can accept patient when MD is ready to discharge sent message to MD and sent updates clinicals to facility.

## 2024-03-11 NOTE — PT/OT/SLP PROGRESS
Physical Therapy Treatment    Patient Name:  Katty Veronica   MRN:  [id number]    Recommendations:     Discharge therapy intensity: Moderate Intensity Therapy   Discharge Equipment Recommendations: none  Barriers to discharge: Ongoing medical needs    Assessment:     Katty Veronica is a 79 y.o. female admitted with a medical diagnosis of  s/p fall with L knee hematoma. Ultrasound for the left knee is negative for quad tendon or patella tendon tear. WBAT, no brace needed per ortho.  She presents with the following impairments/functional limitations: weakness, impaired endurance, impaired functional mobility, gait instability, decreased safety awareness.    Rehab Prognosis: Good; patient would benefit from acute skilled PT services to address these deficits and reach maximum level of function.    Recent Surgery: * No surgery found *      Plan:     During this hospitalization, patient to be seen 5 x/week to address the identified rehab impairments via gait training, therapeutic activities, therapeutic exercises and progress toward the following goals:    Plan of Care Expires:  04/13/24    Subjective     Chief Complaint: ready to go to rehab  Patient/Family Comments/goals: to get stronger  Pain/Comfort:  Pain Rating 1: 2/10  Location - Side 1: Left  Location 1: knee      Objective:     Communicated with RN prior to session.  Patient found HOB elevated with peripheral IV, PureWick, telemetry upon PT entry to room.     General Precautions: Standard, fall  Orthopedic Precautions: LLE weight bearing as tolerated (ROMAT)  Braces: N/A      Functional Mobility:  Bed Mobility:     Supine to Sit: contact guard assistance  Transfers:     Sit to Stand:  contact guard assistance with rolling walker  Gait: Pt ambulated 46' with RW CGA prior to seated rest. Antalgic stepping pattern on LLE with decreased foot clearance. Increased trunk flexion present.     Therapeutic Activities/Exercises:  Supine hip abduction x 10  reps  AP x 20 reps  LAQ x 10 reps  Heel slides to ~95* x 20 reps   March x 10 reps     Education:  Patient provided with verbal education education regarding PT role/goals/POC, fall prevention, and safety awareness.  Understanding was verbalized.     Patient left up in chair with all lines intact and call button in reach    GOALS:   Multidisciplinary Problems       Physical Therapy Goals          Problem: Physical Therapy    Goal Priority Disciplines Outcome Goal Variances Interventions   Physical Therapy Goal     PT, PT/OT Ongoing, Progressing     Description: Goals to be met by: d/c     Patient will increase functional independence with mobility by performin. Supine to sit with Stand-by Assistance  2. Sit to supine with Stand-by Assistance  3. Sit to stand transfer with Stand-by Assistance  4. Bed to chair transfer with Stand-by Assistance using Rolling Walker  5. Gait  x 150 feet with Stand-by Assistance using Rolling Walker.                          Time Tracking:     PT Received On: 24  PT Start Time: 08     PT Stop Time: 0850  PT Total Time (min): 24 min     Billable Minutes: Therapeutic Activity 16 and Therapeutic Exercise 8    Treatment Type: Treatment  PT/PTA: PT     Number of PTA visits since last PT visit: 2     2024

## 2024-03-11 NOTE — PLAN OF CARE
Spoke to Gordon at [facility] she presented patient to MD for acceptance she will have an answer around noon

## 2024-03-11 NOTE — PT/OT/SLP PROGRESS
Occupational Therapy   Treatment    Name: Katty Veronica  MRN: [id number]  Admitting Diagnosis:  Hematoma of left knee region       Recommendations:     Recommended therapy intensity at discharge: Moderate Intensity Therapy   Discharge Equipment Recommendations:  to be determined by next level of care  Barriers to discharge:  Decreased caregiver support (Pt lives alone, ongoing medical needs)    Assessment:     Katty Veronica is a 79 y.o. female with a medical diagnosis of Hematoma of left knee region.  Pt agreeable to working with therapy, tolerated session well. Pt was overall CGA for functional transfer and mobility with RW. Performance deficits affecting function are weakness, gait instability, impaired endurance, impaired self care skills, impaired functional mobility, edema, pain, impaired balance, decreased lower extremity function.     Rehab Prognosis:  Good; patient would benefit from acute skilled OT services to address these deficits and reach maximum level of function.       Plan:     Patient to be seen 3 x/week to address the above listed problems via self-care/home management, therapeutic activities, therapeutic exercises  Plan of Care Expires: 04/04/24  Plan of Care Reviewed with: patient    Subjective     Pain/Comfort:  Pain Rating 1: 0/10    Objective:     Communicated with: Nurse prior to session.  Patient found up in chair with PureWick upon OT entry to room.    General Precautions: Standard, fall    Orthopedic Precautions:LLE weight bearing as tolerated (ROMAT)  Braces: N/A  Respiratory Status: Room air     Occupational Performance:     Bed Mobility:    Patient completed Sit to Supine with minimum assistance for bringing LLE onto bed    Functional Mobility/Transfers:  Patient completed Sit <> Stand Transfer with contact guard assistance  with  rolling walker   Functional Mobility: Pt was CGA for functional transfer with RW, no LOB noted. Pt with flexed posture, cues provided for erect  posture in stand with RW.    Activities of Daily Living:  Grooming: stand by assistance oral care, hand, and face washing while in stand at the sink with RW  Toileting: total assistance yue cares in supine and placement of purewick    Therapeutic Positioning    OT interventions performed during the course of today's session in an effort to prevent and/or reduce acquired pressure injuries:   Education was provided on benefits of and recommendations for therapeutic positioning     Findings: known area of altered skin integrity at yue area    Patient Education:  Patient provided with verbal education and demonstrations education regarding OT role/goals/POC, fall prevention, and safety awareness.  Understanding was verbalized.      Patient left HOB elevated with all lines intact and call button in reach.    GOALS:   Multidisciplinary Problems       Occupational Therapy Goals          Problem: Occupational Therapy    Goal Priority Disciplines Outcome Interventions   Occupational Therapy Goal     OT, PT/OT Ongoing, Progressing    Description: Goals to be met by: 4/4/24     Patient will increase functional independence with ADLs by performing:    LE Dressing with Supervision and AE as needed.  Grooming while standing at sink with Supervision.  Toileting from toilet with Minimal Assistance for hygiene and clothing management.   Toilet transfer to toilet with Contact Guard Assistance.                         Time Tracking:     OT Date of Treatment: 03/11/24  OT Start Time: 1000  OT Stop Time: 1015  OT Total Time (min): 15 min    Billable Minutes:Self Care/Home Management 15 minutes    OT/GAIL: OT     Number of GAIL visits since last OT visit: 1    3/11/2024

## 2024-03-11 NOTE — PROGRESS NOTES
Hospital Medicine  Progress Note    Patient Name: Katty Veronica  MRN: [id number]  Status: IP- Inpatient   Admission Date: 3/4/2024  Length of Stay: 5  Date of Service: 03/11/2024       CC: hospital follow-up for knee hematoma       SUBJECTIVE   78 y.o. white female with a history that includes CAD, CVA on ASA, and atrial fibrillation on Xarelto, as well as chronic lower back pain s/p spinal stimulator, presented to [facility] on 3/4 with left knee pain following a fall.  Patient was walking in her apartment with her walker when went to plug in her phone and tripped and fell onto her left side.  Patient reports she was unable to get up following the fall due to significant knee pain.  She reports having a total replacement on that left knee about a year ago.  She denied loss of consciousness or head injury with fall.  At baseline patient lives alone, ambulates with a walker and completes activities of daily living independently.  Evaluation in ED noted patient HDS, with a large hematoma overlying left knee.  XR noted no acute abnormality. CT of the left knee was limited exam secondary to hardware, but within limitations there was no convincing evidence of acute bony abnormality, no evidence of hemarthrosis.   Patient was admitted to hospital medicine services for further medical management.  Orthopedics originally consulted, but suggested Plastics evaluation as well.    MRI knee -Markedly limited, essentially nondiagnostic evaluation, Possibility of focal/high-grade tendon rupture is not excluded.  Follow-up evaluation with focused ultrasound  if there is ongoing clinical concern   USG -no sonographic evidence of high-grade/full-thickness disruption involving the quadriceps or patellar tendon widespread periarticular subcutaneous edema with the multiple suspected areas of superficial hematoma    Plastic surgery, orthopedics team signed off.  No intervention recommended.  Orthopedics team recommended to follow up with  patient's primary orthopedic surgeon who operated on her knee.    Patient worked with Physical therapy recommended moderate intensity therapy.  Case management consulted for placement.    Anticoagulation recommended monitoring her hemoglobin      Today:   No acute events reported overnight  Patient seen at bedside, she reported not having BMs for the last 3-4 days, reported she had no issues with constipation at home does not use any stool softeners or laxatives on a regular basis.  Reported patient been taking MiraLax but no effect offered enema, refused.  Requested oral regimen to help with constipation   Patient denied any nausea, vomiting, abdominal pain, passing gas   MEDICATIONS   Scheduled   amLODIPine  5 mg Oral Daily    aspirin  81 mg Oral Daily    atorvastatin  80 mg Oral QHS    carvediloL  25 mg Oral BID    famotidine  20 mg Oral QHS    gabapentin  300 mg Oral TID    methocarbamoL  500 mg Oral BID    metoclopramide HCl  5 mg Oral QID (AC & HS)    rivaroxaban  20 mg Oral Daily with dinner    sertraline  50 mg Oral Daily    timolol maleate 0.5%  1 drop Both Eyes BID     Continuous Infusions  None      PHYSICAL EXAM   VITALS: T 97.5 °F (36.4 °C)   /63   P 67   RR 17   O2 97 %    GENERAL: Awake and in NAD  LUNGS: unlabored breathing on room air   CVS: Normal rate  GI/: Soft, NT  EXTREMITIES: Left knee hematoma with swelling, blister  noted  NEURO: AAOx3  PSYCH: Cooperative      LABS   CBC  Recent Labs     03/09/24  0509 03/10/24  0919   WBC 8.66 8.85   RBC 3.45* 3.41*   HGB 10.0* 10.0*   HCT 30.8* 32.2*   MCV 89.3 94.4*   MCH 29.0 29.3   MCHC 32.5* 31.1*   RDW 15.8 15.9    174     CHEM  Recent Labs     03/10/24  0714      K 4.5   CHLORIDE 105   CO2 26   BUN 15.9   CREATININE 0.62   GLUCOSE 109   CALCIUM 8.4       ASSESSMENT   Large left knee hematoma, without evidence of hemarthrosis  Normocytic anemia  Constipation  Afib on Xarelto  Essential hypertension  h/o CAD s/p prior stents, CVA,  on ASA  h/o  SABINA not on CPAP       PLAN   Give lactulose today, monitor for bowel movements patient refused enema  Orthopedics team signed off recommended outpatient follow up with her orthopedic surgeon that completed her left total knee arthroplasty, weight-bearing as tolerated  Plastic surgery team signed off, no procedures planned, cleared for discharge from plastic surgery standpoint  Continue aspirin, Xarelto   Continue MMPC , gabapentin  Continue  PT/OT services-recommended moderate intensity therapy  Case discussed with case management who reported availability of placement    Dispo:  Discharge to accepting facility after having BMs      Prophylaxis:  Josette Dickerson MD  [facility]

## 2024-03-12 VITALS
BODY MASS INDEX: 37.96 KG/M2 | HEART RATE: 77 BPM | OXYGEN SATURATION: 94 % | HEIGHT: 61 IN | WEIGHT: 201.06 LBS | RESPIRATION RATE: 18 BRPM | DIASTOLIC BLOOD PRESSURE: 80 MMHG | SYSTOLIC BLOOD PRESSURE: 136 MMHG | TEMPERATURE: 98 F

## 2024-03-12 PROCEDURE — 25000003 PHARM REV CODE 250: Performed by: INTERNAL MEDICINE

## 2024-03-12 PROCEDURE — 97530 THERAPEUTIC ACTIVITIES: CPT

## 2024-03-12 PROCEDURE — 25000003 PHARM REV CODE 250: Performed by: PHYSICIAN ASSISTANT

## 2024-03-12 PROCEDURE — 97110 THERAPEUTIC EXERCISES: CPT

## 2024-03-12 RX ORDER — POLYETHYLENE GLYCOL 3350 17 G/17G
17 POWDER, FOR SOLUTION ORAL 2 TIMES DAILY PRN
Refills: 0
Start: 2024-03-12

## 2024-03-12 RX ADMIN — ASPIRIN 81 MG: 81 TABLET, COATED ORAL at 09:03

## 2024-03-12 RX ADMIN — OXYCODONE AND ACETAMINOPHEN 1 TABLET: 10; 325 TABLET ORAL at 03:03

## 2024-03-12 RX ADMIN — TIMOLOL MALEATE 1 DROP: 5 SOLUTION/ DROPS OPHTHALMIC at 09:03

## 2024-03-12 RX ADMIN — CARVEDILOL 25 MG: 12.5 TABLET, FILM COATED ORAL at 09:03

## 2024-03-12 RX ADMIN — AMLODIPINE BESYLATE 5 MG: 5 TABLET ORAL at 09:03

## 2024-03-12 RX ADMIN — GABAPENTIN 300 MG: 300 CAPSULE ORAL at 09:03

## 2024-03-12 RX ADMIN — METOCLOPRAMIDE HYDROCHLORIDE 5 MG: 5 SOLUTION ORAL at 10:03

## 2024-03-12 RX ADMIN — SERTRALINE HYDROCHLORIDE 50 MG: 50 TABLET ORAL at 09:03

## 2024-03-12 RX ADMIN — METOCLOPRAMIDE HYDROCHLORIDE 5 MG: 5 SOLUTION ORAL at 06:03

## 2024-03-12 RX ADMIN — METHOCARBAMOL 500 MG: 500 TABLET ORAL at 09:03

## 2024-03-12 RX ADMIN — OXYCODONE AND ACETAMINOPHEN 1 TABLET: 10; 325 TABLET ORAL at 09:03

## 2024-03-12 NOTE — PLAN OF CARE
03/12/24 0958   Final Note   Assessment Type Final Discharge Note   Anticipated Discharge Disposition Swing Bed   Post-Acute Status   Post-Acute Authorization Placement   Post-Acute Placement Status Set-up Complete/Auth obtained   Coverage Medicare   Discharge Delays None known at this time

## 2024-03-12 NOTE — PLAN OF CARE
03/12/24 0957   Medicare Message   Important Message from Medicare regarding Discharge Appeal Rights Explained to patient/caregiver

## 2024-03-12 NOTE — PLAN OF CARE
Problem: Adult Inpatient Plan of Care  Goal: Plan of Care Review  Outcome: Ongoing, Progressing  Flowsheets (Taken 3/12/2024 0800)  Plan of Care Reviewed With: patient  Goal: Patient-Specific Goal (Individualized)  Outcome: Ongoing, Progressing  Goal: Absence of Hospital-Acquired Illness or Injury  Outcome: Ongoing, Progressing  Intervention: Identify and Manage Fall Risk  Flowsheets (Taken 3/12/2024 0800)  Safety Promotion/Fall Prevention: assistive device/personal item within reach  Intervention: Prevent Skin Injury  Flowsheets (Taken 3/12/2024 0800)  Body Position: weight shifting  Skin Protection: incontinence pads utilized  Intervention: Prevent and Manage VTE (Venous Thromboembolism) Risk  Flowsheets (Taken 3/12/2024 0800)  Activity Management: Up in chair - L3  VTE Prevention/Management: bleeding precations maintained  Range of Motion: active ROM (range of motion) encouraged  Goal: Optimal Comfort and Wellbeing  Outcome: Ongoing, Progressing  Intervention: Monitor Pain and Promote Comfort  Flowsheets (Taken 3/12/2024 0800)  Pain Management Interventions: medication offered  Intervention: Provide Person-Centered Care  Flowsheets (Taken 3/12/2024 0800)  Trust Relationship/Rapport:   care explained   thoughts/feelings acknowledged   choices provided   emotional support provided   empathic listening provided   questions answered   questions encouraged   reassurance provided  Goal: Readiness for Transition of Care  Outcome: Ongoing, Progressing     Problem: Impaired Wound Healing  Goal: Optimal Wound Healing  Outcome: Ongoing, Progressing  Intervention: Promote Wound Healing  Flowsheets (Taken 3/12/2024 0800)  Oral Nutrition Promotion: physical activity promoted  Activity Management: Up in chair - L3  Pain Management Interventions: medication offered     Problem: Skin Injury Risk Increased  Goal: Skin Health and Integrity  Outcome: Ongoing, Progressing  Intervention: Optimize Skin Protection  Flowsheets (Taken  3/12/2024 0800)  Pressure Reduction Techniques:   frequent weight shift encouraged   positioned off wounds   pressure points protected  Pressure Reduction Devices: positioning supports utilized  Skin Protection: incontinence pads utilized  Head of Bed (HOB) Positioning: (patient in chair) other (see comments)  Intervention: Promote and Optimize Oral Intake  Flowsheets (Taken 3/12/2024 0800)  Oral Nutrition Promotion: physical activity promoted     Problem: Fall Injury Risk  Goal: Absence of Fall and Fall-Related Injury  Outcome: Ongoing, Progressing  Intervention: Identify and Manage Contributors  Flowsheets (Taken 3/12/2024 0800)  Self-Care Promotion: independence encouraged  Medication Review/Management: medications reviewed  Intervention: Promote Injury-Free Environment  Flowsheets (Taken 3/12/2024 0800)  Safety Promotion/Fall Prevention: assistive device/personal item within reach     Problem: Pain Acute  Goal: Acceptable Pain Control and Functional Ability  Outcome: Ongoing, Progressing  Intervention: Develop Pain Management Plan  Flowsheets (Taken 3/12/2024 0800)  Pain Management Interventions: medication offered  Intervention: Prevent or Manage Pain  Flowsheets (Taken 3/12/2024 0800)  Bowel Elimination Promotion:   adequate fluid intake promoted   ambulation promoted  Medication Review/Management: medications reviewed  Intervention: Optimize Psychosocial Wellbeing  Flowsheets (Taken 3/12/2024 0800)  Supportive Measures: active listening utilized

## 2024-03-12 NOTE — DISCHARGE SUMMARY
[facility] Hospital Medicine Discharge Summary    Admit Date: 3/4/2024  Discharge Date and Time: 3/12/06998:41 AM  Admitting Physician:  Team  Discharging Physician: Molly Dickerson MD.  Primary Care Physician: Renard Eddy MD  Consults:  Plastic surgery and Orthopedic Surgery    Discharge Diagnoses:  Large left knee hematoma, without evidence of hemarthrosis  Normocytic anemia  Constipation  Afib on Xarelto  Essential hypertension  h/o CAD s/p prior stents, CVA, on ASA  h/o  SABINA not on CPAP     Hospital Course:   78 y.o. white female with a history that includes CAD, CVA on ASA, and atrial fibrillation on Xarelto, as well as chronic lower back pain s/p spinal stimulator, presented to [facility] on 3/4 with left knee pain following a fall.  Patient was walking in her apartment with her walker when went to plug in her phone and tripped and fell onto her left side.  Patient reports she was unable to get up following the fall due to significant knee pain.  She reports having a total replacement on that left knee about a year ago.  She denied loss of consciousness or head injury with fall.  At baseline patient lives alone, ambulates with a walker and completes activities of daily living independently.  Evaluation in ED noted patient HDS, with a large hematoma overlying left knee.  XR noted no acute abnormality. CT of the left knee was limited exam secondary to hardware, but within limitations there was no convincing evidence of acute bony abnormality, no evidence of hemarthrosis.   Patient was admitted to hospital medicine services for further medical management.  Orthopedics originally consulted, but suggested Plastics evaluation as well.     MRI knee -Markedly limited, essentially nondiagnostic evaluation, Possibility of focal/high-grade tendon rupture is not excluded.  Follow-up evaluation with focused ultrasound  if there is ongoing clinical concern   USG -no sonographic  evidence of high-grade/full-thickness disruption involving the quadriceps or patellar tendon widespread periarticular subcutaneous edema with the multiple suspected areas of superficial hematoma     Plastic surgery, orthopedics team signed off.  No intervention recommended.  Orthopedics team recommended to follow up with patient's primary orthopedic surgeon who operated on her knee.       Patient worked with Physical therapy recommended moderate intensity therapy.  Case management consulted for placement.     Pt was seen and examined on the day of discharge, remained hemodynamically stable, discussed regarding anticoagulation, patient was concerned about pain requiring round-the-clock Norco and  left knee hematoma, recommended anticoagulation to hold for another week prior to resumption patient understands the risks and benefits of continuing &discontinuation of anticoagulation.  Patient was requested to follow up with orthopedic surgeon she verbalized understanding, discharge to skilled nursing facility , discussed taking stool softeners/laxatives avoid constipation      Vitals:  VITAL SIGNS: 24 HRS MIN & MAX LAST   Temp  Min: 98.1 °F (36.7 °C)  Max: 98.9 °F (37.2 °C) 98.1 °F (36.7 °C)   BP  Min: 99/46  Max: 146/69 136/80   Pulse  Min: 67  Max: 83  77   Resp  Min: 16  Max: 18 18   SpO2  Min: 94 %  Max: 95 % (!) 94 %       Physical Exam:  GENERAL: Awake and in NAD  LUNGS: unlabored breathing on room air   CVS: Normal rate  GI/: Soft, NT  EXTREMITIES: Left knee hematoma with swelling, blister  noted  NEURO: AAOx3  PSYCH: Cooperative    Procedures Performed: No admission procedures for hospital encounter.     Significant Diagnostic Studies: See Full reports for all details    Recent Labs   Lab 03/08/24  0609 03/09/24  0509 03/10/24  0919   WBC 8.18 8.66 8.85   RBC 3.61* 3.45* 3.41*   HGB 10.7* 10.0* 10.0*   HCT 31.7* 30.8* 32.2*   MCV 87.8 89.3 94.4*   MCH 29.6 29.0 29.3   MCHC 33.8 32.5* 31.1*   RDW 15.7 15.8 15.9     193 174   MPV 10.2 10.5* 11.2*       Recent Labs   Lab 03/07/24  0456 03/10/24  0714    139   K 3.4* 4.5   CO2 25 26   BUN 10.3 15.9   CREATININE 0.67 0.62   CALCIUM 8.4 8.4        Microbiology Results (last 7 days)       ** No results found for the last 168 hours. **             US Extremity Non Vascular Complete Left  EXAMINATION  US EXTREMITY NON VASCULAR COMPLETE LEFT    CLINICAL HISTORY  Left knee concern for patella tendon vs quad rupture;    TECHNIQUE  Focused multiplanar sonographic images of the left knee were obtained.    COMPARISON  *4 March 2024 CT  *6 March 2024 MR    FINDINGS  Exam quality: adequate for evaluation    Widespread subcutaneous edema is present.  Overlying the patella and patella tendon are somewhat lobulated, heterogeneous hypoechoic structures measuring up to approximately 6 cm x 1 cm and demonstrating through acoustic enhancement; Doppler interrogation demonstrates no internal vascular flow or evidence of surrounding hyperemia.  Overall appearance is consistent with hematoma.    Echogenic cortex and associated acoustic shadowing of the patella, distal femur, and tibial tuberosity are readily visualized.  The quadricep and patella tendons demonstrate smoothly marginated borders, with no sonographic evidence of inflammatory thickening, abnormal fluid, or focal contour irregularity/laxity.  No significant intra-articular fluid is identified.    IMPRESSION  1. No sonographic evidence of high-grade/full-thickness disruption involving the quadriceps or patella tendons.  2. Widespread periarticular subcutaneous edema with multiple suspected areas of superficial hematoma.    Electronically signed by: Wilbert Moore  Date:    03/07/2024  Time:    16:59         Medication List        START taking these medications      polyethylene glycol 17 gram Pwpk  Commonly known as: GLYCOLAX  Take 17 g by mouth 2 (two) times daily as needed for Constipation.            CHANGE how you take these  medications      rivaroxaban 20 mg Tab  Commonly known as: XARELTO  Take 1 tablet (20 mg total) by mouth daily with dinner or evening meal.  Start taking on: March 19, 2024  What changed: These instructions start on March 19, 2024. If you are unsure what to do until then, ask your doctor or other care provider.            CONTINUE taking these medications      amLODIPine 5 MG tablet  Commonly known as: NORVASC     aspirin 81 MG EC tablet  Commonly known as: ECOTRIN  Take 1 tablet (81 mg total) by mouth once daily.     atorvastatin 80 MG tablet  Commonly known as: LIPITOR     carvediloL 25 MG tablet  Commonly known as: COREG  Take 1 tablet (25 mg total) by mouth 2 (two) times daily.     cetirizine 10 MG tablet  Commonly known as: ZYRTEC     famotidine 20 MG tablet  Commonly known as: PEPCID     gabapentin 300 MG capsule  Commonly known as: NEURONTIN     methocarbamoL 500 MG Tab  Commonly known as: ROBAXIN     metoclopramide HCl 5 MG tablet  Commonly known as: REGLAN     nitroGLYCERIN 0.4 MG SL tablet  Commonly known as: NITROSTAT  Place 1 tablet (0.4 mg total) under the tongue every 5 (five) minutes as needed for Chest pain.     ondansetron 8 MG Tbdl  Commonly known as: ZOFRAN-ODT     pantoprazole 40 MG tablet  Commonly known as: PROTONIX     sertraline 50 MG tablet  Commonly known as: ZOLOFT     timolol maleate 0.5% 0.5 % Drop  Commonly known as: TIMOPTIC     VITAMIN D2 50,000 unit Cap  Generic drug: ergocalciferol               Where to Get Your Medications        Information about where to get these medications is not yet available    Ask your nurse or doctor about these medications  polyethylene glycol 17 gram Pwpk  rivaroxaban 20 mg Tab          Explained in detail to the patient about the discharge plan, medications, and follow-up visits. Pt understands and agrees with the treatment plan  Discharge Disposition: SNF   Discharged Condition: stable  Diet-   Dietary Orders (From admission, onward)       Start      Ordered    03/05/24 1051  Diet heart healthy  Diet effective now         03/05/24 1050                   Medications Per DC med rec  Activities as tolerated   Follow-up Information       Renard Eddy MD Follow up.    Specialties: Internal Medicine, Pediatrics  Why: Patient needs follow up her primary Orthopedics who performed surgery on left knee  Contact information:  [address] [place] [address]  [phone]               [facility] Follow up.    Specialty: Home Health Services  Why: Your home health company will follow up with you when you are released from [facility]  Contact information:  [address] [facility]  [place] [address]  [phone]               [facility] Follow up.    Why: We will assist you with transportation to [facility] unit for therapy  Contact information:  [address] [address] [phone]                         For further questions contact hospitalist office    Discharge time 33 minutes    For worsening symptoms, chest pain, shortness of breath, increased abdominal pain, high grade fever, stroke or stroke like symptoms, immediately go to the nearest Emergency Room or call 911 as soon as possible.      Molly Lutz M.D, on 3/12/2024. at 9:41 AM.

## 2024-03-12 NOTE — PT/OT/SLP PROGRESS
Physical Therapy Treatment    Patient Name:  Katty Veronica   MRN:  [id number]    Recommendations:     Discharge therapy intensity: Moderate Intensity Therapy   Discharge Equipment Recommendations: none  Barriers to discharge: Decreased caregiver support and Impaired mobility    Assessment:     Katty Veronica is a 79 y.o. female admitted with a medical diagnosis of s/p fall with L knee hematoma. Ultrasound for the left knee is negative for quad tendon or patella tendon tear. WBAT, no brace needed per ortho.  She presents with the following impairments/functional limitations: weakness, impaired endurance, impaired functional mobility, gait instability, decreased safety awareness.     Rehab Prognosis: Good; patient would benefit from acute skilled PT services to address these deficits and reach maximum level of function.    Recent Surgery: * No surgery found *      Plan:     During this hospitalization, patient to be seen 5 x/week to address the identified rehab impairments via gait training, therapeutic activities, therapeutic exercises and progress toward the following goals:    Plan of Care Expires:  04/13/24    Subjective     Chief Complaint: knee pain  Patient/Family Comments/goals: to return to PLOF  Pain/Comfort:  Pain Rating 1: 5/10  Location - Side 1: Left  Location 1: knee      Objective:     Communicated with RN prior to session.  Patient found HOB elevated with peripheral IV, PureWick, telemetry upon PT entry to room.     General Precautions: Standard, fall  Orthopedic Precautions: LLE weight bearing as tolerated (ROMAT)  Braces: N/A  Respiratory Status: Room air    Functional Mobility:  Bed Mobility:     Supine to Sit: minimum assistance  Transfers:     Sit to Stand:  contact guard assistance with rolling walker  Bed to Chair: contact guard assistance with  rolling walker  using  Step Transfer  Gait: Pt ambulated 30' with RW CGA, increased antalgic gait pattern with significant knee flexion.  Trunk flexion present increased BUE push during L WB.     Therapeutic Activities/Exercises:  Heel slides x 20 reps  AP x 30 reps  LAQ x 20 reps     Education:  Patient provided with verbal education education regarding PT role/goals/POC, fall prevention, and safety awareness.  Understanding was verbalized.     Patient left up in chair with all lines intact, call button in reach, and RN present    GOALS:   Multidisciplinary Problems       Physical Therapy Goals          Problem: Physical Therapy    Goal Priority Disciplines Outcome Goal Variances Interventions   Physical Therapy Goal     PT, PT/OT Ongoing, Progressing     Description: Goals to be met by: d/c     Patient will increase functional independence with mobility by performin. Supine to sit with Stand-by Assistance  2. Sit to supine with Stand-by Assistance  3. Sit to stand transfer with Stand-by Assistance  4. Bed to chair transfer with Stand-by Assistance using Rolling Walker  5. Gait  x 150 feet with Stand-by Assistance using Rolling Walker.                          Time Tracking:     PT Received On: 24  PT Start Time: 925     PT Stop Time: 948  PT Total Time (min): 23 min     Billable Minutes: Therapeutic Activity 15 and Therapeutic Exercise 8    Treatment Type: Treatment  PT/PTA: PT     Number of PTA visits since last PT visit: 3     2024

## 2024-04-07 ENCOUNTER — HOSPITAL ENCOUNTER (INPATIENT)
Facility: HOSPITAL | Age: 79
LOS: 1 days | Discharge: HOME HEALTH | DRG: 291 | End: 2024-04-08
Attending: EMERGENCY MEDICINE | Admitting: INTERNAL MEDICINE
Payer: MEDICARE

## 2024-04-07 DIAGNOSIS — R06.02 SOB (SHORTNESS OF BREATH): Primary | ICD-10-CM

## 2024-04-07 DIAGNOSIS — R00.0 TACHYCARDIA: ICD-10-CM

## 2024-04-07 LAB
ALBUMIN SERPL-MCNC: 3.4 G/DL (ref 3.4–4.8)
ALBUMIN/GLOB SERPL: 1.2 RATIO (ref 1.1–2)
ALP SERPL-CCNC: 86 UNIT/L (ref 40–150)
ALT SERPL-CCNC: 12 UNIT/L (ref 0–55)
AST SERPL-CCNC: 17 UNIT/L (ref 5–34)
BASOPHILS # BLD AUTO: 0.03 X10(3)/MCL
BASOPHILS NFR BLD AUTO: 0.3 %
BILIRUB SERPL-MCNC: 0.7 MG/DL
BNP BLD-MCNC: 469.2 PG/ML
BUN SERPL-MCNC: 16.4 MG/DL (ref 9.8–20.1)
CALCIUM SERPL-MCNC: 9 MG/DL (ref 8.4–10.2)
CHLORIDE SERPL-SCNC: 106 MMOL/L (ref 98–107)
CO2 SERPL-SCNC: 26 MMOL/L (ref 23–31)
CREAT SERPL-MCNC: 0.79 MG/DL (ref 0.55–1.02)
EOSINOPHIL # BLD AUTO: 0.08 X10(3)/MCL (ref 0–0.9)
EOSINOPHIL NFR BLD AUTO: 0.9 %
ERYTHROCYTE [DISTWIDTH] IN BLOOD BY AUTOMATED COUNT: 15.4 % (ref 11.5–17)
FLUAV AG UPPER RESP QL IA.RAPID: NOT DETECTED
FLUBV AG UPPER RESP QL IA.RAPID: NOT DETECTED
GFR SERPLBLD CREATININE-BSD FMLA CKD-EPI: >60 MLS/MIN/1.73/M2
GLOBULIN SER-MCNC: 2.9 GM/DL (ref 2.4–3.5)
GLUCOSE SERPL-MCNC: 114 MG/DL (ref 82–115)
HCT VFR BLD AUTO: 33 % (ref 37–47)
HGB BLD-MCNC: 10.6 G/DL (ref 12–16)
IMM GRANULOCYTES # BLD AUTO: 0.02 X10(3)/MCL (ref 0–0.04)
IMM GRANULOCYTES NFR BLD AUTO: 0.2 %
LYMPHOCYTES # BLD AUTO: 2.78 X10(3)/MCL (ref 0.6–4.6)
LYMPHOCYTES NFR BLD AUTO: 31.8 %
MCH RBC QN AUTO: 29.2 PG (ref 27–31)
MCHC RBC AUTO-ENTMCNC: 32.1 G/DL (ref 33–36)
MCV RBC AUTO: 90.9 FL (ref 80–94)
MONOCYTES # BLD AUTO: 0.62 X10(3)/MCL (ref 0.1–1.3)
MONOCYTES NFR BLD AUTO: 7.1 %
NEUTROPHILS # BLD AUTO: 5.22 X10(3)/MCL (ref 2.1–9.2)
NEUTROPHILS NFR BLD AUTO: 59.7 %
NRBC BLD AUTO-RTO: 0 %
OHS QRS DURATION: 76 MS
OHS QTC CALCULATION: 415 MS
PLATELET # BLD AUTO: 217 X10(3)/MCL (ref 130–400)
PMV BLD AUTO: 11.3 FL (ref 7.4–10.4)
POTASSIUM SERPL-SCNC: 4.3 MMOL/L (ref 3.5–5.1)
PROT SERPL-MCNC: 6.3 GM/DL (ref 5.8–7.6)
RBC # BLD AUTO: 3.63 X10(6)/MCL (ref 4.2–5.4)
SARS-COV-2 RNA RESP QL NAA+PROBE: NOT DETECTED
SODIUM SERPL-SCNC: 140 MMOL/L (ref 136–145)
TROPONIN I SERPL-MCNC: <0.01 NG/ML (ref 0–0.04)
TROPONIN I SERPL-MCNC: <0.01 NG/ML (ref 0–0.04)
WBC # SPEC AUTO: 8.75 X10(3)/MCL (ref 4.5–11.5)

## 2024-04-07 PROCEDURE — 63600175 PHARM REV CODE 636 W HCPCS: Performed by: EMERGENCY MEDICINE

## 2024-04-07 PROCEDURE — 85025 COMPLETE CBC W/AUTO DIFF WBC: CPT | Performed by: PHYSICIAN ASSISTANT

## 2024-04-07 PROCEDURE — 25000242 PHARM REV CODE 250 ALT 637 W/ HCPCS: Performed by: PHYSICIAN ASSISTANT

## 2024-04-07 PROCEDURE — 84484 ASSAY OF TROPONIN QUANT: CPT | Performed by: PHYSICIAN ASSISTANT

## 2024-04-07 PROCEDURE — 11000001 HC ACUTE MED/SURG PRIVATE ROOM

## 2024-04-07 PROCEDURE — 93010 ELECTROCARDIOGRAM REPORT: CPT | Mod: ,,, | Performed by: INTERNAL MEDICINE

## 2024-04-07 PROCEDURE — 93005 ELECTROCARDIOGRAM TRACING: CPT

## 2024-04-07 PROCEDURE — 80053 COMPREHEN METABOLIC PANEL: CPT | Performed by: PHYSICIAN ASSISTANT

## 2024-04-07 PROCEDURE — 0240U COVID/FLU A&B PCR: CPT | Performed by: EMERGENCY MEDICINE

## 2024-04-07 PROCEDURE — 99285 EMERGENCY DEPT VISIT HI MDM: CPT | Mod: 25

## 2024-04-07 PROCEDURE — 83880 ASSAY OF NATRIURETIC PEPTIDE: CPT | Performed by: PHYSICIAN ASSISTANT

## 2024-04-07 PROCEDURE — 25000003 PHARM REV CODE 250: Performed by: INTERNAL MEDICINE

## 2024-04-07 PROCEDURE — 25000003 PHARM REV CODE 250: Performed by: PHYSICIAN ASSISTANT

## 2024-04-07 PROCEDURE — 21400001 HC TELEMETRY ROOM

## 2024-04-07 RX ORDER — METHOCARBAMOL 500 MG/1
500 TABLET, FILM COATED ORAL 2 TIMES DAILY
Status: DISCONTINUED | OUTPATIENT
Start: 2024-04-07 | End: 2024-04-08 | Stop reason: HOSPADM

## 2024-04-07 RX ORDER — FUROSEMIDE 10 MG/ML
40 INJECTION INTRAMUSCULAR; INTRAVENOUS DAILY
Status: DISCONTINUED | OUTPATIENT
Start: 2024-04-07 | End: 2024-04-08

## 2024-04-07 RX ORDER — NYSTATIN 100000 U/G
1000000 CREAM TOPICAL DAILY
COMMUNITY

## 2024-04-07 RX ORDER — MICONAZOLE NITRATE 2 %
POWDER (GRAM) TOPICAL 2 TIMES DAILY
Status: DISCONTINUED | OUTPATIENT
Start: 2024-04-07 | End: 2024-04-08 | Stop reason: HOSPADM

## 2024-04-07 RX ORDER — ALBUTEROL SULFATE 90 UG/1
2 AEROSOL, METERED RESPIRATORY (INHALATION) EVERY 6 HOURS PRN
Status: DISCONTINUED | OUTPATIENT
Start: 2024-04-07 | End: 2024-04-08 | Stop reason: HOSPADM

## 2024-04-07 RX ORDER — POTASSIUM CHLORIDE 750 MG/1
10 TABLET, EXTENDED RELEASE ORAL ONCE
Status: ON HOLD | COMMUNITY
End: 2024-06-05 | Stop reason: HOSPADM

## 2024-04-07 RX ORDER — LISINOPRIL 10 MG/1
10 TABLET ORAL DAILY
Status: ON HOLD | COMMUNITY
End: 2024-06-05 | Stop reason: HOSPADM

## 2024-04-07 RX ORDER — ASPIRIN 81 MG/1
81 TABLET ORAL DAILY
Status: DISCONTINUED | OUTPATIENT
Start: 2024-04-08 | End: 2024-04-08 | Stop reason: HOSPADM

## 2024-04-07 RX ORDER — FUROSEMIDE 20 MG/1
20 TABLET ORAL DAILY
Status: ON HOLD | COMMUNITY
End: 2024-04-08 | Stop reason: HOSPADM

## 2024-04-07 RX ORDER — SERTRALINE HYDROCHLORIDE 50 MG/1
50 TABLET, FILM COATED ORAL DAILY
Status: DISCONTINUED | OUTPATIENT
Start: 2024-04-08 | End: 2024-04-08 | Stop reason: HOSPADM

## 2024-04-07 RX ORDER — FUROSEMIDE 10 MG/ML
40 INJECTION INTRAMUSCULAR; INTRAVENOUS
Status: COMPLETED | OUTPATIENT
Start: 2024-04-07 | End: 2024-04-07

## 2024-04-07 RX ORDER — ONDANSETRON HYDROCHLORIDE 2 MG/ML
4 INJECTION, SOLUTION INTRAVENOUS EVERY 8 HOURS PRN
Status: DISCONTINUED | OUTPATIENT
Start: 2024-04-07 | End: 2024-04-08 | Stop reason: HOSPADM

## 2024-04-07 RX ORDER — IBUPROFEN 200 MG
16 TABLET ORAL
Status: DISCONTINUED | OUTPATIENT
Start: 2024-04-07 | End: 2024-04-08 | Stop reason: HOSPADM

## 2024-04-07 RX ORDER — AMLODIPINE BESYLATE 5 MG/1
5 TABLET ORAL DAILY
Status: DISCONTINUED | OUTPATIENT
Start: 2024-04-08 | End: 2024-04-08 | Stop reason: HOSPADM

## 2024-04-07 RX ORDER — ATORVASTATIN CALCIUM 40 MG/1
80 TABLET, FILM COATED ORAL NIGHTLY
Status: DISCONTINUED | OUTPATIENT
Start: 2024-04-07 | End: 2024-04-08 | Stop reason: HOSPADM

## 2024-04-07 RX ORDER — LISINOPRIL 10 MG/1
10 TABLET ORAL DAILY
Status: DISCONTINUED | OUTPATIENT
Start: 2024-04-08 | End: 2024-04-08 | Stop reason: HOSPADM

## 2024-04-07 RX ORDER — IBUPROFEN 200 MG
24 TABLET ORAL
Status: DISCONTINUED | OUTPATIENT
Start: 2024-04-07 | End: 2024-04-08 | Stop reason: HOSPADM

## 2024-04-07 RX ORDER — METOCLOPRAMIDE HYDROCHLORIDE 5 MG/5ML
5 SOLUTION ORAL
Status: DISCONTINUED | OUTPATIENT
Start: 2024-04-07 | End: 2024-04-08 | Stop reason: HOSPADM

## 2024-04-07 RX ORDER — GLUCAGON 1 MG
1 KIT INJECTION
Status: DISCONTINUED | OUTPATIENT
Start: 2024-04-07 | End: 2024-04-08 | Stop reason: HOSPADM

## 2024-04-07 RX ORDER — GABAPENTIN 300 MG/1
300 CAPSULE ORAL 3 TIMES DAILY
Status: DISCONTINUED | OUTPATIENT
Start: 2024-04-07 | End: 2024-04-08 | Stop reason: HOSPADM

## 2024-04-07 RX ORDER — TRAMADOL HYDROCHLORIDE 50 MG/1
50 TABLET ORAL EVERY 6 HOURS PRN
Status: DISCONTINUED | OUTPATIENT
Start: 2024-04-07 | End: 2024-04-08 | Stop reason: HOSPADM

## 2024-04-07 RX ORDER — ACETAMINOPHEN 325 MG/1
650 TABLET ORAL EVERY 6 HOURS PRN
Status: DISCONTINUED | OUTPATIENT
Start: 2024-04-07 | End: 2024-04-08 | Stop reason: HOSPADM

## 2024-04-07 RX ADMIN — METOCLOPRAMIDE HYDROCHLORIDE 5 MG: 5 SOLUTION ORAL at 04:04

## 2024-04-07 RX ADMIN — FUROSEMIDE 40 MG: 10 INJECTION, SOLUTION INTRAMUSCULAR; INTRAVENOUS at 02:04

## 2024-04-07 RX ADMIN — METOCLOPRAMIDE HYDROCHLORIDE 5 MG: 5 SOLUTION ORAL at 08:04

## 2024-04-07 RX ADMIN — MICONAZOLE NITRATE: 20 POWDER TOPICAL at 09:04

## 2024-04-07 RX ADMIN — GABAPENTIN 300 MG: 300 CAPSULE ORAL at 08:04

## 2024-04-07 RX ADMIN — ATORVASTATIN CALCIUM 80 MG: 40 TABLET, FILM COATED ORAL at 08:04

## 2024-04-07 RX ADMIN — TRAMADOL HYDROCHLORIDE 50 MG: 50 TABLET, COATED ORAL at 05:04

## 2024-04-07 RX ADMIN — METHOCARBAMOL 500 MG: 500 TABLET ORAL at 08:04

## 2024-04-07 RX ADMIN — ALBUTEROL SULFATE 2 PUFF: 90 AEROSOL, METERED RESPIRATORY (INHALATION) at 05:04

## 2024-04-07 RX ADMIN — TRAMADOL HYDROCHLORIDE 50 MG: 50 TABLET, COATED ORAL at 11:04

## 2024-04-07 NOTE — NURSING
Nurses Note -- 4 Eyes      4/7/2024   4:04 PM      Skin assessed during: Admit      [x] No Altered Skin Integrity Present    [x]Prevention Measures Documented      [] Yes- Altered Skin Integrity Present or Discovered   [] LDA Added if Not in Epic (Describe Wound)   [] New Altered Skin Integrity was Present on Admit and Documented in LDA   [] Wound Image Taken    Wound Care Consulted? No    Attending Nurse:  Hai Pedro RN     Second RN/Staff Member:   ALFRED Baldwin

## 2024-04-07 NOTE — H&P
[facility] Hospital Medicine History & Physical Examination       Patient Name: Katty Veronica  MRN: [id number]  Patient Class: IP- Inpatient   Admission Date: 04/07/2024   Admitting Service: Hospital Medicine   Length of Stay: 0  Attending Physician: Cristopher Davis MD  Primary Care Provider: Renard Eddy MD  Face-to-Face encounter date: 04/07/2024  Code Status: Full code   Chief Complaint: Shortness of Breath (Sob since 5 am with chest pain for the past hour that comes and goes. Pt had tenderness to palpation on chest wall by aasi. Pt took 2 SL nitro prior to aasi arrival. Pt was given 7 additional nitro SL en route to hospital. 324 asa given. Pt is on xarelto. Hx afib and chf. )      Present at Bedside:  None  Patient information was obtained from patient, patient's family, past medical records and ER records.      HISTORY OF PRESENT ILLNESS:   Katty Veronica is a 79 y.o. female with a past medical history of hypertension, hyperlipidemia, CAD, atrial fibrillation on Xarelto, HFpEF, GERD, CVA, SABINA, and glaucoma who presented to [facility] on 4/7/2024 via EMS for shortness of breath and chest pain.  Patient reported worsening shortness of breath began this morning around 5:00 a.m. followed by midline/left-sided chest pain 2 hours later.  Patient denied radiation of pain.  Patient reported taking 2 sublingual nitroglycerin at home with minimal relief, prompting her to call EMS.  Patient was given additional 7 sublingual nitro en route and 324 mg ASA en route with EMS.  Patient reported she was recently discharged from rehab on Friday 04/05/2024.  Patient stated the following day she had shortness of breath on exertion in addition to wheezing. Patient denied fever, chills, cough, congestion, nausea, vomiting, and abdominal pain.  Initial vital signs in ED were /81, pulse 82, respirations 16, temperature 36.7° C, and SpO2 94% on room air.  Labs revealed WBC 8.75,  RBC 3.63, hemoglobin 10.6, hematocrit 33, MCV 90.9, .2, and undetectable troponin.  Flu and COVID testing were negative.  EKG revealed normal sinus rhythm with heart rate of 81 beats per minute.  Chest x-ray revealed no lobar consolidation, pneumothorax, or large pleural effusion.  Patient was given IV Lasix 40 mg in ED. Patient was admitted to hospital medicine service for further medical management.     PAST MEDICAL HISTORY:   Hypertension  Hyperlipidemia  CAD  Atrial fibrillation on Xarelto   HFpEF  GERD   CVA  SABINA  Glaucoma    PAST SURGICAL HISTORY:     Past Surgical History:   Procedure Laterality Date    APPENDECTOMY      BACK SURGERY      CHOLECYSTECTOMY      EXPLORATION OF COMMON BILE DUCT      HERNIA REPAIR      incisional hernia times 2, central abdomen    HYSTERECTOMY      OVARIAN CYST REMOVAL      TONSILLECTOMY         FAMILY HISTORY:   Mother:  MI  Father:  Kidney and bladder cancer    SOCIAL HISTORY:   Denied alcohol, tobacco or illicit drug use.     ALLERGIES:   Morphine and Norco [hydrocodone-acetaminophen]    HOME MEDICATIONS:     Prior to Admission medications    Medication Sig Start Date End Date Taking? Authorizing Provider   amLODIPine (NORVASC) 5 MG tablet 1 tablet Orally Once a day   Yes Provider, Historical   aspirin (ECOTRIN) 81 MG EC tablet Take 1 tablet (81 mg total) by mouth once daily. 8/30/23 8/29/24 Yes Cristopher Davis MD   atorvastatin (LIPITOR) 80 MG tablet Take 80 mg by mouth every evening.   Yes Provider, Historical   carvediloL (COREG) 25 MG tablet Take 1 tablet (25 mg total) by mouth 2 (two) times daily.  Patient taking differently: Take 50 mg by mouth 2 (two) times daily. 11/20/23  Yes Princess Blood FNP   cetirizine (ZYRTEC) 10 MG tablet Take 10 mg by mouth once daily.   Yes Provider, Historical   ergocalciferol (VITAMIN D2) 50,000 unit Cap Take 50,000 Units by mouth every 7 days. Takes on Sunday AM   Yes Provider, Historical   famotidine (PEPCID) 20 MG  tablet Take 1 tablet by mouth every evening.   Yes Provider, Historical   furosemide (LASIX) 20 MG tablet Take 20 mg by mouth once daily.   Yes Provider, Historical   gabapentin (NEURONTIN) 300 MG capsule Take 300 mg by mouth 3 (three) times daily. 8/25/23  Yes Provider, Historical   lisinopriL 10 MG tablet Take 10 mg by mouth once daily.   Yes Provider, Historical   methocarbamoL (ROBAXIN) 500 MG Tab Take 500 mg by mouth 2 (two) times a day.   Yes Provider, Historical   metoclopramide HCl (REGLAN) 5 MG tablet Take 5 mg by mouth 4 (four) times daily before meals and nightly.   Yes Provider, Historical   nitroGLYCERIN (NITROSTAT) 0.4 MG SL tablet Place 1 tablet (0.4 mg total) under the tongue every 5 (five) minutes as needed for Chest pain. 11/20/23  Yes Princess Bolod FNP   nystatin (MYCOSTATIN) cream Apply 1,000,000 g topically once daily.   Yes Provider, Historical   ondansetron (ZOFRAN-ODT) 8 MG TbDL Take 8 mg by mouth every 6 (six) hours as needed.   Yes Provider, Historical   pantoprazole (PROTONIX) 40 MG tablet Take 40 mg by mouth. 8/24/23  Yes Provider, Historical   potassium chloride (KLOR-CON) 10 MEQ TbSR Take 10 mEq by mouth once.   Yes Provider, Historical   rivaroxaban (XARELTO) 20 mg Tab Take 1 tablet (20 mg total) by mouth daily with dinner or evening meal. 3/19/24  Yes Molly Dickerson MD   sertraline (ZOLOFT) 50 MG tablet Take 50 mg by mouth once daily. 8/24/23  Yes Provider, Historical   timolol maleate 0.5% (TIMOPTIC) 0.5 % Drop Place 1 drop into both eyes 2 (two) times daily. 9/8/23  Yes Provider, Historical   polyethylene glycol (GLYCOLAX) 17 gram PwPk Take 17 g by mouth 2 (two) times daily as needed for Constipation. 3/12/24   Molly Dickerson MD     ________________________________________________________________________  INPATIENT LIST OF MEDICATIONS   No current facility-administered medications for this encounter.    Current Outpatient Medications:     amLODIPine (NORVASC) 5 MG  tablet, 1 tablet Orally Once a day, Disp: , Rfl:     aspirin (ECOTRIN) 81 MG EC tablet, Take 1 tablet (81 mg total) by mouth once daily., Disp: , Rfl: 0    atorvastatin (LIPITOR) 80 MG tablet, Take 80 mg by mouth every evening., Disp: , Rfl:     carvediloL (COREG) 25 MG tablet, Take 1 tablet (25 mg total) by mouth 2 (two) times daily. (Patient taking differently: Take 50 mg by mouth 2 (two) times daily.), Disp: 60 tablet, Rfl: 11    cetirizine (ZYRTEC) 10 MG tablet, Take 10 mg by mouth once daily., Disp: , Rfl:     ergocalciferol (VITAMIN D2) 50,000 unit Cap, Take 50,000 Units by mouth every 7 days. Takes on Sunday AM, Disp: , Rfl:     famotidine (PEPCID) 20 MG tablet, Take 1 tablet by mouth every evening., Disp: , Rfl:     furosemide (LASIX) 20 MG tablet, Take 20 mg by mouth once daily., Disp: , Rfl:     gabapentin (NEURONTIN) 300 MG capsule, Take 300 mg by mouth 3 (three) times daily., Disp: , Rfl:     lisinopriL 10 MG tablet, Take 10 mg by mouth once daily., Disp: , Rfl:     methocarbamoL (ROBAXIN) 500 MG Tab, Take 500 mg by mouth 2 (two) times a day., Disp: , Rfl:     metoclopramide HCl (REGLAN) 5 MG tablet, Take 5 mg by mouth 4 (four) times daily before meals and nightly., Disp: , Rfl:     nitroGLYCERIN (NITROSTAT) 0.4 MG SL tablet, Place 1 tablet (0.4 mg total) under the tongue every 5 (five) minutes as needed for Chest pain., Disp: 20 tablet, Rfl: 1    nystatin (MYCOSTATIN) cream, Apply 1,000,000 g topically once daily., Disp: , Rfl:     ondansetron (ZOFRAN-ODT) 8 MG TbDL, Take 8 mg by mouth every 6 (six) hours as needed., Disp: , Rfl:     pantoprazole (PROTONIX) 40 MG tablet, Take 40 mg by mouth., Disp: , Rfl:     potassium chloride (KLOR-CON) 10 MEQ TbSR, Take 10 mEq by mouth once., Disp: , Rfl:     rivaroxaban (XARELTO) 20 mg Tab, Take 1 tablet (20 mg total) by mouth daily with dinner or evening meal., Disp: , Rfl:     sertraline (ZOLOFT) 50 MG tablet, Take 50 mg by mouth once daily., Disp: , Rfl:      timolol maleate 0.5% (TIMOPTIC) 0.5 % Drop, Place 1 drop into both eyes 2 (two) times daily., Disp: , Rfl:     polyethylene glycol (GLYCOLAX) 17 gram PwPk, Take 17 g by mouth 2 (two) times daily as needed for Constipation., Disp: , Rfl: 0    Scheduled Meds:  Continuous Infusions:  PRN Meds:.      REVIEW OF SYSTEMS:   Except as documented, all other systems reviewed and negative.    PHYSICAL EXAM:     VITAL SIGNS: 24 HRS MIN & MAX LAST   Temp  Min: 98.1 °F (36.7 °C)  Max: 98.1 °F (36.7 °C) 98.1 °F (36.7 °C)   BP  Min: 127/81  Max: 162/91 138/61   Pulse  Min: 70  Max: 84  70   Resp  Min: 16  Max: 19 17   SpO2  Min: 93 %  Max: 97 % 97 %       General appearance: Female in no apparent distress.  HENT: Atraumatic head.   Eyes: Normal extraocular movements.   Neck: Supple.   Lungs:  Mild expiratory wheezing  Heart:  Irregular rhythm  Abdomen: Soft, non-distended, non-tender.  Extremities:  Bilateral lower extremity edema  Skin: No Rash.   Neuro: Awake, alert, and oriented. Motor and sensory exams grossly intact.   Psych/mental status: Appropriate mood and affect. Responds appropriately to questions.     LABS AND IMAGING:     Recent Labs   Lab 04/07/24  1151   WBC 8.75   RBC 3.63*   HGB 10.6*   HCT 33.0*   MCV 90.9   MCH 29.2   MCHC 32.1*   RDW 15.4      MPV 11.3*       Recent Labs   Lab 04/07/24  1151      K 4.3   CO2 26   BUN 16.4   CREATININE 0.79   CALCIUM 9.0   ALBUMIN 3.4   ALKPHOS 86   ALT 12   AST 17   BILITOT 0.7       Microbiology Results (last 7 days)       ** No results found for the last 168 hours. **             X-Ray Chest 1 View  EXAMINATION:  XR CHEST 1 VIEW    CLINICAL HISTORY:  Shortness of breath    TECHNIQUE:  Single frontal view of the chest was performed.    COMPARISON:  Radiograph 03/04/2024    FINDINGS:  The cardiomediastinal silhouette and pulmonary vasculature are within normal limits.  Aortic vascular calcifications.  No lobar consolidation, pneumothorax or large pleural effusion.   Mild elevation of the right hemidiaphragm, unchanged.  No acute osseous abnormality identified.  Stimulator leads overlie the thoracic spine.    Electronically signed by: Aditya Ivy  Date:    04/07/2024  Time:    12:34        ASSESSMENT & PLAN:   Assessment:   Acute on chronic HFpEF  Chest pain, resolved   Normocytic anemia, stable  History of hypertension, hyperlipidemia, CAD, atrial fibrillation on Xarelto, HFpEF, GERD, CVA, SABINA, and glaucoma    Plan:  Cardiac monitoring   Trend troponin q.6 x3   Echo 02/09/2024; normal systolic function with visually estimated EF 55-60 with grade 1 diastolic dysfunction  IV diuresis   Strict intake/output  Daily weights  PRN albuterol   Close H&H monitoring  Resume home medications as deemed appropriate once medication reconciliation is updated  Labs in AM    VTE Prophylaxis:  Home Xarelto    Discharge Planning and Disposition: TBD    I, Gus Norman PA-C have reviewed and discussed the case with Cristopher Davis MD  Please see the attending MD's addendum for further assessment and plan.    Gus Norman PA-C  Department of Hospital Medicine   [facility]   04/07/2024    This note was created with the assistance of [employer] voice recognition software. There may be transcription errors as a result of using this technology, however minimal. Effort has been made to assure accuracy of transcription, but any obvious errors or omissions should be clarified with the author of the document.    _______________________________________________________________________________  MD Addendum:  I, Dr. Castaneda assumed care of this patient today at 4pm  For the patient encounter, I performed the substantive portion of the visit, I reviewed the NP/PA documentation, treatment plan, and medical decision making.  I had face to face time with this patient     A. History:  Patient come in with SOB that started this morning. Then she had chest pain. No  radiating  Treated with SL NTG  Denies any fever, chills cough, nausea or vomiting    B. Physical exam:  Heart RRR  Lungs clear   Abdomen soft and non tender   Neuro: No FND    Left knee mild swelling and mild tenderness     C. Medical decision making:  Patients labs and vitals reviewed  Will continue to trend troponin  Continue IV lasix   Home meds reviewed and started   For knee pain will use tylenol and ultram  Patient was on high dose oxy at home. Advised patient to stop taking narcotics     DVT prophylaxis: Xarelto     All diagnosis and differential diagnosis have been reviewed; assessment and plan has been documented; I have personally reviewed the labs and test results that are presently available; I have reviewed the patients medication list; I have reviewed the consulting providers response and recommendations. I have reviewed or attempted to review medical records based upon their availability.    All of the patient and family questions have been addressed and answered. Patient's is agreeable to the above stated plan. I will continue to monitor closely and make adjustments to medical management as needed.      04/07/2024

## 2024-04-07 NOTE — ED PROVIDER NOTES
Encounter Date: 4/7/2024    SCRIBE #1 NOTE: I, Arcadio Granados, am scribing for, and in the presence of,  Dr. Spencer. I have scribed the following portions of the note - the EKG reading. Other sections scribed: HPI, ROS, Physical Exam, MDM, Attending.       History     Chief Complaint   Patient presents with    Shortness of Breath     Sob since 5 am with chest pain for the past hour that comes and goes. Pt had tenderness to palpation on chest wall by aasi. Pt took 2 SL nitro prior to aasi arrival. Pt was given 7 additional nitro SL en route to hospital. 324 asa given. Pt is on xarelto. Hx afib and chf.      80 y/o female with history of HTN, anxiety, COPD, GERD, SABINA, CVA, AFib and CAD s/p stents presents to ED via EMS for SOB onset around 0500 this morning.  Pt then began having intermittent chest pain about an hour prior to EMS arrival on scene.  She also notes increased leg swelling recently.  Pt took 2x SL NTG at home with temporary relief and was then given 7x SL NTG en route, along with 1 inch of NTG paste and 324mg ASA.  She developed mild HA after NTG.  Pt is on xarelto.  Her BP was initially 170 systolic, which dropped to 120 after NTG.  Cardiologist is Dr. Moran.    The history is provided by the patient and the EMS personnel.     Review of patient's allergies indicates:   Allergen Reactions    Morphine Itching    Norco [hydrocodone-acetaminophen]      Past Medical History:   Diagnosis Date    Anxiety     COPD (chronic obstructive pulmonary disease)     Coronary artery disease     s/p 2 stents    Depression     GERD (gastroesophageal reflux disease)     Glaucoma     Hypertension     Obstructive sleep apnea     on cpap    Stroke     Urinary, incontinence, stress female      Past Surgical History:   Procedure Laterality Date    APPENDECTOMY      BACK SURGERY      CHOLECYSTECTOMY      EXPLORATION OF COMMON BILE DUCT      HERNIA REPAIR      incisional hernia times 2, central abdomen    HYSTERECTOMY      OVARIAN  CYST REMOVAL      TONSILLECTOMY       Family History   Problem Relation Name Age of Onset    Heart disease Mother      Cancer Father       Social History     Tobacco Use    Smoking status: Never   Substance Use Topics    Alcohol use: Not Currently    Drug use: Not Currently     Review of Systems   Respiratory:  Positive for shortness of breath.    Cardiovascular:  Positive for chest pain and leg swelling.   Neurological:  Positive for headaches (mild).       Physical Exam     Initial Vitals [04/07/24 1059]   BP Pulse Resp Temp SpO2   127/81 82 16 98.1 °F (36.7 °C) (!) 94 %      MAP       --         Physical Exam    Nursing note and vitals reviewed.  Constitutional: She appears distressed (mildly).   Appears in mild discomfort    HENT:   Head: Normocephalic and atraumatic.   Neck: Trachea normal.   Cardiovascular:  Normal rate.           No murmur heard.  Irregularly irregular rhythm    Pulmonary/Chest: No respiratory distress. She has rales (bases).   Abdominal: Abdomen is soft. Bowel sounds are normal. She exhibits no distension. There is no abdominal tenderness.   Obese    Musculoskeletal:         General: Normal range of motion.      Lumbar back: Normal range of motion.     Neurological: She is alert and oriented to person, place, and time. She has normal strength. No cranial nerve deficit.   Skin: Skin is warm and dry. No rash noted.   Psychiatric: She has a normal mood and affect. Judgment normal.         ED Course   Procedures  Labs Reviewed   B-TYPE NATRIURETIC PEPTIDE - Abnormal; Notable for the following components:       Result Value    Natriuretic Peptide 469.2 (*)     All other components within normal limits   CBC WITH DIFFERENTIAL - Abnormal; Notable for the following components:    RBC 3.63 (*)     Hgb 10.6 (*)     Hct 33.0 (*)     MCHC 32.1 (*)     MPV 11.3 (*)     All other components within normal limits   TROPONIN I - Normal   COVID/FLU A&B PCR - Normal    Narrative:     The Xpert Xpress  SARS-CoV-2/FLU/RSV plus is a rapid, multiplexed real-time PCR test intended for the simultaneous qualitative detection and differentiation of SARS-CoV-2, Influenza A, Influenza B, and respiratory syncytial virus (RSV) viral RNA in either nasopharyngeal swab or nasal swab specimens.         CBC W/ AUTO DIFFERENTIAL    Narrative:     The following orders were created for panel order CBC auto differential.  Procedure                               Abnormality         Status                     ---------                               -----------         ------                     CBC with Differential[[id number]]       Abnormal            Final result                 Please view results for these tests on the individual orders.   COMPREHENSIVE METABOLIC PANEL     EKG Readings: (Independently Interpreted)   Initial Reading: No STEMI. Rhythm: Normal Sinus Rhythm. Heart Rate: 81. Ectopy: No Ectopy. Conduction: Normal. T Waves: Normal. Axis: Normal. [facility] ED EKG2 - Other Findings: ST changes in inferior leads; no ST elevation.   1107  No changes from prior EKG from March 2024     ECG Results              EKG 12-lead (Final result)        Collection Time Result Time QRS Duration [facility] QTC Calculation    04/07/24 11:07:24 04/07/24 16:00:08 76 415                     Final result by Interface, Lab In [facility] (04/07/24 16:00:14)                   Narrative:    Test Reason : R06.02,    Vent. Rate : 081 BPM     Atrial Rate : 081 BPM     P-R Int : 130 ms          QRS Dur : 076 ms      QT Int : 358 ms       P-R-T Axes : 269 -15 066 degrees     QTc Int : 415 ms    Unusual P axis and short IA, probable junctional tachycardia with Premature  supraventricular complexes  Septal infarct ,age undetermined  Abnormal ECG  When compared with ECG of 05-MAR-2024 06:36,  Junctional rhythm has replaced Sinus rhythm  Septal infarct is now Present  Confirmed by James Batista MD ([id number]) on 4/7/2024 4:00:06 PM    Referred By:             Confirmed  By:James Batista MD                                     EKG 12-LEAD (Final result)  Result time 04/11/24 07:17:25      Final result by Unknown User (04/11/24 07:17:25)                                      Imaging Results              X-Ray Chest 1 View (Final result)  Result time 04/07/24 12:34:32      Final result by Aditya Haynes MD (04/07/24 12:34:32)                   Narrative:    EXAMINATION:  XR CHEST 1 VIEW    CLINICAL HISTORY:  Shortness of breath    TECHNIQUE:  Single frontal view of the chest was performed.    COMPARISON:  Radiograph 03/04/2024    FINDINGS:  The cardiomediastinal silhouette and pulmonary vasculature are within normal limits.  Aortic vascular calcifications.  No lobar consolidation, pneumothorax or large pleural effusion.  Mild elevation of the right hemidiaphragm, unchanged.  No acute osseous abnormality identified.  Stimulator leads overlie the thoracic spine.      Electronically signed by: Aditya Haynes  Date:    04/07/2024  Time:    12:34                                     Medications   furosemide injection 40 mg (40 mg Intravenous Given 4/7/24 1407)     Medical Decision Making  Differential diagnoses include, but are not limited to: ACS, CHF, STEMI, NSTEMI, pleurisy     Amount and/or Complexity of Data Reviewed  Independent Historian: EMS     Details: given 7x SL NTG en route, along with 1 inch of NTG paste and 324mg ASA.  Pt is on xarelto.  Her BP was initially 170 systolic, which dropped to 120 after NTG.    Labs: ordered.  Radiology: ordered.  ECG/medicine tests: ordered.    Risk  Prescription drug management.  Decision regarding hospitalization.            Scribe Attestation:   Scribe #1: I performed the above scribed service and the documentation accurately describes the services I performed. I attest to the accuracy of the note.    Attending Attestation:           Physician Attestation for Scribe:  Physician Attestation Statement for Scribe #1: I, reviewed  documentation, as scribed by Arcadio Granados in my presence, and it is both accurate and complete.                                    Clinical Impression:  Final diagnoses:  [R06.02] SOB (shortness of breath) (Primary)          ED Disposition Condition    Admit                 Negrito Spencer III, MD  04/20/24 0616

## 2024-04-08 ENCOUNTER — OFFICE VISIT (OUTPATIENT)
Dept: NEUROLOGY | Facility: CLINIC | Age: 79
End: 2024-04-08
Payer: MEDICARE

## 2024-04-08 VITALS
BODY MASS INDEX: 36.82 KG/M2 | SYSTOLIC BLOOD PRESSURE: 97 MMHG | WEIGHT: 195 LBS | DIASTOLIC BLOOD PRESSURE: 72 MMHG | HEIGHT: 61 IN | HEART RATE: 79 BPM

## 2024-04-08 VITALS
WEIGHT: 203.69 LBS | TEMPERATURE: 99 F | HEART RATE: 76 BPM | HEIGHT: 61 IN | RESPIRATION RATE: 18 BRPM | BODY MASS INDEX: 38.46 KG/M2 | OXYGEN SATURATION: 99 % | SYSTOLIC BLOOD PRESSURE: 124 MMHG | DIASTOLIC BLOOD PRESSURE: 76 MMHG

## 2024-04-08 DIAGNOSIS — Z86.73 H/O TIA (TRANSIENT ISCHEMIC ATTACK) AND STROKE: ICD-10-CM

## 2024-04-08 DIAGNOSIS — I69.354 HEMIPLEGIA AND HEMIPARESIS FOLLOWING CEREBRAL INFARCTION AFFECTING LEFT NON-DOMINANT SIDE: ICD-10-CM

## 2024-04-08 DIAGNOSIS — Z86.73 H/O: STROKE: Primary | ICD-10-CM

## 2024-04-08 PROBLEM — I50.23 ACUTE ON CHRONIC SYSTOLIC HF (HEART FAILURE): Status: ACTIVE | Noted: 2024-04-08

## 2024-04-08 LAB
ALBUMIN SERPL-MCNC: 3.3 G/DL (ref 3.4–4.8)
ALBUMIN/GLOB SERPL: 1.3 RATIO (ref 1.1–2)
ALP SERPL-CCNC: 86 UNIT/L (ref 40–150)
ALT SERPL-CCNC: 14 UNIT/L (ref 0–55)
AST SERPL-CCNC: 21 UNIT/L (ref 5–34)
BASOPHILS # BLD AUTO: 0.03 X10(3)/MCL
BASOPHILS NFR BLD AUTO: 0.4 %
BILIRUB SERPL-MCNC: 0.6 MG/DL
BUN SERPL-MCNC: 14.1 MG/DL (ref 9.8–20.1)
CALCIUM SERPL-MCNC: 9 MG/DL (ref 8.4–10.2)
CHLORIDE SERPL-SCNC: 103 MMOL/L (ref 98–107)
CO2 SERPL-SCNC: 28 MMOL/L (ref 23–31)
CREAT SERPL-MCNC: 0.72 MG/DL (ref 0.55–1.02)
EOSINOPHIL # BLD AUTO: 0.13 X10(3)/MCL (ref 0–0.9)
EOSINOPHIL NFR BLD AUTO: 1.8 %
ERYTHROCYTE [DISTWIDTH] IN BLOOD BY AUTOMATED COUNT: 15.3 % (ref 11.5–17)
GFR SERPLBLD CREATININE-BSD FMLA CKD-EPI: >60 MLS/MIN/1.73/M2
GLOBULIN SER-MCNC: 2.6 GM/DL (ref 2.4–3.5)
GLUCOSE SERPL-MCNC: 106 MG/DL (ref 82–115)
HCT VFR BLD AUTO: 34.5 % (ref 37–47)
HGB BLD-MCNC: 11 G/DL (ref 12–16)
IMM GRANULOCYTES # BLD AUTO: 0.02 X10(3)/MCL (ref 0–0.04)
IMM GRANULOCYTES NFR BLD AUTO: 0.3 %
LYMPHOCYTES # BLD AUTO: 2.51 X10(3)/MCL (ref 0.6–4.6)
LYMPHOCYTES NFR BLD AUTO: 35.4 %
MCH RBC QN AUTO: 29.2 PG (ref 27–31)
MCHC RBC AUTO-ENTMCNC: 31.9 G/DL (ref 33–36)
MCV RBC AUTO: 91.5 FL (ref 80–94)
MONOCYTES # BLD AUTO: 0.72 X10(3)/MCL (ref 0.1–1.3)
MONOCYTES NFR BLD AUTO: 10.2 %
NEUTROPHILS # BLD AUTO: 3.68 X10(3)/MCL (ref 2.1–9.2)
NEUTROPHILS NFR BLD AUTO: 51.9 %
NRBC BLD AUTO-RTO: 0 %
OHS QRS DURATION: 80 MS
OHS QTC CALCULATION: 447 MS
PLATELET # BLD AUTO: 215 X10(3)/MCL (ref 130–400)
PMV BLD AUTO: 11.2 FL (ref 7.4–10.4)
POTASSIUM SERPL-SCNC: 3.9 MMOL/L (ref 3.5–5.1)
PROT SERPL-MCNC: 5.9 GM/DL (ref 5.8–7.6)
RBC # BLD AUTO: 3.77 X10(6)/MCL (ref 4.2–5.4)
SODIUM SERPL-SCNC: 141 MMOL/L (ref 136–145)
TROPONIN I SERPL-MCNC: <0.01 NG/ML (ref 0–0.04)
TROPONIN I SERPL-MCNC: <0.01 NG/ML (ref 0–0.04)
WBC # SPEC AUTO: 7.09 X10(3)/MCL (ref 4.5–11.5)

## 2024-04-08 PROCEDURE — 99214 OFFICE O/P EST MOD 30 MIN: CPT | Mod: PBBFAC,25 | Performed by: NURSE PRACTITIONER

## 2024-04-08 PROCEDURE — 93010 ELECTROCARDIOGRAM REPORT: CPT | Mod: ,,, | Performed by: INTERNAL MEDICINE

## 2024-04-08 PROCEDURE — 25000003 PHARM REV CODE 250: Performed by: INTERNAL MEDICINE

## 2024-04-08 PROCEDURE — 84484 ASSAY OF TROPONIN QUANT: CPT | Performed by: PHYSICIAN ASSISTANT

## 2024-04-08 PROCEDURE — 85025 COMPLETE CBC W/AUTO DIFF WBC: CPT | Performed by: PHYSICIAN ASSISTANT

## 2024-04-08 PROCEDURE — 80053 COMPREHEN METABOLIC PANEL: CPT | Performed by: PHYSICIAN ASSISTANT

## 2024-04-08 PROCEDURE — 63600175 PHARM REV CODE 636 W HCPCS: Performed by: PHYSICIAN ASSISTANT

## 2024-04-08 PROCEDURE — 99213 OFFICE O/P EST LOW 20 MIN: CPT | Mod: S$PBB,,, | Performed by: NURSE PRACTITIONER

## 2024-04-08 PROCEDURE — 99999 PR PBB SHADOW E&M-EST. PATIENT-LVL IV: CPT | Mod: PBBFAC,,, | Performed by: NURSE PRACTITIONER

## 2024-04-08 PROCEDURE — 93005 ELECTROCARDIOGRAM TRACING: CPT

## 2024-04-08 PROCEDURE — 25000003 PHARM REV CODE 250: Performed by: PHYSICIAN ASSISTANT

## 2024-04-08 RX ORDER — ALPRAZOLAM 0.25 MG/1
0.25 TABLET ORAL NIGHTLY PRN
COMMUNITY

## 2024-04-08 RX ORDER — FUROSEMIDE 40 MG/1
40 TABLET ORAL DAILY
Qty: 30 TABLET | Refills: 11 | Status: SHIPPED | OUTPATIENT
Start: 2024-04-08 | End: 2025-04-08

## 2024-04-08 RX ORDER — FUROSEMIDE 40 MG/1
40 TABLET ORAL DAILY
Status: DISCONTINUED | OUTPATIENT
Start: 2024-04-09 | End: 2024-04-08 | Stop reason: HOSPADM

## 2024-04-08 RX ADMIN — SERTRALINE HYDROCHLORIDE 50 MG: 50 TABLET ORAL at 08:04

## 2024-04-08 RX ADMIN — AMLODIPINE BESYLATE 5 MG: 5 TABLET ORAL at 08:04

## 2024-04-08 RX ADMIN — LISINOPRIL 10 MG: 10 TABLET ORAL at 08:04

## 2024-04-08 RX ADMIN — TRAMADOL HYDROCHLORIDE 50 MG: 50 TABLET, COATED ORAL at 06:04

## 2024-04-08 RX ADMIN — FUROSEMIDE 40 MG: 10 INJECTION, SOLUTION INTRAMUSCULAR; INTRAVENOUS at 08:04

## 2024-04-08 RX ADMIN — TRAMADOL HYDROCHLORIDE 50 MG: 50 TABLET, COATED ORAL at 11:04

## 2024-04-08 RX ADMIN — METOCLOPRAMIDE HYDROCHLORIDE 5 MG: 5 SOLUTION ORAL at 11:04

## 2024-04-08 RX ADMIN — GABAPENTIN 300 MG: 300 CAPSULE ORAL at 08:04

## 2024-04-08 RX ADMIN — ASPIRIN 81 MG: 81 TABLET, COATED ORAL at 08:04

## 2024-04-08 RX ADMIN — METOCLOPRAMIDE HYDROCHLORIDE 5 MG: 5 SOLUTION ORAL at 06:04

## 2024-04-08 RX ADMIN — METHOCARBAMOL 500 MG: 500 TABLET ORAL at 08:04

## 2024-04-08 RX ADMIN — MICONAZOLE NITRATE: 20 POWDER TOPICAL at 08:04

## 2024-04-08 NOTE — PLAN OF CARE
04/08/24 1050   Discharge Assessment   Assessment Type Discharge Planning Assessment   Confirmed/corrected address, phone number and insurance Yes   Source of Information family   When was your last doctors appointment?   (PCP is Dr. Renard Eddy. Patient reports las PCP appointment was about 2 months ago.)   Reason For Admission CHF   People in Home child(renee), adult   Do you expect to return to your current living situation? Yes   Do you have help at home or someone to help you manage your care at home? Yes   Who are your caregiver(s) and their phone number(s)? Nat/daughter/[phone]   Current cognitive status: Alert/Oriented   Walking or Climbing Stairs Difficulty yes   Walking or Climbing Stairs ambulation difficulty, requires equipment   Mobility Management Ambulates with a Rollator   Dressing/Bathing Difficulty yes   Dressing/Bathing bathing difficulty, requires equipment   Home Accessibility wheelchair accessible   Home Layout Able to live on 1st floor   Equipment Currently Used at Home shower chair;rollator;other (see comments)  (Ramp)   Readmission within 30 days? Yes   Do you currently have service(s) that help you manage your care at home? Yes   Name and Contact number of agency [facility] with [facility], pending admit   Is the pt/caregiver preference to resume services with current agency Yes   Do you take prescription medications? Yes   Do you have prescription coverage? Yes   Do you have any problems affording any of your prescribed medications? No   Who is going to help you get home at discharge? Family   How do you get to doctors appointments? family or friend will provide   Are you on dialysis? No   Do you take coumadin? No   Discharge Plan A Home Health   Discharge Plan B Home Health   DME Needed Upon Discharge  none   Discharge Plan discussed with: Patient     Son will transport home.

## 2024-04-08 NOTE — CONSULTS
Inpatient consult to Cardiology  Consult performed by: Lola Mccoy FNP  Consult ordered by: Gus Norman PA-C  Reason for consult: Chest Pain/ CHF        [facility] - 6th Floor Medical Telemetry    Cardiology  Consult Note    Patient Name: Katty Veronica  MRN: [id number]  Admission Date: 4/7/2024  Hospital Length of Stay: 1 days  Code Status: Full Code   Attending Provider: Cristopher Davis MD   Consulting Provider: BRENTON Louise  Primary Care Physician: Renard Eddy MD  Principal Problem:<principal problem not specified>    Patient information was obtained from patient, past medical records, ER records, and primary team.     Subjective:     Chief Complaint/Reason for Consult: Chest Pain/CHF    HPI: Ms. Veronica is a 78 y/o female with a history of COPD, CAD/stents, HTN, SABINA, CVA, who is unknown to [facility] (Dr. Blackburn). She presented to the ER on 4.7.24 with a primary complaints of shortness of breath. She reports it started at around 0500 on 4.7.24. She reports intermittent chest pain starting afterwards. She also reports leg swelling. She reports taking SL nitro x2 with some relief. She was given SL Nitro, Nitropaste, and  mg by EMS. EMS reports SBP in the 170s. Significant labs include H&H 11.0/34.5, .2.  Troponin negative x4. Flu and COVID negative. CXR unremarkable. EKG shows probable junctional tachycardia with PVCs an septal Infarct (age undetermined). [facility] has been consulted for chest pain and CHS management.      4.8.24: NAD. VSS. No complaints of CP/Palps. Reports SOB is improving. 4900 urine output over 24 hours.     PMH: COPD, CAD/stents, HTN, SABINA, CVA, Anxiety, Depression, GERD, Glaucoma, Stress Incontinence   PSH: Appendectomy, Back Surgery, Cholecystectomy, Hernia Repair, Hysterectomy, Ovarian Cyst Removal, Tonsillectomy, Common Bile Duct Exploration    Family History: Father - Cancer; Mother - Heart Disease  Social History: Denies smoking, alcohol  use, and illicit drug use.     Previous Cardiac Diagnostics:   ECHO (2.10.24):   Left Ventricle: The left ventricle is normal in size. Ventricular mass is normal. Mildly increased wall thickness. There is concentric remodeling. Normal wall motion. There is normal systolic function with a visually estimated ejection fraction of 55 - 60%. Grade I diastolic dysfunction. Right Ventricle: Normal right ventricular cavity size. Systolic function is normal. Left Atrium: Normal left atrial size. Bubble study with agitated saline is inconclusive. Right Atrium: Normal right atrial size. Aortic Valve: The aortic valve is a trileaflet valve. There is annular calcification present. There is no stenosis. There is trace aortic regurgitation.Mitral Valve: The mitral valve is structurally normal. There is no stenosis. There is trace regurgitation.Tricuspid Valve: The tricuspid valve is structurally normal. There is trace regurgitation. Aorta: Aortic root is normal in size measuring 3.4 cm. IVC/SVC: IVC was not well visualized due to poor acoustic window.  Pericardium: There is no pericardial effusion.    SHIV (10.20.23):  Left Atrium:  Left atrium and the left atrial appendage were well   visualized and were free of any clot or spontaneous echo contrast. Interatrial septum:  The interatrial septum was visualized to be intact   when interrogated by color-flow Doppler.  A bubble study was performed and is negative for any intracardiac right-to-left shunt. Tricuspid valve:  There is no significant stenosis and physiologic regurgitation. Mitral valve:  There is no significant stenosis and trace regurgitation. Aortic valve:  The aortic valve is visualized to be trileaflet and there is no evidence of any significant stenosis or regurgitation. Left ventricle:  Grossly appears to have normal size and contractility. Pulmonic valve: The pulmonic valve is not well visualized. Aorta:  The aorta contains mild plaquing.     MPI (5.10.21):  NORMAL  ANTEROSEPTAL, LATERAL AND INFERIOR WALL PERFUSION, WALL MOTION WITH NORMAL SYSTOLIC WALL THICKENING. NO SCAN EVIDENCE OF ISCHEMIA. NORMAL GLOBAL LVEF- 69 %.    Review of patient's allergies indicates:   Allergen Reactions    Morphine Itching    Norco [hydrocodone-acetaminophen]      No current facility-administered medications on file prior to encounter.     Current Outpatient Medications on File Prior to Encounter   Medication Sig    amLODIPine (NORVASC) 5 MG tablet 1 tablet Orally Once a day    aspirin (ECOTRIN) 81 MG EC tablet Take 1 tablet (81 mg total) by mouth once daily.    atorvastatin (LIPITOR) 80 MG tablet Take 80 mg by mouth every evening.    carvediloL (COREG) 25 MG tablet Take 1 tablet (25 mg total) by mouth 2 (two) times daily. (Patient taking differently: Take 50 mg by mouth 2 (two) times daily.)    cetirizine (ZYRTEC) 10 MG tablet Take 10 mg by mouth once daily.    ergocalciferol (VITAMIN D2) 50,000 unit Cap Take 50,000 Units by mouth every 7 days. Takes on Sunday AM    famotidine (PEPCID) 20 MG tablet Take 1 tablet by mouth every evening.    furosemide (LASIX) 20 MG tablet Take 20 mg by mouth once daily.    gabapentin (NEURONTIN) 300 MG capsule Take 300 mg by mouth 3 (three) times daily.    lisinopriL 10 MG tablet Take 10 mg by mouth once daily.    methocarbamoL (ROBAXIN) 500 MG Tab Take 500 mg by mouth 2 (two) times a day.    metoclopramide HCl (REGLAN) 5 MG tablet Take 5 mg by mouth 4 (four) times daily before meals and nightly.    nitroGLYCERIN (NITROSTAT) 0.4 MG SL tablet Place 1 tablet (0.4 mg total) under the tongue every 5 (five) minutes as needed for Chest pain.    nystatin (MYCOSTATIN) cream Apply 1,000,000 g topically once daily.    ondansetron (ZOFRAN-ODT) 8 MG TbDL Take 8 mg by mouth every 6 (six) hours as needed.    pantoprazole (PROTONIX) 40 MG tablet Take 40 mg by mouth.    potassium chloride (KLOR-CON) 10 MEQ TbSR Take 10 mEq by mouth once.    rivaroxaban (XARELTO) 20 mg Tab Take 1  tablet (20 mg total) by mouth daily with dinner or evening meal.    sertraline (ZOLOFT) 50 MG tablet Take 50 mg by mouth once daily.    timolol maleate 0.5% (TIMOPTIC) 0.5 % Drop Place 1 drop into both eyes 2 (two) times daily.    polyethylene glycol (GLYCOLAX) 17 gram PwPk Take 17 g by mouth 2 (two) times daily as needed for Constipation.     Review of Systems   Respiratory:  Positive for shortness of breath. Negative for chest tightness.    Cardiovascular:  Positive for chest pain and leg swelling. Negative for palpitations.   All other systems reviewed and are negative.      Objective:     Vital Signs (Most Recent):  Temp: 98.1 °F (36.7 °C) (04/08/24 0428)  Pulse: 66 (04/08/24 0428)  Resp: 18 (04/08/24 0602)  BP: (!) 141/87 (04/08/24 0428)  SpO2: (!) 94 % (04/08/24 0428) Vital Signs (24h Range):  Temp:  [97.6 °F (36.4 °C)-98.1 °F (36.7 °C)] 98.1 °F (36.7 °C)  Pulse:  [66-84] 66  Resp:  [16-19] 18  SpO2:  [93 %-98 %] 94 %  BP: (104-164)/(60-91) 141/87   Weight: 92.4 kg (203 lb 11.3 oz)  Body mass index is 38.49 kg/m².  SpO2: (!) 94 %       Intake/Output Summary (Last 24 hours) at 4/8/2024 0735  Last data filed at 4/8/2024 0428  Gross per 24 hour   Intake --   Output 4900 ml   Net -4900 ml     Lines/Drains/Airways       Peripheral Intravenous Line  Duration                  Peripheral IV - Single Lumen 04/07/24 1127 20 G Anterior;Right Forearm <1 day                  Significant Labs:  Recent Results (from the past 72 hour(s))   EKG 12-lead    Collection Time: 04/07/24 11:07 AM   Result Value Ref Range    QRS Duration 76 ms    [facility] QTC Calculation 415 ms   Comprehensive metabolic panel    Collection Time: 04/07/24 11:51 AM   Result Value Ref Range    Sodium Level 140 136 - 145 mmol/L    Potassium Level 4.3 3.5 - 5.1 mmol/L    Chloride 106 98 - 107 mmol/L    Carbon Dioxide 26 23 - 31 mmol/L    Glucose Level 114 82 - 115 mg/dL    Blood Urea Nitrogen 16.4 9.8 - 20.1 mg/dL    Creatinine 0.79 0.55 - 1.02 mg/dL    Calcium  Level Total 9.0 8.4 - 10.2 mg/dL    Protein Total 6.3 5.8 - 7.6 gm/dL    Albumin Level 3.4 3.4 - 4.8 g/dL    Globulin 2.9 2.4 - 3.5 gm/dL    Albumin/Globulin Ratio 1.2 1.1 - 2.0 ratio    Bilirubin Total 0.7 <=1.5 mg/dL    Alkaline Phosphatase 86 40 - 150 unit/L    Alanine Aminotransferase 12 0 - 55 unit/L    Aspartate Aminotransferase 17 5 - 34 unit/L    eGFR >60 mls/min/1.73/m2   Brain natriuretic peptide    Collection Time: 04/07/24 11:51 AM   Result Value Ref Range    Natriuretic Peptide 469.2 (H) <=100.0 pg/mL   Troponin I    Collection Time: 04/07/24 11:51 AM   Result Value Ref Range    Troponin-I <0.010 0.000 - 0.045 ng/mL   CBC with Differential    Collection Time: 04/07/24 11:51 AM   Result Value Ref Range    WBC 8.75 4.50 - 11.50 x10(3)/mcL    RBC 3.63 (L) 4.20 - 5.40 x10(6)/mcL    Hgb 10.6 (L) 12.0 - 16.0 g/dL    Hct 33.0 (L) 37.0 - 47.0 %    MCV 90.9 80.0 - 94.0 fL    MCH 29.2 27.0 - 31.0 pg    MCHC 32.1 (L) 33.0 - 36.0 g/dL    RDW 15.4 11.5 - 17.0 %    Platelet 217 130 - 400 x10(3)/mcL    MPV 11.3 (H) 7.4 - 10.4 fL    Neut % 59.7 %    Lymph % 31.8 %    Mono % 7.1 %    Eos % 0.9 %    Basophil % 0.3 %    Lymph # 2.78 0.6 - 4.6 x10(3)/mcL    Neut # 5.22 2.1 - 9.2 x10(3)/mcL    Mono # 0.62 0.1 - 1.3 x10(3)/mcL    Eos # 0.08 0 - 0.9 x10(3)/mcL    Baso # 0.03 <=0.2 x10(3)/mcL    IG# 0.02 0 - 0.04 x10(3)/mcL    IG% 0.2 %    NRBC% 0.0 %   COVID/FLU A&B PCR    Collection Time: 04/07/24 12:15 PM   Result Value Ref Range    Influenza A PCR Not Detected Not Detected    Influenza B PCR Not Detected Not Detected    SARS-CoV-2 PCR Not Detected Not Detected, Negative   Troponin I    Collection Time: 04/07/24  5:28 PM   Result Value Ref Range    Troponin-I <0.010 0.000 - 0.045 ng/mL   Troponin I    Collection Time: 04/08/24 12:55 AM   Result Value Ref Range    Troponin-I <0.010 0.000 - 0.045 ng/mL   Comprehensive Metabolic Panel (CMP)    Collection Time: 04/08/24  4:28 AM   Result Value Ref Range    Sodium Level 141 136  - 145 mmol/L    Potassium Level 3.9 3.5 - 5.1 mmol/L    Chloride 103 98 - 107 mmol/L    Carbon Dioxide 28 23 - 31 mmol/L    Glucose Level 106 82 - 115 mg/dL    Blood Urea Nitrogen 14.1 9.8 - 20.1 mg/dL    Creatinine 0.72 0.55 - 1.02 mg/dL    Calcium Level Total 9.0 8.4 - 10.2 mg/dL    Protein Total 5.9 5.8 - 7.6 gm/dL    Albumin Level 3.3 (L) 3.4 - 4.8 g/dL    Globulin 2.6 2.4 - 3.5 gm/dL    Albumin/Globulin Ratio 1.3 1.1 - 2.0 ratio    Bilirubin Total 0.6 <=1.5 mg/dL    Alkaline Phosphatase 86 40 - 150 unit/L    Alanine Aminotransferase 14 0 - 55 unit/L    Aspartate Aminotransferase 21 5 - 34 unit/L    eGFR >60 mls/min/1.73/m2   Troponin I    Collection Time: 04/08/24  4:28 AM   Result Value Ref Range    Troponin-I <0.010 0.000 - 0.045 ng/mL   CBC with Differential    Collection Time: 04/08/24  4:28 AM   Result Value Ref Range    WBC 7.09 4.50 - 11.50 x10(3)/mcL    RBC 3.77 (L) 4.20 - 5.40 x10(6)/mcL    Hgb 11.0 (L) 12.0 - 16.0 g/dL    Hct 34.5 (L) 37.0 - 47.0 %    MCV 91.5 80.0 - 94.0 fL    MCH 29.2 27.0 - 31.0 pg    MCHC 31.9 (L) 33.0 - 36.0 g/dL    RDW 15.3 11.5 - 17.0 %    Platelet 215 130 - 400 x10(3)/mcL    MPV 11.2 (H) 7.4 - 10.4 fL    Neut % 51.9 %    Lymph % 35.4 %    Mono % 10.2 %    Eos % 1.8 %    Basophil % 0.4 %    Lymph # 2.51 0.6 - 4.6 x10(3)/mcL    Neut # 3.68 2.1 - 9.2 x10(3)/mcL    Mono # 0.72 0.1 - 1.3 x10(3)/mcL    Eos # 0.13 0 - 0.9 x10(3)/mcL    Baso # 0.03 <=0.2 x10(3)/mcL    IG# 0.02 0 - 0.04 x10(3)/mcL    IG% 0.3 %    NRBC% 0.0 %     Significant Imaging:  Imaging Results              X-Ray Chest 1 View (Final result)  Result time 04/07/24 12:34:32      Final result by Aditya Haynes MD (04/07/24 12:34:32)                   Narrative:    EXAMINATION:  XR CHEST 1 VIEW    CLINICAL HISTORY:  Shortness of breath    TECHNIQUE:  Single frontal view of the chest was performed.    COMPARISON:  Radiograph 03/04/2024    FINDINGS:  The cardiomediastinal silhouette and pulmonary vasculature are  within normal limits.  Aortic vascular calcifications.  No lobar consolidation, pneumothorax or large pleural effusion.  Mild elevation of the right hemidiaphragm, unchanged.  No acute osseous abnormality identified.  Stimulator leads overlie the thoracic spine.      Electronically signed by: Aditya Haynes  Date:    04/07/2024  Time:    12:34                                  EKG:       Telemetry:  SR    Physical Exam  Vitals and nursing note reviewed.   HENT:      Head: Normocephalic.      Nose: Nose normal.      Mouth/Throat:      Mouth: Mucous membranes are moist.   Eyes:      Pupils: Pupils are equal, round, and reactive to light.   Cardiovascular:      Rate and Rhythm: Normal rate and regular rhythm.      Pulses: Normal pulses.   Pulmonary:      Effort: Pulmonary effort is normal.   Abdominal:      General: Bowel sounds are normal.      Palpations: Abdomen is soft.   Musculoskeletal:         General: Normal range of motion.      Cervical back: Normal range of motion.   Skin:     General: Skin is warm.   Neurological:      Mental Status: She is alert and oriented to person, place, and time.   Psychiatric:         Mood and Affect: Mood normal.         Behavior: Behavior normal.       Home Medications:   No current facility-administered medications on file prior to encounter.     Current Outpatient Medications on File Prior to Encounter   Medication Sig Dispense Refill    amLODIPine (NORVASC) 5 MG tablet 1 tablet Orally Once a day      aspirin (ECOTRIN) 81 MG EC tablet Take 1 tablet (81 mg total) by mouth once daily.  0    atorvastatin (LIPITOR) 80 MG tablet Take 80 mg by mouth every evening.      carvediloL (COREG) 25 MG tablet Take 1 tablet (25 mg total) by mouth 2 (two) times daily. (Patient taking differently: Take 50 mg by mouth 2 (two) times daily.) 60 tablet 11    cetirizine (ZYRTEC) 10 MG tablet Take 10 mg by mouth once daily.      ergocalciferol (VITAMIN D2) 50,000 unit Cap Take 50,000 Units by mouth every  7 days. Takes on Sunday AM      famotidine (PEPCID) 20 MG tablet Take 1 tablet by mouth every evening.      furosemide (LASIX) 20 MG tablet Take 20 mg by mouth once daily.      gabapentin (NEURONTIN) 300 MG capsule Take 300 mg by mouth 3 (three) times daily.      lisinopriL 10 MG tablet Take 10 mg by mouth once daily.      methocarbamoL (ROBAXIN) 500 MG Tab Take 500 mg by mouth 2 (two) times a day.      metoclopramide HCl (REGLAN) 5 MG tablet Take 5 mg by mouth 4 (four) times daily before meals and nightly.      nitroGLYCERIN (NITROSTAT) 0.4 MG SL tablet Place 1 tablet (0.4 mg total) under the tongue every 5 (five) minutes as needed for Chest pain. 20 tablet 1    nystatin (MYCOSTATIN) cream Apply 1,000,000 g topically once daily.      ondansetron (ZOFRAN-ODT) 8 MG TbDL Take 8 mg by mouth every 6 (six) hours as needed.      pantoprazole (PROTONIX) 40 MG tablet Take 40 mg by mouth.      potassium chloride (KLOR-CON) 10 MEQ TbSR Take 10 mEq by mouth once.      rivaroxaban (XARELTO) 20 mg Tab Take 1 tablet (20 mg total) by mouth daily with dinner or evening meal.      sertraline (ZOLOFT) 50 MG tablet Take 50 mg by mouth once daily.      timolol maleate 0.5% (TIMOPTIC) 0.5 % Drop Place 1 drop into both eyes 2 (two) times daily.      polyethylene glycol (GLYCOLAX) 17 gram PwPk Take 17 g by mouth 2 (two) times daily as needed for Constipation.  0     Current Inpatient Medications:    Current Facility-Administered Medications:     acetaminophen tablet 650 mg, 650 mg, Oral, Q6H PRN, Cristopher Davis MD    albuterol inhaler 2 puff, 2 puff, Inhalation, Q6H PRN, Gus Norman PA-C, 2 puff at 04/07/24 1712    amLODIPine tablet 5 mg, 5 mg, Oral, Daily, Gus Norman PA-C    aspirin EC tablet 81 mg, 81 mg, Oral, Daily, Gus Norman PA-C    atorvastatin tablet 80 mg, 80 mg, Oral, QHS, Gus Norman PA-C, 80 mg at 04/07/24 2050    dextrose 10% bolus 125 mL 125 mL, 12.5 g, Intravenous, PRN, Gus Norman PA-C     dextrose 10% bolus 250 mL 250 mL, 25 g, Intravenous, PRN, Gus Norman PA-C    furosemide injection 40 mg, 40 mg, Intravenous, Daily, Gus Norman PA-C    gabapentin capsule 300 mg, 300 mg, Oral, TID, Gus Norman PA-C, 300 mg at 04/07/24 2051    glucagon (human recombinant) injection 1 mg, 1 mg, Intramuscular, PRN, Gus Norman PA-C    glucose chewable tablet 16 g, 16 g, Oral, PRN, Gus Norman PA-C    glucose chewable tablet 24 g, 24 g, Oral, PRN, Gus Norman PA-C    lisinopriL tablet 10 mg, 10 mg, Oral, Daily, Gus Norman PA-C    methocarbamoL tablet 500 mg, 500 mg, Oral, BID, Cristopher Davis MD, 500 mg at 04/07/24 2051    metoclopramide HCl 5 mg/5 mL solution 5 mg, 5 mg, Oral, QID (AC & HS), Gus Norman PA-C, 5 mg at 04/08/24 0603    miconazole NITRATE 2 % top powder, , Topical (Top), BID, Cristopher Davis MD, Given at 04/07/24 2100    ondansetron injection 4 mg, 4 mg, Intravenous, Q8H PRN, Gus Norman PA-C    rivaroxaban tablet 20 mg, 20 mg, Oral, with dinner, Gus Norman PA-C    sertraline tablet 50 mg, 50 mg, Oral, Daily, Gus Norman PA-C    traMADoL tablet 50 mg, 50 mg, Oral, Q6H PRN, Cristopher Davis MD, 50 mg at 04/08/24 0602  VTE Risk Mitigation (From admission, onward)           Ordered     rivaroxaban tablet 20 mg  with dinner         04/07/24 1636     Place sequential compression device  Until discontinued         04/07/24 1422                  Assessment:   Acute Diastolic Heart Failure  Chest Pain, Atypical - Resolved   CAD    - s/p stents  PAF - Currently SR with PACs     - [id number] Score 8 (10.8% Stroke Risk per Year)    - on Xarelto outpatient   COPD  HTN  SABINA  CVA  Anxiety  Depression  GERD  Glaucoma  Stress Incontinence   Obesity   No known history of GI Bleed     Plan:   Continue Lasix IV for Diurese   Continue Amlodipine, ASA, Statin, Lisinopril, and Xarelto   Accurate I&O/Daily Weight   More Recommendations to Follow    Keep Mag > 2 and Potassium > 4  Labs in AM: CBC, BMP, and Mag     Thank you for your consult.     BRENTON Louise  Cardiology  [facility] - 6th Floor Medical Telemetry  04/08/2024    Physician addendum:  I have seen and examined this patient as a split-shared visit with the DAHLIA d/t complicated medical management of above problems written in assessment and high acuity requiring physician expertise in medical decision-making. I performed the substantive portion of the history and exam. Above medical decision-making is also formulated by me.  Patient feels much better after diuresis with IV Lasix    Cardiovascular exam:  S1, S2, 1/6 ALISON  Lungs:  Clear to auscultation  Extremities:  1+ edema    Plan:  Recommend outpatient ischemic evaluation with primary cardiologist in next 7-10 days.  Recommend titrating cardiac therapy for presumed diastolic heart failure.  Transition Norvasc to beta blocker.  Transition lisinopril to Entresto as outpatient.  Consider SGLT2 inhibitor.    Gerson Alex MD  Cardiologist

## 2024-04-08 NOTE — DISCHARGE SUMMARY
[facility] Medicine Discharge Summary    Admit Date: 4/7/2024  Discharge Date and Time: 4/8/20249:05 AM  Admitting Physician:  Team  Discharging Physician: Cristopher Davis MD.  Primary Care Physician: Renard Eddy MD      Discharge Diagnoses:  Acute on chronic HFpEF  Chest pain, resolved   Normocytic anemia, stable  History of hypertension, hyperlipidemia, CAD, atrial fibrillation on Xarelto, HFpEF, GERD, CVA, SABINA, and glaucoma    Hospital Course:   Katty Veronica is a 79 y.o. female with a past medical history of hypertension, hyperlipidemia, CAD, atrial fibrillation on Xarelto, HFpEF, GERD, CVA, SABINA, and glaucoma who presented to [facility] on 4/7/2024 via EMS for shortness of breath and chest pain.  Patient reported worsening shortness of breath began this morning around 5:00 a.m. followed by midline/left-sided chest pain 2 hours later.  Patient denied radiation of pain.  Patient reported taking 2 sublingual nitroglycerin at home with minimal relief, prompting her to call EMS.  Patient was given additional 7 sublingual nitro en route and 324 mg ASA en route with EMS.  Patient reported she was recently discharged from rehab on Friday 04/05/2024.  Patient stated the following day she had shortness of breath on exertion in addition to wheezing. Patient denied fever, chills, cough, congestion, nausea, vomiting, and abdominal pain.  Initial vital signs in ED were /81, pulse 82, respirations 16, temperature 36.7° C, and SpO2 94% on room air.  Labs revealed WBC 8.75, RBC 3.63, hemoglobin 10.6, hematocrit 33, MCV 90.9, .2, and undetectable troponin.  Flu and COVID testing were negative.  EKG revealed normal sinus rhythm with heart rate of 81 beats per minute.  Chest x-ray revealed no lobar consolidation, pneumothorax, or large pleural effusion.  Patient was given IV Lasix 40 mg in ED. Patient was admitted to hospital medicine service for further  medical management.      Troponin was trended and was at normal range. She had no chest pain, SOB, cough fever or chills. She was switched to lasix 40 mg po daily. She was advised to get off high dose narcotics. She was stable and asymptomatic. She will be discharged home and she is established with  service.     Pt was seen and examined on the day of discharge  Vitals:  VITAL SIGNS: 24 HRS MIN & MAX LAST   Temp  Min: 97.6 °F (36.4 °C)  Max: 98.1 °F (36.7 °C) 97.8 °F (36.6 °C)   BP  Min: 104/60  Max: 164/80 (!) 157/82   Pulse  Min: 62  Max: 84  62   Resp  Min: 16  Max: 19 18   SpO2  Min: 93 %  Max: 98 % 96 %       Physical Exam:  Heart RRR  Lungs clear   Abdomen soft and non tender   Neuro: No FND    No JVD   No pedal edema     Procedures Performed: No admission procedures for hospital encounter.     Significant Diagnostic Studies: See Full reports for all details    Recent Labs   Lab 04/07/24  1151 04/08/24  0428   WBC 8.75 7.09   RBC 3.63* 3.77*   HGB 10.6* 11.0*   HCT 33.0* 34.5*   MCV 90.9 91.5   MCH 29.2 29.2   MCHC 32.1* 31.9*   RDW 15.4 15.3    215   MPV 11.3* 11.2*       Recent Labs   Lab 04/07/24  1151 04/08/24  0428    141   K 4.3 3.9   CO2 26 28   BUN 16.4 14.1   CREATININE 0.79 0.72   CALCIUM 9.0 9.0   ALBUMIN 3.4 3.3*   ALKPHOS 86 86   ALT 12 14   AST 17 21   BILITOT 0.7 0.6        Microbiology Results (last 7 days)       ** No results found for the last 168 hours. **             X-Ray Chest 1 View  EXAMINATION:  XR CHEST 1 VIEW    CLINICAL HISTORY:  Shortness of breath    TECHNIQUE:  Single frontal view of the chest was performed.    COMPARISON:  Radiograph 03/04/2024    FINDINGS:  The cardiomediastinal silhouette and pulmonary vasculature are within normal limits.  Aortic vascular calcifications.  No lobar consolidation, pneumothorax or large pleural effusion.  Mild elevation of the right hemidiaphragm, unchanged.  No acute osseous abnormality identified.  Stimulator leads overlie the  thoracic spine.    Electronically signed by: Aditya Haynes  Date:    04/07/2024  Time:    12:34         Medication List        CHANGE how you take these medications      furosemide 40 MG tablet  Commonly known as: LASIX  Take 1 tablet (40 mg total) by mouth once daily.  What changed:   medication strength  how much to take            CONTINUE taking these medications      amLODIPine 5 MG tablet  Commonly known as: NORVASC     aspirin 81 MG EC tablet  Commonly known as: ECOTRIN  Take 1 tablet (81 mg total) by mouth once daily.     atorvastatin 80 MG tablet  Commonly known as: LIPITOR     carvediloL 25 MG tablet  Commonly known as: COREG  Take 1 tablet (25 mg total) by mouth 2 (two) times daily.     cetirizine 10 MG tablet  Commonly known as: ZYRTEC     famotidine 20 MG tablet  Commonly known as: PEPCID     gabapentin 300 MG capsule  Commonly known as: NEURONTIN     lisinopriL 10 MG tablet     methocarbamoL 500 MG Tab  Commonly known as: ROBAXIN     metoclopramide HCl 5 MG tablet  Commonly known as: REGLAN     nitroGLYCERIN 0.4 MG SL tablet  Commonly known as: NITROSTAT  Place 1 tablet (0.4 mg total) under the tongue every 5 (five) minutes as needed for Chest pain.     nystatin cream  Commonly known as: MYCOSTATIN     ondansetron 8 MG Tbdl  Commonly known as: ZOFRAN-ODT     pantoprazole 40 MG tablet  Commonly known as: PROTONIX     polyethylene glycol 17 gram Pwpk  Commonly known as: GLYCOLAX  Take 17 g by mouth 2 (two) times daily as needed for Constipation.     potassium chloride 10 MEQ Tbsr  Commonly known as: KLOR-CON     rivaroxaban 20 mg Tab  Commonly known as: XARELTO  Take 1 tablet (20 mg total) by mouth daily with dinner or evening meal.     sertraline 50 MG tablet  Commonly known as: ZOLOFT     timolol maleate 0.5% 0.5 % Drop  Commonly known as: TIMOPTIC     VITAMIN D2 50,000 unit Cap  Generic drug: ergocalciferol               Where to Get Your Medications        These [facility] [address]  [address] [place] [address]      Phone: [phone]   furosemide 40 MG tablet          Explained in detail to the patient about the discharge plan, medications, and follow-up visits. Pt understands and agrees with the treatment plan  Discharge Disposition: Home with HH   Discharged Condition: stable  Diet-   Dietary Orders (From admission, onward)       Start     Ordered    04/07/24 1430  Diet Heart Healthy  Diet effective now         04/07/24 1429                   Medications Per DC med rec  Activities as tolerated   Follow-up Information       Sharad Moran MD Follow up in 1 week(s).    Specialty: Cardiology  Contact information:  [place] [address] [phone]                           For further questions contact hospitalist office    Discharge time 33 minutes    For worsening symptoms, chest pain, shortness of breath, increased abdominal pain, high grade fever, stroke or stroke like symptoms, immediately go to the nearest Emergency Room or call 911 as soon as possible.      Cristopher Pierce M.D on 4/8/2024. at 9:05 AM.

## 2024-04-08 NOTE — PROGRESS NOTES
Subjective:      Patient ID: Katty Veronica is a 79 y.o. female.    Chief Complaint:  Follow-up (Patient here for TIA follow up. Patient having numbness and trouble speaking, states the right side of her body gets cold. Patient was released from the hospital today for heat failure. States she had a sharp pain behind her temple shooting to her eye on Friday. )      History of Present Illness  Patient past medical history of COPD, CAD s/p stent, GERD, HTN, stroke (with left-sided weakness), TIA in August 2023 presents for follow up. Presented to [facility] with reports of left facial twisting and slurred speech. Symptoms resolved prior to ED arrival. CT head on admit normal. Patient had recurring slurred speech and left facial droop on 8/26; repeat CT head again normal and symptoms resolved quickly. Cerebral angiogram performed by Dr. Leach was normal.  Patient was reportedly not on antiplatelet, anticoagulation, or statin therapy prior to hospital admission. Today, patient denies HA or dizziness. Patient states blurred vision in left eye due to floater. Also states weakness on left and right side which is chronic in nature. Sees Dr. Moran, cardiology, for 2 cardiac stents and LAD. Recent left TKA last year with residual left leg numbness. Sees Dr. Barton for pain management, has a nevro spinal cord stimulator. Patient was admitted to the hospital overnight yesterday and discharged today with CHF exacerbation. She also had a fall last month and completed inpatient rehab.  Today, patient reports she is having numbness and trouble speaking. States the right side of her body gets cold. This is new from LOV. MRI brain performed in February 2024 showed no new stroke or acute abnormalities.           MRI BRAIN WITHOUT CONTRAST     CLINICAL HISTORY:  Stroke, follow up;     TECHNIQUE:  Multiplanar MRI sequences were performed of the brain without contrast.     COMPARISON:  MRI brain and November 12, 2014.  CT brain  February 9, 2024     FINDINGS:  Interval progression of chronic microvascular ischemia which is now moderate and is seen in the periventricular deep white matter T2 FLAIR hyperintense signals.  There are right cerebral and bilateral basal ganglia old lacunar infarcts.  Generalized cerebral and cerebellar cortical volume loss. Gradient echo sequences demonstrate no evidence of de phasing artifact to suggest hemorrhagic byproducts. No evidence of diffusion restriction or ADC map signal drop out to suggest acute infarct. The sella and suprasellar areas are unremarkable.     The cerebellar tonsils are normally positioned. There is no acute intracranial hemorrhage, hydrocephalus, midline shift or mass effect. No acute extra axial fluid collections identified. The mastoid air cells are clear.     Impression:     1.  No acute intracranial findings identified.     2.  Old lacunar infarcts, chronic microangiopathic ischemia and atrophy.        Electronically signed by: Anastacio Preston  Date:                                            02/09/2024  Time:                                           21:31  Past Medical History:   Diagnosis Date    Anxiety     COPD (chronic obstructive pulmonary disease)     Coronary artery disease     s/p 2 stents    Depression     GERD (gastroesophageal reflux disease)     Glaucoma     Hypertension     Obstructive sleep apnea     on cpap    Stroke     Urinary, incontinence, stress female        Past Surgical History:   Procedure Laterality Date    APPENDECTOMY      BACK SURGERY      CHOLECYSTECTOMY      EXPLORATION OF COMMON BILE DUCT      HERNIA REPAIR      incisional hernia times 2, central abdomen    HYSTERECTOMY      OVARIAN CYST REMOVAL      TONSILLECTOMY         Family History   Problem Relation Age of Onset    Heart disease Mother     Cancer Father        Social History     Socioeconomic History    Marital status:    Tobacco Use    Smoking status: Never   Substance and Sexual Activity     Alcohol use: Not Currently    Drug use: Not Currently   Social History Narrative    ** Merged History Encounter **          Social Determinants of Health     Financial Resource Strain: Low Risk  (3/5/2024)    Overall Financial Resource Strain (CARDIA)     Difficulty of Paying Living Expenses: Not hard at all   Recent Concern: Financial Resource Strain - High Risk (1/19/2024)    Overall Financial Resource Strain (CARDIA)     Difficulty of Paying Living Expenses: Very hard   Food Insecurity: No Food Insecurity (3/5/2024)    Hunger Vital Sign     Worried About Running Out of Food in the Last Year: Never true     Ran Out of Food in the Last Year: Never true   Recent Concern: Food Insecurity - Food Insecurity Present (1/19/2024)    Hunger Vital Sign     Worried About Running Out of Food in the Last Year: Often true     Ran Out of Food in the Last Year: Often true   Transportation Needs: No Transportation Needs (3/5/2024)    PRAPARE - Transportation     Lack of Transportation (Medical): No     Lack of Transportation (Non-Medical): No   Recent Concern: Transportation Needs - Unmet Transportation Needs (1/19/2024)    PRAPARE - Transportation     Lack of Transportation (Medical): Yes     Lack of Transportation (Non-Medical): Yes   Physical Activity: Inactive (2/12/2024)    Exercise Vital Sign     Days of Exercise per Week: 0 days     Minutes of Exercise per Session: 0 min   Stress: No Stress Concern Present (2/12/2024)    [place] [employer] - Occupational Stress Questionnaire     Feeling of Stress : Not at all   Recent Concern: Stress - Stress Concern Present (1/19/2024)    [place] [employer]ational [employer] - Occupational Stress Questionnaire     Feeling of Stress : Very much   Social Connections: Socially Isolated (3/5/2024)    Social Connection and Isolation Panel [NHANES]     Frequency of Communication with Friends and Family: More than three times a week     Frequency of Social Gatherings  with Friends and Family: Three times a week     Attends Tenriism Services: Never     Active Member of Clubs or Organizations: No     Attends Club or Organization Meetings: Never     Marital Status:    Housing Stability: Low Risk  (3/5/2024)    Housing Stability Vital Sign     Unable to Pay for Housing in the Last Year: No     Number of Places Lived in the Last Year: 1     Unstable Housing in the Last Year: No       Current Outpatient Medications   Medication Sig Dispense Refill    ALPRAZolam (XANAX) 0.25 MG tablet Take 0.25 mg by mouth nightly as needed.      amLODIPine (NORVASC) 5 MG tablet 1 tablet Orally Once a day      aspirin (ECOTRIN) 81 MG EC tablet Take 1 tablet (81 mg total) by mouth once daily.  0    atorvastatin (LIPITOR) 80 MG tablet Take 80 mg by mouth every evening.      carvediloL (COREG) 25 MG tablet Take 1 tablet (25 mg total) by mouth 2 (two) times daily. (Patient taking differently: Take 50 mg by mouth 2 (two) times daily.) 60 tablet 11    cetirizine (ZYRTEC) 10 MG tablet Take 10 mg by mouth once daily.      ergocalciferol (VITAMIN D2) 50,000 unit Cap Take 50,000 Units by mouth every 7 days. Takes on Sunday AM      famotidine (PEPCID) 20 MG tablet Take 1 tablet by mouth every evening.      furosemide (LASIX) 40 MG tablet Take 1 tablet (40 mg total) by mouth once daily. 30 tablet 11    gabapentin (NEURONTIN) 300 MG capsule Take 300 mg by mouth 3 (three) times daily.      lisinopriL 10 MG tablet Take 10 mg by mouth once daily.      methocarbamoL (ROBAXIN) 500 MG Tab Take 500 mg by mouth 2 (two) times a day.      nitroGLYCERIN (NITROSTAT) 0.4 MG SL tablet Place 1 tablet (0.4 mg total) under the tongue every 5 (five) minutes as needed for Chest pain. 20 tablet 1    nystatin (MYCOSTATIN) cream Apply 1,000,000 g topically once daily.      ondansetron (ZOFRAN-ODT) 8 MG TbDL Take 8 mg by mouth every 6 (six) hours as needed.      pantoprazole (PROTONIX) 40 MG tablet Take 40 mg by mouth.       "polyethylene glycol (GLYCOLAX) 17 gram PwPk Take 17 g by mouth 2 (two) times daily as needed for Constipation.  0    potassium chloride (KLOR-CON) 10 MEQ TbSR Take 10 mEq by mouth once.      rivaroxaban (XARELTO) 20 mg Tab Take 1 tablet (20 mg total) by mouth daily with dinner or evening meal.      sertraline (ZOLOFT) 50 MG tablet Take 50 mg by mouth once daily.      timolol maleate 0.5% (TIMOPTIC) 0.5 % Drop Place 1 drop into both eyes 2 (two) times daily.       No current facility-administered medications for this visit.       Review of patient's allergies indicates:   Allergen Reactions    Morphine Itching    Norco [hydrocodone-acetaminophen]       Vitals:    04/08/24 1448   BP: 97/72   BP Location: Left arm   Patient Position: Sitting   Pulse: 79   Weight: 88.5 kg (195 lb)   Height: 5' 1" (1.549 m)      Review of Systems  12 point ROS performed and negative unless otherwise documented in HPI  Objective:     Neurologic Exam      Mental Status   Oriented to person, place, and time.   Speech: speech is normal   Level of consciousness: alert     Cranial Nerves   Cranial nerves II through XII intact.      Motor Exam   Muscle bulk: normalMinimal left sided weakness noted      Sensory Exam   Light touch normal.   Decreased sensation from knee down to her toes on left lower extremity      Gait, Coordination, and Reflexes Ambulates with rolling walker; antalgic         Physical Exam  Vitals reviewed.   Cardiovascular:      Rate and Rhythm: Normal rate and regular rhythm.   Pulmonary:      Effort: Pulmonary effort is normal.      Breath sounds: Normal breath sounds.   Neurological:      Mental Status: She is oriented to person, place, and time.      Cranial Nerves: Cranial nerves 2-12 are intact.   Psychiatric:         Speech: Speech normal.        Assessment:     1. H/O: stroke    2. H/O TIA (transient ischemic attack) and stroke        Plan:     Continue ASA  Continue statin; goal LDL <70; LDL in February 49  Continue " BP management per PCP and cardiology. Recommend cardiology follow up soon.  No new stroke symptoms.  Secondary stroke prevention measures discussed. Education provided on signs and symptoms of stroke; advised to call 9-1-1 with any new onset of numbness, tingling or weakness, difficulty with speech or facial droop.

## 2024-04-08 NOTE — PLAN OF CARE
04/08/24 1211   Final Note   Assessment Type Final Discharge Note   Anticipated Discharge Disposition Home-Health   Post-Acute Status   Post-Acute Authorization Home Health     Discharge documentation sent to [facility] via [facility]. Notified Abby of discharge.

## 2024-04-09 ENCOUNTER — PATIENT OUTREACH (OUTPATIENT)
Dept: ADMINISTRATIVE | Facility: CLINIC | Age: 79
End: 2024-04-09
Payer: MEDICARE

## 2024-04-09 NOTE — PROGRESS NOTES
C3 nurse spoke with Katty Veronica  for a TCC post hospital discharge follow up call. The patient has a scheduled [facility] appointment with Sharad Moran MD (Cardiology) on 4/24/2024 @ 2:40 PM.

## 2024-05-20 PROBLEM — I63.9 CVA (CEREBRAL VASCULAR ACCIDENT): Status: RESOLVED | Noted: 2024-02-13 | Resolved: 2024-05-20

## 2024-05-21 ENCOUNTER — ANESTHESIA EVENT (OUTPATIENT)
Dept: ENDOSCOPY | Facility: HOSPITAL | Age: 79
End: 2024-05-21
Payer: MEDICARE

## 2024-05-21 ENCOUNTER — HOSPITAL ENCOUNTER (OUTPATIENT)
Facility: HOSPITAL | Age: 79
Discharge: HOME | End: 2024-05-21
Attending: INTERNAL MEDICINE | Admitting: INTERNAL MEDICINE
Payer: MEDICARE

## 2024-05-21 ENCOUNTER — ANESTHESIA (OUTPATIENT)
Dept: ENDOSCOPY | Facility: HOSPITAL | Age: 79
End: 2024-05-21
Payer: MEDICARE

## 2024-05-21 DIAGNOSIS — K63.89 NARCOTIC BOWEL SYNDROME DUE TO THERAPEUTIC USE: Primary | ICD-10-CM

## 2024-05-21 DIAGNOSIS — T40.605A NARCOTIC BOWEL SYNDROME DUE TO THERAPEUTIC USE: Primary | ICD-10-CM

## 2024-05-21 DIAGNOSIS — K31.84 GASTROPARESIS: ICD-10-CM

## 2024-05-21 DIAGNOSIS — R10.12 ABDOMINAL PAIN, LEFT UPPER QUADRANT: ICD-10-CM

## 2024-05-21 PROCEDURE — 88313 SPECIAL STAINS GROUP 2: CPT | Mod: 59

## 2024-05-21 PROCEDURE — 37000009 HC ANESTHESIA EA ADD 15 MINS: Performed by: INTERNAL MEDICINE

## 2024-05-21 PROCEDURE — 27201423 OPTIME MED/SURG SUP & DEVICES STERILE SUPPLY: Performed by: INTERNAL MEDICINE

## 2024-05-21 PROCEDURE — D9220A PRA ANESTHESIA: Mod: CRNA,,, | Performed by: NURSE ANESTHETIST, CERTIFIED REGISTERED

## 2024-05-21 PROCEDURE — 43239 EGD BIOPSY SINGLE/MULTIPLE: CPT | Performed by: INTERNAL MEDICINE

## 2024-05-21 PROCEDURE — 25000003 PHARM REV CODE 250: Performed by: NURSE ANESTHETIST, CERTIFIED REGISTERED

## 2024-05-21 PROCEDURE — D9220A PRA ANESTHESIA: Mod: ANES,,, | Performed by: ANESTHESIOLOGY

## 2024-05-21 PROCEDURE — 88312 SPECIAL STAINS GROUP 1: CPT

## 2024-05-21 PROCEDURE — 63600175 PHARM REV CODE 636 W HCPCS: Performed by: NURSE ANESTHETIST, CERTIFIED REGISTERED

## 2024-05-21 PROCEDURE — 25000003 PHARM REV CODE 250: Performed by: ANESTHESIOLOGY

## 2024-05-21 PROCEDURE — 88305 TISSUE EXAM BY PATHOLOGIST: CPT | Mod: 59 | Performed by: INTERNAL MEDICINE

## 2024-05-21 PROCEDURE — 37000008 HC ANESTHESIA 1ST 15 MINUTES: Performed by: INTERNAL MEDICINE

## 2024-05-21 RX ORDER — LIDOCAINE HYDROCHLORIDE 10 MG/ML
1 INJECTION, SOLUTION EPIDURAL; INFILTRATION; INTRACAUDAL; PERINEURAL ONCE
Status: DISCONTINUED | OUTPATIENT
Start: 2024-05-21 | End: 2024-05-21 | Stop reason: HOSPADM

## 2024-05-21 RX ORDER — METOCLOPRAMIDE 10 MG/1
10 TABLET ORAL 4 TIMES DAILY
COMMUNITY

## 2024-05-21 RX ORDER — LIDOCAINE HYDROCHLORIDE 20 MG/ML
INJECTION, SOLUTION EPIDURAL; INFILTRATION; INTRACAUDAL; PERINEURAL
Status: DISCONTINUED | OUTPATIENT
Start: 2024-05-21 | End: 2024-05-21

## 2024-05-21 RX ORDER — SODIUM CHLORIDE, SODIUM GLUCONATE, SODIUM ACETATE, POTASSIUM CHLORIDE AND MAGNESIUM CHLORIDE 30; 37; 368; 526; 502 MG/100ML; MG/100ML; MG/100ML; MG/100ML; MG/100ML
INJECTION, SOLUTION INTRAVENOUS CONTINUOUS
Status: DISCONTINUED | OUTPATIENT
Start: 2024-05-21 | End: 2024-05-21 | Stop reason: HOSPADM

## 2024-05-21 RX ORDER — PROPOFOL 10 MG/ML
VIAL (ML) INTRAVENOUS
Status: DISCONTINUED | OUTPATIENT
Start: 2024-05-21 | End: 2024-05-21

## 2024-05-21 RX ORDER — TRAZODONE HYDROCHLORIDE 50 MG/1
50 TABLET ORAL NIGHTLY
COMMUNITY

## 2024-05-21 RX ORDER — ONDANSETRON HYDROCHLORIDE 2 MG/ML
4 INJECTION, SOLUTION INTRAVENOUS DAILY PRN
Status: CANCELLED | OUTPATIENT
Start: 2024-05-21

## 2024-05-21 RX ORDER — DIPHENHYDRAMINE HYDROCHLORIDE 50 MG/ML
25 INJECTION INTRAMUSCULAR; INTRAVENOUS EVERY 6 HOURS PRN
Status: CANCELLED | OUTPATIENT
Start: 2024-05-21

## 2024-05-21 RX ORDER — VIBEGRON 75 MG/1
75 TABLET, FILM COATED ORAL ONCE
COMMUNITY

## 2024-05-21 RX ORDER — TRAMADOL HYDROCHLORIDE 50 MG/1
50 TABLET ORAL EVERY 6 HOURS PRN
Status: ON HOLD | COMMUNITY
End: 2024-06-05 | Stop reason: HOSPADM

## 2024-05-21 RX ORDER — PHENYLEPHRINE HYDROCHLORIDE 10 MG/ML
INJECTION INTRAVENOUS
Status: DISCONTINUED | OUTPATIENT
Start: 2024-05-21 | End: 2024-05-21

## 2024-05-21 RX ADMIN — PROPOFOL 20 MG: 10 INJECTION, EMULSION INTRAVENOUS at 08:05

## 2024-05-21 RX ADMIN — LIDOCAINE HYDROCHLORIDE 80 MG: 20 INJECTION, SOLUTION INTRAVENOUS at 08:05

## 2024-05-21 RX ADMIN — PHENYLEPHRINE HYDROCHLORIDE 50 MCG: 10 INJECTION INTRAVENOUS at 08:05

## 2024-05-21 RX ADMIN — SODIUM CHLORIDE, SODIUM GLUCONATE, SODIUM ACETATE, POTASSIUM CHLORIDE AND MAGNESIUM CHLORIDE: 526; 502; 368; 37; 30 INJECTION, SOLUTION INTRAVENOUS at 08:05

## 2024-05-21 RX ADMIN — SODIUM CHLORIDE: 9 INJECTION, SOLUTION INTRAVENOUS at 08:05

## 2024-05-21 RX ADMIN — PROPOFOL 70 MG: 10 INJECTION, EMULSION INTRAVENOUS at 08:05

## 2024-05-21 RX ADMIN — PROPOFOL 30 MG: 10 INJECTION, EMULSION INTRAVENOUS at 08:05

## 2024-05-21 NOTE — ANESTHESIA PREPROCEDURE EVALUATION
"                                                                                                             05/21/2024  Katty Veronica is a 79 y.o., female with multiple complicated medical problems including gastroparesis and dyspepsia, chronic anemia, SOB, CHF, hx CVA and other problems as listed.  She is here today for EGD evaluation.      Recent pulmonology note excerpt:  "Chief Complaint: check up, she was in the hospital with heart failure        HPI: Katty Veronica is a 79 y.o. female  patient of Dr. Cam with a large hiatal hernia and gastroparesis that has caused significant dyspnea over the past several years. Pulmonary function testing in 2014 was completely normal.  She was hospitalized in November of 2023 with chest pain and has been worked up by Dr Moran. Previously on nocturnal oxygen, however repeat overnight pulse oximetry from January of 2024 revealed that she no longer qualified.         Today's visit:  Patient comes to the office today as recommended by her cardiologist. Admitted in early April for a CHF exacerbation. . Troponin normals.  Patient endorses shortness of breath.  She also had some wheezing which has improved with diuresis.  Denies any edema today recurrent wheezing.  No cough.          Past Medical History:   Diagnosis Date    Anxiety      COPD (chronic obstructive pulmonary disease)      Coronary artery disease       s/p 2 stents    Depression      GERD (gastroesophageal reflux disease)      Glaucoma      Hypertension      Obstructive sleep apnea       on cpap    Stroke      Urinary, incontinence, stress female              Review of Systems   Respiratory:  Positive for shortness of breath."    2/24 echo:  "Summary         Left Ventricle: The left ventricle is normal in size. Ventricular mass is normal. Mildly increased wall thickness. There is concentric remodeling. Normal wall motion. There is normal systolic function with a visually estimated ejection " "fraction of 55 - 60%. Grade I diastolic dysfunction.    Right Ventricle: Normal right ventricular cavity size. Systolic function is normal.    Left Atrium: Normal left atrial size. Bubble study with agitated saline is inconclusive.    Right Atrium: Normal right atrial size.    Aortic Valve: The aortic valve is a trileaflet valve. There is annular calcification present. There is no stenosis. There is trace aortic regurgitation.    Mitral Valve: The mitral valve is structurally normal. There is no stenosis. There is trace regurgitation.    Tricuspid Valve: The tricuspid valve is structurally normal. There is trace regurgitation.    Aorta: Aortic root is normal in size measuring 3.4 cm.    IVC/SVC: IVC was not well visualized due to poor acoustic window.    Pericardium: There is no pericardial effusion."         Pre-op Assessment    I have reviewed the Patient Summary Reports.     I have reviewed the Nursing Notes. I have reviewed the NPO Status.   I have reviewed the Medications.     Review of Systems  Anesthesia Hx:  No problems with previous Anesthesia             Denies Family Hx of Anesthesia complications.    Denies Personal Hx of Anesthesia complications.                    Cardiovascular:     Hypertension   CAD       CHF                                 Pulmonary:   COPD     Sleep Apnea                Hepatic/GI:    Hiatal Hernia, GERD             Neurological:   CVA Neuromuscular Disease,                                   Psych:  Psychiatric History                  Physical Exam  General: Well nourished, Cooperative, Alert and Oriented    Airway:  Mallampati: II   Mouth Opening: Normal  TM Distance: Normal  Tongue: Normal  Neck ROM: Normal ROM    Dental:  Dentures    Chest/Lungs:  Clear to auscultation, Normal Respiratory Rate    Heart:  Rate: Normal  Rhythm: Regular Rhythm        Anesthesia Plan  Type of Anesthesia, risks & benefits discussed:    Anesthesia Type: Gen Natural Airway  Intra-op Monitoring Plan: " Standard ASA Monitors  Post Op Pain Control Plan: multimodal analgesia  Induction:  IV  Informed Consent: Informed consent signed with the Patient and all parties understand the risks and agree with anesthesia plan.  All questions answered.   ASA Score: 3  Day of Surgery Review of History & Physical: H&P Update referred to the surgeon/provider.    Ready For Surgery From Anesthesia Perspective.     .

## 2024-05-21 NOTE — H&P
[facility]  Gastroenterology  H&P    Patient Name: Katty Veronica  MRN: [id number]  Admission Date: 5/21/2024  Code Status: Prior    Attending Provider: BASIM Washington MD  Primary Care Physician: Renard Eddy MD  Principal Problem:<principal problem not specified>    Subjective:     History of Present Illness: 79-year-old individuaWith a history of intermittent gastro paresis and some dysphagia who comes in for further evaluation.  When her episodes of gastroparesis occur she tends to try to stay more on liquids.  The dysphagia is more to solids there has been some intermittent vomiting.  Her weight is relatively stable.  She has a history of obstructive sleep apnea congestive failure COPD and has had an appendectomy cholecystectomy hernia repair and hysterectomy she has periods of gagging but not actually bringing up any liquid or solid material.  Her weight is down 190 4 over the past few months.  Past Medical History:   Diagnosis Date    Anxiety     CHF (congestive heart failure)     COPD (chronic obstructive pulmonary disease)     Coronary artery disease     s/p 2 stents    Depression     GERD (gastroesophageal reflux disease)     Glaucoma     Hypertension     Obstructive sleep apnea     on cpap    Stroke     Urinary, incontinence, stress female        Past Surgical History:   Procedure Laterality Date    APPENDECTOMY      BACK SURGERY      CHOLECYSTECTOMY      EXPLORATION OF COMMON BILE DUCT      HERNIA REPAIR      incisional hernia times 2, central abdomen    HYSTERECTOMY      OVARIAN CYST REMOVAL      TONSILLECTOMY         Review of patient's allergies indicates:   Allergen Reactions    Morphine Itching    Norco [hydrocodone-acetaminophen]      Family History       Problem Relation (Age of Onset)    Cancer Father    Heart disease Mother          Tobacco Use    Smoking status: Never    Smokeless tobacco: Never   Substance and Sexual Activity    Alcohol use:  Not Currently    Drug use: Not Currently    Sexual activity: Not on file     Review of Systems  Objective:     Vital Signs (Most Recent):    Vital Signs (24h Range):        Weight: 86.6 kg (191 lb) (05/20/24 1202)  Body mass index is 36.09 kg/m².    No intake or output data in the 24 hours ending 05/21/24 0806    Lines/Drains/Airways       None                   Physical Exam  Constitutional:       Appearance: Normal appearance. She is obese.   Cardiovascular:      Pulses: Normal pulses.   Pulmonary:      Effort: Pulmonary effort is normal.   Abdominal:      General: Abdomen is flat.   Skin:     General: Skin is warm.   Psychiatric:         Mood and Affect: Mood normal.         Significant Labs:  All pertinent lab results from the last 24 hours have been reviewed.    Significant Imaging:  Imaging results within the past 24 hours have been reviewed.    Assessment/Plan:     There are no hospital problems to display for this patient.      For EGD and possible dilation    J Aamir Washington MD  Gastroenterology  [facility]

## 2024-05-21 NOTE — TRANSFER OF CARE
"Anesthesia Transfer of Care Note    Patient: Katty Veronica    Procedure(s) Performed: Procedure(s) (LRB):  EGD (N/A)    Patient location: PACU    Anesthesia Type: general    Transport from OR: Transported from OR on room air with adequate spontaneous ventilation    Post pain: adequate analgesia    Post assessment: no apparent anesthetic complications    Post vital signs: stable    Level of consciousness: sedated, awake and responds to stimulation    Nausea/Vomiting: no nausea/vomiting    Complications: none    Transfer of care protocol was followed      Last vitals: Visit Vitals  BP (!) 155/84 (BP Location: Left arm, Patient Position: Sitting)   Pulse 76   Temp 36.2 °C (97.2 °F) (Tympanic)   Resp 17   Ht 5' 1" (1.549 m)   Wt 86.6 kg (191 lb)   SpO2 96%   Breastfeeding No   BMI 36.09 kg/m²     "

## 2024-05-21 NOTE — PROVATION PATIENT INSTRUCTIONS
Discharge Summary/Instructions after an Endoscopic Procedure  Patient Name: Katty Veronica  Patient MRN: [id number]  Patient YOB: 1945  Tuesday, May 21, 2024  KRISTIAN Washington MD  Dear patient,  As a result of recent federal legislation (The Federal Cures Act), you may   receive lab or pathology results from your procedure in your [facility]   account before your physician is able to contact you. Your physician or   their representative will relay the results to you with their   recommendations at their soonest availability.  Thank you,  RESTRICTIONS:  During your procedure today, you received medications for sedation.  These   medications may affect your judgment, balance and coordination.  Therefore,   for 24 hours, you have the following restrictions:   - DO NOT drive a car, operate machinery, make legal/financial decisions,   sign important papers or drink alcohol.    ACTIVITY:  Today: no heavy lifting, straining or running due to procedural   sedation/anesthesia.  The following day: return to full activity including work.  DIET:  Eat and drink normally unless instructed otherwise.     TREATMENT FOR COMMON SIDE EFFECTS:  - Mild abdominal pain, nausea, belching, bloating or excessive gas:  rest,   eat lightly and use a heating pad.  - Sore Throat: treat with throat lozenges and/or gargle with warm salt   water.  - Because air was used during the procedure, expelling large amounts of air   from your rectum or belching is normal.  - If a bowel prep was taken, you may not have a bowel movement for 1-3 days.    This is normal.  SYMPTOMS TO WATCH FOR AND REPORT TO YOUR PHYSICIAN:  1. Abdominal pain or bloating, other than gas cramps.  2. Chest pain.  3. Back pain.  4. Signs of infection such as: chills or fever occurring within 24 hours   after the procedure.  5. Rectal bleeding, which would show as bright red, maroon, or black stools.   (A tablespoon of blood from the rectum is not serious, especially  if   hemorrhoids are present.)  6. Vomiting.  7. Weakness or dizziness.  GO DIRECTLY TO THE NEAREST EMERGENCY ROOM IF YOU HAVE ANY OF THE FOLLOWING:      Difficulty breathing              Chills and/or fever over 101 F   Persistent vomiting and/or vomiting blood   Severe abdominal pain   Severe chest pain   Black, tarry stools   Bleeding- more than one tablespoon   Any other symptom or condition that you feel may need urgent attention  Your doctor recommends these additional instructions:  If any biopsies were taken, your doctors clinic will contact you in 1 to 2   weeks with any results.  - Written discharge instructions were provided to the patient.   - The signs and symptoms of potential delayed complications were discussed   with the patient.   - Patient has a contact number available for emergencies.   - Return to normal activities tomorrow.   - Gastroparesis diet indefinitely.   - Continue present medications.   - Await pathology results.   We will need opioid antagonist when she has to use the opioid to help with   motility and also periodic use if possible of metoclopramide during   episodes of gastro pruritic cycling.  She has had small-bowel studies   showing no evidence of postbulbar obstruction.  - Discharge patient to home (via wheelchair).  For questions, problems or results please call your physician - KRISTIAN Washington MD at Work:  [phone].  [facility], EMERGENCY ROOM PHONE NUMBER: [phone]  IF A COMPLICATION OR EMERGENCY SITUATION ARISES AND YOU ARE UNABLE TO REACH   YOUR PHYSICIAN - GO DIRECTLY TO THE EMERGENCY ROOM.  MD KRISTIAN Cano MD  5/21/2024 9:17:46 AM  This report has been verified and signed electronically.  Dear patient,  As a result of recent federal legislation (The Federal Cures Act), you may   receive lab or pathology results from your procedure in your [facility]   account before your physician is able to contact you. Your  physician or   their representative will relay the results to you with their   recommendations at their soonest availability.  Thank you,  PROVATION

## 2024-05-22 LAB — PSYCHE PATHOLOGY RESULT: NORMAL

## 2024-05-23 VITALS
RESPIRATION RATE: 16 BRPM | SYSTOLIC BLOOD PRESSURE: 142 MMHG | DIASTOLIC BLOOD PRESSURE: 78 MMHG | WEIGHT: 191 LBS | TEMPERATURE: 97 F | BODY MASS INDEX: 36.06 KG/M2 | HEART RATE: 70 BPM | OXYGEN SATURATION: 98 % | HEIGHT: 61 IN

## 2024-05-27 ENCOUNTER — HOSPITAL ENCOUNTER (INPATIENT)
Facility: HOSPITAL | Age: 79
LOS: 9 days | Discharge: HOME HEALTH | DRG: 378 | End: 2024-06-05
Attending: EMERGENCY MEDICINE | Admitting: INTERNAL MEDICINE
Payer: MEDICARE

## 2024-05-27 DIAGNOSIS — R10.13 EPIGASTRIC ABDOMINAL PAIN: ICD-10-CM

## 2024-05-27 DIAGNOSIS — T14.8XXA EXTRAVASATION INJURY: ICD-10-CM

## 2024-05-27 DIAGNOSIS — R11.2 NAUSEA AND VOMITING, UNSPECIFIED VOMITING TYPE: ICD-10-CM

## 2024-05-27 DIAGNOSIS — K92.2 UGIB (UPPER GASTROINTESTINAL BLEED): Primary | ICD-10-CM

## 2024-05-27 LAB
ALBUMIN SERPL-MCNC: 3.2 G/DL (ref 3.4–4.8)
ALBUMIN/GLOB SERPL: 1.1 RATIO (ref 1.1–2)
ALP SERPL-CCNC: 73 UNIT/L (ref 40–150)
ALT SERPL-CCNC: 13 UNIT/L (ref 0–55)
ANION GAP SERPL CALC-SCNC: 15 MEQ/L
AST SERPL-CCNC: 16 UNIT/L (ref 5–34)
BACTERIA #/AREA URNS AUTO: ABNORMAL /HPF
BASOPHILS # BLD AUTO: 0.03 X10(3)/MCL
BASOPHILS NFR BLD AUTO: 0.3 %
BILIRUB SERPL-MCNC: 0.5 MG/DL
BILIRUB UR QL STRIP.AUTO: NEGATIVE
BUN SERPL-MCNC: 36 MG/DL (ref 9.8–20.1)
CALCIUM SERPL-MCNC: 9 MG/DL (ref 8.4–10.2)
CHLORIDE SERPL-SCNC: 107 MMOL/L (ref 98–107)
CLARITY UR: CLEAR
CO2 SERPL-SCNC: 21 MMOL/L (ref 23–31)
COLOR UR AUTO: ABNORMAL
CREAT SERPL-MCNC: 1.1 MG/DL (ref 0.55–1.02)
CREAT/UREA NIT SERPL: 33
EOSINOPHIL # BLD AUTO: 0.03 X10(3)/MCL (ref 0–0.9)
EOSINOPHIL NFR BLD AUTO: 0.3 %
ERYTHROCYTE [DISTWIDTH] IN BLOOD BY AUTOMATED COUNT: 14.2 % (ref 11.5–17)
GFR SERPLBLD CREATININE-BSD FMLA CKD-EPI: 51 ML/MIN/1.73/M2
GLOBULIN SER-MCNC: 2.9 GM/DL (ref 2.4–3.5)
GLUCOSE SERPL-MCNC: 148 MG/DL (ref 82–115)
GLUCOSE UR QL STRIP: NORMAL
HCT VFR BLD AUTO: 25.6 % (ref 37–47)
HCT VFR BLD AUTO: 27.2 % (ref 37–47)
HGB BLD-MCNC: 8.8 G/DL (ref 12–16)
HGB BLD-MCNC: 9.2 G/DL (ref 12–16)
HGB UR QL STRIP: NEGATIVE
IMM GRANULOCYTES # BLD AUTO: 0.05 X10(3)/MCL (ref 0–0.04)
IMM GRANULOCYTES NFR BLD AUTO: 0.5 %
KETONES UR QL STRIP: NEGATIVE
LEUKOCYTE ESTERASE UR QL STRIP: NEGATIVE
LIPASE SERPL-CCNC: 8 U/L
LYMPHOCYTES # BLD AUTO: 3.05 X10(3)/MCL (ref 0.6–4.6)
LYMPHOCYTES NFR BLD AUTO: 27.9 %
MCH RBC QN AUTO: 28.8 PG (ref 27–31)
MCHC RBC AUTO-ENTMCNC: 34.4 G/DL (ref 33–36)
MCV RBC AUTO: 83.7 FL (ref 80–94)
MONOCYTES # BLD AUTO: 0.68 X10(3)/MCL (ref 0.1–1.3)
MONOCYTES NFR BLD AUTO: 6.2 %
NEUTROPHILS # BLD AUTO: 7.11 X10(3)/MCL (ref 2.1–9.2)
NEUTROPHILS NFR BLD AUTO: 64.8 %
NITRITE UR QL STRIP: NEGATIVE
NRBC BLD AUTO-RTO: 0 %
PH UR STRIP: 8.5 [PH]
PLATELET # BLD AUTO: 174 X10(3)/MCL (ref 130–400)
PMV BLD AUTO: 10.4 FL (ref 7.4–10.4)
POTASSIUM SERPL-SCNC: 3.7 MMOL/L (ref 3.5–5.1)
PROT SERPL-MCNC: 6.1 GM/DL (ref 5.8–7.6)
PROT UR QL STRIP: NEGATIVE
RBC # BLD AUTO: 3.06 X10(6)/MCL (ref 4.2–5.4)
RBC #/AREA URNS AUTO: ABNORMAL /HPF
SODIUM SERPL-SCNC: 143 MMOL/L (ref 136–145)
SP GR UR STRIP.AUTO: 1.01 (ref 1–1.03)
SQUAMOUS #/AREA URNS LPF: ABNORMAL /HPF
TROPONIN I SERPL-MCNC: <0.01 NG/ML (ref 0–0.04)
UROBILINOGEN UR STRIP-ACNC: 4
WBC # SPEC AUTO: 10.95 X10(3)/MCL (ref 4.5–11.5)
WBC #/AREA URNS AUTO: ABNORMAL /HPF

## 2024-05-27 PROCEDURE — 85025 COMPLETE CBC W/AUTO DIFF WBC: CPT | Performed by: PHYSICIAN ASSISTANT

## 2024-05-27 PROCEDURE — 83690 ASSAY OF LIPASE: CPT | Performed by: PHYSICIAN ASSISTANT

## 2024-05-27 PROCEDURE — 21400001 HC TELEMETRY ROOM

## 2024-05-27 PROCEDURE — 99285 EMERGENCY DEPT VISIT HI MDM: CPT | Mod: 25

## 2024-05-27 PROCEDURE — 80053 COMPREHEN METABOLIC PANEL: CPT | Performed by: PHYSICIAN ASSISTANT

## 2024-05-27 PROCEDURE — 36569 INSJ PICC 5 YR+ W/O IMAGING: CPT

## 2024-05-27 PROCEDURE — 02HV33Z INSERTION OF INFUSION DEVICE INTO SUPERIOR VENA CAVA, PERCUTANEOUS APPROACH: ICD-10-PCS | Performed by: INTERNAL MEDICINE

## 2024-05-27 PROCEDURE — 11000001 HC ACUTE MED/SURG PRIVATE ROOM

## 2024-05-27 PROCEDURE — C1751 CATH, INF, PER/CENT/MIDLINE: HCPCS

## 2024-05-27 PROCEDURE — 85014 HEMATOCRIT: CPT | Performed by: INTERNAL MEDICINE

## 2024-05-27 PROCEDURE — 81001 URINALYSIS AUTO W/SCOPE: CPT | Performed by: PHYSICIAN ASSISTANT

## 2024-05-27 PROCEDURE — 96376 TX/PRO/DX INJ SAME DRUG ADON: CPT

## 2024-05-27 PROCEDURE — 63600175 PHARM REV CODE 636 W HCPCS: Performed by: PHYSICIAN ASSISTANT

## 2024-05-27 PROCEDURE — 84484 ASSAY OF TROPONIN QUANT: CPT | Performed by: PHYSICIAN ASSISTANT

## 2024-05-27 PROCEDURE — C9113 INJ PANTOPRAZOLE SODIUM, VIA: HCPCS | Performed by: EMERGENCY MEDICINE

## 2024-05-27 PROCEDURE — 96374 THER/PROPH/DIAG INJ IV PUSH: CPT

## 2024-05-27 PROCEDURE — 63600175 PHARM REV CODE 636 W HCPCS: Performed by: EMERGENCY MEDICINE

## 2024-05-27 PROCEDURE — 96375 TX/PRO/DX INJ NEW DRUG ADDON: CPT

## 2024-05-27 RX ORDER — TRAZODONE HYDROCHLORIDE 50 MG/1
1 TABLET ORAL NIGHTLY PRN
COMMUNITY
Start: 2024-05-15

## 2024-05-27 RX ORDER — ONDANSETRON HYDROCHLORIDE 2 MG/ML
4 INJECTION, SOLUTION INTRAVENOUS
Status: COMPLETED | OUTPATIENT
Start: 2024-05-27 | End: 2024-05-27

## 2024-05-27 RX ORDER — ATORVASTATIN CALCIUM 80 MG/1
1 TABLET, FILM COATED ORAL EVERY MORNING
COMMUNITY
Start: 2024-04-30

## 2024-05-27 RX ORDER — SODIUM CHLORIDE 0.9 % (FLUSH) 0.9 %
10 SYRINGE (ML) INJECTION
Status: DISCONTINUED | OUTPATIENT
Start: 2024-05-27 | End: 2024-06-05 | Stop reason: HOSPADM

## 2024-05-27 RX ORDER — PANTOPRAZOLE SODIUM 40 MG/10ML
40 INJECTION, POWDER, LYOPHILIZED, FOR SOLUTION INTRAVENOUS
Status: DISCONTINUED | OUTPATIENT
Start: 2024-05-28 | End: 2024-05-31

## 2024-05-27 RX ORDER — ONDANSETRON HYDROCHLORIDE 2 MG/ML
4 INJECTION, SOLUTION INTRAVENOUS EVERY 8 HOURS PRN
Status: DISCONTINUED | OUTPATIENT
Start: 2024-05-27 | End: 2024-06-05 | Stop reason: HOSPADM

## 2024-05-27 RX ORDER — HYDROMORPHONE HYDROCHLORIDE 2 MG/ML
1 INJECTION, SOLUTION INTRAMUSCULAR; INTRAVENOUS; SUBCUTANEOUS ONCE
Status: COMPLETED | OUTPATIENT
Start: 2024-05-27 | End: 2024-05-27

## 2024-05-27 RX ORDER — ASPIRIN 81 MG/1
1 TABLET ORAL DAILY
Status: ON HOLD | COMMUNITY
Start: 2024-04-30 | End: 2024-06-05 | Stop reason: HOSPADM

## 2024-05-27 RX ORDER — HYDRALAZINE HYDROCHLORIDE 20 MG/ML
10 INJECTION INTRAMUSCULAR; INTRAVENOUS
Status: DISCONTINUED | OUTPATIENT
Start: 2024-05-27 | End: 2024-06-05 | Stop reason: HOSPADM

## 2024-05-27 RX ORDER — FUROSEMIDE 40 MG/1
1 TABLET ORAL EVERY MORNING
Status: ON HOLD | COMMUNITY
End: 2024-06-05 | Stop reason: HOSPADM

## 2024-05-27 RX ORDER — PANTOPRAZOLE SODIUM 40 MG/10ML
80 INJECTION, POWDER, LYOPHILIZED, FOR SOLUTION INTRAVENOUS
Status: COMPLETED | OUTPATIENT
Start: 2024-05-27 | End: 2024-05-27

## 2024-05-27 RX ORDER — METOCLOPRAMIDE HYDROCHLORIDE 5 MG/ML
5 INJECTION INTRAMUSCULAR; INTRAVENOUS
Status: COMPLETED | OUTPATIENT
Start: 2024-05-27 | End: 2024-05-27

## 2024-05-27 RX ORDER — CETIRIZINE HYDROCHLORIDE 10 MG/1
1 TABLET ORAL EVERY MORNING
COMMUNITY
Start: 2024-04-30

## 2024-05-27 RX ORDER — POTASSIUM CHLORIDE 750 MG/1
10 TABLET, EXTENDED RELEASE ORAL DAILY
COMMUNITY

## 2024-05-27 RX ORDER — SODIUM CHLORIDE 0.9 % (FLUSH) 0.9 %
10 SYRINGE (ML) INJECTION EVERY 6 HOURS
Status: DISCONTINUED | OUTPATIENT
Start: 2024-05-28 | End: 2024-06-05 | Stop reason: HOSPADM

## 2024-05-27 RX ORDER — FENTANYL CITRATE 50 UG/ML
75 INJECTION, SOLUTION INTRAMUSCULAR; INTRAVENOUS
Status: COMPLETED | OUTPATIENT
Start: 2024-05-27 | End: 2024-05-27

## 2024-05-27 RX ORDER — LISINOPRIL 10 MG/1
1 TABLET ORAL EVERY MORNING
COMMUNITY
Start: 2024-04-30

## 2024-05-27 RX ORDER — METOCLOPRAMIDE HYDROCHLORIDE 5 MG/ML
5 INJECTION INTRAMUSCULAR; INTRAVENOUS
Status: DISCONTINUED | OUTPATIENT
Start: 2024-05-27 | End: 2024-05-27

## 2024-05-27 RX ORDER — LABETALOL HYDROCHLORIDE 5 MG/ML
10 INJECTION, SOLUTION INTRAVENOUS EVERY 4 HOURS PRN
Status: DISCONTINUED | OUTPATIENT
Start: 2024-05-27 | End: 2024-06-05 | Stop reason: HOSPADM

## 2024-05-27 RX ORDER — TRAMADOL HYDROCHLORIDE 50 MG/1
50 TABLET ORAL EVERY 8 HOURS PRN
COMMUNITY

## 2024-05-27 RX ORDER — FAMOTIDINE 20 MG/1
20 TABLET, FILM COATED ORAL DAILY
COMMUNITY
Start: 2024-04-30

## 2024-05-27 RX ORDER — PROMETHAZINE HYDROCHLORIDE 25 MG/ML
25 INJECTION, SOLUTION INTRAMUSCULAR; INTRAVENOUS
Status: COMPLETED | OUTPATIENT
Start: 2024-05-27 | End: 2024-05-27

## 2024-05-27 RX ORDER — TALC
6 POWDER (GRAM) TOPICAL NIGHTLY PRN
Status: DISCONTINUED | OUTPATIENT
Start: 2024-05-27 | End: 2024-06-05 | Stop reason: HOSPADM

## 2024-05-27 RX ORDER — MORPHINE SULFATE 4 MG/ML
2 INJECTION, SOLUTION INTRAMUSCULAR; INTRAVENOUS EVERY 4 HOURS PRN
Status: DISCONTINUED | OUTPATIENT
Start: 2024-05-27 | End: 2024-05-27

## 2024-05-27 RX ADMIN — METOCLOPRAMIDE 5 MG: 5 INJECTION, SOLUTION INTRAMUSCULAR; INTRAVENOUS at 07:05

## 2024-05-27 RX ADMIN — PANTOPRAZOLE SODIUM 80 MG: 40 INJECTION, POWDER, LYOPHILIZED, FOR SOLUTION INTRAVENOUS at 05:05

## 2024-05-27 RX ADMIN — ONDANSETRON 4 MG: 2 INJECTION INTRAMUSCULAR; INTRAVENOUS at 03:05

## 2024-05-27 RX ADMIN — PROMETHAZINE HYDROCHLORIDE 25 MG: 25 INJECTION INTRAMUSCULAR; INTRAVENOUS at 08:05

## 2024-05-27 RX ADMIN — ONDANSETRON 4 MG: 2 INJECTION INTRAMUSCULAR; INTRAVENOUS at 04:05

## 2024-05-27 RX ADMIN — HYDROMORPHONE HYDROCHLORIDE 1 MG: 2 INJECTION, SOLUTION INTRAMUSCULAR; INTRAVENOUS; SUBCUTANEOUS at 08:05

## 2024-05-27 RX ADMIN — FENTANYL CITRATE 75 MCG: 50 INJECTION, SOLUTION INTRAMUSCULAR; INTRAVENOUS at 04:05

## 2024-05-27 NOTE — ED PROVIDER NOTES
Encounter Date: 5/27/2024    SCRIBE #1 NOTE: I, Rafaela Carroll, am scribing for, and in the presence of,  Marilee Ruiz DO. I have scribed the following portions of the note - Other sections scribed: HPI, ROS, PE.       History     Chief Complaint   Patient presents with    Abdominal Pain     Pt. C/o upper abdominal pain and vomiting. Pmh CHF and Gastroparesis.      Patient is an 79-year-old female with a history of CHF, COPD, CAD s/p 2 stents placed, GERD, and HTN presenting to the ED with c/o abdominal pain. The pt is reporting consistent epigastric abdominal pain radiating to her back onset Saturday. Associated symptoms include nausea and dry heaving. She denies vomiting.     The history is provided by the patient. No  was used.     Review of patient's allergies indicates:   Allergen Reactions    Morphine Itching    Norco [hydrocodone-acetaminophen]      Past Medical History:   Diagnosis Date    Anxiety     CHF (congestive heart failure)     COPD (chronic obstructive pulmonary disease)     Coronary artery disease     s/p 2 stents    Depression     GERD (gastroesophageal reflux disease)     Glaucoma     Hypertension     Obstructive sleep apnea     on cpap    Stroke     Urinary, incontinence, stress female      Past Surgical History:   Procedure Laterality Date    APPENDECTOMY      BACK SURGERY      CHOLECYSTECTOMY      EGD, WITH CLOSED BIOPSY N/A 5/21/2024    Procedure: EGD;  Surgeon: BASIM Washington MD;  Location: [facility] ENDOSCOPY;  Service: Gastroenterology;  Laterality: N/A;    EXPLORATION OF COMMON BILE DUCT      HERNIA REPAIR      incisional hernia times 2, central abdomen    HYSTERECTOMY      OVARIAN CYST REMOVAL      TONSILLECTOMY       Family History   Problem Relation Name Age of Onset    Heart disease Mother      Cancer Father       Social History     Tobacco Use    Smoking status: Never    Smokeless tobacco: Never   Substance Use Topics    Alcohol use: Not Currently    Drug  use: Not Currently     Review of Systems   Gastrointestinal:  Positive for abdominal pain (epigastric) and nausea. Negative for vomiting.        Dry heaving.     All other systems reviewed and are negative.      Physical Exam     Initial Vitals [05/27/24 1253]   BP Pulse Resp Temp SpO2   (!) 148/70 78 16 98.2 °F (36.8 °C) 97 %      MAP       --         Physical Exam    Nursing note and vitals reviewed.  Constitutional: She appears well-developed and well-nourished. She is not diaphoretic. She does not appear ill. No distress.   The pt appears uncomfortable.   She is tremulous.  The pt is dry heaving.   HENT:   Head: Normocephalic and atraumatic.   Right Ear: External ear normal.   Left Ear: External ear normal.   Mouth/Throat: Oropharynx is clear and moist.   Eyes: Conjunctivae are normal.   Neck: Neck supple. No tracheal deviation present.   Cardiovascular:  Normal rate and regular rhythm.           Murmur heard.  Pulmonary/Chest: Breath sounds normal. No respiratory distress. She has no wheezes. She has no rhonchi. She has no rales.   Abdominal: Abdomen is soft. She exhibits no distension. There is abdominal tenderness in the epigastric area.   No right CVA tenderness.  No left CVA tenderness.   Musculoskeletal:         General: Normal range of motion.      Cervical back: Neck supple.     Neurological: She is alert and oriented to person, place, and time. GCS score is 15. GCS eye subscore is 4. GCS verbal subscore is 5. GCS motor subscore is 6.   Skin: Skin is warm and dry. Capillary refill takes less than 2 seconds. No rash noted. No pallor.         ED Course   Procedures  Labs Reviewed   COMPREHENSIVE METABOLIC PANEL - Abnormal; Notable for the following components:       Result Value    CO2 21 (*)     Glucose 148 (*)     Blood Urea Nitrogen 36.0 (*)     Creatinine 1.10 (*)     Albumin 3.2 (*)     All other components within normal limits   URINALYSIS, REFLEX TO URINE CULTURE - Abnormal; Notable for the  following components:    Urobilinogen, UA 4.0 (*)     All other components within normal limits   CBC WITH DIFFERENTIAL - Abnormal; Notable for the following components:    RBC 3.06 (*)     Hgb 8.8 (*)     Hct 25.6 (*)     IG# 0.05 (*)     All other components within normal limits   LIPASE - Normal   TROPONIN I - Normal   CBC W/ AUTO DIFFERENTIAL    Narrative:     The following orders were created for panel order CBC auto differential.  Procedure                               Abnormality         Status                     ---------                               -----------         ------                     CBC with Differential[[id number]]       Abnormal            Final result                 Please view results for these tests on the individual orders.   HEMOGLOBIN AND HEMATOCRIT, BLOOD     EKG Readings: (Independently Interpreted)   Initial Reading: No STEMI. Rhythm: Normal Sinus Rhythm. Heart Rate: 76. Ectopy: No Ectopy. Conduction: Normal. ST Segments: Normal ST Segments. T Waves: Normal. Axis: Normal.   Performed at 1250       Imaging Results              CT Abdomen Pelvis W Wo Contrast (No Result on File)                      Medications   pantoprazole injection 40 mg (has no administration in time range)   labetaloL injection 10 mg (has no administration in time range)   hydrALAZINE injection 10 mg (has no administration in time range)   sodium chloride 0.9% flush 10 mL (has no administration in time range)   melatonin tablet 6 mg (has no administration in time range)   ondansetron injection 4 mg (has no administration in time range)   ondansetron injection 4 mg (4 mg Intravenous Given 5/27/24 1531)   fentaNYL injection 75 mcg (75 mcg Intravenous Given 5/27/24 1624)   ondansetron injection 4 mg (4 mg Intravenous Given 5/27/24 1624)   pantoprazole injection 80 mg (80 mg Intravenous Given 5/27/24 1747)   metoclopramide injection 5 mg (5 mg Intravenous Given 5/27/24 1915)   promethazine injection 25 mg (25 mg  Intramuscular Given 5/27/24 2019)   HYDROmorphone (PF) injection 1 mg (1 mg Intravenous Given 5/27/24 2018)     Medical Decision Making  Differential diagnosis include but are not limited to: pancreatitis, cholecystitis, dehydration, SBO, PUD, gastritis, electrolyte abnormality, gastroparesis     Problems Addressed:  Epigastric abdominal pain: acute illness or injury that poses a threat to life or bodily functions  Nausea and vomiting, unspecified vomiting type: acute illness or injury that poses a threat to life or bodily functions  UGIB (upper gastrointestinal bleed): acute illness or injury that poses a threat to life or bodily functions    Amount and/or Complexity of Data Reviewed  External Data Reviewed: notes.     Details: Chart review 05/21/2024-patient had a EGD due to intermittent gastroparesis and dysphagia which showed normal esophagus and some gastric mucosal erythema  Labs: ordered. Decision-making details documented in ED Course.  Radiology: ordered and independent interpretation performed. Decision-making details documented in ED Course.    Risk  Prescription drug management.  Decision regarding hospitalization.            Scribe Attestation:   Scribe #1: I performed the above scribed service and the documentation accurately describes the services I performed. I attest to the accuracy of the note.    Attending Attestation:           Physician Attestation for Scribe:  Physician Attestation Statement for Scribe #1: I, Marilee Ruiz, DO, reviewed documentation, as scribed by Rafaela Carroll in my presence, and it is both accurate and complete.             ED Course as of 05/27/24 2054   Mon May 27, 2024   1705 Hemoglobin(!): 8.8  Discussed with patient. She reports her stool was bright red and black last night. Currently her stool is black and occult positive. Will give Protonix and obtain CT GI bleed protocol [KM]   2012 GI consult: spoke with Dr Henao who recs continuing PPI, clears for now and NPO at  midnight  [KM]      ED Course User Index  [KM] Marilee Ruiz MD                             Clinical Impression:  Final diagnoses:  [R10.13] Epigastric abdominal pain  [K92.2] UGIB (upper gastrointestinal bleed) (Primary)  [R11.2] Nausea and vomiting, unspecified vomiting type          ED Disposition Condition    Admit Stable                Marilee Ruiz MD  05/27/24 2054

## 2024-05-27 NOTE — FIRST PROVIDER EVALUATION
"Medical screening examination initiated.  I have conducted a focused provider triage encounter, findings are as follows:    Chief Complaint   Patient presents with    Abdominal Pain     Pt. C/o upper abdominal pain and vomiting. Pmh CHF and Gastroparesis.      Brief history of present illness:  79 y.o. female presents to the ED with epigastric abdominal pain with n/v. Hx of CHF gastroparesis, and Afib. Given Zofran with EMS    Vitals:    05/27/24 1253   BP: (!) 148/70   Pulse: 78   Resp: 16   Temp: 98.2 °F (36.8 °C)   TempSrc: Oral   SpO2: 97%   Weight: 90.7 kg (200 lb)   Height: 5' 1" (1.549 m)       Pertinent physical exam:  Awake, alert, non-labored respirations    Brief workup plan:  labs, EKG, UA    Preliminary workup initiated; this workup will be continued and followed by the physician or advanced practice provider that is assigned to the patient when roomed.  "

## 2024-05-28 ENCOUNTER — ANESTHESIA EVENT (OUTPATIENT)
Dept: ENDOSCOPY | Facility: HOSPITAL | Age: 79
DRG: 378 | End: 2024-05-28
Payer: MEDICARE

## 2024-05-28 ENCOUNTER — ANESTHESIA (OUTPATIENT)
Dept: ENDOSCOPY | Facility: HOSPITAL | Age: 79
DRG: 378 | End: 2024-05-28
Payer: MEDICARE

## 2024-05-28 LAB
ALBUMIN SERPL-MCNC: 3.4 G/DL (ref 3.4–4.8)
ALBUMIN/GLOB SERPL: 1.1 RATIO (ref 1.1–2)
ALP SERPL-CCNC: 76 UNIT/L (ref 40–150)
ALT SERPL-CCNC: 14 UNIT/L (ref 0–55)
ANION GAP SERPL CALC-SCNC: 14 MEQ/L
AST SERPL-CCNC: 21 UNIT/L (ref 5–34)
BASOPHILS # BLD AUTO: 0.01 X10(3)/MCL
BASOPHILS NFR BLD AUTO: 0.1 %
BILIRUB SERPL-MCNC: 0.3 MG/DL
BUN SERPL-MCNC: 31.8 MG/DL (ref 9.8–20.1)
CALCIUM SERPL-MCNC: 8.7 MG/DL (ref 8.4–10.2)
CHLORIDE SERPL-SCNC: 105 MMOL/L (ref 98–107)
CO2 SERPL-SCNC: 23 MMOL/L (ref 23–31)
CREAT SERPL-MCNC: 1.11 MG/DL (ref 0.55–1.02)
CREAT/UREA NIT SERPL: 29
EOSINOPHIL # BLD AUTO: 0 X10(3)/MCL (ref 0–0.9)
EOSINOPHIL NFR BLD AUTO: 0 %
ERYTHROCYTE [DISTWIDTH] IN BLOOD BY AUTOMATED COUNT: 14.8 % (ref 11.5–17)
FERRITIN SERPL-MCNC: 78.21 NG/ML (ref 4.63–204)
FOLATE SERPL-MCNC: 11.2 NG/ML (ref 7–31.4)
GFR SERPLBLD CREATININE-BSD FMLA CKD-EPI: 50 ML/MIN/1.73/M2
GLOBULIN SER-MCNC: 3.1 GM/DL (ref 2.4–3.5)
GLUCOSE SERPL-MCNC: 169 MG/DL (ref 82–115)
HCT VFR BLD AUTO: 26.8 % (ref 37–47)
HGB BLD-MCNC: 8.7 G/DL (ref 12–16)
IMM GRANULOCYTES # BLD AUTO: 0.04 X10(3)/MCL (ref 0–0.04)
IMM GRANULOCYTES NFR BLD AUTO: 0.4 %
IRON SATN MFR SERPL: 19 % (ref 20–50)
IRON SERPL-MCNC: 46 UG/DL (ref 50–170)
LYMPHOCYTES # BLD AUTO: 2.78 X10(3)/MCL (ref 0.6–4.6)
LYMPHOCYTES NFR BLD AUTO: 26.5 %
MCH RBC QN AUTO: 28.1 PG (ref 27–31)
MCHC RBC AUTO-ENTMCNC: 32.5 G/DL (ref 33–36)
MCV RBC AUTO: 86.5 FL (ref 80–94)
MONOCYTES # BLD AUTO: 0.42 X10(3)/MCL (ref 0.1–1.3)
MONOCYTES NFR BLD AUTO: 4 %
NEUTROPHILS # BLD AUTO: 7.23 X10(3)/MCL (ref 2.1–9.2)
NEUTROPHILS NFR BLD AUTO: 69 %
NRBC BLD AUTO-RTO: 0 %
PLATELET # BLD AUTO: 195 X10(3)/MCL (ref 130–400)
PMV BLD AUTO: 11.4 FL (ref 7.4–10.4)
POTASSIUM SERPL-SCNC: 3.4 MMOL/L (ref 3.5–5.1)
PROT SERPL-MCNC: 6.5 GM/DL (ref 5.8–7.6)
RBC # BLD AUTO: 3.1 X10(6)/MCL (ref 4.2–5.4)
SODIUM SERPL-SCNC: 142 MMOL/L (ref 136–145)
TIBC SERPL-MCNC: 202 UG/DL (ref 70–310)
TIBC SERPL-MCNC: 248 UG/DL (ref 250–450)
TRANSFERRIN SERPL-MCNC: 232 MG/DL
VIT B12 SERPL-MCNC: 271 PG/ML (ref 213–816)
WBC # SPEC AUTO: 10.48 X10(3)/MCL (ref 4.5–11.5)

## 2024-05-28 PROCEDURE — 36415 COLL VENOUS BLD VENIPUNCTURE: CPT | Performed by: INTERNAL MEDICINE

## 2024-05-28 PROCEDURE — 88313 SPECIAL STAINS GROUP 2: CPT

## 2024-05-28 PROCEDURE — 82746 ASSAY OF FOLIC ACID SERUM: CPT | Performed by: INTERNAL MEDICINE

## 2024-05-28 PROCEDURE — 82728 ASSAY OF FERRITIN: CPT | Performed by: INTERNAL MEDICINE

## 2024-05-28 PROCEDURE — 21400001 HC TELEMETRY ROOM

## 2024-05-28 PROCEDURE — D9220A PRA ANESTHESIA: Mod: CRNA,,,

## 2024-05-28 PROCEDURE — 63600175 PHARM REV CODE 636 W HCPCS: Performed by: EMERGENCY MEDICINE

## 2024-05-28 PROCEDURE — 25000003 PHARM REV CODE 250: Performed by: INTERNAL MEDICINE

## 2024-05-28 PROCEDURE — 63600175 PHARM REV CODE 636 W HCPCS: Performed by: NURSE PRACTITIONER

## 2024-05-28 PROCEDURE — 63600175 PHARM REV CODE 636 W HCPCS: Performed by: INTERNAL MEDICINE

## 2024-05-28 PROCEDURE — 83540 ASSAY OF IRON: CPT | Performed by: INTERNAL MEDICINE

## 2024-05-28 PROCEDURE — 88305 TISSUE EXAM BY PATHOLOGIST: CPT | Performed by: INTERNAL MEDICINE

## 2024-05-28 PROCEDURE — 37000008 HC ANESTHESIA 1ST 15 MINUTES: Performed by: INTERNAL MEDICINE

## 2024-05-28 PROCEDURE — 25000003 PHARM REV CODE 250

## 2024-05-28 PROCEDURE — D9220A PRA ANESTHESIA: Mod: ANES,,, | Performed by: ANESTHESIOLOGY

## 2024-05-28 PROCEDURE — 63600175 PHARM REV CODE 636 W HCPCS: Performed by: ANESTHESIOLOGY

## 2024-05-28 PROCEDURE — 25000003 PHARM REV CODE 250: Performed by: PHYSICIAN ASSISTANT

## 2024-05-28 PROCEDURE — 88312 SPECIAL STAINS GROUP 1: CPT

## 2024-05-28 PROCEDURE — 63600175 PHARM REV CODE 636 W HCPCS

## 2024-05-28 PROCEDURE — 63600175 PHARM REV CODE 636 W HCPCS: Performed by: PHYSICIAN ASSISTANT

## 2024-05-28 PROCEDURE — 80053 COMPREHEN METABOLIC PANEL: CPT | Performed by: INTERNAL MEDICINE

## 2024-05-28 PROCEDURE — 43239 EGD BIOPSY SINGLE/MULTIPLE: CPT | Performed by: INTERNAL MEDICINE

## 2024-05-28 PROCEDURE — 37000009 HC ANESTHESIA EA ADD 15 MINS: Performed by: INTERNAL MEDICINE

## 2024-05-28 PROCEDURE — A4216 STERILE WATER/SALINE, 10 ML: HCPCS | Performed by: INTERNAL MEDICINE

## 2024-05-28 PROCEDURE — 0DB68ZX EXCISION OF STOMACH, VIA NATURAL OR ARTIFICIAL OPENING ENDOSCOPIC, DIAGNOSTIC: ICD-10-PCS | Performed by: INTERNAL MEDICINE

## 2024-05-28 PROCEDURE — 85025 COMPLETE CBC W/AUTO DIFF WBC: CPT | Performed by: INTERNAL MEDICINE

## 2024-05-28 PROCEDURE — C9113 INJ PANTOPRAZOLE SODIUM, VIA: HCPCS | Performed by: EMERGENCY MEDICINE

## 2024-05-28 PROCEDURE — 27201423 OPTIME MED/SURG SUP & DEVICES STERILE SUPPLY: Performed by: INTERNAL MEDICINE

## 2024-05-28 PROCEDURE — 82607 VITAMIN B-12: CPT | Performed by: INTERNAL MEDICINE

## 2024-05-28 RX ORDER — PROCHLORPERAZINE EDISYLATE 5 MG/ML
5 INJECTION INTRAMUSCULAR; INTRAVENOUS ONCE
Status: COMPLETED | OUTPATIENT
Start: 2024-05-28 | End: 2024-05-28

## 2024-05-28 RX ORDER — SERTRALINE HYDROCHLORIDE 50 MG/1
50 TABLET, FILM COATED ORAL NIGHTLY
Status: DISCONTINUED | OUTPATIENT
Start: 2024-05-28 | End: 2024-06-05 | Stop reason: HOSPADM

## 2024-05-28 RX ORDER — METHOCARBAMOL 500 MG/1
500 TABLET, FILM COATED ORAL 2 TIMES DAILY
Status: DISCONTINUED | OUTPATIENT
Start: 2024-05-28 | End: 2024-05-31

## 2024-05-28 RX ORDER — METOCLOPRAMIDE HYDROCHLORIDE 5 MG/ML
5 INJECTION INTRAMUSCULAR; INTRAVENOUS EVERY 8 HOURS
Status: DISCONTINUED | OUTPATIENT
Start: 2024-05-28 | End: 2024-05-31

## 2024-05-28 RX ORDER — PROCHLORPERAZINE EDISYLATE 5 MG/ML
5 INJECTION INTRAMUSCULAR; INTRAVENOUS EVERY 6 HOURS PRN
Status: DISCONTINUED | OUTPATIENT
Start: 2024-05-28 | End: 2024-06-05 | Stop reason: HOSPADM

## 2024-05-28 RX ORDER — HALOPERIDOL 5 MG/ML
2 INJECTION INTRAMUSCULAR ONCE
Status: COMPLETED | OUTPATIENT
Start: 2024-05-28 | End: 2024-05-28

## 2024-05-28 RX ORDER — SODIUM, POTASSIUM,MAG SULFATES 17.5-3.13G
1 SOLUTION, RECONSTITUTED, ORAL ORAL 2 TIMES DAILY
Status: COMPLETED | OUTPATIENT
Start: 2024-05-28 | End: 2024-05-28

## 2024-05-28 RX ORDER — ONDANSETRON HYDROCHLORIDE 2 MG/ML
INJECTION, SOLUTION INTRAVENOUS
Status: DISPENSED
Start: 2024-05-28 | End: 2024-05-29

## 2024-05-28 RX ORDER — GABAPENTIN 300 MG/1
300 CAPSULE ORAL 3 TIMES DAILY
Status: DISCONTINUED | OUTPATIENT
Start: 2024-05-28 | End: 2024-06-05 | Stop reason: HOSPADM

## 2024-05-28 RX ORDER — PROPOFOL 10 MG/ML
VIAL (ML) INTRAVENOUS
Status: DISCONTINUED | OUTPATIENT
Start: 2024-05-28 | End: 2024-05-28

## 2024-05-28 RX ORDER — PROPOFOL 10 MG/ML
VIAL (ML) INTRAVENOUS
Status: COMPLETED
Start: 2024-05-28 | End: 2024-05-28

## 2024-05-28 RX ORDER — ATORVASTATIN CALCIUM 40 MG/1
80 TABLET, FILM COATED ORAL NIGHTLY
Status: DISCONTINUED | OUTPATIENT
Start: 2024-05-28 | End: 2024-06-05 | Stop reason: HOSPADM

## 2024-05-28 RX ORDER — SODIUM CHLORIDE 450 MG/100ML
INJECTION, SOLUTION INTRAVENOUS CONTINUOUS
Status: DISCONTINUED | OUTPATIENT
Start: 2024-05-28 | End: 2024-05-30

## 2024-05-28 RX ORDER — TRAMADOL HYDROCHLORIDE 50 MG/1
50 TABLET ORAL 3 TIMES DAILY
Status: DISCONTINUED | OUTPATIENT
Start: 2024-05-28 | End: 2024-06-05 | Stop reason: HOSPADM

## 2024-05-28 RX ORDER — SODIUM CHLORIDE, SODIUM LACTATE, POTASSIUM CHLORIDE, CALCIUM CHLORIDE 600; 310; 30; 20 MG/100ML; MG/100ML; MG/100ML; MG/100ML
INJECTION, SOLUTION INTRAVENOUS CONTINUOUS
Status: CANCELLED | OUTPATIENT
Start: 2024-05-28

## 2024-05-28 RX ORDER — LIDOCAINE HYDROCHLORIDE 20 MG/ML
INJECTION INTRAVENOUS
Status: DISCONTINUED | OUTPATIENT
Start: 2024-05-28 | End: 2024-05-28

## 2024-05-28 RX ORDER — TIMOLOL MALEATE 5 MG/ML
1 SOLUTION/ DROPS OPHTHALMIC 2 TIMES DAILY
Status: DISCONTINUED | OUTPATIENT
Start: 2024-05-28 | End: 2024-06-05 | Stop reason: HOSPADM

## 2024-05-28 RX ORDER — LIDOCAINE HYDROCHLORIDE 10 MG/ML
1 INJECTION, SOLUTION EPIDURAL; INFILTRATION; INTRACAUDAL; PERINEURAL ONCE
Status: CANCELLED | OUTPATIENT
Start: 2024-05-28 | End: 2024-05-28

## 2024-05-28 RX ORDER — LIDOCAINE HYDROCHLORIDE 10 MG/ML
INJECTION, SOLUTION EPIDURAL; INFILTRATION; INTRACAUDAL; PERINEURAL
Status: DISPENSED
Start: 2024-05-28 | End: 2024-05-29

## 2024-05-28 RX ORDER — DICYCLOMINE HYDROCHLORIDE 10 MG/ML
10 INJECTION INTRAMUSCULAR 4 TIMES DAILY PRN
Status: DISCONTINUED | OUTPATIENT
Start: 2024-05-28 | End: 2024-06-05 | Stop reason: HOSPADM

## 2024-05-28 RX ORDER — TRAZODONE HYDROCHLORIDE 50 MG/1
50 TABLET ORAL NIGHTLY PRN
Status: DISCONTINUED | OUTPATIENT
Start: 2024-05-28 | End: 2024-06-05 | Stop reason: HOSPADM

## 2024-05-28 RX ORDER — ONDANSETRON HYDROCHLORIDE 2 MG/ML
4 INJECTION, SOLUTION INTRAVENOUS ONCE AS NEEDED
OUTPATIENT
Start: 2024-05-28 | End: 2035-10-25

## 2024-05-28 RX ADMIN — ATORVASTATIN CALCIUM 80 MG: 40 TABLET, FILM COATED ORAL at 11:05

## 2024-05-28 RX ADMIN — TRAMADOL HYDROCHLORIDE 50 MG: 50 TABLET, COATED ORAL at 11:05

## 2024-05-28 RX ADMIN — GABAPENTIN 300 MG: 300 CAPSULE ORAL at 11:05

## 2024-05-28 RX ADMIN — PANTOPRAZOLE SODIUM 40 MG: 40 INJECTION, POWDER, LYOPHILIZED, FOR SOLUTION INTRAVENOUS at 06:05

## 2024-05-28 RX ADMIN — SERTRALINE HYDROCHLORIDE 50 MG: 50 TABLET ORAL at 11:05

## 2024-05-28 RX ADMIN — SODIUM CHLORIDE, SODIUM GLUCONATE, SODIUM ACETATE, POTASSIUM CHLORIDE AND MAGNESIUM CHLORIDE: 526; 502; 368; 37; 30 INJECTION, SOLUTION INTRAVENOUS at 02:05

## 2024-05-28 RX ADMIN — PROCHLORPERAZINE EDISYLATE 5 MG: 5 INJECTION INTRAMUSCULAR; INTRAVENOUS at 02:05

## 2024-05-28 RX ADMIN — ONDANSETRON 4 MG: 2 INJECTION INTRAMUSCULAR; INTRAVENOUS at 01:05

## 2024-05-28 RX ADMIN — PROPOFOL 50 MG: 10 INJECTION, EMULSION INTRAVENOUS at 02:05

## 2024-05-28 RX ADMIN — ONDANSETRON 4 MG: 2 INJECTION INTRAMUSCULAR; INTRAVENOUS at 04:05

## 2024-05-28 RX ADMIN — SODIUM CHLORIDE, PRESERVATIVE FREE 10 ML: 5 INJECTION INTRAVENOUS at 11:05

## 2024-05-28 RX ADMIN — SODIUM CHLORIDE, PRESERVATIVE FREE 10 ML: 5 INJECTION INTRAVENOUS at 05:05

## 2024-05-28 RX ADMIN — SODIUM CHLORIDE, PRESERVATIVE FREE 10 ML: 5 INJECTION INTRAVENOUS at 12:05

## 2024-05-28 RX ADMIN — PANTOPRAZOLE SODIUM 40 MG: 40 INJECTION, POWDER, LYOPHILIZED, FOR SOLUTION INTRAVENOUS at 04:05

## 2024-05-28 RX ADMIN — SODIUM CHLORIDE: 4.5 INJECTION, SOLUTION INTRAVENOUS at 08:05

## 2024-05-28 RX ADMIN — LIDOCAINE HYDROCHLORIDE 50 MG: 20 INJECTION INTRAVENOUS at 02:05

## 2024-05-28 RX ADMIN — GABAPENTIN 300 MG: 300 CAPSULE ORAL at 04:05

## 2024-05-28 RX ADMIN — DICYCLOMINE HYDROCHLORIDE 10 MG: 20 INJECTION, SOLUTION INTRAMUSCULAR at 01:05

## 2024-05-28 RX ADMIN — TIMOLOL MALEATE 1 DROP: 5 SOLUTION OPHTHALMIC at 11:05

## 2024-05-28 RX ADMIN — METHOCARBAMOL 500 MG: 500 TABLET ORAL at 11:05

## 2024-05-28 RX ADMIN — HALOPERIDOL LACTATE 2 MG: 5 INJECTION, SOLUTION INTRAMUSCULAR at 04:05

## 2024-05-28 RX ADMIN — PROCHLORPERAZINE EDISYLATE 5 MG: 5 INJECTION INTRAMUSCULAR; INTRAVENOUS at 04:05

## 2024-05-28 RX ADMIN — METOCLOPRAMIDE 5 MG: 5 INJECTION, SOLUTION INTRAMUSCULAR; INTRAVENOUS at 11:05

## 2024-05-28 RX ADMIN — PROCHLORPERAZINE EDISYLATE 5 MG: 5 INJECTION INTRAMUSCULAR; INTRAVENOUS at 06:05

## 2024-05-28 RX ADMIN — TRAMADOL HYDROCHLORIDE 50 MG: 50 TABLET, COATED ORAL at 04:05

## 2024-05-28 RX ADMIN — SODIUM SULFATE, POTASSIUM SULFATE, MAGNESIUM SULFATE 354 ML: 17.5; 3.13; 1.6 SOLUTION, CONCENTRATE ORAL at 05:05

## 2024-05-28 NOTE — PLAN OF CARE
Problem: Adult Inpatient Plan of Care  Goal: Plan of Care Review  5/28/2024 1705 by Vania Meadows RN  Outcome: Progressing  5/28/2024 0753 by Vania Meadows RN  Outcome: Progressing  5/28/2024 0752 by Vania Meadows RN  Outcome: Progressing  Goal: Patient-Specific Goal (Individualized)  5/28/2024 1705 by Vania Meadows RN  Outcome: Progressing  5/28/2024 0753 by Vania Meadows RN  Outcome: Progressing  5/28/2024 0752 by Vania Meadows RN  Outcome: Progressing  Goal: Absence of Hospital-Acquired Illness or Injury  5/28/2024 1705 by Vania Meadows RN  Outcome: Progressing  5/28/2024 0753 by Vania Meadows RN  Outcome: Progressing  5/28/2024 0752 by Vania Meadows RN  Outcome: Progressing  Goal: Optimal Comfort and Wellbeing  5/28/2024 1705 by Vania Meadows RN  Outcome: Progressing  5/28/2024 0753 by Vania Meadows RN  Outcome: Progressing  5/28/2024 0752 by Vania Meadows RN  Outcome: Progressing  Goal: Readiness for Transition of Care  5/28/2024 1705 by Vania Meadows RN  Outcome: Progressing  5/28/2024 0753 by Vania Meadows RN  Outcome: Progressing  5/28/2024 0752 by Vania Meadows RN  Outcome: Progressing     Problem: Infection  Goal: Absence of Infection Signs and Symptoms  5/28/2024 1705 by Vania Meadows RN  Outcome: Progressing  5/28/2024 0753 by Vania Meadows RN  Outcome: Progressing  5/28/2024 0752 by Vania Meadows RN  Outcome: Progressing     Problem: Wound  Goal: Optimal Coping  5/28/2024 1705 by Vania Meadows RN  Outcome: Progressing  5/28/2024 0753 by Vania Meadows RN  Outcome: Progressing  5/28/2024 0752 by Vania Meadows RN  Outcome: Progressing  Goal: Optimal Functional Ability  5/28/2024 1705 by Vania Meadows RN  Outcome: Progressing  5/28/2024 0753 by Abdiel, Vania, RN  Outcome: Progressing  5/28/2024 0752 by Vania Meadows RN  Outcome: Progressing  Goal: Absence of Infection Signs and Symptoms  5/28/2024 1705 by Vania Meadows RN  Outcome:  Progressing  5/28/2024 0753 by Vania Meadows RN  Outcome: Progressing  5/28/2024 0752 by Vania Meadows RN  Outcome: Progressing  Goal: Improved Oral Intake  5/28/2024 1705 by Vania Meadows RN  Outcome: Progressing  5/28/2024 0753 by Vania Meadows RN  Outcome: Progressing  5/28/2024 0752 by Vania Meadows RN  Outcome: Progressing  Goal: Optimal Pain Control and Function  5/28/2024 1705 by Vania Meadows RN  Outcome: Progressing  5/28/2024 0753 by Vania Meadows RN  Outcome: Progressing  5/28/2024 0752 by Vania Meadows RN  Outcome: Progressing  Goal: Skin Health and Integrity  5/28/2024 1705 by Vania Meadows RN  Outcome: Progressing  5/28/2024 0753 by Vania Meadows RN  Outcome: Progressing  5/28/2024 0752 by Vania Meadows RN  Outcome: Progressing  Goal: Optimal Wound Healing  5/28/2024 1705 by Vania Meadows RN  Outcome: Progressing  5/28/2024 0753 by Vania Meadows RN  Outcome: Progressing  5/28/2024 0752 by Vania Meadows RN  Outcome: Progressing

## 2024-05-28 NOTE — TRANSFER OF CARE
"Anesthesia Transfer of Care Note    Patient: Katty Veronica    Procedure(s) Performed: Procedure(s) (LRB):  EGD (N/A)    Patient location: GI    Anesthesia Type: general    Transport from OR: Transported from OR on room air with adequate spontaneous ventilation    Post pain: adequate analgesia    Post assessment: no apparent anesthetic complications    Post vital signs: stable    Level of consciousness: awake    Nausea/Vomiting: no nausea/vomiting    Complications: none    Transfer of care protocol was followed    Last vitals: Visit Vitals  BP (!) 186/72 (BP Location: Left arm, Patient Position: Lying)   Pulse 88   Temp 37 °C (98.6 °F) (Tympanic)   Resp 13   Ht 5' 1" (1.549 m)   Wt 86.6 kg (191 lb)   SpO2 99%   BMI 36.09 kg/m²     "

## 2024-05-28 NOTE — PROGRESS NOTES
"[facility] Hospital Medicine Progress Note        Chief Complaint: Inpatient Follow-up for GI bleed    HPI:   Patient is an 79-year-old female with a history of heart failure with preserved EF, PAF on Xarelto, COPD, gastroparesis GERD, CVA, SABINA, glaucoma, HTN, HLD, CAD and additional past medical history as below who presented to the ER complaining of epigastric abdominal pain with nausea and vomiting over the prior 24 hours.  She just recently had an EGD done 05/21/2024 on an outpatient basis due to her ongoing symptoms of epigastric abdominal pain, which noted chronic gastritis and erythematous mucus in the gastric body.  She does report melanotic stools over the past 24 hours.     She arrived to the ER tonight afebrile hemodynamically stable maintaining normal sats on room air.  Laboratory work showed a drop in her hemoglobin from 11 last month currently 8.8.  Her stool was guaiac-positive in the ER.  A CT bleeding scan was ordered and hospitalist was consulted for admission for presumed upper GI bleeding.    Protonix was added.  Anticoagulation was held.  GI was consulted.      Interval Hx:   Afebrile.  Blood pressure mildly elevated.  Except for the pain she has no issues.  No family members at bedside.  Appeared lethargic.  Labs this morning showing hemoglobin 8.7.  No indication for transfusion.  Mild hypokalemia noted.  BUN and serum creatinine elevated.    GI recommended EGD today.      Objective/physical exam:  Vitals:    05/27/24 2223 05/27/24 2224 05/28/24 0401 05/28/24 0741   BP: 110/85 110/82 (!) 153/69 (!) 177/80   BP Location:       Patient Position:       Pulse: 93 77 76 78   Resp: 18 18     Temp: 98.4 °F (36.9 °C) 98.4 °F (36.9 °C) 97.7 °F (36.5 °C) 98.6 °F (37 °C)   TempSrc:  Oral Oral Oral   SpO2: 98% 98% 98% 97%   Weight: 86.5 kg (190 lb 11.2 oz)      Height: 5' 1" (1.549 m)        General: In no acute distress, afebrile  Respiratory: Clear to auscultation " bilaterally  Cardiovascular: S1, S2, no appreciable murmur  Abdomen: Soft, nontender, BS +  MSK: Warm, no lower extremity edema, no clubbing or cyanosis  Neurologic: Alert and oriented x4, moving all extremities with good strength     Lab Results   Component Value Date     05/28/2024    K 3.4 (L) 05/28/2024     08/04/2023    CO2 23 05/28/2024    BUN 31.8 (H) 05/28/2024    CREATININE 1.11 (H) 05/28/2024    CALCIUM 8.7 05/28/2024    ANIONGAP 7 (L) 08/04/2023    ESTGFRAFRICA 74 08/04/2023    EGFRNONAA >60 11/10/2021      Lab Results   Component Value Date    ALT 14 05/28/2024    AST 21 05/28/2024    ALKPHOS 76 05/28/2024    BILITOT 0.3 05/28/2024      Lab Results   Component Value Date    WBC 10.48 05/28/2024    HGB 8.7 (L) 05/28/2024    HCT 26.8 (L) 05/28/2024    MCV 86.5 05/28/2024     05/28/2024           Medications:   metoclopramide  5 mg Intravenous Q8H    pantoprazole  40 mg Intravenous BID AC    sodium chloride 0.9%  10 mL Intravenous Q6H        Current Facility-Administered Medications:     dicyclomine, 10 mg, Intramuscular, QID PRN    hydrALAZINE, 10 mg, Intravenous, Q2H PRN    labetalol, 10 mg, Intravenous, Q4H PRN    melatonin, 6 mg, Oral, Nightly PRN    ondansetron, 4 mg, Intravenous, Q8H PRN    prochlorperazine, 5 mg, Intravenous, Q6H PRN    sodium chloride 0.9%, 10 mL, Intravenous, PRN    Flushing PICC/Midline Protocol, , , Until Discontinued **AND** sodium chloride 0.9%, 10 mL, Intravenous, Q6H **AND** sodium chloride 0.9%, 10 mL, Intravenous, PRN     Assessment/Plan:    Melena   Acute on chronic anemia due to above  Chronic gastritis, recent EGD 05/21/2024   Chronic gastroparesis     HX:  AFib on Xarelto, CAD, HTN, HLD, GERD, CVA, SABINA, glaucoma    Plan:  -HGB stable.  No indication for transfusion.  Hold Xarelto.  Continue Protonix  -GI consulted.  Recommended EGD today   -analgesics, antiemetics as needed.  Advance diet as tolerated   -home medications including analgesics will be  continued     SCDs      Alvino Garcia MD

## 2024-05-28 NOTE — H&P
[facility] Hospital Medicine History & Physical Examination       Patient Name: Katty Veronica  MRN: [id number]  Patient Class: Emergency   Admission Date: 5/27/2024  1:09 PM  Length of Stay: 0  Admitting Service: Hospital Medicine   Attending Physician: Samuel Rivera MD   Primary Care Provider: Renard Eddy MD  History source: EMR, patient and/or patient's family    CHIEF COMPLAINT   Abdominal Pain (Pt. C/o upper abdominal pain and vomiting. Pmh CHF and Gastroparesis. )    HISTORY OF PRESENT ILLNESS:   Patient is an 79-year-old female with a history of heart failure with preserved EF, PAF on Xarelto, COPD, gastroparesis GERD, CVA, SABINA, glaucoma, HTN, HLD, CAD and additional past medical history as below who presented to the ER complaining of epigastric abdominal pain with nausea and vomiting over the prior 24 hours.  She just recently had an EGD done 05/21/2024 on an outpatient basis due to her ongoing symptoms of epigastric abdominal pain, which noted chronic gastritis and erythematous mucus in the gastric body.  She does report melanotic stools over the past 24 hours.    She arrived to the ER tonight afebrile hemodynamically stable maintaining normal sats on room air.  Laboratory work showed a drop in her hemoglobin from 11 last month currently 8.8.  Her stool was guaiac-positive in the ER.  A CT bleeding scan was ordered and hospitalist was consulted for admission for presumed upper GI bleeding.    PAST MEDICAL HISTORY:     Past Medical History:   Diagnosis Date    Anxiety     CHF (congestive heart failure)     COPD (chronic obstructive pulmonary disease)     Coronary artery disease     s/p 2 stents    Depression     GERD (gastroesophageal reflux disease)     Glaucoma     Hypertension     Obstructive sleep apnea     on cpap    Stroke     Urinary, incontinence, stress female        PAST SURGICAL HISTORY:     Past Surgical History:   Procedure Laterality Date     APPENDECTOMY      BACK SURGERY      CHOLECYSTECTOMY      EGD, WITH CLOSED BIOPSY N/A 5/21/2024    Procedure: EGD;  Surgeon: BASIM Washington MD;  Location: [facility] ENDOSCOPY;  Service: Gastroenterology;  Laterality: N/A;    EXPLORATION OF COMMON BILE DUCT      HERNIA REPAIR      incisional hernia times 2, central abdomen    HYSTERECTOMY      OVARIAN CYST REMOVAL      TONSILLECTOMY         ALLERGIES:   Morphine and Norco [hydrocodone-acetaminophen]    FAMILY HISTORY:   Reviewed and non-contributory     SOCIAL HISTORY:   Screening for Social Drivers for health: Patient screened for food insecurity, housing instability, transportation needs, utility   difficulties, and interpersonal safety (select all that apply as identified as concern)  []Housing or Food  []Transportation Needs  []Utility Difficulties  []Interpersonal safety  [x]None  Social History     Tobacco Use    Smoking status: Never    Smokeless tobacco: Never   Substance Use Topics    Alcohol use: Not Currently        HOME MEDICATIONS:     Prior to Admission medications    Medication Sig Start Date End Date Taking? Authorizing Provider   ALPRAZolam (XANAX) 0.25 MG tablet Take 0.25 mg by mouth nightly as needed.    Provider, Historical   amLODIPine (NORVASC) 5 MG tablet 1 tablet Orally Once a day  Patient not taking: Reported on 5/20/2024    Provider, Historical   aspirin (ECOTRIN) 81 MG EC tablet Take 1 tablet (81 mg total) by mouth once daily. 8/30/23 8/29/24  Cristopher Davis MD   atorvastatin (LIPITOR) 80 MG tablet Take 80 mg by mouth every evening.    Provider, Historical   BISACODYL ORAL Take by mouth.  Patient not taking: Reported on 5/20/2024    Provider, Historical   carvediloL (COREG) 25 MG tablet Take 1 tablet (25 mg total) by mouth 2 (two) times daily.  Patient taking differently: Take 50 mg by mouth 2 (two) times daily. 11/20/23   Princess Blood FNP   cetirizine (ZYRTEC) 10 MG tablet Take 10 mg by mouth once daily.     Provider, Historical   ergocalciferol (VITAMIN D2) 50,000 unit Cap Take 50,000 Units by mouth every 7 days. Takes on Sunday AM    Provider, Historical   famotidine (PEPCID) 20 MG tablet Take 1 tablet by mouth every evening.    Provider, Historical   furosemide (LASIX) 40 MG tablet Take 1 tablet (40 mg total) by mouth once daily. 4/8/24 4/8/25  Cristopher Davis MD   gabapentin (NEURONTIN) 300 MG capsule Take 300 mg by mouth 3 (three) times daily. 8/25/23   Provider, Historical   lisinopriL 10 MG tablet Take 10 mg by mouth once daily.    Provider, Historical   methocarbamoL (ROBAXIN) 500 MG Tab Take 500 mg by mouth 2 (two) times a day.    Provider, Historical   metoclopramide HCl (REGLAN) 10 MG tablet Take 10 mg by mouth 4 (four) times daily.    Provider, Historical   nitroGLYCERIN (NITROSTAT) 0.4 MG SL tablet Place 1 tablet (0.4 mg total) under the tongue every 5 (five) minutes as needed for Chest pain. 11/20/23   Princess Blood FNP   nystatin (MYCOSTATIN) cream Apply 1,000,000 g topically once daily.    Provider, Historical   ondansetron (ZOFRAN-ODT) 8 MG TbDL Take 8 mg by mouth every 6 (six) hours as needed.    Provider, Historical   pantoprazole (PROTONIX) 40 MG tablet Take 40 mg by mouth. 8/24/23   Provider, Historical   polyethylene glycol (GLYCOLAX) 17 gram PwPk Take 17 g by mouth 2 (two) times daily as needed for Constipation. 3/12/24   Molly Dickerson MD   potassium chloride (KLOR-CON) 10 MEQ TbSR Take 10 mEq by mouth once.    Provider, Historical   rivaroxaban (XARELTO) 20 mg Tab Take 1 tablet (20 mg total) by mouth daily with dinner or evening meal. 3/19/24   Molly Dickerson MD   sertraline (ZOLOFT) 50 MG tablet Take 50 mg by mouth once daily. 8/24/23   Provider, Historical   timolol maleate 0.5% (TIMOPTIC) 0.5 % Drop Place 1 drop into both eyes 2 (two) times daily. 9/8/23   Provider, Historical   traMADoL (ULTRAM) 50 mg tablet Take 50 mg by mouth every 6 (six) hours as needed for  "Pain.    Provider, Historical   traZODone (DESYREL) 50 MG tablet Take 50 mg by mouth every evening.    Provider, Historical   vibegron (GEMTESA) 75 mg Tab Take 75 mg by mouth once.    Provider, Historical       REVIEW OF SYSTEMS:   Except as documented, all other systems reviewed and negative     PHYSICAL EXAM:   T 98.2 °F (36.8 °C)   BP (!) 188/85   P 75   RR 19   O2 98 %  GENERAL: awake, alert, oriented and in no acute distress, non-toxic appearing   HEENT: normocephalic atraumatic   NECK: supple   LUNGS: Clear bilaterally, no wheezing or rales, no accessory muscle use   CVS: Regular rate and rhythm, normal peripheral perfusion  ABD: Soft, non-tender, mild epigastric ttp, bowel sounds present  EXTREMITIES: no clubbing or cyanosis  SKIN: Warm, dry.   NEURO: alert and oriented, grossly without focal deficits   PSYCHIATRIC: Cooperative    LABS AND IMAGING:     Recent Labs     05/27/24  1427   WBC 10.95   RBC 3.06*   HGB 8.8*   HCT 25.6*   MCV 83.7   MCH 28.8   MCHC 34.4   RDW 14.2        No results for input(s): "LACTIC" in the last 72 hours.  No results for input(s): "INR", "APTT", "D-DIMER" in the last 72 hours.  No results for input(s): "HGBA1C", "CHOL", "TRIG", "LDL", "VLDL", "HDL" in the last 72 hours.   Recent Labs     05/27/24  1427      K 3.7   CHLORIDE 107   CO2 21*   BUN 36.0*   CREATININE 1.10*   GLUCOSE 148*   CALCIUM 9.0   ALBUMIN 3.2*   GLOBULIN 2.9   ALKPHOS 73   ALT 13   AST 16   BILITOT 0.5   LIPASE 8     Recent Labs     05/27/24  1427   TROPONINI <0.010          X-Ray Chest 1 View  EXAMINATION:  XR CHEST 1 VIEW    CLINICAL HISTORY:  Shortness of breath    TECHNIQUE:  Single frontal view of the chest was performed.    COMPARISON:  Radiograph 03/04/2024    FINDINGS:  The cardiomediastinal silhouette and pulmonary vasculature are within normal limits.  Aortic vascular calcifications.  No lobar consolidation, pneumothorax or large pleural effusion.  Mild elevation of the right " hemidiaphragm, unchanged.  No acute osseous abnormality identified.  Stimulator leads overlie the thoracic spine.    Electronically signed by: Aditya Haynes  Date:    04/07/2024  Time:    12:34      ASSESSMENT & PLAN:   Melena with acute blood loss anemia   Chronic gastritis, erythematous mucosa on EGD 5/21/24  Gastroparesis    History of hypertension, hyperlipidemia, CAD, atrial fibrillation on Xarelto, HFpEF, GERD, CVA, SABINA, and glaucoma     -NPO, IV Protonix, trend H&H, hold Xarelto   -Reglan PRN   -GI consultation   -resume home meds pending med rec process    DVT prophylaxis:  SCDs  Code status:  Full code    If patient was admitted under observational status it is with my approval/permission.     At least 55 min was spent on this history and physical.  Time seen:  10:00 p.m. 5/27  Samuel Rivera MD
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
## 2024-05-28 NOTE — PROCEDURES
"Katty Veronica is a 79 y.o. female patient.    Temp: 98.2 °F (36.8 °C) (05/27/24 1347)  Pulse: 77 (05/27/24 2133)  Resp: 18 (05/27/24 2133)  BP: (!) 154/87 (05/27/24 2133)  SpO2: 98 % (05/27/24 2133)  Weight: 90.7 kg (200 lb) (05/27/24 1253)  Height: 5' 1" (154.9 cm) (05/27/24 1253)    PICC  Date/Time: 5/27/2024 9:45 PM  Performed by: Bala Martell RN  Consent Done: Yes  Time out: Immediately prior to procedure a time out was called to verify the correct patient, procedure, equipment, support staff and site/side marked as required  Indications: med administration and vascular access  Anesthesia: local infiltration  Local anesthetic: lidocaine 1% without epinephrine  Anesthetic Total (mL): 5  Preparation: skin prepped with ChloraPrep  Skin prep agent dried: skin prep agent completely dried prior to procedure  Sterile barriers: all five maximum sterile barriers used - cap, mask, sterile gown, sterile gloves, and large sterile sheet  Hand hygiene: hand hygiene performed prior to central venous catheter insertion  Location details: right basilic  Catheter type: double lumen  Catheter size: 5 Fr  Catheter Length: 36cm    Ultrasound guidance: yes  Vessel Caliber: medium and patent, compressibility normal  Needle advanced into vessel with real time Ultrasound guidance.  Guidewire confirmed in vessel.  Sterile sheath used.  Number of attempts: 1  Post-procedure: blood return through all ports and sterile dressing applied    Assessment: placement verified by x-ray  Complications: none          Bala Martell RN  5/27/2024    "

## 2024-05-28 NOTE — PLAN OF CARE
05/28/24 1027   Discharge Assessment   Assessment Type Discharge Planning Assessment   Confirmed/corrected address, phone number and insurance Yes   Confirmed Demographics Correct on Facesheet   Source of Information patient   Communicated AMBROSIO with patient/caregiver Date not available/Unable to determine   Reason For Admission Epigastric abd pain   People in Home child(renee), adult   Do you expect to return to your current living situation? Yes   Do you have help at home or someone to help you manage your care at home? Yes   Who are your caregiver(s) and their phone number(s)? Daughter Celeste   Prior to hospitilization cognitive status: Unable to Assess   Current cognitive status: Alert/Oriented   Walking or Climbing Stairs Difficulty yes   Walking or Climbing Stairs ambulation difficulty, requires equipment   Mobility Management Rollator   Dressing/Bathing Difficulty yes   Dressing/Bathing bathing difficulty, assistance 1 person   Dressing/Bathing Management CNA with home health assist with bathing   Home Accessibility wheelchair accessible   Home Layout Able to live on 1st floor   Equipment Currently Used at Home grab bar;shower chair;rollator   Do you currently have service(s) that help you manage your care at home? Yes   Name and Contact number of agency [facility]   Is the pt/caregiver preference to resume services with current agency Yes   Do you take prescription medications? Yes   Do you have prescription coverage? Yes   Coverage Medicare and BCBS   Who is going to help you get home at discharge? Family   How do you get to doctors appointments? family or friend will provide   Are you on dialysis? No   Do you take coumadin? No   Discharge Plan A Home Health   Discharge Plan B Home Health   DME Needed Upon Discharge  none   Discharge Plan discussed with: Patient   Transition of Care Barriers None   OTHER   Name(s) of People in Home Celeste Clements     Patient lives with goldy Alonso and is current  with [facility] . CNA assist with bathing needs and transportation is provided by family. Patient wishes to resume services with [facility] upon dc.

## 2024-05-28 NOTE — CONSULTS
Gastroenterology Consult    Reason for Consult:     Acute anemia, GI bleed    HPI:  Katty Veronica is an 79-year-old female patient of Dr. Aamir Washington with a complicated past medical history including CVA, CHF, COPD, A. Fib on Xarelto, CAD with coronary stents in 2005, diverticular disease, HTN, HLD, ischemic colitis, chronic pancreatitis, RA, ventral hernia S/P repair in 2014, appendectomy, hysterectomy, back surgery and chronic back pain on opioids, cholecystectomy, exploration of the CBD, gastroparesis diagnosed via GES 06/13/2023.    Endoscopic history as follows:  - Colonoscopy in 2008:  Diverticulosis   - Colonoscopy in 2010 diverticulosis, internal hemorrhoids, 1 cecal polyp removed  - Most recent colonoscopy 09/17/2015:  Mild diverticulosis of the ascending colon and hepatic flexure, otherwise normal exam.    - EGD 2010 revealing a hiatal hernia, stricture in the GE junction, and gastritis   - EGD 2012 for dysphagia negative for esophageal abnormalities s/p empiric dilation, erosive gastritis, suggestion of gastroparesis  - EGD 2017 5 cm hiatal hernia, stenosis in the GE junction s/p dilation, atrophic gastritis.    - EGD 2020 successful dilation of esophageal stricture, hiatal hernia, mild esophagitis, moderate gastritis.  Biopsies negative for H pylori  - EGD 05/21/2024 for abdominal pain, nausea, vomiting revealed a medium amount of food in the stomach, diffuse chronic gastritis, otherwise normal.  Biopsies negative for H pylori    - ERCP 06/21/2016 done for MRI findings of biliary dilation, revealing of Major papilla appeared to be small and stenotic, severely dilated main CBD, s/p sphincterotomy and balloon sweep.    _________________________________________________    5/28/24    Patient presented to the ER yesterday with complaints of upper abdominal pain, vomiting, and reportedly black stool. +FOBT. She was started on protonix 40 mg BID. Her baseline hgb was about 12 g/dl up until March  2024. For the past two months H&H has steadily decreased. This admission hgb 9.2 -- 8.7 g/dl with normocytic indices. LFTs and lipase are all wnl. UA unremarkable.     S/p EGD 7 days ago which was unrevealing for any source of bleeding. VSS. States that after her EGD she felt fine until this Saturday when she began to experience epigastric abd pain and nausea with dry heaving. She is actively dry heaving on my exam now. She denies true vomiting. She currently endorses a mid-abdominal ache and is tender to palpation.     The stool in the bedside commode is light brown.    States she may have been on reglan in the past, but is not anymore and doesn't know why.     Admits to taking aleve daily for the past couple of weeks. She denies taking pepto bismol.      PCP:  Renard Eddy MD    Review of patient's allergies indicates:   Allergen Reactions    Morphine Itching    Norco [hydrocodone-acetaminophen]        Current Facility-Administered Medications   Medication Dose Route Frequency Provider Last Rate Last Admin    0.45% NaCl infusion   Intravenous Continuous Alvino Garcia MD 75 mL/hr at 05/28/24 0830 New Bag at 05/28/24 0830    dicyclomine injection 10 mg  10 mg Intramuscular QID PRN Samuel Rivera MD   10 mg at 05/28/24 0112    hydrALAZINE injection 10 mg  10 mg Intravenous Q2H PRN Samuel Rivera MD        labetaloL injection 10 mg  10 mg Intravenous Q4H PRN Samuel Rivera MD        melatonin tablet 6 mg  6 mg Oral Nightly PRN Samuel Rivera MD        ondansetron injection 4 mg  4 mg Intravenous Q8H PRN Samuel Rivera MD   4 mg at 05/28/24 0410    pantoprazole injection 40 mg  40 mg Intravenous BID Marilee Johnson MD   40 mg at 05/28/24 0638    prochlorperazine injection Soln 5 mg  5 mg Intravenous Q6H PRN Valerie Araujo FNP   5 mg at 05/28/24 0638    sodium chloride 0.9% flush 10 mL  10 mL Intravenous PRN Samuel Rivera MD        sodium chloride  0.9% flush 10 mL  10 mL Intravenous Q6H Samuel Rivera MD   10 mL at 05/28/24 0546    And    sodium chloride 0.9% flush 10 mL  10 mL Intravenous PRN Samuel Rivera MD         Medications Prior to Admission   Medication Sig Dispense Refill Last Dose    aspirin (ECOTRIN) 81 MG EC tablet Take 1 tablet by mouth once daily.       atorvastatin (LIPITOR) 80 MG tablet Take 1 tablet by mouth every morning.       cetirizine (ZYRTEC) 10 MG tablet Take 1 tablet by mouth every morning.       famotidine (PEPCID) 20 MG tablet Take 20 mg by mouth Daily.       lisinopriL 10 MG tablet Take 1 tablet by mouth every morning.       rivaroxaban (XARELTO) 20 mg Tab Take 1 tablet by mouth once daily.       traZODone (DESYREL) 50 MG tablet Take 1 tablet by mouth nightly as needed.       ALPRAZolam (XANAX) 0.25 MG tablet Take 0.25 mg by mouth nightly as needed.       amLODIPine (NORVASC) 5 MG tablet 1 tablet Orally Once a day (Patient not taking: Reported on 5/20/2024)       aspirin (ECOTRIN) 81 MG EC tablet Take 1 tablet (81 mg total) by mouth once daily.  0     atorvastatin (LIPITOR) 80 MG tablet Take 80 mg by mouth every evening.       BISACODYL ORAL Take by mouth. (Patient not taking: Reported on 5/20/2024)       carvediloL (COREG) 25 MG tablet Take 1 tablet (25 mg total) by mouth 2 (two) times daily. (Patient taking differently: Take 50 mg by mouth 2 (two) times daily.) 60 tablet 11     cetirizine (ZYRTEC) 10 MG tablet Take 10 mg by mouth once daily.       ergocalciferol (VITAMIN D2) 50,000 unit Cap Take 50,000 Units by mouth every 7 days. Takes on Sunday AM       famotidine (PEPCID) 20 MG tablet Take 1 tablet by mouth every evening.       furosemide (LASIX) 40 MG tablet Take 1 tablet (40 mg total) by mouth once daily. 30 tablet 11     furosemide (LASIX) 40 MG tablet Take 1 tablet by mouth every morning.       gabapentin (NEURONTIN) 300 MG capsule Take 300 mg by mouth 3 (three) times daily.       lisinopriL 10 MG tablet  Take 10 mg by mouth once daily.       methocarbamoL (ROBAXIN) 500 MG Tab Take 500 mg by mouth 2 (two) times a day.       metoclopramide HCl (REGLAN) 10 MG tablet Take 10 mg by mouth 4 (four) times daily.       nitroGLYCERIN (NITROSTAT) 0.4 MG SL tablet Place 1 tablet (0.4 mg total) under the tongue every 5 (five) minutes as needed for Chest pain. 20 tablet 1     nystatin (MYCOSTATIN) cream Apply 1,000,000 g topically once daily.       ondansetron (ZOFRAN-ODT) 8 MG TbDL Take 8 mg by mouth every 6 (six) hours as needed.       pantoprazole (PROTONIX) 40 MG tablet Take 40 mg by mouth.       polyethylene glycol (GLYCOLAX) 17 gram PwPk Take 17 g by mouth 2 (two) times daily as needed for Constipation.  0     potassium chloride (KLOR-CON) 10 MEQ TbSR Take 10 mEq by mouth once.       potassium chloride SA (K-DUR,KLOR-CON M) 10 MEQ tablet Take 10 mEq by mouth once.       rivaroxaban (XARELTO) 20 mg Tab Take 1 tablet (20 mg total) by mouth daily with dinner or evening meal.       sertraline (ZOLOFT) 50 MG tablet Take 50 mg by mouth once daily.       timolol maleate 0.5% (TIMOPTIC) 0.5 % Drop Place 1 drop into both eyes 2 (two) times daily.       traMADoL (ULTRAM) 50 mg tablet Take 50 mg by mouth every 6 (six) hours as needed for Pain.       traMADoL (ULTRAM) 50 mg tablet Take 50 mg by mouth every 8 (eight) hours as needed.       traZODone (DESYREL) 50 MG tablet Take 50 mg by mouth every evening.       vibegron (GEMTESA) 75 mg Tab Take 75 mg by mouth once.          Past Medical History:  Past Medical History:   Diagnosis Date    Anxiety     CHF (congestive heart failure)     COPD (chronic obstructive pulmonary disease)     Coronary artery disease     s/p 2 stents    Depression     GERD (gastroesophageal reflux disease)     Glaucoma     Hypertension     Obstructive sleep apnea     on cpap    Stroke     Urinary, incontinence, stress female        Past Surgical History:  Past Surgical History:   Procedure Laterality Date     APPENDECTOMY      BACK SURGERY      CHOLECYSTECTOMY      EGD, WITH CLOSED BIOPSY N/A 5/21/2024    Procedure: EGD;  Surgeon: BASIM Washington MD;  Location: [facility] ENDOSCOPY;  Service: Gastroenterology;  Laterality: N/A;    EXPLORATION OF COMMON BILE DUCT      HERNIA REPAIR      incisional hernia times 2, central abdomen    HYSTERECTOMY      OVARIAN CYST REMOVAL      TONSILLECTOMY         Family History:  Family History   Problem Relation Name Age of Onset    Heart disease Mother      Cancer Father         Social History:  Social History     Tobacco Use    Smoking status: Never    Smokeless tobacco: Never   Substance Use Topics    Alcohol use: Not Currently           Review of Systems:    Review of Systems   Constitutional:  Negative for fever and unexpected weight change.   Respiratory:  Negative for cough and shortness of breath.    Cardiovascular:  Negative for chest pain and leg swelling.   Gastrointestinal:  Positive for abdominal pain and nausea. Negative for blood in stool and vomiting.   Musculoskeletal:  Negative for back pain and myalgias.   Skin:  Negative for color change and pallor.   Neurological:  Negative for speech difficulty and weakness.   All other systems reviewed and are negative.      Objective:      VITAL SIGNS: 24 HR MIN & MAX LAST  Temp  Min: 97.7 °F (36.5 °C)  Max: 98.6 °F (37 °C) 98.6 °F (37 °C)  BP  Min: 110/82  Max: 188/85 (!) 177/80  Pulse  Min: 75  Max: 97 78  Resp  Min: 14  Max: 27 18  SpO2  Min: 96 %  Max: 100 % 97 %      Intake/Output Summary (Last 24 hours) at 5/28/2024 0901  Last data filed at 5/27/2024 2019  Gross per 24 hour   Intake 1 ml   Output --   Net 1 ml       Physical Exam    Recent Results (from the past 48 hour(s))   Urinalysis, Reflex to Urine Culture    Collection Time: 05/27/24  1:30 PM    Specimen: Urine   Result Value Ref Range    Color, UA Light-Yellow Yellow, Light-Yellow, Colorless, Straw, Dark-Yellow    Appearance, UA Clear Clear    Specific [place],  UA 1.015 1.005 - 1.030    pH, UA 8.5 5.0 - 8.5    Protein, UA Negative Negative    Glucose, UA Normal Negative, Normal    Ketones, UA Negative Negative    Blood, UA Negative Negative    Bilirubin, UA Negative Negative    Urobilinogen, UA 4.0 (A) 0.2, 1.0, Normal    Nitrites, UA Negative Negative    Leukocyte Esterase, UA Negative Negative    WBC, UA 0-5 None Seen, 0-2, 3-5, 0-5 /HPF    Bacteria, UA Trace None Seen, Trace /HPF    Squamous Epithelial Cells, UA Trace None Seen /HPF    RBC, UA 0-5 None Seen, 0-2, 3-5, 0-5 /HPF   Comprehensive metabolic panel    Collection Time: 05/27/24  2:27 PM   Result Value Ref Range    Sodium 143 136 - 145 mmol/L    Potassium 3.7 3.5 - 5.1 mmol/L    Chloride 107 98 - 107 mmol/L    CO2 21 (L) 23 - 31 mmol/L    Glucose 148 (H) 82 - 115 mg/dL    Blood Urea Nitrogen 36.0 (H) 9.8 - 20.1 mg/dL    Creatinine 1.10 (H) 0.55 - 1.02 mg/dL    Calcium 9.0 8.4 - 10.2 mg/dL    Protein Total 6.1 5.8 - 7.6 gm/dL    Albumin 3.2 (L) 3.4 - 4.8 g/dL    Globulin 2.9 2.4 - 3.5 gm/dL    Albumin/Globulin Ratio 1.1 1.1 - 2.0 ratio    Bilirubin Total 0.5 <=1.5 mg/dL    ALP 73 40 - 150 unit/L    ALT 13 0 - 55 unit/L    AST 16 5 - 34 unit/L    eGFR 51 mL/min/1.73/m2    Anion Gap 15.0 mEq/L    BUN/Creatinine Ratio 33    Lipase    Collection Time: 05/27/24  2:27 PM   Result Value Ref Range    Lipase Level 8 <=60 U/L   Troponin I    Collection Time: 05/27/24  2:27 PM   Result Value Ref Range    Troponin-I <0.010 0.000 - 0.045 ng/mL   CBC with Differential    Collection Time: 05/27/24  2:27 PM   Result Value Ref Range    WBC 10.95 4.50 - 11.50 x10(3)/mcL    RBC 3.06 (L) 4.20 - 5.40 x10(6)/mcL    Hgb 8.8 (L) 12.0 - 16.0 g/dL    Hct 25.6 (L) 37.0 - 47.0 %    MCV 83.7 80.0 - 94.0 fL    MCH 28.8 27.0 - 31.0 pg    MCHC 34.4 33.0 - 36.0 g/dL    RDW 14.2 11.5 - 17.0 %    Platelet 174 130 - 400 x10(3)/mcL    MPV 10.4 7.4 - 10.4 fL    Neut % 64.8 %    Lymph % 27.9 %    Mono % 6.2 %    Eos % 0.3 %    Basophil % 0.3 %     Lymph # 3.05 0.6 - 4.6 x10(3)/mcL    Neut # 7.11 2.1 - 9.2 x10(3)/mcL    Mono # 0.68 0.1 - 1.3 x10(3)/mcL    Eos # 0.03 0 - 0.9 x10(3)/mcL    Baso # 0.03 <=0.2 x10(3)/mcL    IG# 0.05 (H) 0 - 0.04 x10(3)/mcL    IG% 0.5 %    NRBC% 0.0 %   Hemoglobin and Hematocrit    Collection Time: 05/27/24  8:55 PM   Result Value Ref Range    Hgb 9.2 (L) 12.0 - 16.0 g/dL    Hct 27.2 (L) 37.0 - 47.0 %   Comprehensive metabolic panel    Collection Time: 05/28/24  4:22 AM   Result Value Ref Range    Sodium 142 136 - 145 mmol/L    Potassium 3.4 (L) 3.5 - 5.1 mmol/L    Chloride 105 98 - 107 mmol/L    CO2 23 23 - 31 mmol/L    Glucose 169 (H) 82 - 115 mg/dL    Blood Urea Nitrogen 31.8 (H) 9.8 - 20.1 mg/dL    Creatinine 1.11 (H) 0.55 - 1.02 mg/dL    Calcium 8.7 8.4 - 10.2 mg/dL    Protein Total 6.5 5.8 - 7.6 gm/dL    Albumin 3.4 3.4 - 4.8 g/dL    Globulin 3.1 2.4 - 3.5 gm/dL    Albumin/Globulin Ratio 1.1 1.1 - 2.0 ratio    Bilirubin Total 0.3 <=1.5 mg/dL    ALP 76 40 - 150 unit/L    ALT 14 0 - 55 unit/L    AST 21 5 - 34 unit/L    eGFR 50 mL/min/1.73/m2    Anion Gap 14.0 mEq/L    BUN/Creatinine Ratio 29    CBC with Differential    Collection Time: 05/28/24  4:22 AM   Result Value Ref Range    WBC 10.48 4.50 - 11.50 x10(3)/mcL    RBC 3.10 (L) 4.20 - 5.40 x10(6)/mcL    Hgb 8.7 (L) 12.0 - 16.0 g/dL    Hct 26.8 (L) 37.0 - 47.0 %    MCV 86.5 80.0 - 94.0 fL    MCH 28.1 27.0 - 31.0 pg    MCHC 32.5 (L) 33.0 - 36.0 g/dL    RDW 14.8 11.5 - 17.0 %    Platelet 195 130 - 400 x10(3)/mcL    MPV 11.4 (H) 7.4 - 10.4 fL    Neut % 69.0 %    Lymph % 26.5 %    Mono % 4.0 %    Eos % 0.0 %    Basophil % 0.1 %    Lymph # 2.78 0.6 - 4.6 x10(3)/mcL    Neut # 7.23 2.1 - 9.2 x10(3)/mcL    Mono # 0.42 0.1 - 1.3 x10(3)/mcL    Eos # 0.00 0 - 0.9 x10(3)/mcL    Baso # 0.01 <=0.2 x10(3)/mcL    IG# 0.04 0 - 0.04 x10(3)/mcL    IG% 0.4 %    NRBC% 0.0 %       X-Ray Humerus 2 View Left    Result Date: 5/28/2024  EXAMINATION XR HUMERUS 2 VIEW LEFT CLINICAL HISTORY Other injury of  unspecified body region, initial encounter TECHNIQUE A total of 4 views of the left humerus. COMPARISON None available at the time of initial interpretation. FINDINGS No displaced fracture or dislocation is identified. The visualized joint spaces are preserved. No aggressive osseous lesion or periosteal reaction is evident. Extensive soft tissue opacity extends from the level of the antecubital fossa proximal to the left axillary and subclavian region. IMPRESSION Findings consistent with large volume contrast extravasation. CONTRAST EXTRAVASATION RECOMMENDATIONS: *elevation of the affected extremity *routine neurovascular checks and pain evaluation *monitoring for evidence of erythema or findings to suggest developing skin necrosis *intermittent utilization of warm or cold compresses may provide pain relief ========== Emergency surgical consultation should be pursued if there are changes that raise suspicion for potential skin necrosis or neurovascular compromise. Electronically signed by: Wilbert Moore Date: 05/28/2024 Time: 06:36    X-Ray Chest 1 View for Line/Tube Placement    Result Date: 5/27/2024  EXAMINATION: XR CHEST 1 VIEW FOR LINE/TUBE PLACEMENT CLINICAL HISTORY: picc; TECHNIQUE: One COMPARISON: April 7, 2024. FINDINGS: Right upper extremity approach PICC line terminates within the midportion of the superior vena cava.  Cardiopericardial silhouette is within normal limits.  No acute dense focal or segmental consolidation, congestive process, pleural effusions or pneumothorax.  Thoracic stimulator electrodes.     Optimal placement PICC line. Electronically signed by: Anastacio Preston Date: 05/27/2024 Time: 22:00      Assessment & Plan:     79-year-old female patient of Dr. Aamir Washington with a complicated past medical history including CVA, CHF, COPD, A. Fib on Xarelto, CAD with coronary stents in 2005, diverticular disease, HTN, HLD, ischemic colitis, chronic pancreatitis, RA, ventral hernia S/P repair in  "2014, appendectomy, hysterectomy, back surgery and chronic back pain on opioids, cholecystectomy, exploration of the CBD, gastroparesis diagnosed via GES 06/13/2023. Most recent colonoscopy 2015, EGD last week for abd pain/n/v. She is now admitted with acute anemia and recurrent abd pain and nausea.    Acute anemia  - continue PPI BID  - hold xarelto  - EGD just 7 days ago negative for source of bleeding  - most recently colonoscopy was in 2015  - states her stool looks "black" but in bedside commode is light brown   - pt counseled to d/c NSAIDs    2. Nondiabetic gastroparesis  - long standing hx, not on any treatment  - start reglan 5 mg q 8 hrs. Dr. CHILD ordered this medication for her in June 2023 but she hasn't been taking it.    3. Chronic abd pain    - keep NPO for EGD today.     Thank you for allowing us to participate in this patient's care.    Viky Armendariz PA-C  [facility]    "

## 2024-05-28 NOTE — PROVATION PATIENT INSTRUCTIONS
Discharge Summary/Instructions after an Endoscopic Procedure  Patient Name: Ktaty Veronica  Patient MRN: [id number]  Patient YOB: 1945  Tuesday, May 28, 2024  Samuel Washington MD  Dear patient,  As a result of recent federal legislation (The Federal Cures Act), you may   receive lab or pathology results from your procedure in your [facility]   account before your physician is able to contact you. Your physician or   their representative will relay the results to you with their   recommendations at their soonest availability.  Thank you,  RESTRICTIONS:  During your procedure today, you received medications for sedation.  These   medications may affect your judgment, balance and coordination.  Therefore,   for 24 hours, you have the following restrictions:   - DO NOT drive a car, operate machinery, make legal/financial decisions,   sign important papers or drink alcohol.    ACTIVITY:  Today: no heavy lifting, straining or running due to procedural   sedation/anesthesia.  The following day: return to full activity including work.  DIET:  Eat and drink normally unless instructed otherwise.     TREATMENT FOR COMMON SIDE EFFECTS:  - Mild abdominal pain, nausea, belching, bloating or excessive gas:  rest,   eat lightly and use a heating pad.  - Sore Throat: treat with throat lozenges and/or gargle with warm salt   water.  - Because air was used during the procedure, expelling large amounts of air   from your rectum or belching is normal.  - If a bowel prep was taken, you may not have a bowel movement for 1-3 days.    This is normal.  SYMPTOMS TO WATCH FOR AND REPORT TO YOUR PHYSICIAN:  1. Abdominal pain or bloating, other than gas cramps.  2. Chest pain.  3. Back pain.  4. Signs of infection such as: chills or fever occurring within 24 hours   after the procedure.  5. Rectal bleeding, which would show as bright red, maroon, or black stools.   (A tablespoon of blood from the rectum is not serious, especially  if   hemorrhoids are present.)  6. Vomiting.  7. Weakness or dizziness.  GO DIRECTLY TO THE NEAREST EMERGENCY ROOM IF YOU HAVE ANY OF THE FOLLOWING:      Difficulty breathing              Chills and/or fever over 101 F   Persistent vomiting and/or vomiting blood   Severe abdominal pain   Severe chest pain   Black, tarry stools   Bleeding- more than one tablespoon   Any other symptom or condition that you feel may need urgent attention  Your doctor recommends these additional instructions:  If any biopsies were taken, your doctors clinic will contact you in 1 to 2   weeks with any results.  - Await pathology results.   - clear liquid diet  - consideration for colonoscopy for anemia.  however, she would have to be   able to tolerate prep and get her n  MD Samuel Linda MD  5/28/2024 3:02:38 PM  This report has been verified and signed electronically.  Dear patient,  As a result of recent federal legislation (The Federal Cures Act), you may   receive lab or pathology results from your procedure in your [facility]   account before your physician is able to contact you. Your physician or   their representative will relay the results to you with their   recommendations at their soonest availability.  Thank you,  PROVATION

## 2024-05-28 NOTE — PLAN OF CARE
Problem: Adult Inpatient Plan of Care  Goal: Plan of Care Review  Outcome: Progressing  Flowsheets (Taken 5/28/2024 0204)  Plan of Care Reviewed With:   patient   child     Problem: Adult Inpatient Plan of Care  Goal: Absence of Hospital-Acquired Illness or Injury  Outcome: Progressing  Intervention: Identify and Manage Fall Risk  Flowsheets (Taken 5/28/2024 0204)  Safety Promotion/Fall Prevention:   assistive device/personal item within reach   side rails raised x 2   nonskid shoes/socks when out of bed  Intervention: Prevent Skin Injury  Flowsheets (Taken 5/28/2024 0204)  Body Position: position changed independently  Skin Protection: protective footwear used  Intervention: Prevent and Manage VTE (Venous Thromboembolism) Risk  Flowsheets (Taken 5/28/2024 0204)  VTE Prevention/Management: ambulation promoted     Problem: Adult Inpatient Plan of Care  Goal: Optimal Comfort and Wellbeing  Outcome: Progressing  Intervention: Monitor Pain and Promote Comfort  Flowsheets (Taken 5/28/2024 0204)  Pain Management Interventions:   care clustered   quiet environment facilitated

## 2024-05-28 NOTE — NURSING
Nurses Note -- 4 Eyes      5/27/2024   10:44 PM      Skin assessed during: Admit      [x] No Altered Skin Integrity Present    []Prevention Measures Documented      [] Yes- Altered Skin Integrity Present or Discovered   [] LDA Added if Not in Epic (Describe Wound)   [] New Altered Skin Integrity was Present on Admit and Documented in LDA   [] Wound Image Taken    Wound Care Consulted? No    Attending Nurse:  ALFRED Hearn    Second RN/Staff Member:   ALFRED Smith

## 2024-05-28 NOTE — ANESTHESIA PREPROCEDURE EVALUATION
05/28/2024  Katty Veronica is a 79 y.o., female, who presents for the following:    Procedure: EGD (Abdomen)   Anesthesia type: Monitor Anesthesia Care   Diagnosis: Epigastric abdominal pain [R10.13]   Location: [facility] ENDO 02 / [facility]   Surgeons: Samuel Washington MD     HPI:    Patient presented to the ER yesterday with complaints of upper abdominal pain, vomiting, and reportedly black stool. +FOBT. She was started on protonix 40 mg BID. Her baseline hgb was about 12 g/dl up until March 2024. For the past two months H&H has steadily decreased. This admission hgb 9.2 -- 8.7 g/dl with normocytic indices. LFTs and lipase are all wnl. UA unremarkable.     Recent GI Studies:  - EGD 2020 successful dilation of esophageal stricture, hiatal hernia, mild esophagitis, moderate gastritis.  Biopsies negative for H pylori  - EGD 05/21/2024 for abdominal pain, nausea, vomiting revealed a medium amount of food in the stomach, diffuse chronic gastritis, otherwise normal.  Biopsies negative for H pylori    Past Medical History:   Diagnosis Date    Anxiety      COPD (chronic obstructive pulmonary disease)      Coronary artery disease       s/p 2 stents    Depression      GERD (gastroesophageal reflux disease)      Glaucoma      Hypertension      Obstructive sleep apnea       on cpap    Stroke      Urinary, incontinence, stress female      LAB:  [4/8] Hgb 11 -> [5/28] Hgb 8.7        TTE (2/10/24) :    Left Ventricle: The left ventricle is normal in size. Ventricular mass is normal. Mildly increased wall thickness. There is concentric remodeling. Normal wall motion. There is normal systolic function with a visually estimated ejection fraction of 55 - 60%. Grade I diastolic dysfunction.    Right Ventricle: Normal right ventricular cavity size. Systolic function is normal.    Left Atrium: Normal  "left atrial size. Bubble study with agitated saline is inconclusive.    Right Atrium: Normal right atrial size.    Aortic Valve: The aortic valve is a trileaflet valve. There is annular calcification present. There is no stenosis. There is trace aortic regurgitation.    Mitral Valve: The mitral valve is structurally normal. There is no stenosis. There is trace regurgitation.    Tricuspid Valve: The tricuspid valve is structurally normal. There is trace regurgitation.    Aorta: Aortic root is normal in size measuring 3.4 cm.    IVC/SVC: IVC was not well visualized due to poor acoustic window.    Pericardium: There is no pericardial effusion.    Pre-op Assessment    I have reviewed the Patient Summary Reports.     I have reviewed the Nursing Notes. I have reviewed the NPO Status.   I have reviewed the Medications.     Review of Systems  Anesthesia Hx:  No problems with previous Anesthesia             Denies Family Hx of Anesthesia complications.    Denies Personal Hx of Anesthesia complications.                    Cardiovascular:     Hypertension   CAD       CHF                                 Pulmonary:   COPD     Sleep Apnea                Hepatic/GI:    Hiatal Hernia, GERD   Non-Diabetic Gastroparesis          Neurological:   CVA        CVA : Left sided weakness                            Psych:    depression                Physical Exam  General: Alert and Oriented  + Nausea and "stomach burning" pain reported  Airway:  Mallampati: II   Mouth Opening: Normal  TM Distance: Normal  Tongue: Normal  Neck ROM: Normal ROM    Dental:  Edentulous    Chest/Lungs:  Normal Respiratory Rate    Heart:  Rate: Normal  Rhythm: Regular Rhythm        Anesthesia Plan  Type of Anesthesia, risks & benefits discussed:    Anesthesia Type: Gen Natural Airway  Intra-op Monitoring Plan: Standard ASA Monitors  Post Op Pain Control Plan: IV/PO Opioids PRN  Induction:  IV  Airway Plan: Direct  Informed Consent: Informed consent signed with " the Patient and all parties understand the risks and agree with anesthesia plan.  All questions answered. Patient consented to blood products? No  ASA Score: 3  Day of Surgery Review of History & Physical: H&P Update referred to the surgeon/provider.  Anesthesia Plan Notes: Nasal cannula vs facemask supplemental oxygenation   For patients with ASBINA/obesity, may consider SuperNoval Nasal CPAP  Multi-antiemetics (Zofran / IV compazine or IM phenergan)      Ready For Surgery From Anesthesia Perspective.     .

## 2024-05-29 ENCOUNTER — ANESTHESIA (OUTPATIENT)
Dept: ENDOSCOPY | Facility: HOSPITAL | Age: 79
DRG: 378 | End: 2024-05-29
Payer: MEDICARE

## 2024-05-29 LAB
ALBUMIN SERPL-MCNC: 3.3 G/DL (ref 3.4–4.8)
ALBUMIN/GLOB SERPL: 1.1 RATIO (ref 1.1–2)
ALP SERPL-CCNC: 66 UNIT/L (ref 40–150)
ALT SERPL-CCNC: 16 UNIT/L (ref 0–55)
ANION GAP SERPL CALC-SCNC: 12 MEQ/L
AST SERPL-CCNC: 34 UNIT/L (ref 5–34)
BASOPHILS # BLD AUTO: 0.05 X10(3)/MCL
BASOPHILS NFR BLD AUTO: 0.4 %
BILIRUB SERPL-MCNC: 0.4 MG/DL
BUN SERPL-MCNC: 17.4 MG/DL (ref 9.8–20.1)
CALCIUM SERPL-MCNC: 8.8 MG/DL (ref 8.4–10.2)
CHLORIDE SERPL-SCNC: 107 MMOL/L (ref 98–107)
CO2 SERPL-SCNC: 25 MMOL/L (ref 23–31)
CREAT SERPL-MCNC: 0.86 MG/DL (ref 0.55–1.02)
CREAT/UREA NIT SERPL: 20
EOSINOPHIL # BLD AUTO: 0 X10(3)/MCL (ref 0–0.9)
EOSINOPHIL NFR BLD AUTO: 0 %
ERYTHROCYTE [DISTWIDTH] IN BLOOD BY AUTOMATED COUNT: 15.2 % (ref 11.5–17)
GFR SERPLBLD CREATININE-BSD FMLA CKD-EPI: >60 ML/MIN/1.73/M2
GLOBULIN SER-MCNC: 2.9 GM/DL (ref 2.4–3.5)
GLUCOSE SERPL-MCNC: 143 MG/DL (ref 82–115)
HCT VFR BLD AUTO: 23.7 % (ref 37–47)
HGB BLD-MCNC: 8 G/DL (ref 12–16)
IMM GRANULOCYTES # BLD AUTO: 0.18 X10(3)/MCL (ref 0–0.04)
IMM GRANULOCYTES NFR BLD AUTO: 1.5 %
LYMPHOCYTES # BLD AUTO: 4.1 X10(3)/MCL (ref 0.6–4.6)
LYMPHOCYTES NFR BLD AUTO: 34 %
MAGNESIUM SERPL-MCNC: 2 MG/DL (ref 1.6–2.6)
MCH RBC QN AUTO: 29.2 PG (ref 27–31)
MCHC RBC AUTO-ENTMCNC: 33.8 G/DL (ref 33–36)
MCV RBC AUTO: 86.5 FL (ref 80–94)
MONOCYTES # BLD AUTO: 0.98 X10(3)/MCL (ref 0.1–1.3)
MONOCYTES NFR BLD AUTO: 8.1 %
NEUTROPHILS # BLD AUTO: 6.75 X10(3)/MCL (ref 2.1–9.2)
NEUTROPHILS NFR BLD AUTO: 56 %
NRBC BLD AUTO-RTO: 0 %
PLATELET # BLD AUTO: 188 X10(3)/MCL (ref 130–400)
PMV BLD AUTO: 11.2 FL (ref 7.4–10.4)
POTASSIUM SERPL-SCNC: 3 MMOL/L (ref 3.5–5.1)
PROT SERPL-MCNC: 6.2 GM/DL (ref 5.8–7.6)
PSYCHE PATHOLOGY RESULT: NORMAL
RBC # BLD AUTO: 2.74 X10(6)/MCL (ref 4.2–5.4)
SODIUM SERPL-SCNC: 144 MMOL/L (ref 136–145)
WBC # SPEC AUTO: 12.06 X10(3)/MCL (ref 4.5–11.5)

## 2024-05-29 PROCEDURE — 63600175 PHARM REV CODE 636 W HCPCS: Performed by: NURSE PRACTITIONER

## 2024-05-29 PROCEDURE — 85025 COMPLETE CBC W/AUTO DIFF WBC: CPT | Performed by: INTERNAL MEDICINE

## 2024-05-29 PROCEDURE — 37000008 HC ANESTHESIA 1ST 15 MINUTES: Performed by: INTERNAL MEDICINE

## 2024-05-29 PROCEDURE — 25000003 PHARM REV CODE 250: Performed by: INTERNAL MEDICINE

## 2024-05-29 PROCEDURE — 37000009 HC ANESTHESIA EA ADD 15 MINS: Performed by: INTERNAL MEDICINE

## 2024-05-29 PROCEDURE — 25000003 PHARM REV CODE 250: Performed by: ANESTHESIOLOGY

## 2024-05-29 PROCEDURE — D9220A PRA ANESTHESIA: Mod: CRNA,,,

## 2024-05-29 PROCEDURE — A4216 STERILE WATER/SALINE, 10 ML: HCPCS | Performed by: INTERNAL MEDICINE

## 2024-05-29 PROCEDURE — 83735 ASSAY OF MAGNESIUM: CPT | Performed by: INTERNAL MEDICINE

## 2024-05-29 PROCEDURE — D9220A PRA ANESTHESIA: Mod: ANES,,, | Performed by: ANESTHESIOLOGY

## 2024-05-29 PROCEDURE — 45378 DIAGNOSTIC COLONOSCOPY: CPT | Performed by: INTERNAL MEDICINE

## 2024-05-29 PROCEDURE — 63600175 PHARM REV CODE 636 W HCPCS: Performed by: INTERNAL MEDICINE

## 2024-05-29 PROCEDURE — 63600175 PHARM REV CODE 636 W HCPCS: Performed by: EMERGENCY MEDICINE

## 2024-05-29 PROCEDURE — C9113 INJ PANTOPRAZOLE SODIUM, VIA: HCPCS | Performed by: EMERGENCY MEDICINE

## 2024-05-29 PROCEDURE — 80053 COMPREHEN METABOLIC PANEL: CPT | Performed by: INTERNAL MEDICINE

## 2024-05-29 PROCEDURE — 25000003 PHARM REV CODE 250

## 2024-05-29 PROCEDURE — 63600175 PHARM REV CODE 636 W HCPCS: Performed by: PHYSICIAN ASSISTANT

## 2024-05-29 PROCEDURE — 63600175 PHARM REV CODE 636 W HCPCS

## 2024-05-29 PROCEDURE — 21400001 HC TELEMETRY ROOM

## 2024-05-29 PROCEDURE — 36415 COLL VENOUS BLD VENIPUNCTURE: CPT | Performed by: INTERNAL MEDICINE

## 2024-05-29 PROCEDURE — 0DJD8ZZ INSPECTION OF LOWER INTESTINAL TRACT, VIA NATURAL OR ARTIFICIAL OPENING ENDOSCOPIC: ICD-10-PCS | Performed by: INTERNAL MEDICINE

## 2024-05-29 RX ORDER — LIDOCAINE HYDROCHLORIDE 10 MG/ML
INJECTION, SOLUTION EPIDURAL; INFILTRATION; INTRACAUDAL; PERINEURAL
Status: DISPENSED
Start: 2024-05-29 | End: 2024-05-29

## 2024-05-29 RX ORDER — LIDOCAINE HYDROCHLORIDE 10 MG/ML
1 INJECTION, SOLUTION EPIDURAL; INFILTRATION; INTRACAUDAL; PERINEURAL ONCE
Status: CANCELLED | OUTPATIENT
Start: 2024-05-29 | End: 2024-05-29

## 2024-05-29 RX ORDER — PROPOFOL 10 MG/ML
VIAL (ML) INTRAVENOUS
Status: COMPLETED
Start: 2024-05-29 | End: 2024-05-29

## 2024-05-29 RX ORDER — IPRATROPIUM BROMIDE AND ALBUTEROL SULFATE 2.5; .5 MG/3ML; MG/3ML
3 SOLUTION RESPIRATORY (INHALATION)
Status: CANCELLED | OUTPATIENT
Start: 2024-05-29

## 2024-05-29 RX ORDER — PROPOFOL 10 MG/ML
VIAL (ML) INTRAVENOUS
Status: DISCONTINUED | OUTPATIENT
Start: 2024-05-29 | End: 2024-05-29

## 2024-05-29 RX ORDER — ONDANSETRON HYDROCHLORIDE 2 MG/ML
4 INJECTION, SOLUTION INTRAVENOUS DAILY PRN
Status: CANCELLED | OUTPATIENT
Start: 2024-05-29

## 2024-05-29 RX ORDER — MEPERIDINE HYDROCHLORIDE 25 MG/ML
12.5 INJECTION INTRAMUSCULAR; INTRAVENOUS; SUBCUTANEOUS EVERY 10 MIN PRN
Status: CANCELLED | OUTPATIENT
Start: 2024-05-29 | End: 2024-05-30

## 2024-05-29 RX ORDER — LIDOCAINE HYDROCHLORIDE 20 MG/ML
INJECTION, SOLUTION EPIDURAL; INFILTRATION; INTRACAUDAL; PERINEURAL
Status: DISCONTINUED | OUTPATIENT
Start: 2024-05-29 | End: 2024-05-29

## 2024-05-29 RX ORDER — SODIUM CHLORIDE, SODIUM GLUCONATE, SODIUM ACETATE, POTASSIUM CHLORIDE AND MAGNESIUM CHLORIDE 30; 37; 368; 526; 502 MG/100ML; MG/100ML; MG/100ML; MG/100ML; MG/100ML
INJECTION, SOLUTION INTRAVENOUS CONTINUOUS
Status: CANCELLED | OUTPATIENT
Start: 2024-05-29 | End: 2024-06-28

## 2024-05-29 RX ORDER — ONDANSETRON 4 MG/1
8 TABLET, ORALLY DISINTEGRATING ORAL EVERY 6 HOURS PRN
Status: CANCELLED | OUTPATIENT
Start: 2024-05-29

## 2024-05-29 RX ORDER — LABETALOL HYDROCHLORIDE 5 MG/ML
INJECTION, SOLUTION INTRAVENOUS
Status: DISPENSED
Start: 2024-05-29 | End: 2024-05-29

## 2024-05-29 RX ORDER — SODIUM CHLORIDE 9 MG/ML
INJECTION, SOLUTION INTRAVENOUS CONTINUOUS
Status: DISCONTINUED | OUTPATIENT
Start: 2024-05-29 | End: 2024-05-30

## 2024-05-29 RX ORDER — POTASSIUM CHLORIDE 14.9 MG/ML
20 INJECTION INTRAVENOUS ONCE
Status: COMPLETED | OUTPATIENT
Start: 2024-05-29 | End: 2024-05-29

## 2024-05-29 RX ORDER — PROCHLORPERAZINE EDISYLATE 5 MG/ML
5 INJECTION INTRAMUSCULAR; INTRAVENOUS EVERY 30 MIN PRN
Status: CANCELLED | OUTPATIENT
Start: 2024-05-29

## 2024-05-29 RX ADMIN — PROPOFOL 30 MG: 10 INJECTION, EMULSION INTRAVENOUS at 10:05

## 2024-05-29 RX ADMIN — METOCLOPRAMIDE 5 MG: 5 INJECTION, SOLUTION INTRAMUSCULAR; INTRAVENOUS at 01:05

## 2024-05-29 RX ADMIN — PROPOFOL 40 MG: 10 INJECTION, EMULSION INTRAVENOUS at 10:05

## 2024-05-29 RX ADMIN — TIMOLOL MALEATE 1 DROP: 5 SOLUTION OPHTHALMIC at 08:05

## 2024-05-29 RX ADMIN — SODIUM CHLORIDE, PRESERVATIVE FREE 10 ML: 5 INJECTION INTRAVENOUS at 11:05

## 2024-05-29 RX ADMIN — GABAPENTIN 300 MG: 300 CAPSULE ORAL at 02:05

## 2024-05-29 RX ADMIN — SODIUM CHLORIDE, PRESERVATIVE FREE 10 ML: 5 INJECTION INTRAVENOUS at 12:05

## 2024-05-29 RX ADMIN — GABAPENTIN 300 MG: 300 CAPSULE ORAL at 08:05

## 2024-05-29 RX ADMIN — SODIUM CHLORIDE: 9 INJECTION, SOLUTION INTRAVENOUS at 10:05

## 2024-05-29 RX ADMIN — ONDANSETRON 4 MG: 2 INJECTION INTRAMUSCULAR; INTRAVENOUS at 07:05

## 2024-05-29 RX ADMIN — LIDOCAINE HYDROCHLORIDE 50 MG: 20 INJECTION, SOLUTION INTRAVENOUS at 10:05

## 2024-05-29 RX ADMIN — PROCHLORPERAZINE EDISYLATE 5 MG: 5 INJECTION INTRAMUSCULAR; INTRAVENOUS at 03:05

## 2024-05-29 RX ADMIN — TRAMADOL HYDROCHLORIDE 50 MG: 50 TABLET, COATED ORAL at 08:05

## 2024-05-29 RX ADMIN — SODIUM CHLORIDE, PRESERVATIVE FREE 10 ML: 5 INJECTION INTRAVENOUS at 06:05

## 2024-05-29 RX ADMIN — METHOCARBAMOL 500 MG: 500 TABLET ORAL at 08:05

## 2024-05-29 RX ADMIN — POTASSIUM CHLORIDE 20 MEQ: 14.9 INJECTION, SOLUTION INTRAVENOUS at 07:05

## 2024-05-29 RX ADMIN — SERTRALINE HYDROCHLORIDE 50 MG: 50 TABLET ORAL at 08:05

## 2024-05-29 RX ADMIN — SODIUM CHLORIDE, PRESERVATIVE FREE 10 ML: 5 INJECTION INTRAVENOUS at 04:05

## 2024-05-29 RX ADMIN — ATORVASTATIN CALCIUM 80 MG: 40 TABLET, FILM COATED ORAL at 08:05

## 2024-05-29 RX ADMIN — METOCLOPRAMIDE 5 MG: 5 INJECTION, SOLUTION INTRAMUSCULAR; INTRAVENOUS at 06:05

## 2024-05-29 RX ADMIN — PROPOFOL 50 MG: 10 INJECTION, EMULSION INTRAVENOUS at 10:05

## 2024-05-29 RX ADMIN — TIMOLOL MALEATE 1 DROP: 5 SOLUTION OPHTHALMIC at 12:05

## 2024-05-29 RX ADMIN — TRAMADOL HYDROCHLORIDE 50 MG: 50 TABLET, COATED ORAL at 02:05

## 2024-05-29 RX ADMIN — PANTOPRAZOLE SODIUM 40 MG: 40 INJECTION, POWDER, LYOPHILIZED, FOR SOLUTION INTRAVENOUS at 06:05

## 2024-05-29 RX ADMIN — SODIUM CHLORIDE: 4.5 INJECTION, SOLUTION INTRAVENOUS at 07:05

## 2024-05-29 RX ADMIN — PANTOPRAZOLE SODIUM 40 MG: 40 INJECTION, POWDER, LYOPHILIZED, FOR SOLUTION INTRAVENOUS at 03:05

## 2024-05-29 RX ADMIN — METOCLOPRAMIDE 5 MG: 5 INJECTION, SOLUTION INTRAMUSCULAR; INTRAVENOUS at 10:05

## 2024-05-29 RX ADMIN — LABETALOL HYDROCHLORIDE 10 MG: 5 INJECTION, SOLUTION INTRAVENOUS at 09:05

## 2024-05-29 RX ADMIN — SODIUM CHLORIDE 125 MG: 9 INJECTION, SOLUTION INTRAVENOUS at 12:05

## 2024-05-29 RX ADMIN — DICYCLOMINE HYDROCHLORIDE 10 MG: 20 INJECTION, SOLUTION INTRAMUSCULAR at 12:05

## 2024-05-29 RX ADMIN — SODIUM CHLORIDE: 4.5 INJECTION, SOLUTION INTRAVENOUS at 01:05

## 2024-05-29 RX ADMIN — SODIUM CHLORIDE, PRESERVATIVE FREE 10 ML: 5 INJECTION INTRAVENOUS at 05:05

## 2024-05-29 NOTE — ANESTHESIA POSTPROCEDURE EVALUATION
Anesthesia Post Evaluation    Patient: Katty Veronica    Procedure(s) Performed: Procedure(s) (LRB):  COLON (N/A)    Final Anesthesia Type: general      Patient location during evaluation: floor  Patient participation: Yes- Able to Participate  Level of consciousness: awake and alert  Post-procedure vital signs: reviewed and stable  Pain management: adequate  Airway patency: patent    PONV status at discharge: No PONV  Anesthetic complications: no      Cardiovascular status: blood pressure returned to baseline  Respiratory status: spontaneous ventilation and room air  Hydration status: euvolemic  Follow-up not needed.              Vitals Value Taken Time   /71 05/29/24 1123   Temp  05/29/24 1145   Pulse 73 05/29/24 1123   Resp 18 05/29/24 1123   SpO2 98 % 05/29/24 1123         No case tracking events are documented in the log.      Pain/Deja Score: Pain Rating Prior to Med Admin: 8 (5/28/2024 11:08 PM)  Pain Rating Post Med Admin: 2 (5/29/2024 12:08 AM)  Deja Score: 10 (5/29/2024 11:23 AM)

## 2024-05-29 NOTE — PLAN OF CARE
Problem: Adult Inpatient Plan of Care  Goal: Plan of Care Review  Outcome: Progressing  Flowsheets (Taken 5/29/2024 0211)  Plan of Care Reviewed With:   patient   child     Problem: Adult Inpatient Plan of Care  Goal: Patient-Specific Goal (Individualized)  Outcome: Progressing     Problem: Adult Inpatient Plan of Care  Goal: Absence of Hospital-Acquired Illness or Injury  Outcome: Progressing  Intervention: Identify and Manage Fall Risk  Flowsheets (Taken 5/29/2024 0211)  Safety Promotion/Fall Prevention:   assistive device/personal item within reach   nonskid shoes/socks when out of bed   side rails raised x 2  Intervention: Prevent Skin Injury  Flowsheets (Taken 5/29/2024 0211)  Body Position: position changed independently  Skin Protection: protective footwear used  Intervention: Prevent and Manage VTE (Venous Thromboembolism) Risk  Flowsheets (Taken 5/29/2024 0211)  VTE Prevention/Management: ambulation promoted  Intervention: Prevent Infection  Flowsheets (Taken 5/29/2024 0211)  Infection Prevention:   rest/sleep promoted   single patient room provided     Problem: Adult Inpatient Plan of Care  Goal: Absence of Hospital-Acquired Illness or Injury  Intervention: Identify and Manage Fall Risk  Flowsheets (Taken 5/29/2024 0211)  Safety Promotion/Fall Prevention:   assistive device/personal item within reach   nonskid shoes/socks when out of bed   side rails raised x 2     Problem: Adult Inpatient Plan of Care  Goal: Absence of Hospital-Acquired Illness or Injury  Intervention: Prevent Skin Injury  Flowsheets (Taken 5/29/2024 0211)  Body Position: position changed independently  Skin Protection: protective footwear used     Problem: Adult Inpatient Plan of Care  Goal: Absence of Hospital-Acquired Illness or Injury  Intervention: Prevent and Manage VTE (Venous Thromboembolism) Risk  Flowsheets (Taken 5/29/2024 0211)  VTE Prevention/Management: ambulation promoted     Problem: Adult Inpatient Plan of Care  Goal:  Absence of Hospital-Acquired Illness or Injury  Intervention: Prevent Infection  Flowsheets (Taken 5/29/2024 0211)  Infection Prevention:   rest/sleep promoted   single patient room provided     Problem: Adult Inpatient Plan of Care  Goal: Optimal Comfort and Wellbeing  Outcome: Progressing  Intervention: Monitor Pain and Promote Comfort  Flowsheets (Taken 5/29/2024 0211)  Pain Management Interventions:   care clustered   quiet environment facilitated  Intervention: Provide Person-Centered Care  Flowsheets (Taken 5/29/2024 0211)  Trust Relationship/Rapport:   questions answered   questions encouraged

## 2024-05-29 NOTE — PROGRESS NOTES
"[facility] Hospital Medicine Progress Note        Chief Complaint: Inpatient Follow-up for GI bleed    HPI:   Patient is an 79-year-old female with a history of heart failure with preserved EF, PAF on Xarelto, COPD, gastroparesis GERD, CVA, SABINA, glaucoma, HTN, HLD, CAD and additional past medical history as below who presented to the ER complaining of epigastric abdominal pain with nausea and vomiting over the prior 24 hours.  She just recently had an EGD done 05/21/2024 on an outpatient basis due to her ongoing symptoms of epigastric abdominal pain, which noted chronic gastritis and erythematous mucus in the gastric body.  She does report melanotic stools over the past 24 hours.     She arrived to the ER tonight afebrile hemodynamically stable maintaining normal sats on room air.  Laboratory work showed a drop in her hemoglobin from 11 last month currently 8.8.  Her stool was guaiac-positive in the ER.  A CT bleeding scan was ordered and hospitalist was consulted for admission for presumed upper GI bleeding.    Protonix was added.  Anticoagulation was held.  GI was consulted.      Interval Hx:   Patient seen and examined this morning plan for colonoscopy today reports she is feeling very tired    Objective/physical exam:  Vitals:    05/28/24 2341 05/29/24 0700 05/29/24 0809 05/29/24 0921   BP: (!) 156/55 (!) 179/69 (!) 179/69 (!) 188/83   BP Location:    Left arm   Patient Position:    Lying   Pulse: 75 70  72   Resp:    20   Temp: 98.2 °F (36.8 °C) 99 °F (37.2 °C) 99 °F (37.2 °C) 97 °F (36.1 °C)   TempSrc: Oral   Tympanic   SpO2: 98% (!) 93%  96%   Weight:    86.2 kg (190 lb)   Height:    5' 1" (1.549 m)     General: In no acute distress, afebrile  Respiratory: Clear to auscultation bilaterally  Cardiovascular: S1, S2, no appreciable murmur  Abdomen: Soft, nontender, BS +  MSK: Warm, no lower extremity edema, no clubbing or cyanosis  Neurologic: Alert and oriented x4, moving all " extremities with good strength     Lab Results   Component Value Date     05/29/2024    K 3.0 (L) 05/29/2024     08/04/2023    CO2 25 05/29/2024    BUN 17.4 05/29/2024    CREATININE 0.86 05/29/2024    CALCIUM 8.8 05/29/2024    ANIONGAP 7 (L) 08/04/2023    ESTGFRAFRICA 74 08/04/2023    EGFRNONAA >60 11/10/2021      Lab Results   Component Value Date    ALT 16 05/29/2024    AST 34 05/29/2024    ALKPHOS 66 05/29/2024    BILITOT 0.4 05/29/2024      Lab Results   Component Value Date    WBC 12.06 (H) 05/29/2024    HGB 8.0 (L) 05/29/2024    HCT 23.7 (L) 05/29/2024    MCV 86.5 05/29/2024     05/29/2024           Medications:   atorvastatin  80 mg Oral QHS    ferric gluconate (FERRLECIT) 125 mg in sodium chloride 0.9% 100 mL IVPB  125 mg Intravenous 1 time in Clinic/HOD    gabapentin  300 mg Oral TID    labetaloL        LIDOcaine (PF) 10 mg/ml (1%)        methocarbamoL  500 mg Oral BID    metoclopramide  5 mg Intravenous Q8H    pantoprazole  40 mg Intravenous BID AC    sertraline  50 mg Oral QHS    sodium chloride 0.9%  10 mL Intravenous Q6H    timolol maleate 0.5%  1 drop Both Eyes BID    traMADoL  50 mg Oral TID        Current Facility-Administered Medications:     dicyclomine, 10 mg, Intramuscular, QID PRN    hydrALAZINE, 10 mg, Intravenous, Q2H PRN    labetaloL, , ,     labetalol, 10 mg, Intravenous, Q4H PRN    LIDOcaine (PF) 10 mg/ml (1%), , ,     melatonin, 6 mg, Oral, Nightly PRN    ondansetron, 4 mg, Intravenous, Q8H PRN    prochlorperazine, 5 mg, Intravenous, Q6H PRN    sodium chloride 0.9%, 10 mL, Intravenous, PRN    Flushing PICC/Midline Protocol, , , Until Discontinued **AND** sodium chloride 0.9%, 10 mL, Intravenous, Q6H **AND** sodium chloride 0.9%, 10 mL, Intravenous, PRN    traZODone, 50 mg, Oral, Nightly PRN     Assessment/Plan:    Melena   Acute on chronic anemia due to above  Chronic gastritis, recent EGD 05/21/2024   Chronic gastroparesis     HX:  AFib on Xarelto, CAD, HTN, HLD, GERD,  CVA, SABINA, glaucoma    Plan:  EGD showed normal esophagus and medium amount of food in the stomach patient does have history of gastroparesis   Colonoscopy today  Hemoglobin slowly trending down this morning it is 8.0 will still keep a close watch   Potassium is low will give IV potassium  Xarelto is still on hold awaiting further GI recommendations about anticoagulation  Will continue with Protonix  Repeat blood work in a.m.  Continue with IV iron  SCDs      Eileen gtz MD

## 2024-05-29 NOTE — PROGRESS NOTES
Inpatient Nutrition Assessment    Admit Date: 5/27/2024   Total duration of encounter: 2 days   Patient Age: 79 y.o.    Nutrition Recommendation/Prescription     Diet Adult Regular ordered  Add Boost Breeze BID (provides 250 kcal and 9 g protein per container)  Encouraged adequate PO intake  Monitor appetite/PO intake, weight, and labs    Communication of Recommendations: reviewed with nurse and reviewed with patient    Nutrition Assessment     Malnutrition Assessment/Nutrition-Focused Physical Exam    Malnutrition Context: other (see comments) (Does not meet criteria at this time) (05/29/24 1446)  Malnutrition Level: other (see comments) (Does not meet criteria at this time) (05/29/24 1446)  Energy Intake (Malnutrition): less than 75% for greater than 7 days (05/29/24 1446)  Weight Loss (Malnutrition): other (see comments) (Does not meet criteria per EMR) (05/29/24 1446)  Subcutaneous Fat (Malnutrition): other (see comments) (Does not meet criteria) (05/29/24 1446)           Muscle Mass (Malnutrition): other (see comments) (Does not meet criteria) (05/29/24 1446)                          Fluid Accumulation (Malnutrition): other (see comments) (Does not meet criteria) (05/29/24 1446)        A minimum of two characteristics is recommended for diagnosis of either severe or non-severe malnutrition.    Chart Review    Reason Seen: malnutrition screening tool (MST)    Malnutrition Screening Tool Results   Have you recently lost weight without trying?: Yes: 14-23 lbs  Have you been eating poorly because of a decreased appetite?: No   MST Score: 2   Diagnosis:  Melena   Acute on chronic anemia due to above  Chronic gastritis, recent EGD 05/21/2024   Chronic gastroparesis     Relevant Medical History:    Anxiety      CHF (congestive heart failure)      COPD (chronic obstructive pulmonary disease)      Coronary artery disease       s/p 2 stents    Depression      GERD (gastroesophageal reflux disease)      Glaucoma       Hypertension      Obstructive sleep apnea       on cpap    Stroke      Urinary, incontinence, stress female        Scheduled Medications:  atorvastatin, 80 mg, QHS  gabapentin, 300 mg, TID  labetaloL, ,   LIDOcaine (PF) 10 mg/ml (1%), ,   methocarbamoL, 500 mg, BID  metoclopramide, 5 mg, Q8H  pantoprazole, 40 mg, BID AC  sertraline, 50 mg, QHS  sodium chloride 0.9%, 10 mL, Q6H  timolol maleate 0.5%, 1 drop, BID  traMADoL, 50 mg, TID    Continuous Infusions:  sodium chloride 0.45%, Last Rate: 75 mL/hr at 05/29/24 0633  sodium chloride 0.9%    PRN Medications:  dicyclomine, 10 mg, QID PRN  hydrALAZINE, 10 mg, Q2H PRN  labetaloL, ,   labetalol, 10 mg, Q4H PRN  LIDOcaine (PF) 10 mg/ml (1%), ,   melatonin, 6 mg, Nightly PRN  ondansetron, 4 mg, Q8H PRN  prochlorperazine, 5 mg, Q6H PRN  sodium chloride 0.9%, 10 mL, PRN  sodium chloride 0.9%, 10 mL, PRN  traZODone, 50 mg, Nightly PRN    Calorie Containing IV Medications: no significant kcals from medications at this time    Recent Labs   Lab 05/27/24  1427 05/27/24  2055 05/28/24  0422 05/29/24  0347     --  142 144   K 3.7  --  3.4* 3.0*   CALCIUM 9.0  --  8.7 8.8   MG  --   --   --  2.00   CHLORIDE 107  --  105 107   CO2 21*  --  23 25   BUN 36.0*  --  31.8* 17.4   CREATININE 1.10*  --  1.11* 0.86   EGFRNORACEVR 51  --  50 >60   GLUCOSE 148*  --  169* 143*   BILITOT 0.5  --  0.3 0.4   ALKPHOS 73  --  76 66   ALT 13  --  14 16   AST 16  --  21 34   ALBUMIN 3.2*  --  3.4 3.3*   LIPASE 8  --   --   --    WBC 10.95  --  10.48 12.06*   HGB 8.8* 9.2* 8.7* 8.0*   HCT 25.6* 27.2* 26.8* 23.7*     Nutrition Orders:  Diet Adult Regular      Appetite/Oral Intake: poor/0-25% of meals  Factors Affecting Nutritional Intake: decreased appetite and nausea  Social Needs Impacting Access to Food: none identified  Food/Church/Cultural Preferences: none reported  Food Allergies: none reported  Last Bowel Movement: 05/28/24  Wound(s):  none noted    Comments    5/29/2024: Pt reports a  "poor appetite/PO intake with nausea and dry heaves >3 weeks prior to admit.Pt reports a poor appetite/PO intake since admit (Clear Liquid Diet). Pt denies vomiting, and chewing difficulties. Pt reports nausea ans swallowing difficulties. Pt reports swallowing difficulties with rice and meat. Pt wears dentures, reports they are too loose. Per EMR, pt weighed 95 kg on 2023 (8.9% wt loss in 6 months, insignificant). Pt agreeable to Boost Breeze BID. Encouraged adequate PO intake. Will monitor.    Anthropometrics    Height: 5' 1" (154.9 cm), Height Method: Stated  Last Weight: 86.2 kg (190 lb) (24 0921), Weight Method: Stated  BMI (Calculated): 35.9  BMI Classification: obese grade II (BMI 35-39.9)     Ideal Body Weight (IBW), Female: 105 lb     % Ideal Body Weight, Female (lb): 180.95 %                    Usual Body Weight (UBW), k kg  % Usual Body Weight: 90.91     Usual Weight Provided By: EMR weight history    Wt Readings from Last 5 Encounters:   24 86.2 kg (190 lb)   24 86.6 kg (191 lb)   24 88 kg (194 lb)   24 88.5 kg (195 lb)   24 92.4 kg (203 lb 11.3 oz)     Weight Change(s) Since Admission:   2024: 86.2 kg  Wt Readings from Last 1 Encounters:   24 0921 86.2 kg (190 lb)   24 1257 86.6 kg (191 lb)   24 2223 86.5 kg (190 lb 11.2 oz)   24 1253 90.7 kg (200 lb)   Admit Weight: 90.7 kg (200 lb) (24 1253), Weight Method: Standard Scale    Estimated Needs    Weight Used For Calorie Calculations: 86.2 kg (190 lb 0.6 oz)  Energy Calorie Requirements (kcal): 8348-4574 (20-25 kcal/kg)  Energy Need Method: Kcal/kg  Weight Used For Protein Calculations: 86.2 kg (190 lb 0.6 oz)  Protein Requirements:  (1.0-1.2 g/kg)  Fluid Requirements (mL): 2000 (CHF)        Enteral Nutrition     Patient not receiving enteral nutrition at this time.    Parenteral Nutrition     Patient not receiving parenteral nutrition support at this time.    Evaluation " of Received Nutrient Intake    Calories: not meeting estimated needs  Protein: not meeting estimated needs    Patient Education     Not applicable.    Nutrition Diagnosis     PES: Inadequate oral intake related to acute illness as evidenced by poor PO intake for >3 weeks prior to admit and since admit. (new)     PES:  does not meet criteria at this time malnutrition related to  N/A as evidenced by  N/A . (new)    Nutrition Interventions     Intervention(s): general/healthful diet and commercial beverage    Goal: Meet greater than 80% of nutritional needs by follow-up. (new)  Goal: Maintain weight throughout hospitalization. (new)    Nutrition Goals & Monitoring     Dietitian will monitor: food and beverage intake, weight, electrolyte/renal panel, glucose/endocrine profile, and gastrointestinal profile  Discharge planning: too early to determine; pending clinical course  Nutrition Risk/Follow-Up: moderate (follow-up in 3-5 days)   Please consult if re-assessment needed sooner.

## 2024-05-29 NOTE — ANESTHESIA PREPROCEDURE EVALUATION
05/29/2024  Katty Veronica is a 79 y.o., female with CAD status post stents (2005) with preserved EF, paroxysmal AFib previously on Xarelto, COPD, CVA (left-sided weakness), SABINA admitted May 27th complaining of abdominal pain with vomiting.  Further workup revealed moderate anemia.  Patient tolerated IV sedation for EGD yesterday and presents today for colonoscopy.  Systolic pressure 199 upon arrival to GI lab      Transthoracic echo February 2024   EF 55% with grade 1 diastolic dysfunction   Trace AI/MR/TR the    Pre-op Assessment    I have reviewed the Patient Summary Reports.    I have reviewed the NPO Status.   I have reviewed the Medications.     Review of Systems  Anesthesia Hx:               Denies Personal Hx of Anesthesia complications.                    Social:  Non-Smoker       Hematology/Oncology:       -- Anemia:                                  Cardiovascular:     Hypertension, poorly controlled    Dysrhythmias atrial fibrillation           Functional Capacity 2 METS, Ambulates with walker   Coronary Artery Disease:      S/P Percutaneous Coronary Intervention (PCI)                            Pulmonary:   COPD     Sleep Apnea                Hepatic/GI:    Hiatal Hernia, GERD             Neurological:   CVA, residual symptoms                                    Endocrine:        Obesity / BMI > 30      Physical Exam  General: Well nourished, Cooperative, Alert and Oriented    Airway:  Mallampati: II   Mouth Opening: Normal  TM Distance: Normal  Tongue: Normal  Neck ROM: Normal ROM    Dental:  Edentulous    Chest/Lungs:  Clear to auscultation, Normal Respiratory Rate    Heart:  Rhythm: Regular Rhythm, Irregularly Irregular        Anesthesia Plan  Type of Anesthesia, risks & benefits discussed:    Anesthesia Type: Gen Natural Airway  Intra-op Monitoring Plan: Standard ASA Monitors  Induction:   IV  Informed Consent: Informed consent signed with the Patient and all parties understand the risks and agree with anesthesia plan.  All questions answered.   ASA Score: 4  Day of Surgery Review of History & Physical: H&P Update referred to the surgeon/provider.  Anesthesia Plan Notes: Preop labetalol    Ready For Surgery From Anesthesia Perspective.     .

## 2024-05-29 NOTE — PT/OT/SLP PROGRESS
Physical Therapy Treatment    Patient Name:  Katty Veronica   MRN:  [id number]    Patient refused 2/2 nausea. PT to f/u tomorrow.

## 2024-05-29 NOTE — PROVATION PATIENT INSTRUCTIONS
Discharge Summary/Instructions after an Endoscopic Procedure  Patient Name: Katty Veronica  Patient MRN: [id number]  Patient YOB: 1945  Wednesday, May 29, 2024  Samuel Washington MD  Dear patient,  As a result of recent federal legislation (The Federal Cures Act), you may   receive lab or pathology results from your procedure in your [facility]   account before your physician is able to contact you. Your physician or   their representative will relay the results to you with their   recommendations at their soonest availability.  Thank you,  RESTRICTIONS:  During your procedure today, you received medications for sedation.  These   medications may affect your judgment, balance and coordination.  Therefore,   for 24 hours, you have the following restrictions:   - DO NOT drive a car, operate machinery, make legal/financial decisions,   sign important papers or drink alcohol.    ACTIVITY:  Today: no heavy lifting, straining or running due to procedural   sedation/anesthesia.  The following day: return to full activity including work.  DIET:  Eat and drink normally unless instructed otherwise.     TREATMENT FOR COMMON SIDE EFFECTS:  - Mild abdominal pain, nausea, belching, bloating or excessive gas:  rest,   eat lightly and use a heating pad.  - Sore Throat: treat with throat lozenges and/or gargle with warm salt   water.  - Because air was used during the procedure, expelling large amounts of air   from your rectum or belching is normal.  - If a bowel prep was taken, you may not have a bowel movement for 1-3 days.    This is normal.  SYMPTOMS TO WATCH FOR AND REPORT TO YOUR PHYSICIAN:  1. Abdominal pain or bloating, other than gas cramps.  2. Chest pain.  3. Back pain.  4. Signs of infection such as: chills or fever occurring within 24 hours   after the procedure.  5. Rectal bleeding, which would show as bright red, maroon, or black stools.   (A tablespoon of blood from the rectum is not serious,  especially if   hemorrhoids are present.)  6. Vomiting.  7. Weakness or dizziness.  GO DIRECTLY TO THE NEAREST EMERGENCY ROOM IF YOU HAVE ANY OF THE FOLLOWING:      Difficulty breathing              Chills and/or fever over 101 F   Persistent vomiting and/or vomiting blood   Severe abdominal pain   Severe chest pain   Black, tarry stools   Bleeding- more than one tablespoon   Any other symptom or condition that you feel may need urgent attention  Your doctor recommends these additional instructions:  If any biopsies were taken, your doctors clinic will contact you in 1 to 2   weeks with any results.  - [facility]   - will give iron infusion while she is here.   -follow up gi as outpatient.  can discuss utility of capsule, but this would   have to be placed endoscopically.  we can see how she does with iron   infusions.  For questions, problems or results please call your physician - Samuel Washington MD at Work:  [phone].  [facility], EMERGENCY ROOM PHONE NUMBER: [phone]  IF A COMPLICATION OR EMERGENCY SITUATION ARISES AND YOU ARE UNABLE TO REACH   YOUR PHYSICIAN - GO DIRECTLY TO THE EMERGENCY ROOM.  MD Samuel Linda MD  5/29/2024 11:03:47 AM  This report has been verified and signed electronically.  Dear patient,  As a result of recent federal legislation (The Federal Cures Act), you may   receive lab or pathology results from your procedure in your [facility]   account before your physician is able to contact you. Your physician or   their representative will relay the results to you with their   recommendations at their soonest availability.  Thank you,  PROVATION

## 2024-05-29 NOTE — PLAN OF CARE
Problem: Adult Inpatient Plan of Care  Goal: Plan of Care Review  Outcome: Progressing  Flowsheets (Taken 5/29/2024 0656)  Plan of Care Reviewed With: patient  Goal: Patient-Specific Goal (Individualized)  Outcome: Progressing  Goal: Absence of Hospital-Acquired Illness or Injury  Outcome: Progressing  Intervention: Prevent Skin Injury  Flowsheets (Taken 5/29/2024 0656)  Body Position: position maintained  Device Skin Pressure Protection:   adhesive use limited   tubing/devices free from skin contact  Goal: Optimal Comfort and Wellbeing  Outcome: Progressing  Intervention: Monitor Pain and Promote Comfort  Flowsheets (Taken 5/29/2024 0656)  Pain Management Interventions: care clustered  Goal: Readiness for Transition of Care  Outcome: Progressing     Problem: Infection  Goal: Absence of Infection Signs and Symptoms  Outcome: Progressing     Problem: Wound  Goal: Optimal Coping  Outcome: Progressing  Goal: Optimal Functional Ability  Outcome: Progressing  Goal: Absence of Infection Signs and Symptoms  Outcome: Progressing  Goal: Improved Oral Intake  Outcome: Progressing  Goal: Optimal Pain Control and Function  Outcome: Progressing  Goal: Skin Health and Integrity  Outcome: Progressing  Goal: Optimal Wound Healing  Outcome: Progressing

## 2024-05-29 NOTE — TRANSFER OF CARE
"Anesthesia Transfer of Care Note    Patient: Katty Veronica    Procedure(s) Performed: Procedure(s) (LRB):  COLON (N/A)    Patient location: GI    Anesthesia Type: general    Transport from OR: Transported from OR on room air with adequate spontaneous ventilation    Post pain: adequate analgesia    Post assessment: no apparent anesthetic complications and tolerated procedure well    Post vital signs: stable    Level of consciousness: awake and sedated    Nausea/Vomiting: no nausea/vomiting    Complications: none    Transfer of care protocol was followed      Last vitals: Visit Vitals  BP (!) 188/83 (BP Location: Left arm, Patient Position: Lying)   Pulse 72   Temp 36.1 °C (97 °F) (Tympanic)   Resp 20   Ht 5' 1" (1.549 m)   Wt 86.2 kg (190 lb)   SpO2 96%   BMI 35.90 kg/m²     "

## 2024-05-30 LAB
ABO + RH BLD: NORMAL
ABORH RETYPE: NORMAL
ANION GAP SERPL CALC-SCNC: 6 MEQ/L
BASOPHILS # BLD AUTO: 0.03 X10(3)/MCL
BASOPHILS NFR BLD AUTO: 0.3 %
BLD PROD TYP BPU: NORMAL
BLOOD UNIT EXPIRATION DATE: NORMAL
BLOOD UNIT TYPE CODE: 6200
BUN SERPL-MCNC: 10.6 MG/DL (ref 9.8–20.1)
CALCIUM SERPL-MCNC: 8.2 MG/DL (ref 8.4–10.2)
CHLORIDE SERPL-SCNC: 103 MMOL/L (ref 98–107)
CO2 SERPL-SCNC: 29 MMOL/L (ref 23–31)
CREAT SERPL-MCNC: 0.73 MG/DL (ref 0.55–1.02)
CREAT/UREA NIT SERPL: 15
CROSSMATCH INTERPRETATION: NORMAL
DISPENSE STATUS: NORMAL
EOSINOPHIL # BLD AUTO: 0.01 X10(3)/MCL (ref 0–0.9)
EOSINOPHIL NFR BLD AUTO: 0.1 %
ERYTHROCYTE [DISTWIDTH] IN BLOOD BY AUTOMATED COUNT: 14.9 % (ref 11.5–17)
GFR SERPLBLD CREATININE-BSD FMLA CKD-EPI: >60 ML/MIN/1.73/M2
GLUCOSE SERPL-MCNC: 93 MG/DL (ref 82–115)
GROUP & RH: NORMAL
HCT VFR BLD AUTO: 21.5 % (ref 37–47)
HGB BLD-MCNC: 7.1 G/DL (ref 12–16)
IMM GRANULOCYTES # BLD AUTO: 0.05 X10(3)/MCL (ref 0–0.04)
IMM GRANULOCYTES NFR BLD AUTO: 0.5 %
INDIRECT COOMBS: NORMAL
LYMPHOCYTES # BLD AUTO: 3.93 X10(3)/MCL (ref 0.6–4.6)
LYMPHOCYTES NFR BLD AUTO: 38.2 %
MCH RBC QN AUTO: 28.5 PG (ref 27–31)
MCHC RBC AUTO-ENTMCNC: 33 G/DL (ref 33–36)
MCV RBC AUTO: 86.3 FL (ref 80–94)
MONOCYTES # BLD AUTO: 0.93 X10(3)/MCL (ref 0.1–1.3)
MONOCYTES NFR BLD AUTO: 9 %
NEUTROPHILS # BLD AUTO: 5.34 X10(3)/MCL (ref 2.1–9.2)
NEUTROPHILS NFR BLD AUTO: 51.9 %
NRBC BLD AUTO-RTO: 0 %
PLATELET # BLD AUTO: 181 X10(3)/MCL (ref 130–400)
PMV BLD AUTO: 10.9 FL (ref 7.4–10.4)
POTASSIUM SERPL-SCNC: 2.2 MMOL/L (ref 3.5–5.1)
RBC # BLD AUTO: 2.49 X10(6)/MCL (ref 4.2–5.4)
SODIUM SERPL-SCNC: 138 MMOL/L (ref 136–145)
SPECIMEN OUTDATE: NORMAL
UNIT NUMBER: NORMAL
WBC # SPEC AUTO: 10.29 X10(3)/MCL (ref 4.5–11.5)

## 2024-05-30 PROCEDURE — 85025 COMPLETE CBC W/AUTO DIFF WBC: CPT | Performed by: INTERNAL MEDICINE

## 2024-05-30 PROCEDURE — A4216 STERILE WATER/SALINE, 10 ML: HCPCS | Performed by: INTERNAL MEDICINE

## 2024-05-30 PROCEDURE — C9113 INJ PANTOPRAZOLE SODIUM, VIA: HCPCS | Performed by: EMERGENCY MEDICINE

## 2024-05-30 PROCEDURE — 63600175 PHARM REV CODE 636 W HCPCS: Performed by: PHYSICIAN ASSISTANT

## 2024-05-30 PROCEDURE — 25000003 PHARM REV CODE 250: Performed by: INTERNAL MEDICINE

## 2024-05-30 PROCEDURE — 63600175 PHARM REV CODE 636 W HCPCS: Performed by: NURSE PRACTITIONER

## 2024-05-30 PROCEDURE — 63600175 PHARM REV CODE 636 W HCPCS: Performed by: INTERNAL MEDICINE

## 2024-05-30 PROCEDURE — P9016 RBC LEUKOCYTES REDUCED: HCPCS | Performed by: NURSE PRACTITIONER

## 2024-05-30 PROCEDURE — 86923 COMPATIBILITY TEST ELECTRIC: CPT | Performed by: NURSE PRACTITIONER

## 2024-05-30 PROCEDURE — 36415 COLL VENOUS BLD VENIPUNCTURE: CPT | Performed by: INTERNAL MEDICINE

## 2024-05-30 PROCEDURE — 63600175 PHARM REV CODE 636 W HCPCS: Performed by: EMERGENCY MEDICINE

## 2024-05-30 PROCEDURE — 86850 RBC ANTIBODY SCREEN: CPT | Performed by: NURSE PRACTITIONER

## 2024-05-30 PROCEDURE — 97162 PT EVAL MOD COMPLEX 30 MIN: CPT

## 2024-05-30 PROCEDURE — 30233N1 TRANSFUSION OF NONAUTOLOGOUS RED BLOOD CELLS INTO PERIPHERAL VEIN, PERCUTANEOUS APPROACH: ICD-10-PCS | Performed by: INTERNAL MEDICINE

## 2024-05-30 PROCEDURE — 36430 TRANSFUSION BLD/BLD COMPNT: CPT

## 2024-05-30 PROCEDURE — 25000003 PHARM REV CODE 250: Performed by: NURSE PRACTITIONER

## 2024-05-30 PROCEDURE — 80048 BASIC METABOLIC PNL TOTAL CA: CPT | Performed by: INTERNAL MEDICINE

## 2024-05-30 PROCEDURE — 21400001 HC TELEMETRY ROOM

## 2024-05-30 PROCEDURE — 36415 COLL VENOUS BLD VENIPUNCTURE: CPT | Performed by: NURSE PRACTITIONER

## 2024-05-30 RX ORDER — POTASSIUM CHLORIDE 20 MEQ/1
40 TABLET, EXTENDED RELEASE ORAL ONCE
Status: COMPLETED | OUTPATIENT
Start: 2024-05-30 | End: 2024-05-30

## 2024-05-30 RX ORDER — MUPIROCIN 20 MG/G
OINTMENT TOPICAL 2 TIMES DAILY
Status: COMPLETED | OUTPATIENT
Start: 2024-05-30 | End: 2024-06-04

## 2024-05-30 RX ORDER — HYDROCODONE BITARTRATE AND ACETAMINOPHEN 500; 5 MG/1; MG/1
TABLET ORAL
Status: DISCONTINUED | OUTPATIENT
Start: 2024-05-30 | End: 2024-06-05 | Stop reason: HOSPADM

## 2024-05-30 RX ORDER — DOXYLAMINE SUCCINATE 25 MG
TABLET ORAL 2 TIMES DAILY
Status: DISCONTINUED | OUTPATIENT
Start: 2024-05-30 | End: 2024-06-05 | Stop reason: HOSPADM

## 2024-05-30 RX ORDER — POTASSIUM CHLORIDE 14.9 MG/ML
40 INJECTION INTRAVENOUS ONCE
Status: COMPLETED | OUTPATIENT
Start: 2024-05-30 | End: 2024-05-30

## 2024-05-30 RX ADMIN — ONDANSETRON 4 MG: 2 INJECTION INTRAMUSCULAR; INTRAVENOUS at 02:05

## 2024-05-30 RX ADMIN — SODIUM CHLORIDE, PRESERVATIVE FREE 10 ML: 5 INJECTION INTRAVENOUS at 11:05

## 2024-05-30 RX ADMIN — GABAPENTIN 300 MG: 300 CAPSULE ORAL at 09:05

## 2024-05-30 RX ADMIN — ATORVASTATIN CALCIUM 80 MG: 40 TABLET, FILM COATED ORAL at 09:05

## 2024-05-30 RX ADMIN — SODIUM CHLORIDE, PRESERVATIVE FREE 10 ML: 5 INJECTION INTRAVENOUS at 12:05

## 2024-05-30 RX ADMIN — POTASSIUM CHLORIDE 40 MEQ: 1500 TABLET, EXTENDED RELEASE ORAL at 11:05

## 2024-05-30 RX ADMIN — POTASSIUM CHLORIDE 40 MEQ: 14.9 INJECTION, SOLUTION INTRAVENOUS at 06:05

## 2024-05-30 RX ADMIN — TRAMADOL HYDROCHLORIDE 50 MG: 50 TABLET, COATED ORAL at 09:05

## 2024-05-30 RX ADMIN — SODIUM CHLORIDE: 4.5 INJECTION, SOLUTION INTRAVENOUS at 10:05

## 2024-05-30 RX ADMIN — GABAPENTIN 300 MG: 300 CAPSULE ORAL at 02:05

## 2024-05-30 RX ADMIN — PANTOPRAZOLE SODIUM 40 MG: 40 INJECTION, POWDER, LYOPHILIZED, FOR SOLUTION INTRAVENOUS at 06:05

## 2024-05-30 RX ADMIN — METHOCARBAMOL 500 MG: 500 TABLET ORAL at 08:05

## 2024-05-30 RX ADMIN — PANTOPRAZOLE SODIUM 40 MG: 40 INJECTION, POWDER, LYOPHILIZED, FOR SOLUTION INTRAVENOUS at 03:05

## 2024-05-30 RX ADMIN — TRAMADOL HYDROCHLORIDE 50 MG: 50 TABLET, COATED ORAL at 08:05

## 2024-05-30 RX ADMIN — TIMOLOL MALEATE 1 DROP: 5 SOLUTION OPHTHALMIC at 08:05

## 2024-05-30 RX ADMIN — METHOCARBAMOL 500 MG: 500 TABLET ORAL at 09:05

## 2024-05-30 RX ADMIN — TRAMADOL HYDROCHLORIDE 50 MG: 50 TABLET, COATED ORAL at 02:05

## 2024-05-30 RX ADMIN — ONDANSETRON 4 MG: 2 INJECTION INTRAMUSCULAR; INTRAVENOUS at 07:05

## 2024-05-30 RX ADMIN — MUPIROCIN: 20 OINTMENT TOPICAL at 11:05

## 2024-05-30 RX ADMIN — MICONAZOLE NITRATE: 20 CREAM TOPICAL at 09:05

## 2024-05-30 RX ADMIN — METOCLOPRAMIDE 5 MG: 5 INJECTION, SOLUTION INTRAMUSCULAR; INTRAVENOUS at 02:05

## 2024-05-30 RX ADMIN — METOCLOPRAMIDE 5 MG: 5 INJECTION, SOLUTION INTRAMUSCULAR; INTRAVENOUS at 09:05

## 2024-05-30 RX ADMIN — GABAPENTIN 300 MG: 300 CAPSULE ORAL at 08:05

## 2024-05-30 RX ADMIN — TIMOLOL MALEATE 1 DROP: 5 SOLUTION OPHTHALMIC at 09:05

## 2024-05-30 RX ADMIN — SERTRALINE HYDROCHLORIDE 50 MG: 50 TABLET ORAL at 09:05

## 2024-05-30 RX ADMIN — POTASSIUM CHLORIDE 40 MEQ: 1500 TABLET, EXTENDED RELEASE ORAL at 06:05

## 2024-05-30 RX ADMIN — SODIUM CHLORIDE, PRESERVATIVE FREE 10 ML: 5 INJECTION INTRAVENOUS at 06:05

## 2024-05-30 RX ADMIN — METOCLOPRAMIDE 5 MG: 5 INJECTION, SOLUTION INTRAMUSCULAR; INTRAVENOUS at 06:05

## 2024-05-30 RX ADMIN — SODIUM CHLORIDE 125 MG: 9 INJECTION, SOLUTION INTRAVENOUS at 11:05

## 2024-05-30 NOTE — ANESTHESIA POSTPROCEDURE EVALUATION
Anesthesia Post Evaluation    Patient: Katty Veronica    Procedure(s) Performed: Procedure(s) (LRB):  EGD (N/A)    Final Anesthesia Type: general      Patient location during evaluation: PACU  Patient participation: Yes- Able to Participate  Level of consciousness: awake and alert  Post-procedure vital signs: reviewed and stable  Pain management: adequate  Airway patency: patent      Anesthetic complications: no      Cardiovascular status: blood pressure returned to baseline  Respiratory status: unassisted  Hydration status: euvolemic  Follow-up not needed.              Vitals Value Taken Time   /78 05/21/24 0920   Temp 36 °C (96.8 °F) 05/21/24 0900   Pulse 70 05/21/24 0920   Resp 16 05/21/24 0920   SpO2 98 % 05/21/24 0920         No case tracking events are documented in the log.      Pain/Deja Score: Pain Rating Prior to Med Admin: 7 (5/30/2024  8:29 AM)  Pain Rating Post Med Admin: 2 (5/30/2024  9:29 AM)  Deja Score: 10 (5/30/2024  8:00 AM)

## 2024-05-30 NOTE — PLAN OF CARE
Problem: Physical Therapy  Goal: Physical Therapy Goal  Description: Goals to be met by: 24     Patient will increase functional independence with mobility by performin. Supine to sit with Modified [place]  2. Sit to supine with Modified [place]  3. Sit to stand transfer with Modified [place]  4. Bed to chair transfer with Modified [place] using Rolling Walker  5. Gait  x 200 feet with Modified [place] using Rolling Walker.     Outcome: Progressing

## 2024-05-30 NOTE — PT/OT/SLP EVAL
"Physical Therapy Evaluation    Patient Name:  Katty Veronica   MRN:  [id number]    Recommendations:     Discharge therapy intensity: Low Intensity Therapy   Discharge Equipment Recommendations: none   Barriers to discharge: Impaired mobility    Assessment:     Katty Veronica is a 79 y.o. female admitted with a medical diagnosis of melena with acute blood loss anemia, gastroparesis.  She presents with the following impairments/functional limitations: weakness, gait instability, impaired balance, impaired endurance, decreased safety awareness, impaired functional mobility. The pt tolerated session well. She is able to ambulate a short distance with RW at this time. Anticipated increased ambulation distance with H&H improves. Will continue to progress as tolerated.    Rehab Prognosis: Good; patient would benefit from acute skilled PT services to address these deficits and reach maximum level of function.    Recent Surgery: Procedure(s) (LRB):  COLON (N/A) 1 Day Post-Op    Plan:     During this hospitalization, patient would benefit from acute PT services 5 x/week to address the identified rehab impairments via therapeutic activities, therapeutic exercises, gait training and progress toward the following goals:    Plan of Care Expires:  06/28/24    Subjective     Chief Complaint: "feeling weak"  Patient/Family Comments/goals: return home  Pain/Comfort:  Pain Rating 1: 0/10    Patients cultural, spiritual, Syn[employer] conflicts given the current situation:      Living Environment:  Pt lives at home with daughter and son-in-law, SL home, ramp to enter.   Prior to admission, patients level of function was independent.  Equipment used at home: walker, rolling, rollator.  DME owned (not currently used): rolling walker.  Upon discharge, patient will have assistance from spouse.    Objective:     Communicated with NSG prior to session.  Patient found supine with PICC line  upon PT entry to room.    General " Precautions: Standard, fall  Orthopedic Precautions:N/A   Braces: N/A  Respiratory Status: Room air  Blood Pressure: NA      Exams:  RLE ROM: WFL  RLE Strength: WFL  LLE ROM: WFL  LLE Strength: WFL  Skin integrity: Visible skin intact      Functional Mobility:  Bed Mobility:     Scooting: contact guard assistance  Supine to Sit: contact guard assistance  Sit to Supine: contact guard assistance  Transfers:     Sit to Stand:  contact guard assistance with no AD  Gait: the pt ambulates x 15 feet in room with RW, did not wish to go further 2/2 feeling weak.      Patient provided with verbal education education regarding PT role/goals/POC, safety awareness, and discharge/DME recommendations.  Understanding was verbalized.     Patient left supine with all lines intact, call button in reach, and NSG notified.    GOALS:   Multidisciplinary Problems       Physical Therapy Goals          Problem: Physical Therapy    Goal Priority Disciplines Outcome Goal Variances Interventions   Physical Therapy Goal     PT, PT/OT Progressing     Description: Goals to be met by: 24     Patient will increase functional independence with mobility by performin. Supine to sit with Modified [place]  2. Sit to supine with Modified [place]  3. Sit to stand transfer with Modified [place]  4. Bed to chair transfer with Modified [place] using Rolling Walker  5. Gait  x 200 feet with Modified [place] using Rolling Walker.                          History:     Past Medical History:   Diagnosis Date    Anxiety     CHF (congestive heart failure)     COPD (chronic obstructive pulmonary disease)     Coronary artery disease     s/p 2 stents    Depression     GERD (gastroesophageal reflux disease)     Glaucoma     Hypertension     Obstructive sleep apnea     on cpap    Stroke     Urinary, incontinence, stress female        Past Surgical History:   Procedure Laterality Date    APPENDECTOMY      BACK SURGERY       CHOLECYSTECTOMY      COLONOSCOPY N/A 5/29/2024    Procedure: COLON;  Surgeon: Samuel Washington MD;  Location: [facility] ENDOSCOPY;  Service: Gastroenterology;  Laterality: N/A;    EGD, WITH CLOSED BIOPSY N/A 5/21/2024    Procedure: EGD;  Surgeon: BASIM Washington MD;  Location: [facility] ENDOSCOPY;  Service: Gastroenterology;  Laterality: N/A;    EGD, WITH CLOSED BIOPSY N/A 5/28/2024    Procedure: EGD;  Surgeon: Samuel Washington MD;  Location: [facility] ENDOSCOPY;  Service: Gastroenterology;  Laterality: N/A;    EXPLORATION OF COMMON BILE DUCT      HERNIA REPAIR      incisional hernia times 2, central abdomen    HYSTERECTOMY      OVARIAN CYST REMOVAL      TONSILLECTOMY         Time Tracking:     PT Received On: 05/30/24  PT Start Time: 0913     PT Stop Time: 0921  PT Total Time (min): 8 min     Billable Minutes: Evaluation 8      05/30/2024

## 2024-05-30 NOTE — PLAN OF CARE
Problem: Adult Inpatient Plan of Care  Goal: Plan of Care Review  Outcome: Progressing  Flowsheets (Taken 5/29/2024 2349)  Plan of Care Reviewed With: patient  Goal: Patient-Specific Goal (Individualized)  Outcome: Progressing  Flowsheets (Taken 5/29/2024 2349)  Individualized Care Needs: assist with adls, manage pain and nausea  Goal: Absence of Hospital-Acquired Illness or Injury  Outcome: Progressing  Intervention: Identify and Manage Fall Risk  Flowsheets (Taken 5/29/2024 2349)  Safety Promotion/Fall Prevention:   assistive device/personal item within reach   Fall Risk reviewed with patient/family   Fall Risk signage in place   side rails raised x 2  Intervention: Prevent Skin Injury  Flowsheets (Taken 5/29/2024 2349)  Body Position:   position changed independently   weight shifting  Intervention: Prevent and Manage VTE (Venous Thromboembolism) Risk  Flowsheets (Taken 5/29/2024 2349)  VTE Prevention/Management:   intravenous hydration   ambulation promoted  Intervention: Prevent Infection  Flowsheets (Taken 5/29/2024 2349)  Infection Prevention:   hand hygiene promoted   single patient room provided   rest/sleep promoted  Goal: Optimal Comfort and Wellbeing  Outcome: Progressing  Intervention: Monitor Pain and Promote Comfort  Flowsheets (Taken 5/29/2024 2349)  Pain Management Interventions: care clustered  Intervention: Provide Person-Centered Care  Flowsheets (Taken 5/29/2024 2349)  Trust Relationship/Rapport:   care explained   choices provided   thoughts/feelings acknowledged   questions encouraged   questions answered  Goal: Readiness for Transition of Care  Outcome: Progressing

## 2024-05-31 LAB
ANION GAP SERPL CALC-SCNC: 8 MEQ/L
BASOPHILS # BLD AUTO: 0.03 X10(3)/MCL
BASOPHILS NFR BLD AUTO: 0.3 %
BUN SERPL-MCNC: 9 MG/DL (ref 9.8–20.1)
CALCIUM SERPL-MCNC: 8.2 MG/DL (ref 8.4–10.2)
CHLORIDE SERPL-SCNC: 107 MMOL/L (ref 98–107)
CO2 SERPL-SCNC: 22 MMOL/L (ref 23–31)
CREAT SERPL-MCNC: 0.68 MG/DL (ref 0.55–1.02)
CREAT/UREA NIT SERPL: 13
EOSINOPHIL # BLD AUTO: 0.05 X10(3)/MCL (ref 0–0.9)
EOSINOPHIL NFR BLD AUTO: 0.5 %
ERYTHROCYTE [DISTWIDTH] IN BLOOD BY AUTOMATED COUNT: 15.8 % (ref 11.5–17)
GFR SERPLBLD CREATININE-BSD FMLA CKD-EPI: >60 ML/MIN/1.73/M2
GLUCOSE SERPL-MCNC: 111 MG/DL (ref 82–115)
HCT VFR BLD AUTO: 27 % (ref 37–47)
HGB BLD-MCNC: 9.3 G/DL (ref 12–16)
IMM GRANULOCYTES # BLD AUTO: 0.04 X10(3)/MCL (ref 0–0.04)
IMM GRANULOCYTES NFR BLD AUTO: 0.4 %
LYMPHOCYTES # BLD AUTO: 3.63 X10(3)/MCL (ref 0.6–4.6)
LYMPHOCYTES NFR BLD AUTO: 34.7 %
MAGNESIUM SERPL-MCNC: 1.9 MG/DL (ref 1.6–2.6)
MCH RBC QN AUTO: 28.7 PG (ref 27–31)
MCHC RBC AUTO-ENTMCNC: 34.4 G/DL (ref 33–36)
MCV RBC AUTO: 83.3 FL (ref 80–94)
MONOCYTES # BLD AUTO: 0.92 X10(3)/MCL (ref 0.1–1.3)
MONOCYTES NFR BLD AUTO: 8.8 %
NEUTROPHILS # BLD AUTO: 5.8 X10(3)/MCL (ref 2.1–9.2)
NEUTROPHILS NFR BLD AUTO: 55.3 %
NRBC BLD AUTO-RTO: 0.2 %
PLATELET # BLD AUTO: 203 X10(3)/MCL (ref 130–400)
PMV BLD AUTO: 10.8 FL (ref 7.4–10.4)
POTASSIUM SERPL-SCNC: 3.3 MMOL/L (ref 3.5–5.1)
RBC # BLD AUTO: 3.24 X10(6)/MCL (ref 4.2–5.4)
SODIUM SERPL-SCNC: 137 MMOL/L (ref 136–145)
WBC # SPEC AUTO: 10.47 X10(3)/MCL (ref 4.5–11.5)

## 2024-05-31 PROCEDURE — 63600175 PHARM REV CODE 636 W HCPCS: Performed by: INTERNAL MEDICINE

## 2024-05-31 PROCEDURE — 21400001 HC TELEMETRY ROOM

## 2024-05-31 PROCEDURE — 25000003 PHARM REV CODE 250: Performed by: INTERNAL MEDICINE

## 2024-05-31 PROCEDURE — C9113 INJ PANTOPRAZOLE SODIUM, VIA: HCPCS | Performed by: EMERGENCY MEDICINE

## 2024-05-31 PROCEDURE — 83735 ASSAY OF MAGNESIUM: CPT | Performed by: INTERNAL MEDICINE

## 2024-05-31 PROCEDURE — 97116 GAIT TRAINING THERAPY: CPT | Mod: CQ

## 2024-05-31 PROCEDURE — 63600175 PHARM REV CODE 636 W HCPCS: Performed by: PHYSICIAN ASSISTANT

## 2024-05-31 PROCEDURE — 85025 COMPLETE CBC W/AUTO DIFF WBC: CPT | Performed by: INTERNAL MEDICINE

## 2024-05-31 PROCEDURE — 80048 BASIC METABOLIC PNL TOTAL CA: CPT | Performed by: INTERNAL MEDICINE

## 2024-05-31 PROCEDURE — A4216 STERILE WATER/SALINE, 10 ML: HCPCS | Performed by: INTERNAL MEDICINE

## 2024-05-31 PROCEDURE — 36415 COLL VENOUS BLD VENIPUNCTURE: CPT | Performed by: INTERNAL MEDICINE

## 2024-05-31 PROCEDURE — 63600175 PHARM REV CODE 636 W HCPCS: Performed by: EMERGENCY MEDICINE

## 2024-05-31 RX ORDER — PANTOPRAZOLE SODIUM 40 MG/1
40 TABLET, DELAYED RELEASE ORAL
Status: DISCONTINUED | OUTPATIENT
Start: 2024-05-31 | End: 2024-06-05 | Stop reason: HOSPADM

## 2024-05-31 RX ORDER — AMLODIPINE BESYLATE 5 MG/1
5 TABLET ORAL DAILY
Status: DISCONTINUED | OUTPATIENT
Start: 2024-05-31 | End: 2024-06-03

## 2024-05-31 RX ORDER — POTASSIUM CHLORIDE 20 MEQ/1
40 TABLET, EXTENDED RELEASE ORAL 2 TIMES DAILY
Status: DISCONTINUED | OUTPATIENT
Start: 2024-05-31 | End: 2024-06-03

## 2024-05-31 RX ADMIN — SODIUM CHLORIDE, PRESERVATIVE FREE 10 ML: 5 INJECTION INTRAVENOUS at 11:05

## 2024-05-31 RX ADMIN — TIMOLOL MALEATE 1 DROP: 5 SOLUTION OPHTHALMIC at 08:05

## 2024-05-31 RX ADMIN — SODIUM CHLORIDE, PRESERVATIVE FREE 10 ML: 5 INJECTION INTRAVENOUS at 05:05

## 2024-05-31 RX ADMIN — POTASSIUM CHLORIDE 40 MEQ: 1500 TABLET, EXTENDED RELEASE ORAL at 05:05

## 2024-05-31 RX ADMIN — GABAPENTIN 300 MG: 300 CAPSULE ORAL at 08:05

## 2024-05-31 RX ADMIN — GABAPENTIN 300 MG: 300 CAPSULE ORAL at 03:05

## 2024-05-31 RX ADMIN — MICONAZOLE NITRATE: 20 CREAM TOPICAL at 09:05

## 2024-05-31 RX ADMIN — GABAPENTIN 300 MG: 300 CAPSULE ORAL at 09:05

## 2024-05-31 RX ADMIN — TRAMADOL HYDROCHLORIDE 50 MG: 50 TABLET, COATED ORAL at 08:05

## 2024-05-31 RX ADMIN — MICONAZOLE NITRATE: 20 CREAM TOPICAL at 08:05

## 2024-05-31 RX ADMIN — SODIUM CHLORIDE, PRESERVATIVE FREE 10 ML: 5 INJECTION INTRAVENOUS at 06:05

## 2024-05-31 RX ADMIN — SERTRALINE HYDROCHLORIDE 50 MG: 50 TABLET ORAL at 09:05

## 2024-05-31 RX ADMIN — PANTOPRAZOLE SODIUM 40 MG: 40 TABLET, DELAYED RELEASE ORAL at 05:05

## 2024-05-31 RX ADMIN — TRAMADOL HYDROCHLORIDE 50 MG: 50 TABLET, COATED ORAL at 09:05

## 2024-05-31 RX ADMIN — ATORVASTATIN CALCIUM 80 MG: 40 TABLET, FILM COATED ORAL at 09:05

## 2024-05-31 RX ADMIN — PANTOPRAZOLE SODIUM 40 MG: 40 INJECTION, POWDER, LYOPHILIZED, FOR SOLUTION INTRAVENOUS at 06:05

## 2024-05-31 RX ADMIN — TRAZODONE HYDROCHLORIDE 50 MG: 50 TABLET ORAL at 09:05

## 2024-05-31 RX ADMIN — TRAMADOL HYDROCHLORIDE 50 MG: 50 TABLET, COATED ORAL at 03:05

## 2024-05-31 RX ADMIN — METOCLOPRAMIDE 5 MG: 5 INJECTION, SOLUTION INTRAMUSCULAR; INTRAVENOUS at 06:05

## 2024-05-31 RX ADMIN — TIMOLOL MALEATE 1 DROP: 5 SOLUTION OPHTHALMIC at 09:05

## 2024-05-31 RX ADMIN — AMLODIPINE BESYLATE 5 MG: 5 TABLET ORAL at 05:05

## 2024-05-31 RX ADMIN — MUPIROCIN: 20 OINTMENT TOPICAL at 09:05

## 2024-05-31 RX ADMIN — SODIUM CHLORIDE, PRESERVATIVE FREE 10 ML: 5 INJECTION INTRAVENOUS at 12:05

## 2024-05-31 RX ADMIN — METHOCARBAMOL 500 MG: 500 TABLET ORAL at 08:05

## 2024-05-31 RX ADMIN — HYDRALAZINE HYDROCHLORIDE 10 MG: 20 INJECTION INTRAMUSCULAR; INTRAVENOUS at 12:05

## 2024-05-31 RX ADMIN — MUPIROCIN: 20 OINTMENT TOPICAL at 08:05

## 2024-05-31 NOTE — NURSING
Subjective:      Patient ID: Katty Veronica is a 79 y.o. female.    Chief Complaint: Abdominal Pain (Pt. C/o upper abdominal pain and vomiting. Pmh CHF and Gastroparesis. )    HPI  Review of Systems   Objective:     Physical Exam   Assessment:     1. UGIB (upper gastrointestinal bleed)    2. Epigastric abdominal pain    3. Nausea and vomiting, unspecified vomiting type    4. Extravasation injury           Wound 05/31/24 1000 Other (comment) Left Abdomen (Active)   05/31/24 1000   Present on Original Admission: Y   Primary Wound Type: Other   Side: Left   Orientation:    Location: Abdomen   Wound Approximate Age at First Assessment (Weeks):    Wound Number:    Is this injury device related?:    Incision Type:    Closure Method:    Wound Description (Comments):    Type:    Additional Comments:    Ankle-Brachial Index:    Pulses:    Removal Indication and Assessment:    Wound Outcome:    Wound Image   05/31/24 0900   Dressing Appearance Open to air 05/31/24 0900   Drainage Amount Scant 05/31/24 0900   Drainage Characteristics/Odor Serous;No odor 05/31/24 0900   Appearance Red;Moist 05/31/24 0900   Care Cleansed with:;Wound cleanser 05/31/24 0900   Dressing Absorptive Pad 05/31/24 0900       Plan:          78 y/o female in with upper GIB.  She is awake alert oriented.   She assist with moving in bed.   Consulted on left abd fold redness-see photo.  Nystatin top cr already ordered in chart and is appropriate.   Spoke with nurse Ebony.  Will recheck her in a few days.

## 2024-05-31 NOTE — PROGRESS NOTES
[facility] Hospital Medicine Progress Note        Chief Complaint: Inpatient Follow-up for GI bleed    HPI:   Patient is an 79-year-old female with a history of heart failure with preserved EF, PAF on Xarelto, COPD, gastroparesis GERD, CVA, SABINA, glaucoma, HTN, HLD, CAD and additional past medical history as below who presented to the ER complaining of epigastric abdominal pain with nausea and vomiting over the prior 24 hours.  She just recently had an EGD done 05/21/2024 on an outpatient basis due to her ongoing symptoms of epigastric abdominal pain, which noted chronic gastritis and erythematous mucosa in the gastric body.  She does report melanotic stools over the past 24 hours.     She arrived to the ER tonight afebrile hemodynamically stable maintaining normal sats on room air.  Laboratory work showed a drop in her hemoglobin from 11 last month currently 8.8.  Her stool was guaiac-positive in the ER.  A CT bleeding scan was ordered and hospitalist was consulted for admission for presumed upper GI bleeding.    Protonix was added.  Anticoagulation was held.  GI was consulted.      Interval Hx:   5/29/24-Patient seen and examined this morning plan for colonoscopy today reports she is feeling very tired    5/30/24 dr pion - voices no complains and feels fine . Had colonoscopy with no signs of active bleeding . Has LORIN so will continue ferrlicit. Replace K+. Another option is to place video capsule    5/31/24 dr pino - continue ferrlicit/ will transfer ppi to po and continue to replace K+. Dc reglan. Somehow she received 1 unit prbc yesterday w hemoglobin 7.1>>9.3. refers feeling weak but working with PT(low intensity therapy)    Objective/physical exam:  Vitals:    05/31/24 0853 05/31/24 1147 05/31/24 1511 05/31/24 1517   BP:  (!) 160/84  (!) 173/69   Pulse:  68  72   Resp: 18  10 18   Temp:  98.8 °F (37.1 °C)  97.7 °F (36.5 °C)   TempSrc:  Oral  Oral   SpO2:  (!) 94%  96%    Weight:       Height:         General: In no acute distress, afebrile  Respiratory: Clear to auscultation bilaterally  Cardiovascular: S1, S2, no appreciable murmur  Abdomen: Soft, nontender, BS +  MSK: Warm, no lower extremity edema, no clubbing or cyanosis  Neurologic: Alert and oriented x4, moving all extremities with good strength         Colonoscopy:  Impression:            - Moderate diverticulosis in the sigmoid colon, in                          the descending colon, in the transverse colon and                          in the ascending colon.                          - The examined portion of the ileum was normal.                          - Internal hemorrhoids.                          -no findings to explain anemia.   Recommendation:        - [facility].                          - will give iron infusion while she is here.                          -follow up gi as outpatient. can discuss utility                          of capsule, but this would have to be placed                          endoscopically. we can see how she does with iron                          infusions.     Lab Results   Component Value Date     05/31/2024    K 3.3 (L) 05/31/2024     05/31/2024    CO2 22 (L) 05/31/2024    BUN 9.0 (L) 05/31/2024    CREATININE 0.68 05/31/2024    CALCIUM 8.2 (L) 05/31/2024    ANIONGAP 7 (L) 08/04/2023    ESTGFRAFRICA 74 08/04/2023    EGFRNONAA >60 11/10/2021      Lab Results   Component Value Date    ALT 16 05/29/2024    AST 34 05/29/2024    ALKPHOS 66 05/29/2024    BILITOT 0.4 05/29/2024      Lab Results   Component Value Date    WBC 10.47 05/31/2024    HGB 9.3 (L) 05/31/2024    HCT 27.0 (L) 05/31/2024    MCV 83.3 05/31/2024     05/31/2024           Medications:   atorvastatin  80 mg Oral QHS    gabapentin  300 mg Oral TID    methocarbamoL  500 mg Oral BID    miconazole   Topical (Top) BID    mupirocin   Nasal BID    pantoprazole  40 mg Oral BID  AC    potassium chloride  40 mEq Oral BID    sertraline  50 mg Oral QHS    sodium chloride 0.9%  10 mL Intravenous Q6H    timolol maleate 0.5%  1 drop Both Eyes BID    traMADoL  50 mg Oral TID        Current Facility-Administered Medications:     0.9%  NaCl infusion (for blood administration), , Intravenous, Q24H PRN    dicyclomine, 10 mg, Intramuscular, QID PRN    hydrALAZINE, 10 mg, Intravenous, Q2H PRN    labetalol, 10 mg, Intravenous, Q4H PRN    melatonin, 6 mg, Oral, Nightly PRN    ondansetron, 4 mg, Intravenous, Q8H PRN    prochlorperazine, 5 mg, Intravenous, Q6H PRN    sodium chloride 0.9%, 10 mL, Intravenous, PRN    Flushing PICC/Midline Protocol, , , Until Discontinued **AND** sodium chloride 0.9%, 10 mL, Intravenous, Q6H **AND** sodium chloride 0.9%, 10 mL, Intravenous, PRN    traZODone, 50 mg, Oral, Nightly PRN     Assessment/Plan:    Melena /heme + stools   -Egd- gastritis  -Colonoscopy -diverticulosis/ internal hemorrhoids    Acute on chronic anemia due to above    Acute symptomatic anemia requiring prbc transfusion     Chronic gastritis, recent EGD 05/21/2024     Chronic gastroparesis     HX:  AFib on Xarelto, CAD, HTN, HLD, GERD, CVA, SABINA, glaucoma    Plan:  Continue ferrlicit  Replace K+  Xarelto is still on hold awaiting further GI recommendations about anticoagulation  Change  Protonix to po  Repeat blood work in a.m.  SCDs

## 2024-05-31 NOTE — PROGRESS NOTES
[facility] Medicine Progress Note        Chief Complaint: Inpatient Follow-up for GI bleed    HPI:   Patient is an 79-year-old female with a history of heart failure with preserved EF, PAF on Xarelto, COPD, gastroparesis GERD, CVA, SABINA, glaucoma, HTN, HLD, CAD and additional past medical history as below who presented to the ER complaining of epigastric abdominal pain with nausea and vomiting over the prior 24 hours.  She just recently had an EGD done 05/21/2024 on an outpatient basis due to her ongoing symptoms of epigastric abdominal pain, which noted chronic gastritis and erythematous mucosa in the gastric body.  She does report melanotic stools over the past 24 hours.     She arrived to the ER tonight afebrile hemodynamically stable maintaining normal sats on room air.  Laboratory work showed a drop in her hemoglobin from 11 last month currently 8.8.  Her stool was guaiac-positive in the ER.  A CT bleeding scan was ordered and hospitalist was consulted for admission for presumed upper GI bleeding.    Protonix was added.  Anticoagulation was held.  GI was consulted.      Interval Hx:   5/29/24-Patient seen and examined this morning plan for colonoscopy today reports she is feeling very tired    5/30/24 dr pino - voices no complains and feels fine . Had colonoscopy with no signs of active bleeding . Has LORIN so will continue ferrlicit. Replace K+. Another option is to place video capsule    Objective/physical exam:  Vitals:    05/30/24 1510 05/30/24 1531 05/30/24 1933 05/30/24 2134   BP: (!) 159/75 (!) 162/76 (!) 152/82    Pulse:  69 85    Resp:  18  18   Temp: 98 °F (36.7 °C) 98.6 °F (37 °C) 98 °F (36.7 °C)    TempSrc:  Oral Oral    SpO2:  95% 97%    Weight:       Height:         General: In no acute distress, afebrile  Respiratory: Clear to auscultation bilaterally  Cardiovascular: S1, S2, no appreciable murmur  Abdomen: Soft, nontender, BS +  MSK: Warm, no lower  extremity edema, no clubbing or cyanosis  Neurologic: Alert and oriented x4, moving all extremities with good strength         Colonoscopy:  Impression:            - Moderate diverticulosis in the sigmoid colon, in                          the descending colon, in the transverse colon and                          in the ascending colon.                          - The examined portion of the ileum was normal.                          - Internal hemorrhoids.                          -no findings to explain anemia.   Recommendation:        - [facility].                          - will give iron infusion while she is here.                          -follow up gi as outpatient. can discuss utility                          of capsule, but this would have to be placed                          endoscopically. we can see how she does with iron                          infusions.     Lab Results   Component Value Date     05/30/2024    K 2.2 (LL) 05/30/2024     08/04/2023    CO2 29 05/30/2024    BUN 10.6 05/30/2024    CREATININE 0.73 05/30/2024    CALCIUM 8.2 (L) 05/30/2024    ANIONGAP 7 (L) 08/04/2023    ESTGFRAFRICA 74 08/04/2023    EGFRNONAA >60 11/10/2021      Lab Results   Component Value Date    ALT 16 05/29/2024    AST 34 05/29/2024    ALKPHOS 66 05/29/2024    BILITOT 0.4 05/29/2024      Lab Results   Component Value Date    WBC 10.29 05/30/2024    HGB 7.1 (L) 05/30/2024    HCT 21.5 (L) 05/30/2024    MCV 86.3 05/30/2024     05/30/2024           Medications:   atorvastatin  80 mg Oral QHS    gabapentin  300 mg Oral TID    methocarbamoL  500 mg Oral BID    metoclopramide  5 mg Intravenous Q8H    miconazole   Topical (Top) BID    pantoprazole  40 mg Intravenous BID AC    sertraline  50 mg Oral QHS    sodium chloride 0.9%  10 mL Intravenous Q6H    timolol maleate 0.5%  1 drop Both Eyes BID    traMADoL  50 mg Oral TID        Current Facility-Administered Medications:      0.9%  NaCl infusion (for blood administration), , Intravenous, Q24H PRN    dicyclomine, 10 mg, Intramuscular, QID PRN    hydrALAZINE, 10 mg, Intravenous, Q2H PRN    labetalol, 10 mg, Intravenous, Q4H PRN    melatonin, 6 mg, Oral, Nightly PRN    ondansetron, 4 mg, Intravenous, Q8H PRN    prochlorperazine, 5 mg, Intravenous, Q6H PRN    sodium chloride 0.9%, 10 mL, Intravenous, PRN    Flushing PICC/Midline Protocol, , , Until Discontinued **AND** sodium chloride 0.9%, 10 mL, Intravenous, Q6H **AND** sodium chloride 0.9%, 10 mL, Intravenous, PRN    traZODone, 50 mg, Oral, Nightly PRN     Assessment/Plan:    Melena   -Egd- gastritis  -Colonoscopy -diverticulosis/ internal hemorrhoids    Acute on chronic anemia due to above  Chronic gastritis, recent EGD 05/21/2024   Chronic gastroparesis     HX:  AFib on Xarelto, CAD, HTN, HLD, GERD, CVA, SABINA, glaucoma    Plan:  Continue ferrlicit   Replace K+  Xarelto is still on hold awaiting further GI recommendations about anticoagulation  Will continue with Protonix  Repeat blood work in a.m.  SCDs

## 2024-05-31 NOTE — PT/OT/SLP PROGRESS
Physical Therapy Treatment    Patient Name:  Katty Veronica   MRN:  [id number]    Recommendations:     Discharge therapy intensity: Low Intensity Therapy   Discharge Equipment Recommendations: none  Barriers to discharge: None    Assessment:     Katty Veronica is a 79 y.o. female admitted with a medical diagnosis of melena with acute blood loss anemia, gastroparesis.  She presents with the following impairments/functional limitations: weakness, gait instability, impaired balance, impaired endurance, decreased safety awareness, impaired functional mobility .    Rehab Prognosis: Good; patient would benefit from acute skilled PT services to address these deficits and reach maximum level of function.    Recent Surgery: Procedure(s) (LRB):  COLON (N/A) 2 Days Post-Op    Plan:     During this hospitalization, patient would benefit from acute PT services 5 x/week to address the identified rehab impairments via therapeutic activities, therapeutic exercises, gait training and progress toward the following goals:    Plan of Care Expires:  06/28/24    Subjective     Chief Complaint: tired fro not sleeping last night  Patient/Family Comments/goals: to get better  Pain/Comfort:  Pain Rating 1: 0/10      Objective:     Communicated with nurse prior to session.  Patient found up in chair with PICC line upon PT entry to room.     General Precautions: Standard, fall  Orthopedic Precautions: N/A  Braces: N/A  Respiratory Status: Room air  Blood Pressure: nt  Skin Integrity: Visible skin intact      Functional Mobility:  Bed Mobility:     Rolling left/Right: stand by assistance  Scooting: stand by assistance  Supine <> Sit: stand by assistance  Transfers:     Sit <> Stand:  stand by assistance with rolling walker  Gait: patient amb 25ft, 41ft with rolling walker with step through giat pattern with SBA. Patient required a seated rest break in between ambulation trials.      Education:  Patient provided with verbal education  education regarding PT role/goals/POC, fall prevention, and safety awareness.  Understanding was verbalized.     Patient left HOB elevated with all lines intact and call button in reach    GOALS:   Multidisciplinary Problems       Physical Therapy Goals          Problem: Physical Therapy    Goal Priority Disciplines Outcome Goal Variances Interventions   Physical Therapy Goal     PT, PT/OT Progressing     Description: Goals to be met by: 24     Patient will increase functional independence with mobility by performin. Supine to sit with Modified [place]  2. Sit to supine with Modified [place]  3. Sit to stand transfer with Modified [place]  4. Bed to chair transfer with Modified [place] using Rolling Walker  5. Gait  x 200 feet with Modified [place] using Rolling Walker.                          Time Tracking:     PT Received On: 24  PT Start Time: 1047     PT Stop Time: 1110  PT Total Time (min): 23 min     Billable Minutes: Gait Training 23    Treatment Type: Treatment  PT/PTA: PTA     Number of PTA visits since last PT visit: 2024

## 2024-05-31 NOTE — PLAN OF CARE
Problem: Adult Inpatient Plan of Care  Goal: Plan of Care Review  Outcome: Progressing  Flowsheets (Taken 5/30/2024 2152)  Plan of Care Reviewed With: patient  Goal: Patient-Specific Goal (Individualized)  Outcome: Progressing  Flowsheets (Taken 5/30/2024 2152)  Individualized Care Needs: assist with adls, manage pain and nausea  Goal: Absence of Hospital-Acquired Illness or Injury  Outcome: Progressing  Intervention: Prevent Skin Injury  Flowsheets (Taken 5/30/2024 2152)  Body Position:   position changed independently   weight shifting  Device Skin Pressure Protection: adhesive use limited  Intervention: Prevent and Manage VTE (Venous Thromboembolism) Risk  Flowsheets (Taken 5/30/2024 2152)  VTE Prevention/Management:   ambulation promoted   fluids promoted   intravenous hydration  Intervention: Prevent Infection  Flowsheets (Taken 5/30/2024 2152)  Infection Prevention:   hand hygiene promoted   single patient room provided   rest/sleep promoted  Goal: Optimal Comfort and Wellbeing  Outcome: Progressing  Intervention: Monitor Pain and Promote Comfort  Flowsheets (Taken 5/30/2024 2152)  Pain Management Interventions:   care clustered   quiet environment facilitated  Intervention: Provide Person-Centered Care  Flowsheets (Taken 5/30/2024 2152)  Trust Relationship/Rapport:   care explained   choices provided  Goal: Readiness for Transition of Care  Outcome: Progressing

## 2024-06-01 LAB
ALBUMIN SERPL-MCNC: 2.9 G/DL (ref 3.4–4.8)
ALBUMIN/GLOB SERPL: 1.2 RATIO (ref 1.1–2)
ALP SERPL-CCNC: 61 UNIT/L (ref 40–150)
ALT SERPL-CCNC: 14 UNIT/L (ref 0–55)
ANION GAP SERPL CALC-SCNC: 8 MEQ/L
AST SERPL-CCNC: 21 UNIT/L (ref 5–34)
BASOPHILS # BLD AUTO: 0.01 X10(3)/MCL
BASOPHILS NFR BLD AUTO: 0.1 %
BILIRUB SERPL-MCNC: 0.5 MG/DL
BUN SERPL-MCNC: 10.2 MG/DL (ref 9.8–20.1)
CALCIUM SERPL-MCNC: 8.5 MG/DL (ref 8.4–10.2)
CHLORIDE SERPL-SCNC: 107 MMOL/L (ref 98–107)
CO2 SERPL-SCNC: 24 MMOL/L (ref 23–31)
CREAT SERPL-MCNC: 0.75 MG/DL (ref 0.55–1.02)
CREAT/UREA NIT SERPL: 14
EOSINOPHIL # BLD AUTO: 0.09 X10(3)/MCL (ref 0–0.9)
EOSINOPHIL NFR BLD AUTO: 1 %
ERYTHROCYTE [DISTWIDTH] IN BLOOD BY AUTOMATED COUNT: 15.9 % (ref 11.5–17)
GFR SERPLBLD CREATININE-BSD FMLA CKD-EPI: >60 ML/MIN/1.73/M2
GLOBULIN SER-MCNC: 2.5 GM/DL (ref 2.4–3.5)
GLUCOSE SERPL-MCNC: 109 MG/DL (ref 82–115)
HCT VFR BLD AUTO: 27.4 % (ref 37–47)
HGB BLD-MCNC: 9 G/DL (ref 12–16)
IMM GRANULOCYTES # BLD AUTO: 0.05 X10(3)/MCL (ref 0–0.04)
IMM GRANULOCYTES NFR BLD AUTO: 0.6 %
LYMPHOCYTES # BLD AUTO: 2.16 X10(3)/MCL (ref 0.6–4.6)
LYMPHOCYTES NFR BLD AUTO: 23.8 %
MAGNESIUM SERPL-MCNC: 1.9 MG/DL (ref 1.6–2.6)
MCH RBC QN AUTO: 28 PG (ref 27–31)
MCHC RBC AUTO-ENTMCNC: 32.8 G/DL (ref 33–36)
MCV RBC AUTO: 85.1 FL (ref 80–94)
MONOCYTES # BLD AUTO: 0.76 X10(3)/MCL (ref 0.1–1.3)
MONOCYTES NFR BLD AUTO: 8.4 %
NEUTROPHILS # BLD AUTO: 5.99 X10(3)/MCL (ref 2.1–9.2)
NEUTROPHILS NFR BLD AUTO: 66.1 %
NRBC BLD AUTO-RTO: 0.2 %
PLATELET # BLD AUTO: 201 X10(3)/MCL (ref 130–400)
PMV BLD AUTO: 10.5 FL (ref 7.4–10.4)
POCT GLUCOSE: 119 MG/DL (ref 70–110)
POTASSIUM SERPL-SCNC: 3.7 MMOL/L (ref 3.5–5.1)
PROT SERPL-MCNC: 5.4 GM/DL (ref 5.8–7.6)
RBC # BLD AUTO: 3.22 X10(6)/MCL (ref 4.2–5.4)
SODIUM SERPL-SCNC: 139 MMOL/L (ref 136–145)
WBC # SPEC AUTO: 9.06 X10(3)/MCL (ref 4.5–11.5)

## 2024-06-01 PROCEDURE — A4216 STERILE WATER/SALINE, 10 ML: HCPCS | Performed by: INTERNAL MEDICINE

## 2024-06-01 PROCEDURE — 36415 COLL VENOUS BLD VENIPUNCTURE: CPT | Performed by: INTERNAL MEDICINE

## 2024-06-01 PROCEDURE — 80053 COMPREHEN METABOLIC PANEL: CPT | Performed by: INTERNAL MEDICINE

## 2024-06-01 PROCEDURE — 25000003 PHARM REV CODE 250: Performed by: INTERNAL MEDICINE

## 2024-06-01 PROCEDURE — 21400001 HC TELEMETRY ROOM

## 2024-06-01 PROCEDURE — 85025 COMPLETE CBC W/AUTO DIFF WBC: CPT | Performed by: INTERNAL MEDICINE

## 2024-06-01 PROCEDURE — 83735 ASSAY OF MAGNESIUM: CPT | Performed by: INTERNAL MEDICINE

## 2024-06-01 RX ORDER — METOCLOPRAMIDE 10 MG/1
10 TABLET ORAL
Status: DISCONTINUED | OUTPATIENT
Start: 2024-06-01 | End: 2024-06-05 | Stop reason: HOSPADM

## 2024-06-01 RX ADMIN — POTASSIUM CHLORIDE 40 MEQ: 1500 TABLET, EXTENDED RELEASE ORAL at 10:06

## 2024-06-01 RX ADMIN — MUPIROCIN: 20 OINTMENT TOPICAL at 10:06

## 2024-06-01 RX ADMIN — PANTOPRAZOLE SODIUM 40 MG: 40 TABLET, DELAYED RELEASE ORAL at 06:06

## 2024-06-01 RX ADMIN — GABAPENTIN 300 MG: 300 CAPSULE ORAL at 10:06

## 2024-06-01 RX ADMIN — SODIUM CHLORIDE, PRESERVATIVE FREE 10 ML: 5 INJECTION INTRAVENOUS at 01:06

## 2024-06-01 RX ADMIN — SODIUM CHLORIDE, PRESERVATIVE FREE 10 ML: 5 INJECTION INTRAVENOUS at 06:06

## 2024-06-01 RX ADMIN — ATORVASTATIN CALCIUM 80 MG: 40 TABLET, FILM COATED ORAL at 09:06

## 2024-06-01 RX ADMIN — MICONAZOLE NITRATE: 20 CREAM TOPICAL at 09:06

## 2024-06-01 RX ADMIN — PANTOPRAZOLE SODIUM 40 MG: 40 TABLET, DELAYED RELEASE ORAL at 03:06

## 2024-06-01 RX ADMIN — TRAZODONE HYDROCHLORIDE 50 MG: 50 TABLET ORAL at 09:06

## 2024-06-01 RX ADMIN — METOCLOPRAMIDE 10 MG: 10 TABLET ORAL at 03:06

## 2024-06-01 RX ADMIN — TRAMADOL HYDROCHLORIDE 50 MG: 50 TABLET, COATED ORAL at 09:06

## 2024-06-01 RX ADMIN — TRAMADOL HYDROCHLORIDE 50 MG: 50 TABLET, COATED ORAL at 10:06

## 2024-06-01 RX ADMIN — POTASSIUM CHLORIDE 40 MEQ: 1500 TABLET, EXTENDED RELEASE ORAL at 09:06

## 2024-06-01 RX ADMIN — TIMOLOL MALEATE 1 DROP: 5 SOLUTION OPHTHALMIC at 10:06

## 2024-06-01 RX ADMIN — AMLODIPINE BESYLATE 5 MG: 5 TABLET ORAL at 10:06

## 2024-06-01 RX ADMIN — TIMOLOL MALEATE 1 DROP: 5 SOLUTION OPHTHALMIC at 09:06

## 2024-06-01 RX ADMIN — MICONAZOLE NITRATE: 20 CREAM TOPICAL at 10:06

## 2024-06-01 RX ADMIN — TRAMADOL HYDROCHLORIDE 50 MG: 50 TABLET, COATED ORAL at 03:06

## 2024-06-01 RX ADMIN — SERTRALINE HYDROCHLORIDE 50 MG: 50 TABLET ORAL at 09:06

## 2024-06-01 RX ADMIN — GABAPENTIN 300 MG: 300 CAPSULE ORAL at 09:06

## 2024-06-01 RX ADMIN — MUPIROCIN: 20 OINTMENT TOPICAL at 09:06

## 2024-06-01 RX ADMIN — GABAPENTIN 300 MG: 300 CAPSULE ORAL at 03:06

## 2024-06-01 RX ADMIN — SODIUM CHLORIDE, PRESERVATIVE FREE 10 ML: 5 INJECTION INTRAVENOUS at 05:06

## 2024-06-01 NOTE — PROGRESS NOTES
[facility] Medicine Progress Note        Chief Complaint: Inpatient Follow-up for GI bleed    HPI:   Patient is an 79-year-old female with a history of heart failure with preserved EF, PAF on Xarelto, COPD, gastroparesis GERD, CVA, SABINA, glaucoma, HTN, HLD, CAD and additional past medical history as below who presented to the ER complaining of epigastric abdominal pain with nausea and vomiting over the prior 24 hours.  She just recently had an EGD done 05/21/2024 on an outpatient basis due to her ongoing symptoms of epigastric abdominal pain, which noted chronic gastritis and erythematous mucosa in the gastric body.  She does report melanotic stools over the past 24 hours.     She arrived to the ER tonight afebrile hemodynamically stable maintaining normal sats on room air.  Laboratory work showed a drop in her hemoglobin from 11 last month currently 8.8.  Her stool was guaiac-positive in the ER.  A CT bleeding scan was ordered and hospitalist was consulted for admission for presumed upper GI bleeding.    Protonix was added.  Anticoagulation was held.  GI was consulted.      Interval Hx:   5/29/24-Patient seen and examined this morning plan for colonoscopy today reports she is feeling very tired    5/30/24 dr pino - voices no complains and feels fine . Had colonoscopy with no signs of active bleeding . Has LORIN so will continue ferrlicit. Replace K+. Another option is to place video capsule    5/31/24 dr pino - continue ferrlicit/ will transfer ppi to po and continue to replace K+. Dc reglan. Somehow she received 1 unit prbc yesterday w hemoglobin 7.1>>9.3. refers feeling weak but working with PT(low intensity therapy)    6/1/24 dr roman - pt has no complains and feels fine . Stable hh post prbc transfusion . Not yet on her xarelto awaiting plans for video capsule     Objective/physical exam:  Vitals:    05/31/24 2320 06/01/24 0330 06/01/24 0825 06/01/24 1014   BP: (!)  143/60 127/65 139/70    Pulse: 66 80 69    Resp:   18 18   Temp: 98.2 °F (36.8 °C) 98.5 °F (36.9 °C) 98.3 °F (36.8 °C)    TempSrc: Oral Oral Oral    SpO2: (!) 94% 95% 96%    Weight:       Height:         General: In no acute distress, afebrile  Respiratory: Clear to auscultation bilaterally  Cardiovascular: S1, S2, no appreciable murmur  Abdomen: Soft, nontender, BS +  MSK: Warm, no lower extremity edema, no clubbing or cyanosis  Neurologic: Alert and oriented x4, moving all extremities with good strength         Colonoscopy:  Impression:            - Moderate diverticulosis in the sigmoid colon, in                          the descending colon, in the transverse colon and                          in the ascending colon.                          - The examined portion of the ileum was normal.                          - Internal hemorrhoids.                          -no findings to explain anemia.   Recommendation:        - [facility].                          - will give iron infusion while she is here.                          -follow up gi as outpatient. can discuss utility                          of capsule, but this would have to be placed                          endoscopically. we can see how she does with iron                          infusions.     Lab Results   Component Value Date     06/01/2024    K 3.7 06/01/2024     06/01/2024    CO2 24 06/01/2024    BUN 10.2 06/01/2024    CREATININE 0.75 06/01/2024    CALCIUM 8.5 06/01/2024    ANIONGAP 7 (L) 08/04/2023    ESTGFRAFRICA 74 08/04/2023    EGFRNONAA >60 11/10/2021      Lab Results   Component Value Date    ALT 14 06/01/2024    AST 21 06/01/2024    ALKPHOS 61 06/01/2024    BILITOT 0.5 06/01/2024      Lab Results   Component Value Date    WBC 9.06 06/01/2024    HGB 9.0 (L) 06/01/2024    HCT 27.4 (L) 06/01/2024    MCV 85.1 06/01/2024     06/01/2024           Medications:   amLODIPine  5 mg Oral Daily     atorvastatin  80 mg Oral QHS    gabapentin  300 mg Oral TID    miconazole   Topical (Top) BID    mupirocin   Nasal BID    pantoprazole  40 mg Oral BID AC    potassium chloride  40 mEq Oral BID    sertraline  50 mg Oral QHS    sodium chloride 0.9%  10 mL Intravenous Q6H    timolol maleate 0.5%  1 drop Both Eyes BID    traMADoL  50 mg Oral TID        Current Facility-Administered Medications:     0.9%  NaCl infusion (for blood administration), , Intravenous, Q24H PRN    dicyclomine, 10 mg, Intramuscular, QID PRN    hydrALAZINE, 10 mg, Intravenous, Q2H PRN    labetalol, 10 mg, Intravenous, Q4H PRN    melatonin, 6 mg, Oral, Nightly PRN    ondansetron, 4 mg, Intravenous, Q8H PRN    prochlorperazine, 5 mg, Intravenous, Q6H PRN    sodium chloride 0.9%, 10 mL, Intravenous, PRN    Flushing PICC/Midline Protocol, , , Until Discontinued **AND** sodium chloride 0.9%, 10 mL, Intravenous, Q6H **AND** sodium chloride 0.9%, 10 mL, Intravenous, PRN    traZODone, 50 mg, Oral, Nightly PRN     Assessment/Plan:    Melena /heme + stools   -Egd- gastritis  -Colonoscopy -diverticulosis/ internal hemorrhoids  - video capsule    Acute on chronic anemia due to above    Acute symptomatic anemia requiring prbc transfusion     Chronic gastritis, recent EGD 05/21/2024     Chronic gastroparesis     HX:  AFib on Xarelto, CAD, HTN, HLD, GERD, CVA, SABINA, glaucoma    Plan:  Continue ferrlicit x 3  Replace K+  Xarelto is still on hold awaiting further GI recommendations about anticoagulation  Change  Protonix to po  Repeat blood work in a.m.  SCDs  VIDEO CAPSULE??????????????????????????????

## 2024-06-01 NOTE — PLAN OF CARE
Problem: Adult Inpatient Plan of Care  Goal: Plan of Care Review  6/1/2024 1706 by Vania Meadows RN  Outcome: Progressing  6/1/2024 0744 by Vania Meadows RN  Outcome: Progressing  Goal: Patient-Specific Goal (Individualized)  6/1/2024 1706 by Vania Meadows RN  Outcome: Progressing  6/1/2024 0744 by Vania Meadows RN  Outcome: Progressing  Goal: Absence of Hospital-Acquired Illness or Injury  6/1/2024 1706 by Vania Meadows RN  Outcome: Progressing  6/1/2024 0744 by Vania Meadows RN  Outcome: Progressing  Goal: Optimal Comfort and Wellbeing  6/1/2024 1706 by Vania Meadows RN  Outcome: Progressing  6/1/2024 0744 by Vania Meadows RN  Outcome: Progressing  Goal: Readiness for Transition of Care  6/1/2024 1706 by Vania Meadows RN  Outcome: Progressing  6/1/2024 0744 by Vania Meadows RN  Outcome: Progressing     Problem: Infection  Goal: Absence of Infection Signs and Symptoms  6/1/2024 1706 by Vania Meadows RN  Outcome: Progressing  6/1/2024 0744 by Vania Meadows RN  Outcome: Progressing     Problem: Wound  Goal: Optimal Coping  6/1/2024 1706 by Vania Meadows RN  Outcome: Progressing  6/1/2024 0744 by Vania Meadows RN  Outcome: Progressing  Goal: Optimal Functional Ability  6/1/2024 1706 by Vania Meadows RN  Outcome: Progressing  6/1/2024 0744 by Vania Meadows RN  Outcome: Progressing  Goal: Absence of Infection Signs and Symptoms  6/1/2024 1706 by Vania Meadows RN  Outcome: Progressing  6/1/2024 0744 by Vania Meadows RN  Outcome: Progressing  Goal: Improved Oral Intake  6/1/2024 1706 by Vania Meadows RN  Outcome: Progressing  6/1/2024 0744 by Vania Meadows RN  Outcome: Progressing  Goal: Optimal Pain Control and Function  6/1/2024 1706 by Vania Meadows RN  Outcome: Progressing  6/1/2024 0744 by Vania Meadows RN  Outcome: Progressing  Goal: Skin Health and Integrity  6/1/2024 1706 by Vania Meadows RN  Outcome: Progressing  6/1/2024 0744 by Abdiel  ALFRED Caro  Outcome: Progressing  Goal: Optimal Wound Healing  6/1/2024 1706 by Vania Meadows RN  Outcome: Progressing  6/1/2024 0744 by Vania Meadows RN  Outcome: Progressing     Problem: Fall Injury Risk  Goal: Absence of Fall and Fall-Related Injury  6/1/2024 1706 by Vania Meadows RN  Outcome: Progressing  6/1/2024 0744 by Vania Meadows RN  Outcome: Progressing

## 2024-06-01 NOTE — PLAN OF CARE
Problem: Adult Inpatient Plan of Care  Goal: Plan of Care Review  Outcome: Progressing  Flowsheets (Taken 5/31/2024 1916)  Plan of Care Reviewed With: patient  Goal: Patient-Specific Goal (Individualized)  Outcome: Progressing  Flowsheets (Taken 5/31/2024 1916)  Individualized Care Needs: assist with adls, manage pain and nausea, monitor labs.  Goal: Absence of Hospital-Acquired Illness or Injury  Outcome: Progressing  Intervention: Identify and Manage Fall Risk  Flowsheets (Taken 5/31/2024 1916)  Safety Promotion/Fall Prevention:   nonskid shoes/socks when out of bed   Fall Risk signage in place   assistive device/personal item within reach   side rails raised x 3  Intervention: Prevent Skin Injury  Flowsheets (Taken 5/31/2024 1916)  Body Position:   position changed independently   weight shifting  Device Skin Pressure Protection: adhesive use limited  Intervention: Prevent and Manage VTE (Venous Thromboembolism) Risk  Flowsheets (Taken 5/31/2024 1916)  VTE Prevention/Management: ambulation promoted  Intervention: Prevent Infection  Flowsheets (Taken 5/31/2024 1916)  Infection Prevention:   rest/sleep promoted   single patient room provided   hand hygiene promoted  Goal: Optimal Comfort and Wellbeing  Outcome: Progressing  Intervention: Monitor Pain and Promote Comfort  Flowsheets (Taken 5/31/2024 1916)  Pain Management Interventions:   care clustered   quiet environment facilitated  Intervention: Provide Person-Centered Care  Flowsheets (Taken 5/31/2024 1916)  Trust Relationship/Rapport:   care explained   choices provided   questions answered   questions encouraged   thoughts/feelings acknowledged  Goal: Readiness for Transition of Care  Outcome: Progressing     Problem: Fall Injury Risk  Goal: Absence of Fall and Fall-Related Injury  Outcome: Progressing  Intervention: Identify and Manage Contributors  Flowsheets (Taken 5/31/2024 1916)  Medication Review/Management: medications reviewed

## 2024-06-02 LAB
BASOPHILS # BLD AUTO: 0.03 X10(3)/MCL
BASOPHILS NFR BLD AUTO: 0.3 %
EOSINOPHIL # BLD AUTO: 0.15 X10(3)/MCL (ref 0–0.9)
EOSINOPHIL NFR BLD AUTO: 1.6 %
ERYTHROCYTE [DISTWIDTH] IN BLOOD BY AUTOMATED COUNT: 16.6 % (ref 11.5–17)
HCT VFR BLD AUTO: 30.1 % (ref 37–47)
HGB BLD-MCNC: 9.9 G/DL (ref 12–16)
IMM GRANULOCYTES # BLD AUTO: 0.04 X10(3)/MCL (ref 0–0.04)
IMM GRANULOCYTES NFR BLD AUTO: 0.4 %
LYMPHOCYTES # BLD AUTO: 3.03 X10(3)/MCL (ref 0.6–4.6)
LYMPHOCYTES NFR BLD AUTO: 32.7 %
MCH RBC QN AUTO: 28.9 PG (ref 27–31)
MCHC RBC AUTO-ENTMCNC: 32.9 G/DL (ref 33–36)
MCV RBC AUTO: 87.8 FL (ref 80–94)
MONOCYTES # BLD AUTO: 0.86 X10(3)/MCL (ref 0.1–1.3)
MONOCYTES NFR BLD AUTO: 9.3 %
NEUTROPHILS # BLD AUTO: 5.17 X10(3)/MCL (ref 2.1–9.2)
NEUTROPHILS NFR BLD AUTO: 55.7 %
NRBC BLD AUTO-RTO: 0 %
PLATELET # BLD AUTO: 190 X10(3)/MCL (ref 130–400)
PMV BLD AUTO: 11.1 FL (ref 7.4–10.4)
RBC # BLD AUTO: 3.43 X10(6)/MCL (ref 4.2–5.4)
WBC # SPEC AUTO: 9.28 X10(3)/MCL (ref 4.5–11.5)

## 2024-06-02 PROCEDURE — 25000003 PHARM REV CODE 250: Performed by: INTERNAL MEDICINE

## 2024-06-02 PROCEDURE — 21400001 HC TELEMETRY ROOM

## 2024-06-02 PROCEDURE — 85025 COMPLETE CBC W/AUTO DIFF WBC: CPT | Performed by: INTERNAL MEDICINE

## 2024-06-02 PROCEDURE — A4216 STERILE WATER/SALINE, 10 ML: HCPCS | Performed by: INTERNAL MEDICINE

## 2024-06-02 PROCEDURE — 36415 COLL VENOUS BLD VENIPUNCTURE: CPT | Performed by: INTERNAL MEDICINE

## 2024-06-02 RX ADMIN — POTASSIUM CHLORIDE 40 MEQ: 1500 TABLET, EXTENDED RELEASE ORAL at 08:06

## 2024-06-02 RX ADMIN — Medication 6 MG: at 02:06

## 2024-06-02 RX ADMIN — MUPIROCIN: 20 OINTMENT TOPICAL at 09:06

## 2024-06-02 RX ADMIN — PANTOPRAZOLE SODIUM 40 MG: 40 TABLET, DELAYED RELEASE ORAL at 06:06

## 2024-06-02 RX ADMIN — TRAMADOL HYDROCHLORIDE 50 MG: 50 TABLET, COATED ORAL at 08:06

## 2024-06-02 RX ADMIN — GABAPENTIN 300 MG: 300 CAPSULE ORAL at 08:06

## 2024-06-02 RX ADMIN — SODIUM CHLORIDE, PRESERVATIVE FREE 10 ML: 5 INJECTION INTRAVENOUS at 05:06

## 2024-06-02 RX ADMIN — MICONAZOLE NITRATE: 20 CREAM TOPICAL at 09:06

## 2024-06-02 RX ADMIN — ATORVASTATIN CALCIUM 80 MG: 40 TABLET, FILM COATED ORAL at 09:06

## 2024-06-02 RX ADMIN — TRAMADOL HYDROCHLORIDE 50 MG: 50 TABLET, COATED ORAL at 09:06

## 2024-06-02 RX ADMIN — POTASSIUM CHLORIDE 40 MEQ: 1500 TABLET, EXTENDED RELEASE ORAL at 09:06

## 2024-06-02 RX ADMIN — SODIUM CHLORIDE, PRESERVATIVE FREE 10 ML: 5 INJECTION INTRAVENOUS at 12:06

## 2024-06-02 RX ADMIN — GABAPENTIN 300 MG: 300 CAPSULE ORAL at 02:06

## 2024-06-02 RX ADMIN — SERTRALINE HYDROCHLORIDE 50 MG: 50 TABLET ORAL at 09:06

## 2024-06-02 RX ADMIN — MUPIROCIN: 20 OINTMENT TOPICAL at 08:06

## 2024-06-02 RX ADMIN — TRAMADOL HYDROCHLORIDE 50 MG: 50 TABLET, COATED ORAL at 02:06

## 2024-06-02 RX ADMIN — TIMOLOL MALEATE 1 DROP: 5 SOLUTION OPHTHALMIC at 08:06

## 2024-06-02 RX ADMIN — SODIUM CHLORIDE, PRESERVATIVE FREE 10 ML: 5 INJECTION INTRAVENOUS at 01:06

## 2024-06-02 RX ADMIN — TRAZODONE HYDROCHLORIDE 50 MG: 50 TABLET ORAL at 09:06

## 2024-06-02 RX ADMIN — AMLODIPINE BESYLATE 5 MG: 5 TABLET ORAL at 08:06

## 2024-06-02 RX ADMIN — METOCLOPRAMIDE 10 MG: 10 TABLET ORAL at 06:06

## 2024-06-02 RX ADMIN — GABAPENTIN 300 MG: 300 CAPSULE ORAL at 09:06

## 2024-06-02 RX ADMIN — METOCLOPRAMIDE 10 MG: 10 TABLET ORAL at 12:06

## 2024-06-02 RX ADMIN — PANTOPRAZOLE SODIUM 40 MG: 40 TABLET, DELAYED RELEASE ORAL at 04:06

## 2024-06-02 RX ADMIN — METOCLOPRAMIDE 10 MG: 10 TABLET ORAL at 04:06

## 2024-06-02 RX ADMIN — TIMOLOL MALEATE 1 DROP: 5 SOLUTION OPHTHALMIC at 09:06

## 2024-06-02 RX ADMIN — MICONAZOLE NITRATE: 20 CREAM TOPICAL at 08:06

## 2024-06-02 RX ADMIN — SODIUM CHLORIDE, PRESERVATIVE FREE 10 ML: 5 INJECTION INTRAVENOUS at 06:06

## 2024-06-02 NOTE — PROGRESS NOTES
[facility] Hospital Medicine Progress Note        Chief Complaint: Inpatient Follow-up for GI bleed    HPI:   Patient is an 79-year-old female with a history of heart failure with preserved EF, PAF on Xarelto, COPD, gastroparesis GERD, CVA, SABINA, glaucoma, HTN, HLD, CAD and additional past medical history as below who presented to the ER complaining of epigastric abdominal pain with nausea and vomiting over the prior 24 hours.  She just recently had an EGD done 05/21/2024 on an outpatient basis due to her ongoing symptoms of epigastric abdominal pain, which noted chronic gastritis and erythematous mucosa in the gastric body.  She does report melanotic stools over the past 24 hours.     She arrived to the ER tonight afebrile hemodynamically stable maintaining normal sats on room air.  Laboratory work showed a drop in her hemoglobin from 11 last month currently 8.8.  Her stool was guaiac-positive in the ER.  A CT bleeding scan was ordered and hospitalist was consulted for admission for presumed upper GI bleeding.    Protonix was added.  Anticoagulation was held.  GI was consulted.      Interval Hx:   5/29/24-Patient seen and examined this morning plan for colonoscopy today reports she is feeling very tired    5/30/24 dr pino - voices no complains and feels fine . Had colonoscopy with no signs of active bleeding . Has LORIN so will continue ferrlicit. Replace K+. Another option is to place video capsule    5/31/24 dr pino - continue ferrlicit/ will transfer ppi to po and continue to replace K+. Dc reglan. Somehow she received 1 unit prbc yesterday w hemoglobin 7.1>>9.3. refers feeling weak but working with PT(low intensity therapy)    6/1/24 dr roman - pt has no complains and feels fine . Stable hh post prbc transfusion . Not yet on her xarelto awaiting plans for video capsule     6/2/24 dr pino - no new issues. Will ask GI in am for plans . If no video capsule since her HH is  trending up and pt asymptomatic...will give trial of FD lovenox and monitor b4 resuming xarelto  for PAF.    6/3/24 dr pino - no new issues and no plans for video capsule . Will give trial of FD lovenox and if no bleed then will commit to xarelto     Objective/physical exam:  Vitals:    06/02/24 0837 06/02/24 1133 06/02/24 1446 06/02/24 1527   BP:  (!) 140/71  (!) 141/67   Pulse:  71  70   Resp: 18 18 18    Temp:  98.3 °F (36.8 °C)  98.3 °F (36.8 °C)   TempSrc:  Oral  Oral   SpO2:  97%  98%   Weight:       Height:         General: In no acute distress, afebrile  Respiratory: Clear to auscultation bilaterally  Cardiovascular: S1, S2, no appreciable murmur  Abdomen: Soft, nontender, BS +  MSK: Warm, no lower extremity edema, no clubbing or cyanosis  Neurologic: Alert and oriented x4, moving all extremities with good strength         Colonoscopy:  Impression:            - Moderate diverticulosis in the sigmoid colon, in                          the descending colon, in the transverse colon and                          in the ascending colon.                          - The examined portion of the ileum was normal.                          - Internal hemorrhoids.                          -no findings to explain anemia.   Recommendation:        - [facility].                          - will give iron infusion while she is here.                          -follow up gi as outpatient. can discuss utility                          of capsule, but this would have to be placed                          endoscopically. we can see how she does with iron                          infusions.     Lab Results   Component Value Date     06/01/2024    K 3.7 06/01/2024     06/01/2024    CO2 24 06/01/2024    BUN 10.2 06/01/2024    CREATININE 0.75 06/01/2024    CALCIUM 8.5 06/01/2024    ANIONGAP 7 (L) 08/04/2023    ESTGFRAFRICA 74 08/04/2023    EGFRNONAA >60 11/10/2021      Lab Results    Component Value Date    ALT 14 06/01/2024    AST 21 06/01/2024    ALKPHOS 61 06/01/2024    BILITOT 0.5 06/01/2024      Lab Results   Component Value Date    WBC 9.28 06/02/2024    HGB 9.9 (L) 06/02/2024    HCT 30.1 (L) 06/02/2024    MCV 87.8 06/02/2024     06/02/2024           Medications:   amLODIPine  5 mg Oral Daily    atorvastatin  80 mg Oral QHS    gabapentin  300 mg Oral TID    metoclopramide HCl  10 mg Oral TID AC    miconazole   Topical (Top) BID    mupirocin   Nasal BID    pantoprazole  40 mg Oral BID AC    potassium chloride  40 mEq Oral BID    sertraline  50 mg Oral QHS    sodium chloride 0.9%  10 mL Intravenous Q6H    timolol maleate 0.5%  1 drop Both Eyes BID    traMADoL  50 mg Oral TID        Current Facility-Administered Medications:     0.9%  NaCl infusion (for blood administration), , Intravenous, Q24H PRN    dicyclomine, 10 mg, Intramuscular, QID PRN    hydrALAZINE, 10 mg, Intravenous, Q2H PRN    labetalol, 10 mg, Intravenous, Q4H PRN    melatonin, 6 mg, Oral, Nightly PRN    ondansetron, 4 mg, Intravenous, Q8H PRN    prochlorperazine, 5 mg, Intravenous, Q6H PRN    sodium chloride 0.9%, 10 mL, Intravenous, PRN    Flushing PICC/Midline Protocol, , , Until Discontinued **AND** sodium chloride 0.9%, 10 mL, Intravenous, Q6H **AND** sodium chloride 0.9%, 10 mL, Intravenous, PRN    traZODone, 50 mg, Oral, Nightly PRN     Assessment/Plan:    Melena /heme + stools   -Egd- gastritis  -Colonoscopy -diverticulosis/ internal hemorrhoids  - no plans for video capsule  - HH stable. Will start FD lovenox and monitor for bleed/ drop in hh    Acute on chronic anemia due to above    Acute symptomatic anemia requiring prbc transfusion     Chronic gastritis, recent EGD 05/21/2024     Chronic gastroparesis     HX:  AFib on Xarelto, CAD, HTN, HLD, GERD, CVA, SABINA, glaucoma    Plan:  Completed  ferrlicit x 3  No plans for vce/ will start FD lovenox today and monitor for bleed or drop in hh  Po protonix   [facility]

## 2024-06-02 NOTE — PLAN OF CARE
Problem: Adult Inpatient Plan of Care  Goal: Plan of Care Review  6/2/2024 1626 by Vania Meadows RN  Outcome: Progressing  6/2/2024 0749 by Vania Meadows RN  Outcome: Progressing  Goal: Patient-Specific Goal (Individualized)  6/2/2024 1626 by Vania Meadows RN  Outcome: Progressing  6/2/2024 0749 by Vania Meadows RN  Outcome: Progressing  Goal: Absence of Hospital-Acquired Illness or Injury  6/2/2024 1626 by Vania Meadows RN  Outcome: Progressing  6/2/2024 0749 by Vania Meadows RN  Outcome: Progressing  Goal: Optimal Comfort and Wellbeing  6/2/2024 1626 by Vania Meadows RN  Outcome: Progressing  6/2/2024 0749 by Vania Meadows RN  Outcome: Progressing  Goal: Readiness for Transition of Care  6/2/2024 1626 by Vania Meadows RN  Outcome: Progressing  6/2/2024 0749 by Vania Meadows RN  Outcome: Progressing     Problem: Infection  Goal: Absence of Infection Signs and Symptoms  6/2/2024 1626 by Vania Meadows RN  Outcome: Progressing  6/2/2024 0749 by Vania Medaows RN  Outcome: Progressing     Problem: Wound  Goal: Optimal Coping  6/2/2024 1626 by Vania Meadows RN  Outcome: Progressing  6/2/2024 0749 by Vania Meadows RN  Outcome: Progressing  Goal: Optimal Functional Ability  6/2/2024 1626 by Vania Meadows RN  Outcome: Progressing  6/2/2024 0749 by Vania Meadows RN  Outcome: Progressing  Goal: Absence of Infection Signs and Symptoms  6/2/2024 1626 by Vania Meadows RN  Outcome: Progressing  6/2/2024 0749 by Vania Meadows RN  Outcome: Progressing  Goal: Improved Oral Intake  6/2/2024 1626 by Vania Meadows RN  Outcome: Progressing  6/2/2024 0749 by Vania Meadows RN  Outcome: Progressing  Goal: Optimal Pain Control and Function  6/2/2024 1626 by Vania Meadows RN  Outcome: Progressing  6/2/2024 0749 by Vania Meadows RN  Outcome: Progressing  Goal: Skin Health and Integrity  6/2/2024 1626 by Vania Meadows RN  Outcome: Progressing  6/2/2024 0749 by Abdiel  ALFRED Caro  Outcome: Progressing  Goal: Optimal Wound Healing  6/2/2024 1626 by Vania Meadows RN  Outcome: Progressing  6/2/2024 0749 by Vania Meadows RN  Outcome: Progressing     Problem: Fall Injury Risk  Goal: Absence of Fall and Fall-Related Injury  6/2/2024 1626 by Vania Meadows RN  Outcome: Progressing  6/2/2024 0749 by Vania Meadows RN  Outcome: Progressing

## 2024-06-03 LAB
HCT VFR BLD AUTO: 28.5 % (ref 37–47)
HGB BLD-MCNC: 9.2 G/DL (ref 12–16)

## 2024-06-03 PROCEDURE — 36415 COLL VENOUS BLD VENIPUNCTURE: CPT | Performed by: INTERNAL MEDICINE

## 2024-06-03 PROCEDURE — 25000003 PHARM REV CODE 250: Performed by: INTERNAL MEDICINE

## 2024-06-03 PROCEDURE — 85014 HEMATOCRIT: CPT | Performed by: INTERNAL MEDICINE

## 2024-06-03 PROCEDURE — 97116 GAIT TRAINING THERAPY: CPT | Mod: CQ

## 2024-06-03 PROCEDURE — 21400001 HC TELEMETRY ROOM

## 2024-06-03 PROCEDURE — 97110 THERAPEUTIC EXERCISES: CPT | Mod: CQ

## 2024-06-03 PROCEDURE — A4216 STERILE WATER/SALINE, 10 ML: HCPCS | Performed by: INTERNAL MEDICINE

## 2024-06-03 PROCEDURE — 63600175 PHARM REV CODE 636 W HCPCS: Performed by: INTERNAL MEDICINE

## 2024-06-03 RX ORDER — LANOLIN ALCOHOL/MO/W.PET/CERES
1 CREAM (GRAM) TOPICAL DAILY
Status: DISCONTINUED | OUTPATIENT
Start: 2024-06-03 | End: 2024-06-05 | Stop reason: HOSPADM

## 2024-06-03 RX ORDER — FOLIC ACID 1 MG/1
1 TABLET ORAL DAILY
Status: DISCONTINUED | OUTPATIENT
Start: 2024-06-03 | End: 2024-06-05 | Stop reason: HOSPADM

## 2024-06-03 RX ORDER — ENOXAPARIN SODIUM 100 MG/ML
1 INJECTION SUBCUTANEOUS EVERY 12 HOURS
Status: DISCONTINUED | OUTPATIENT
Start: 2024-06-03 | End: 2024-06-05 | Stop reason: HOSPADM

## 2024-06-03 RX ORDER — AMLODIPINE BESYLATE 5 MG/1
10 TABLET ORAL DAILY
Status: DISCONTINUED | OUTPATIENT
Start: 2024-06-04 | End: 2024-06-05 | Stop reason: HOSPADM

## 2024-06-03 RX ADMIN — MICONAZOLE NITRATE: 20 CREAM TOPICAL at 09:06

## 2024-06-03 RX ADMIN — SODIUM CHLORIDE, PRESERVATIVE FREE 10 ML: 5 INJECTION INTRAVENOUS at 11:06

## 2024-06-03 RX ADMIN — GABAPENTIN 300 MG: 300 CAPSULE ORAL at 09:06

## 2024-06-03 RX ADMIN — MUPIROCIN: 20 OINTMENT TOPICAL at 08:06

## 2024-06-03 RX ADMIN — PANTOPRAZOLE SODIUM 40 MG: 40 TABLET, DELAYED RELEASE ORAL at 03:06

## 2024-06-03 RX ADMIN — ENOXAPARIN SODIUM 90 MG: 100 INJECTION SUBCUTANEOUS at 08:06

## 2024-06-03 RX ADMIN — MUPIROCIN: 20 OINTMENT TOPICAL at 09:06

## 2024-06-03 RX ADMIN — METOCLOPRAMIDE 10 MG: 10 TABLET ORAL at 11:06

## 2024-06-03 RX ADMIN — TIMOLOL MALEATE 1 DROP: 5 SOLUTION OPHTHALMIC at 09:06

## 2024-06-03 RX ADMIN — GABAPENTIN 300 MG: 300 CAPSULE ORAL at 08:06

## 2024-06-03 RX ADMIN — SODIUM CHLORIDE, PRESERVATIVE FREE 10 ML: 5 INJECTION INTRAVENOUS at 05:06

## 2024-06-03 RX ADMIN — SERTRALINE HYDROCHLORIDE 50 MG: 50 TABLET ORAL at 08:06

## 2024-06-03 RX ADMIN — METOCLOPRAMIDE 10 MG: 10 TABLET ORAL at 03:06

## 2024-06-03 RX ADMIN — FOLIC ACID 1 MG: 1 TABLET ORAL at 03:06

## 2024-06-03 RX ADMIN — PANTOPRAZOLE SODIUM 40 MG: 40 TABLET, DELAYED RELEASE ORAL at 05:06

## 2024-06-03 RX ADMIN — ATORVASTATIN CALCIUM 80 MG: 40 TABLET, FILM COATED ORAL at 08:06

## 2024-06-03 RX ADMIN — SODIUM CHLORIDE, PRESERVATIVE FREE 10 ML: 5 INJECTION INTRAVENOUS at 12:06

## 2024-06-03 RX ADMIN — Medication 6 MG: at 08:06

## 2024-06-03 RX ADMIN — POTASSIUM CHLORIDE 40 MEQ: 1500 TABLET, EXTENDED RELEASE ORAL at 09:06

## 2024-06-03 RX ADMIN — GABAPENTIN 300 MG: 300 CAPSULE ORAL at 03:06

## 2024-06-03 RX ADMIN — MICONAZOLE NITRATE: 20 CREAM TOPICAL at 08:06

## 2024-06-03 RX ADMIN — TRAZODONE HYDROCHLORIDE 50 MG: 50 TABLET ORAL at 08:06

## 2024-06-03 RX ADMIN — AMLODIPINE BESYLATE 5 MG: 5 TABLET ORAL at 09:06

## 2024-06-03 RX ADMIN — METOCLOPRAMIDE 10 MG: 10 TABLET ORAL at 05:06

## 2024-06-03 RX ADMIN — TRAMADOL HYDROCHLORIDE 50 MG: 50 TABLET, COATED ORAL at 03:06

## 2024-06-03 RX ADMIN — TRAMADOL HYDROCHLORIDE 50 MG: 50 TABLET, COATED ORAL at 09:06

## 2024-06-03 RX ADMIN — TIMOLOL MALEATE 1 DROP: 5 SOLUTION OPHTHALMIC at 08:06

## 2024-06-03 RX ADMIN — FERROUS SULFATE TAB 325 MG (65 MG ELEMENTAL FE) 1 EACH: 325 (65 FE) TAB at 03:06

## 2024-06-03 RX ADMIN — TRAMADOL HYDROCHLORIDE 50 MG: 50 TABLET, COATED ORAL at 08:06

## 2024-06-03 NOTE — PLAN OF CARE
Problem: Adult Inpatient Plan of Care  Goal: Plan of Care Review  Outcome: Progressing  Flowsheets (Taken 6/2/2024 1942)  Plan of Care Reviewed With: patient  Goal: Patient-Specific Goal (Individualized)  Outcome: Progressing  Goal: Absence of Hospital-Acquired Illness or Injury  Outcome: Progressing  Intervention: Identify and Manage Fall Risk  Flowsheets (Taken 6/2/2024 1942)  Safety Promotion/Fall Prevention:   assistive device/personal item within reach   Fall Risk reviewed with patient/family   Fall Risk signage in place   bedside commode chair   lighting adjusted   medications reviewed   nonskid shoes/socks when out of bed   side rails raised x 2  Intervention: Prevent Skin Injury  Flowsheets (Taken 6/2/2024 1942)  Body Position:   supine   position changed independently  Skin Protection:   incontinence pads utilized   protective footwear used  Device Skin Pressure Protection: absorbent pad utilized/changed  Intervention: Prevent and Manage VTE (Venous Thromboembolism) Risk  Flowsheets (Taken 6/2/2024 1942)  VTE Prevention/Management:   ambulation promoted   bleeding precations maintained   bleeding risk assessed  Intervention: Prevent Infection  Flowsheets (Taken 6/2/2024 1942)  Infection Prevention:   environmental surveillance performed   equipment surfaces disinfected   hand hygiene promoted   rest/sleep promoted   personal protective equipment utilized   single patient room provided  Goal: Optimal Comfort and Wellbeing  Outcome: Progressing  Intervention: Monitor Pain and Promote Comfort  Flowsheets (Taken 6/2/2024 1942)  Pain Management Interventions:   care clustered   quiet environment facilitated   position adjusted   pain management plan reviewed with patient/caregiver  Intervention: Provide Person-Centered Care  Flowsheets (Taken 6/2/2024 1942)  Trust Relationship/Rapport:   care explained   choices provided   emotional support provided   empathic listening provided   questions answered    questions encouraged   reassurance provided   thoughts/feelings acknowledged  Goal: Readiness for Transition of Care  Outcome: Progressing     Problem: Infection  Goal: Absence of Infection Signs and Symptoms  Outcome: Progressing  Intervention: Prevent or Manage Infection  Flowsheets (Taken 6/2/2024 1942)  Infection Management: aseptic technique maintained  Isolation Precautions: precautions maintained     Problem: Wound  Goal: Optimal Coping  Outcome: Progressing  Goal: Optimal Functional Ability  Outcome: Progressing  Intervention: Optimize Functional Ability  Flowsheets (Taken 6/2/2024 1942)  Activity Management: Up to bedside commode - L3  Activity Assistance Provided: independent  Goal: Absence of Infection Signs and Symptoms  Outcome: Progressing  Intervention: Prevent or Manage Infection  Flowsheets (Taken 6/2/2024 1942)  Infection Management: aseptic technique maintained  Isolation Precautions: precautions maintained  Goal: Improved Oral Intake  Outcome: Progressing  Goal: Optimal Pain Control and Function  Outcome: Progressing  Goal: Skin Health and Integrity  Outcome: Progressing  Goal: Optimal Wound Healing  Outcome: Progressing     Problem: Fall Injury Risk  Goal: Absence of Fall and Fall-Related Injury  Outcome: Progressing  Intervention: Identify and Manage Contributors  Flowsheets (Taken 6/2/2024 1942)  Self-Care Promotion:   independence encouraged   BADL personal objects within reach   adaptive equipment use encouraged  Medication Review/Management: medications reviewed  Intervention: Promote Injury-Free Environment  Flowsheets (Taken 6/2/2024 1942)  Safety Promotion/Fall Prevention:   assistive device/personal item within reach   Fall Risk reviewed with patient/family   Fall Risk signage in place   bedside commode chair   lighting adjusted   medications reviewed   nonskid shoes/socks when out of bed   side rails raised x 2

## 2024-06-03 NOTE — PT/OT/SLP PROGRESS
Physical Therapy Treatment    Patient Name:  Katty Veronica   MRN:  [id number]    Recommendations:     Discharge therapy intensity: Low Intensity Therapy   Discharge Equipment Recommendations: none  Barriers to discharge: None    Assessment:     Katty Veronica is a 79 y.o. female.  She presents with the following impairments/functional limitations: weakness, gait instability, impaired balance, impaired endurance, decreased safety awareness, impaired functional mobility.    Rehab Prognosis: Good; patient would benefit from acute skilled PT services to address these deficits and reach maximum level of function.    Recent Surgery: Procedure(s) (LRB):  COLON (N/A) 5 Days Post-Op    Plan:     During this hospitalization, patient would benefit from acute PT services 5 x/week to address the identified rehab impairments via therapeutic activities, therapeutic exercises, gait training and progress toward the following goals:    Plan of Care Expires:  06/28/24    Subjective     Chief Complaint: none    Objective:     Communicated with nurse prior to session.  Patient found supine with   upon PT entry to room.     General Precautions: Standard, fall  Orthopedic Precautions: N/A  Braces: N/A  Respiratory Status: Room air  Blood Pressure:   Skin Integrity: Visible skin intact      Functional Mobility:  Supervision assist to get EOB and SBA STS  Gait 100 ft and 75 ft with RW SBA.  Pt performed LE PRE's to increase strenth, ROM, and endurance to improve overall independence.    Education Provided:  Role and goals of PT, transfer training, bed mobility, gait training, balance training, safety awareness, assistive device, strengthening exercises, and importance of participating in PT to return to PLOF.     Patient left up in chair with call button in reach    GOALS:   Multidisciplinary Problems       Physical Therapy Goals          Problem: Physical Therapy    Goal Priority Disciplines Outcome Goal Variances Interventions    Physical Therapy Goal     PT, PT/OT Progressing     Description: Goals to be met by: 24     Patient will increase functional independence with mobility by performin. Supine to sit with Modified [place]  2. Sit to supine with Modified [place]  3. Sit to stand transfer with Modified [place]  4. Bed to chair transfer with Modified [place] using Rolling Walker  5. Gait  x 200 feet with Modified [place] using Rolling Walker.                          Time Tracking:     Billable Minutes: Gait Training 12 and Therapeutic Exercise 11    Treatment Type: Treatment  PT/PTA: PTA     Number of PTA visits since last PT visit: 2     2024

## 2024-06-03 NOTE — PROGRESS NOTES
Inpatient Nutrition Assessment    Admit Date: 5/27/2024   Total duration of encounter: 7 days   Patient Age: 79 y.o.    Nutrition Recommendation/Prescription     Diet Adult Regular ordered  Discontinue Boost Breeze BID (provides 250 kcal and 9 g protein per container)  Add trial of Prosource daily (provides 60 kcal and 15 g protein per serving)  Encouraged adequate PO intake  Monitor appetite/PO intake, weight, and labs    Communication of Recommendations: reviewed with nurse and reviewed with patient    Nutrition Assessment     Malnutrition Assessment/Nutrition-Focused Physical Exam    Malnutrition Context: other (see comments) (Does not meet criteria at this time) (05/29/24 1446)  Malnutrition Level: other (see comments) (Does not meet criteria at this time) (05/29/24 1446)  Energy Intake (Malnutrition): less than 75% for greater than 7 days (05/29/24 1446)  Weight Loss (Malnutrition): other (see comments) (Does not meet criteria per EMR) (05/29/24 1446)  Subcutaneous Fat (Malnutrition): other (see comments) (Does not meet criteria) (05/29/24 1446)           Muscle Mass (Malnutrition): other (see comments) (Does not meet criteria) (05/29/24 1446)                          Fluid Accumulation (Malnutrition): other (see comments) (Does not meet criteria) (05/29/24 1446)        A minimum of two characteristics is recommended for diagnosis of either severe or non-severe malnutrition.    Chart Review    Reason Seen: malnutrition screening tool (MST) and follow-up    Malnutrition Screening Tool Results   Have you recently lost weight without trying?: Yes: 14-23 lbs  Have you been eating poorly because of a decreased appetite?: No   MST Score: 2   Diagnosis:  Melena   Acute on chronic anemia due to above  Chronic gastritis, recent EGD 05/21/2024   Chronic gastroparesis     Relevant Medical History:    Anxiety      CHF (congestive heart failure)      COPD (chronic obstructive pulmonary disease)      Coronary artery disease        s/p 2 stents    Depression      GERD (gastroesophageal reflux disease)      Glaucoma      Hypertension      Obstructive sleep apnea       on cpap    Stroke      Urinary, incontinence, stress female        Scheduled Medications:  [START ON 6/4/2024] amLODIPine, 10 mg, Daily  atorvastatin, 80 mg, QHS  enoxparin, 1 mg/kg, Q12H (prophylaxis, 0900/2100)  ferrous sulfate, 1 tablet, Daily  folic acid, 1 mg, Daily  gabapentin, 300 mg, TID  metoclopramide HCl, 10 mg, TID AC  miconazole, , BID  mupirocin, , BID  pantoprazole, 40 mg, BID AC  sertraline, 50 mg, QHS  sodium chloride 0.9%, 10 mL, Q6H  timolol maleate 0.5%, 1 drop, BID  traMADoL, 50 mg, TID    Continuous Infusions:     PRN Medications:  0.9%  NaCl infusion (for blood administration), , Q24H PRN  dicyclomine, 10 mg, QID PRN  hydrALAZINE, 10 mg, Q2H PRN  labetalol, 10 mg, Q4H PRN  melatonin, 6 mg, Nightly PRN  ondansetron, 4 mg, Q8H PRN  prochlorperazine, 5 mg, Q6H PRN  sodium chloride 0.9%, 10 mL, PRN  sodium chloride 0.9%, 10 mL, PRN  traZODone, 50 mg, Nightly PRN    Calorie Containing IV Medications: no significant kcals from medications at this time    Recent Labs   Lab 05/28/24  0422 05/29/24  0347 05/30/24  0343 05/31/24  0344 06/01/24  0321 06/02/24  0403 06/03/24  0412    144 138 137 139  --   --    K 3.4* 3.0* 2.2* 3.3* 3.7  --   --    CALCIUM 8.7 8.8 8.2* 8.2* 8.5  --   --    MG  --  2.00  --  1.90 1.90  --   --    CO2 23 25 29 22* 24  --   --    BUN 31.8* 17.4 10.6 9.0* 10.2  --   --    CREATININE 1.11* 0.86 0.73 0.68 0.75  --   --    EGFRNORACEVR 50 >60 >60 >60 >60  --   --    GLUCOSE 169* 143* 93 111 109  --   --    BILITOT 0.3 0.4  --   --  0.5  --   --    ALKPHOS 76 66  --   --  61  --   --    ALT 14 16  --   --  14  --   --    AST 21 34  --   --  21  --   --    ALBUMIN 3.4 3.3*  --   --  2.9*  --   --    WBC 10.48 12.06* 10.29 10.47 9.06 9.28  --    HGB 8.7* 8.0* 7.1* 9.3* 9.0* 9.9* 9.2*   HCT 26.8* 23.7* 21.5* 27.0* 27.4* 30.1* 28.5*  "    Nutrition Orders:  Diet Adult Regular Cardiac (Low Na/Chol)  Dietary nutrition supplements Boost Breeze - Any flavor; BID    Appetite/Oral Intake: fair/50-75% of meals  Factors Affecting Nutritional Intake: decreased appetite and nausea  Social Needs Impacting Access to Food: none identified  Food/Rastafari/Cultural Preferences: none reported  Food Allergies: none reported  Last Bowel Movement: 24  Wound(s):  none noted    Comments    2024: Pt reports a poor appetite/PO intake with nausea and dry heaves >3 weeks prior to admit.Pt reports a poor appetite/PO intake since admit (Clear Liquid Diet). Pt denies vomiting, and chewing difficulties. Pt reports nausea ans swallowing difficulties. Pt reports swallowing difficulties with rice and meat. Pt wears dentures, reports they are too loose. Per EMR, pt weighed 95 kg on 2023 (8.9% wt loss in 6 months, insignificant). Pt agreeable to Boost Breeze BID. Encouraged adequate PO intake. Will monitor.    6/3/2024: Pt reports a fair appetite/PO intake and denies N/V/D/C. Pt requests to discontinue ONS and add protein modular for trial. Encouraged adequate PO intake. Last BM noted. Will monitor.    Anthropometrics    Height: 5' 1" (154.9 cm), Height Method: Stated  Last Weight: 86.2 kg (190 lb) (24 170), Weight Method: Standard Scale  BMI (Calculated): 35.9  BMI Classification: obese grade II (BMI 35-39.9)     Ideal Body Weight (IBW), Female: 105 lb     % Ideal Body Weight, Female (lb): 180.95 %                    Usual Body Weight (UBW), k kg  % Usual Body Weight: 90.91     Usual Weight Provided By: EMR weight history    Wt Readings from Last 5 Encounters:   24 86.2 kg (190 lb)   24 86.6 kg (191 lb)   24 88 kg (194 lb)   24 88.5 kg (195 lb)   24 92.4 kg (203 lb 11.3 oz)     Weight Change(s) Since Admission:   2024: 86.2 kg  6/3/2024: no new wts  Wt Readings from Last 1 Encounters:   24 1708 86.2 kg (190 lb) "   05/29/24 0921 86.2 kg (190 lb)   05/28/24 1257 86.6 kg (191 lb)   05/27/24 2223 86.5 kg (190 lb 11.2 oz)   05/27/24 1253 90.7 kg (200 lb)   Admit Weight: 90.7 kg (200 lb) (05/27/24 1253), Weight Method: Standard Scale    Estimated Needs    Weight Used For Calorie Calculations: 86.2 kg (190 lb 0.6 oz)  Energy Calorie Requirements (kcal): 4253-2485 (20-25 kcal/kg)  Energy Need Method: Kcal/kg  Weight Used For Protein Calculations: 86.2 kg (190 lb 0.6 oz)  Protein Requirements:  (1.0-1.2 g/kg)  Fluid Requirements (mL): 2000 (CHF)        Enteral Nutrition     Patient not receiving enteral nutrition at this time.    Parenteral Nutrition     Patient not receiving parenteral nutrition support at this time.    Evaluation of Received Nutrient Intake    Calories: not meeting estimated needs  Protein: not meeting estimated needs    Patient Education     Not applicable.    Nutrition Diagnosis     PES: Inadequate oral intake related to acute illness as evidenced by poor PO intake for >3 weeks prior to admit and since admit. (active)     PES:  does not meet criteria at this time malnutrition related to  N/A as evidenced by  N/A . (active)    Nutrition Interventions     Intervention(s): general/healthful diet and commercial beverage    Goal: Meet greater than 80% of nutritional needs by follow-up. (goal progressing)  Goal: Maintain weight throughout hospitalization. (goal progressing)    Nutrition Goals & Monitoring     Dietitian will monitor: food and beverage intake, weight, electrolyte/renal panel, glucose/endocrine profile, and gastrointestinal profile  Discharge planning: too early to determine; pending clinical course  Nutrition Risk/Follow-Up: moderate (follow-up in 3-5 days)   Please consult if re-assessment needed sooner.

## 2024-06-04 LAB
ABS NEUT (OLG): 5.13 X10(3)/MCL (ref 2.1–9.2)
EOSINOPHIL NFR BLD MANUAL: 0.09 X10(3)/MCL (ref 0–0.9)
EOSINOPHIL NFR BLD MANUAL: 1 %
ERYTHROCYTE [DISTWIDTH] IN BLOOD BY AUTOMATED COUNT: 16.6 % (ref 11.5–17)
HCT VFR BLD AUTO: 29.4 % (ref 37–47)
HGB BLD-MCNC: 10 G/DL (ref 12–16)
INSTRUMENT WBC (OLG): 9.32 X10(3)/MCL
LYMPHOCYTES NFR BLD MANUAL: 3.36 X10(3)/MCL
LYMPHOCYTES NFR BLD MANUAL: 36 %
MCH RBC QN AUTO: 29.2 PG (ref 27–31)
MCHC RBC AUTO-ENTMCNC: 34 G/DL (ref 33–36)
MCV RBC AUTO: 86 FL (ref 80–94)
MONOCYTES NFR BLD MANUAL: 0.75 X10(3)/MCL (ref 0.1–1.3)
MONOCYTES NFR BLD MANUAL: 8 %
NEUTROPHILS NFR BLD MANUAL: 55 %
NRBC BLD AUTO-RTO: 0 %
OVALOCYTES (OLG): ABNORMAL
PLATELET # BLD AUTO: 240 X10(3)/MCL (ref 130–400)
PLATELET # BLD EST: NORMAL 10*3/UL
PMV BLD AUTO: 12.6 FL (ref 7.4–10.4)
POIKILOCYTOSIS BLD QL SMEAR: ABNORMAL
RBC # BLD AUTO: 3.42 X10(6)/MCL (ref 4.2–5.4)
RBC MORPH BLD: ABNORMAL
TOXIC GRANULES BLD QL SMEAR: ABNORMAL
WBC # SPEC AUTO: 9.32 X10(3)/MCL (ref 4.5–11.5)

## 2024-06-04 PROCEDURE — 63600175 PHARM REV CODE 636 W HCPCS: Performed by: INTERNAL MEDICINE

## 2024-06-04 PROCEDURE — 25000003 PHARM REV CODE 250: Performed by: INTERNAL MEDICINE

## 2024-06-04 PROCEDURE — 97116 GAIT TRAINING THERAPY: CPT | Mod: CQ

## 2024-06-04 PROCEDURE — 85027 COMPLETE CBC AUTOMATED: CPT | Performed by: INTERNAL MEDICINE

## 2024-06-04 PROCEDURE — 21400001 HC TELEMETRY ROOM

## 2024-06-04 PROCEDURE — A4216 STERILE WATER/SALINE, 10 ML: HCPCS | Performed by: INTERNAL MEDICINE

## 2024-06-04 PROCEDURE — 36415 COLL VENOUS BLD VENIPUNCTURE: CPT | Performed by: INTERNAL MEDICINE

## 2024-06-04 PROCEDURE — 97110 THERAPEUTIC EXERCISES: CPT | Mod: CQ

## 2024-06-04 RX ADMIN — SERTRALINE HYDROCHLORIDE 50 MG: 50 TABLET ORAL at 08:06

## 2024-06-04 RX ADMIN — TIMOLOL MALEATE 1 DROP: 5 SOLUTION OPHTHALMIC at 09:06

## 2024-06-04 RX ADMIN — TRAMADOL HYDROCHLORIDE 50 MG: 50 TABLET, COATED ORAL at 03:06

## 2024-06-04 RX ADMIN — ENOXAPARIN SODIUM 90 MG: 100 INJECTION SUBCUTANEOUS at 08:06

## 2024-06-04 RX ADMIN — MICONAZOLE NITRATE: 20 CREAM TOPICAL at 09:06

## 2024-06-04 RX ADMIN — MICONAZOLE NITRATE: 20 CREAM TOPICAL at 08:06

## 2024-06-04 RX ADMIN — SODIUM CHLORIDE, PRESERVATIVE FREE 10 ML: 5 INJECTION INTRAVENOUS at 05:06

## 2024-06-04 RX ADMIN — PANTOPRAZOLE SODIUM 40 MG: 40 TABLET, DELAYED RELEASE ORAL at 05:06

## 2024-06-04 RX ADMIN — AMLODIPINE BESYLATE 10 MG: 5 TABLET ORAL at 08:06

## 2024-06-04 RX ADMIN — SODIUM CHLORIDE, PRESERVATIVE FREE 10 ML: 5 INJECTION INTRAVENOUS at 11:06

## 2024-06-04 RX ADMIN — GABAPENTIN 300 MG: 300 CAPSULE ORAL at 03:06

## 2024-06-04 RX ADMIN — TIMOLOL MALEATE 1 DROP: 5 SOLUTION OPHTHALMIC at 08:06

## 2024-06-04 RX ADMIN — TRAMADOL HYDROCHLORIDE 50 MG: 50 TABLET, COATED ORAL at 08:06

## 2024-06-04 RX ADMIN — METOCLOPRAMIDE 10 MG: 10 TABLET ORAL at 11:06

## 2024-06-04 RX ADMIN — METOCLOPRAMIDE 10 MG: 10 TABLET ORAL at 03:06

## 2024-06-04 RX ADMIN — PANTOPRAZOLE SODIUM 40 MG: 40 TABLET, DELAYED RELEASE ORAL at 03:06

## 2024-06-04 RX ADMIN — FOLIC ACID 1 MG: 1 TABLET ORAL at 08:06

## 2024-06-04 RX ADMIN — ATORVASTATIN CALCIUM 80 MG: 40 TABLET, FILM COATED ORAL at 08:06

## 2024-06-04 RX ADMIN — METOCLOPRAMIDE 10 MG: 10 TABLET ORAL at 05:06

## 2024-06-04 RX ADMIN — GABAPENTIN 300 MG: 300 CAPSULE ORAL at 08:06

## 2024-06-04 RX ADMIN — MUPIROCIN: 20 OINTMENT TOPICAL at 08:06

## 2024-06-04 RX ADMIN — FERROUS SULFATE TAB 325 MG (65 MG ELEMENTAL FE) 1 EACH: 325 (65 FE) TAB at 08:06

## 2024-06-04 NOTE — PLAN OF CARE
06/04/24 0826   Medicare Message   Important Message from Medicare regarding Discharge Appeal Rights Given to patient/caregiver;Explained to patient/caregiver

## 2024-06-04 NOTE — PLAN OF CARE
Problem: Adult Inpatient Plan of Care  Goal: Plan of Care Review  Outcome: Progressing  Flowsheets (Taken 6/3/2024 2357)  Plan of Care Reviewed With: patient  Goal: Patient-Specific Goal (Individualized)  Outcome: Progressing  Flowsheets (Taken 6/3/2024 2357)  Individualized Care Needs: assist with ADL's, promote sleep hygeine, monitor labs, monitor for bleeding  Anxieties, Fears or Concerns: none  Goal: Absence of Hospital-Acquired Illness or Injury  Outcome: Progressing  Intervention: Identify and Manage Fall Risk  Flowsheets (Taken 6/3/2024 2357)  Safety Promotion/Fall Prevention:   assistive device/personal item within reach   Fall Risk reviewed with patient/family   Fall Risk signage in place   lighting adjusted   medications reviewed   nonskid shoes/socks when out of bed   side rails raised x 2  Intervention: Prevent Skin Injury  Flowsheets (Taken 6/3/2024 2357)  Body Position:   position changed independently   weight shifting  Skin Protection:   drying agents applied   transparent dressing maintained   skin sealant/moisture barrier applied  Device Skin Pressure Protection:   absorbent pad utilized/changed   tubing/devices free from skin contact   skin-to-skin areas padded  Intervention: Prevent and Manage VTE (Venous Thromboembolism) Risk  Flowsheets (Taken 6/3/2024 2357)  VTE Prevention/Management:   bleeding precations maintained   bleeding risk assessed   ambulation promoted   bleeding risk factor(s) identified, provider notified   ROM (passive) performed   ROM (active) performed   dorsiflexion/plantar flexion performed   fluids promoted  Intervention: Prevent Infection  Flowsheets (Taken 6/3/2024 2357)  Infection Prevention:   hand hygiene promoted   equipment surfaces disinfected   rest/sleep promoted   single patient room provided  Goal: Optimal Comfort and Wellbeing  Outcome: Progressing  Intervention: Monitor Pain and Promote Comfort  Flowsheets (Taken 6/3/2024 2357)  Pain Management  Interventions:   care clustered   relaxation techniques promoted   quiet environment facilitated   position adjusted  Intervention: Provide Person-Centered Care  Flowsheets (Taken 6/3/2024 2357)  Trust Relationship/Rapport:   care explained   choices provided   emotional support provided   empathic listening provided   questions answered   questions encouraged   thoughts/feelings acknowledged     Problem: Infection  Goal: Absence of Infection Signs and Symptoms  Outcome: Progressing  Intervention: Prevent or Manage Infection  Flowsheets (Taken 6/3/2024 2357)  Fever Reduction/Comfort Measures:   lightweight bedding   lightweight clothing  Infection Management: aseptic technique maintained  Isolation Precautions: precautions maintained     Problem: Wound  Goal: Optimal Coping  Outcome: Progressing  Intervention: Support Patient and Family Response  Flowsheets (Taken 6/3/2024 2357)  Supportive Measures:   active listening utilized   self-responsibility promoted   verbalization of feelings encouraged   self-reflection promoted   self-care encouraged  Family/Support System Care:   involvement promoted   presence promoted   self-care encouraged   support provided  Goal: Optimal Functional Ability  Outcome: Progressing  Intervention: Optimize Functional Ability  Flowsheets (Taken 6/3/2024 2357)  Activity Management: Up to bedside commode - L3  Activity Assistance Provided: independent  Goal: Absence of Infection Signs and Symptoms  Outcome: Progressing  Intervention: Prevent or Manage Infection  Flowsheets (Taken 6/3/2024 2357)  Fever Reduction/Comfort Measures:   lightweight bedding   lightweight clothing  Infection Management: aseptic technique maintained  Isolation Precautions: precautions maintained  Goal: Improved Oral Intake  Outcome: Progressing  Intervention: Promote and Optimize Oral Intake  Flowsheets (Taken 6/3/2024 2357)  Oral Nutrition Promotion: rest periods promoted  Nutrition Interventions: frequent small  meals provided  Goal: Optimal Pain Control and Function  Outcome: Progressing  Intervention: Prevent or Manage Pain  Flowsheets (Taken 6/3/2024 2357)  Sleep/Rest Enhancement:   noise level reduced   regular sleep/rest pattern promoted   relaxation techniques promoted   room darkened   therapeutic touch utilized   natural light exposure provided   music provided   massage given   family presence promoted   consistent schedule promoted   awakenings minimized  Pain Management Interventions:   care clustered   relaxation techniques promoted   quiet environment facilitated   position adjusted  Goal: Skin Health and Integrity  Outcome: Progressing  Intervention: Optimize Skin Protection  Flowsheets (Taken 6/3/2024 2357)  Pressure Reduction Techniques: frequent weight shift encouraged  Pressure Reduction Devices: positioning supports utilized  Skin Protection:   drying agents applied   transparent dressing maintained   skin sealant/moisture barrier applied  Activity Management: Up to bedside commode - L3  Head of Bed (HOB) Positioning: HOB at 30-45 degrees  Goal: Optimal Wound Healing  Outcome: Progressing  Intervention: Promote Wound Healing  Flowsheets (Taken 6/3/2024 2357)  Sleep/Rest Enhancement:   noise level reduced   regular sleep/rest pattern promoted   relaxation techniques promoted   room darkened   therapeutic touch utilized   natural light exposure provided   music provided   massage given   family presence promoted   consistent schedule promoted   awakenings minimized     Problem: Fall Injury Risk  Goal: Absence of Fall and Fall-Related Injury  Outcome: Progressing  Intervention: Identify and Manage Contributors  Flowsheets (Taken 6/3/2024 2357)  Self-Care Promotion:   independence encouraged   BADL personal objects within reach   BADL personal routines maintained  Medication Review/Management: medications reviewed  Intervention: Promote Injury-Free Environment  Flowsheets (Taken 6/3/2024 2357)  Safety  Promotion/Fall Prevention:   assistive device/personal item within reach   Fall Risk reviewed with patient/family   Fall Risk signage in place   lighting adjusted   medications reviewed   nonskid shoes/socks when out of bed   side rails raised x 2

## 2024-06-04 NOTE — PLAN OF CARE
Problem: Adult Inpatient Plan of Care  Goal: Plan of Care Review  Outcome: Progressing  Goal: Patient-Specific Goal (Individualized)  Outcome: Progressing  Goal: Absence of Hospital-Acquired Illness or Injury  Outcome: Progressing  Goal: Optimal Comfort and Wellbeing  Outcome: Progressing  Goal: Readiness for Transition of Care  Outcome: Progressing     Problem: Infection  Goal: Absence of Infection Signs and Symptoms  Outcome: Progressing  Intervention: Prevent or Manage Infection  Flowsheets (Taken 6/4/2024 1758)  Infection Management: aseptic technique maintained     Problem: Wound  Goal: Optimal Coping  Outcome: Progressing  Goal: Optimal Functional Ability  Outcome: Progressing  Goal: Absence of Infection Signs and Symptoms  Outcome: Progressing  Goal: Improved Oral Intake  Outcome: Progressing  Goal: Optimal Pain Control and Function  Outcome: Progressing  Intervention: Prevent or Manage Pain  Flowsheets (Taken 6/4/2024 1758)  Pain Management Interventions:   pain management plan reviewed with patient/caregiver   quiet environment facilitated  Goal: Skin Health and Integrity  Outcome: Progressing  Goal: Optimal Wound Healing  Outcome: Progressing     Problem: Fall Injury Risk  Goal: Absence of Fall and Fall-Related Injury  Outcome: Progressing

## 2024-06-04 NOTE — PT/OT/SLP PROGRESS
Physical Therapy Treatment    Patient Name:  Katty Veronica   MRN:  [id number]    Recommendations:     Discharge therapy intensity: Low Intensity Therapy   Discharge Equipment Recommendations: none  Barriers to discharge: None    Assessment:     Katty Veronica is a 79 y.o. female.  She presents with the following impairments/functional limitations: weakness, gait instability, impaired balance, impaired endurance, decreased safety awareness, impaired functional mobility.    Rehab Prognosis: Good; patient would benefit from acute skilled PT services to address these deficits and reach maximum level of function.    Recent Surgery: Procedure(s) (LRB):  COLON (N/A) 6 Days Post-Op    Plan:     During this hospitalization, patient would benefit from acute PT services 5 x/week to address the identified rehab impairments via therapeutic activities, therapeutic exercises, gait training and progress toward the following goals:    Plan of Care Expires:  06/28/24    Subjective     Chief Complaint: my legs are weaker today    Objective:     Communicated with nurse prior to session.  Patient found supine with   upon PT entry to room.     General Precautions: Standard, fall  Orthopedic Precautions: N/A  Braces: N/A  Respiratory Status: Room air  Blood Pressure:   Skin Integrity: Visible skin intact      Functional Mobility:  Mod I bed mobility and transfers  Gait 50 ft x 2 75 ft x 1 RW SBA.  Pt performed LE PRE's to increase strenth, ROM, and endurance to improve overall independence.    Education Provided:  Role and goals of PT, transfer training, bed mobility, gait training, balance training, safety awareness, assistive device, strengthening exercises, and importance of participating in PT to return to PLOF.     Patient left up in chair with call button in reach    GOALS:   Multidisciplinary Problems       Physical Therapy Goals          Problem: Physical Therapy    Goal Priority Disciplines Outcome Goal Variances  Interventions   Physical Therapy Goal     PT, PT/OT Progressing     Description: Goals to be met by: 24     Patient will increase functional independence with mobility by performin. Supine to sit with Modified [place]  2. Sit to supine with Modified [place]  3. Sit to stand transfer with Modified [place]  4. Bed to chair transfer with Modified [place] using Rolling Walker  5. Gait  x 200 feet with Modified [place] using Rolling Walker.                          Time Tracking:       Billable Minutes: Gait Training 15 and Therapeutic Exercise 9    Treatment Type: Treatment  PT/PTA: PTA     Number of PTA visits since last PT visit: 3     2024

## 2024-06-05 VITALS
OXYGEN SATURATION: 98 % | WEIGHT: 190 LBS | TEMPERATURE: 99 F | SYSTOLIC BLOOD PRESSURE: 147 MMHG | BODY MASS INDEX: 35.87 KG/M2 | HEIGHT: 61 IN | DIASTOLIC BLOOD PRESSURE: 76 MMHG | HEART RATE: 88 BPM | RESPIRATION RATE: 18 BRPM

## 2024-06-05 PROBLEM — R10.13 EPIGASTRIC ABDOMINAL PAIN: Status: ACTIVE | Noted: 2024-06-05

## 2024-06-05 PROBLEM — T14.8XXA EXTRAVASATION INJURY: Status: ACTIVE | Noted: 2024-06-05

## 2024-06-05 PROBLEM — R11.2 NAUSEA AND VOMITING: Status: RESOLVED | Noted: 2024-06-05 | Resolved: 2024-06-05

## 2024-06-05 PROBLEM — R11.2 NAUSEA AND VOMITING: Status: ACTIVE | Noted: 2024-06-05

## 2024-06-05 PROBLEM — R10.13 EPIGASTRIC ABDOMINAL PAIN: Status: RESOLVED | Noted: 2024-06-05 | Resolved: 2024-06-05

## 2024-06-05 PROCEDURE — A4216 STERILE WATER/SALINE, 10 ML: HCPCS | Performed by: INTERNAL MEDICINE

## 2024-06-05 PROCEDURE — 63600175 PHARM REV CODE 636 W HCPCS: Performed by: INTERNAL MEDICINE

## 2024-06-05 PROCEDURE — 25000003 PHARM REV CODE 250: Performed by: INTERNAL MEDICINE

## 2024-06-05 RX ORDER — FOLIC ACID 1 MG/1
1 TABLET ORAL DAILY
Qty: 90 TABLET | Refills: 0 | Status: SHIPPED | OUTPATIENT
Start: 2024-06-06 | End: 2024-09-04

## 2024-06-05 RX ORDER — FERROUS SULFATE 325(65) MG
325 TABLET, DELAYED RELEASE (ENTERIC COATED) ORAL DAILY
Qty: 90 TABLET | Refills: 0 | Status: SHIPPED | OUTPATIENT
Start: 2024-06-05 | End: 2024-09-03

## 2024-06-05 RX ADMIN — SODIUM CHLORIDE, PRESERVATIVE FREE 10 ML: 5 INJECTION INTRAVENOUS at 12:06

## 2024-06-05 RX ADMIN — TRAMADOL HYDROCHLORIDE 50 MG: 50 TABLET, COATED ORAL at 09:06

## 2024-06-05 RX ADMIN — FOLIC ACID 1 MG: 1 TABLET ORAL at 09:06

## 2024-06-05 RX ADMIN — SODIUM CHLORIDE, PRESERVATIVE FREE 10 ML: 5 INJECTION INTRAVENOUS at 05:06

## 2024-06-05 RX ADMIN — PANTOPRAZOLE SODIUM 40 MG: 40 TABLET, DELAYED RELEASE ORAL at 05:06

## 2024-06-05 RX ADMIN — AMLODIPINE BESYLATE 10 MG: 5 TABLET ORAL at 09:06

## 2024-06-05 RX ADMIN — GABAPENTIN 300 MG: 300 CAPSULE ORAL at 09:06

## 2024-06-05 RX ADMIN — METOCLOPRAMIDE 10 MG: 10 TABLET ORAL at 05:06

## 2024-06-05 RX ADMIN — ENOXAPARIN SODIUM 90 MG: 100 INJECTION SUBCUTANEOUS at 09:06

## 2024-06-05 RX ADMIN — FERROUS SULFATE TAB 325 MG (65 MG ELEMENTAL FE) 1 EACH: 325 (65 FE) TAB at 09:06

## 2024-06-05 RX ADMIN — METOCLOPRAMIDE 10 MG: 10 TABLET ORAL at 09:06

## 2024-06-05 RX ADMIN — Medication 6 MG: at 12:06

## 2024-06-05 NOTE — PLAN OF CARE
06/05/24 1104   Final Note   Assessment Type Final Discharge Note   Anticipated Discharge Disposition Home-Health   Hospital Resources/Appts/Education Provided Provided patient/caregiver with written discharge plan information   Post-Acute Status   Post-Acute Authorization Home Health   Home Health Status Set-up Complete/Auth obtained   Discharge Delays None known at this time     Patient discharging home and will resume care with [facility].  Discharge summary sent via [facility].

## 2024-06-05 NOTE — PROGRESS NOTES
[facility] Hospital Medicine Progress Note        Chief Complaint: Inpatient Follow-up for GI bleed    HPI:   Patient is an 79-year-old female with a history of heart failure with preserved EF, PAF on Xarelto, COPD, gastroparesis GERD, CVA, SABINA, glaucoma, HTN, HLD, CAD and additional past medical history as below who presented to the ER complaining of epigastric abdominal pain with nausea and vomiting over the prior 24 hours.  She just recently had an EGD done 05/21/2024 on an outpatient basis due to her ongoing symptoms of epigastric abdominal pain, which noted chronic gastritis and erythematous mucosa in the gastric body.  She does report melanotic stools over the past 24 hours.     She arrived to the ER tonight afebrile hemodynamically stable maintaining normal sats on room air.  Laboratory work showed a drop in her hemoglobin from 11 last month currently 8.8.  Her stool was guaiac-positive in the ER.  A CT bleeding scan was ordered and hospitalist was consulted for admission for presumed upper GI bleeding.    Protonix was added.  Anticoagulation was held.  GI was consulted.      Interval Hx:   5/29/24-Patient seen and examined this morning plan for colonoscopy today reports she is feeling very tired    5/30/24 dr pino - voices no complains and feels fine . Had colonoscopy with no signs of active bleeding . Has LORIN so will continue ferrlicit. Replace K+. Another option is to place video capsule    5/31/24 dr pino - continue ferrlicit/ will transfer ppi to po and continue to replace K+. Dc reglan. Somehow she received 1 unit prbc yesterday w hemoglobin 7.1>>9.3. refers feeling weak but working with PT(low intensity therapy)    6/1/24 dr roman - pt has no complains and feels fine . Stable hh post prbc transfusion . Not yet on her xarelto awaiting plans for video capsule     6/2/24 dr pino - no new issues. Will ask GI in am for plans . If no video capsule since her HH is  trending up and pt asymptomatic...will give trial of FD lovenox and monitor b4 resuming xarelto  for PAF.    6/3/24 dr pino - no new issues and no plans for video capsule . Will give trial of FD lovenox and if no bleed then will commit to xarelto     Objective/physical exam:  Vitals:    06/04/24 1119 06/04/24 1528 06/04/24 1550 06/04/24 1941   BP: 127/71 138/69  (!) 159/80   Pulse: 72 70  92   Resp:  18 18 18   Temp: 97.8 °F (36.6 °C) 98.4 °F (36.9 °C)  97.3 °F (36.3 °C)   TempSrc: Oral Oral  Oral   SpO2: 96% 99%  100%   Weight:       Height:         General: In no acute distress, afebrile  Respiratory: Clear to auscultation bilaterally  Cardiovascular: S1, S2, no appreciable murmur  Abdomen: Soft, nontender, BS +  MSK: Warm, no lower extremity edema, no clubbing or cyanosis  Neurologic: Alert and oriented x4, moving all extremities with good strength         Colonoscopy:  Impression:            - Moderate diverticulosis in the sigmoid colon, in                          the descending colon, in the transverse colon and                          in the ascending colon.                          - The examined portion of the ileum was normal.                          - Internal hemorrhoids.                          -no findings to explain anemia.   Recommendation:        - [facility].                          - will give iron infusion while she is here.                          -follow up gi as outpatient. can discuss utility                          of capsule, but this would have to be placed                          endoscopically. we can see how she does with iron                          infusions.     Lab Results   Component Value Date     06/01/2024    K 3.7 06/01/2024     06/01/2024    CO2 24 06/01/2024    BUN 10.2 06/01/2024    CREATININE 0.75 06/01/2024    CALCIUM 8.5 06/01/2024    ANIONGAP 7 (L) 08/04/2023    ESTGFRAFRICA 74 08/04/2023    EGFRNONAA >60  11/10/2021      Lab Results   Component Value Date    ALT 14 06/01/2024    AST 21 06/01/2024    ALKPHOS 61 06/01/2024    BILITOT 0.5 06/01/2024      Lab Results   Component Value Date    WBC 9.32 06/04/2024    WBC 9.32 06/04/2024    HGB 10.0 (L) 06/04/2024    HCT 29.4 (L) 06/04/2024    MCV 86.0 06/04/2024     06/04/2024           Medications:   amLODIPine  10 mg Oral Daily    atorvastatin  80 mg Oral QHS    enoxparin  1 mg/kg Subcutaneous Q12H (prophylaxis, 0900/2100)    ferrous sulfate  1 tablet Oral Daily    folic acid  1 mg Oral Daily    gabapentin  300 mg Oral TID    metoclopramide HCl  10 mg Oral TID AC    miconazole   Topical (Top) BID    pantoprazole  40 mg Oral BID AC    sertraline  50 mg Oral QHS    sodium chloride 0.9%  10 mL Intravenous Q6H    timolol maleate 0.5%  1 drop Both Eyes BID    traMADoL  50 mg Oral TID        Current Facility-Administered Medications:     0.9%  NaCl infusion (for blood administration), , Intravenous, Q24H PRN    dicyclomine, 10 mg, Intramuscular, QID PRN    hydrALAZINE, 10 mg, Intravenous, Q2H PRN    labetalol, 10 mg, Intravenous, Q4H PRN    melatonin, 6 mg, Oral, Nightly PRN    ondansetron, 4 mg, Intravenous, Q8H PRN    prochlorperazine, 5 mg, Intravenous, Q6H PRN    sodium chloride 0.9%, 10 mL, Intravenous, PRN    Flushing PICC/Midline Protocol, , , Until Discontinued **AND** sodium chloride 0.9%, 10 mL, Intravenous, Q6H **AND** sodium chloride 0.9%, 10 mL, Intravenous, PRN    traZODone, 50 mg, Oral, Nightly PRN     Assessment/Plan:    Melena /heme + stools   -Egd- gastritis  -Colonoscopy -diverticulosis/ internal hemorrhoids  - no plans for video capsule  - HH stable. Will start FD lovenox and monitor for bleed/ drop in hh    Acute on chronic anemia due to above    Acute symptomatic anemia requiring prbc transfusion     Chronic gastritis, recent EGD 05/21/2024     Chronic gastroparesis     HX:  AFib on Xarelto, CAD, HTN, HLD, GERD, CVA, SABINA,  glaucoma    Plan:  Completed  ferrlicit x 3  No plans for vce/ will start FD lovenox today and monitor for bleed or drop in hh  Po protonix   Repeat blood work in a.m.  SCDs

## 2024-06-05 NOTE — PROGRESS NOTES
[facility] Hospital Medicine Progress Note        Chief Complaint: Inpatient Follow-up for GI bleed    HPI:   Patient is an 79-year-old female with a history of heart failure with preserved EF, PAF on Xarelto, COPD, gastroparesis GERD, CVA, SABINA, glaucoma, HTN, HLD, CAD and additional past medical history as below who presented to the ER complaining of epigastric abdominal pain with nausea and vomiting over the prior 24 hours.  She just recently had an EGD done 05/21/2024 on an outpatient basis due to her ongoing symptoms of epigastric abdominal pain, which noted chronic gastritis and erythematous mucosa in the gastric body.  She does report melanotic stools over the past 24 hours.     She arrived to the ER tonight afebrile hemodynamically stable maintaining normal sats on room air.  Laboratory work showed a drop in her hemoglobin from 11 last month currently 8.8.  Her stool was guaiac-positive in the ER.  A CT bleeding scan was ordered and hospitalist was consulted for admission for presumed upper GI bleeding.    Protonix was added.  Anticoagulation was held.  GI was consulted.      Interval Hx:   5/29/24-Patient seen and examined this morning plan for colonoscopy today reports she is feeling very tired    5/30/24 dr pino - voices no complains and feels fine . Had colonoscopy with no signs of active bleeding . Has LORIN so will continue ferrlicit. Replace K+. Another option is to place video capsule    5/31/24 dr pino - continue ferrlicit/ will transfer ppi to po and continue to replace K+. Dc reglan. Somehow she received 1 unit prbc yesterday w hemoglobin 7.1>>9.3. refers feeling weak but working with PT(low intensity therapy)    6/1/24 dr roman - pt has no complains and feels fine . Stable hh post prbc transfusion . Not yet on her xarelto awaiting plans for video capsule     6/2/24 dr pino - no new issues. Will ask GI in am for plans . If no video capsule since her HH is  trending up and pt asymptomatic...will give trial of FD lovenox and monitor b4 resuming xarelto  for PAF.    6/3/24 dr pino - no new issues and no plans for video capsule . Will give trial of FD lovenox and if no bleed then will commit to xarelto     6/4/24 dr pino - no bleeding on FD lovenox. Will discharge home in am     Objective/physical exam:  Vitals:    06/04/24 1119 06/04/24 1528 06/04/24 1550 06/04/24 1941   BP: 127/71 138/69  (!) 159/80   Pulse: 72 70  92   Resp:  18 18 18   Temp: 97.8 °F (36.6 °C) 98.4 °F (36.9 °C)  97.3 °F (36.3 °C)   TempSrc: Oral Oral  Oral   SpO2: 96% 99%  100%   Weight:       Height:         General: In no acute distress, afebrile  Respiratory: Clear to auscultation bilaterally  Cardiovascular: S1, S2, no appreciable murmur  Abdomen: Soft, nontender, BS +  MSK: Warm, no lower extremity edema, no clubbing or cyanosis  Neurologic: Alert and oriented x4, moving all extremities with good strength         Colonoscopy:  Impression:            - Moderate diverticulosis in the sigmoid colon, in                          the descending colon, in the transverse colon and                          in the ascending colon.                          - The examined portion of the ileum was normal.                          - Internal hemorrhoids.                          -no findings to explain anemia.   Recommendation:        - [facility].                          - will give iron infusion while she is here.                          -follow up gi as outpatient. can discuss utility                          of capsule, but this would have to be placed                          endoscopically. we can see how she does with iron                          infusions.     Lab Results   Component Value Date     06/01/2024    K 3.7 06/01/2024     06/01/2024    CO2 24 06/01/2024    BUN 10.2 06/01/2024    CREATININE 0.75 06/01/2024    CALCIUM 8.5 06/01/2024     ANIONGAP 7 (L) 08/04/2023    ESTGFRAFRICA 74 08/04/2023    EGFRNONAA >60 11/10/2021      Lab Results   Component Value Date    ALT 14 06/01/2024    AST 21 06/01/2024    ALKPHOS 61 06/01/2024    BILITOT 0.5 06/01/2024      Lab Results   Component Value Date    WBC 9.32 06/04/2024    WBC 9.32 06/04/2024    HGB 10.0 (L) 06/04/2024    HCT 29.4 (L) 06/04/2024    MCV 86.0 06/04/2024     06/04/2024           Medications:   amLODIPine  10 mg Oral Daily    atorvastatin  80 mg Oral QHS    enoxparin  1 mg/kg Subcutaneous Q12H (prophylaxis, 0900/2100)    ferrous sulfate  1 tablet Oral Daily    folic acid  1 mg Oral Daily    gabapentin  300 mg Oral TID    metoclopramide HCl  10 mg Oral TID AC    miconazole   Topical (Top) BID    pantoprazole  40 mg Oral BID AC    sertraline  50 mg Oral QHS    sodium chloride 0.9%  10 mL Intravenous Q6H    timolol maleate 0.5%  1 drop Both Eyes BID    traMADoL  50 mg Oral TID        Current Facility-Administered Medications:     0.9%  NaCl infusion (for blood administration), , Intravenous, Q24H PRN    dicyclomine, 10 mg, Intramuscular, QID PRN    hydrALAZINE, 10 mg, Intravenous, Q2H PRN    labetalol, 10 mg, Intravenous, Q4H PRN    melatonin, 6 mg, Oral, Nightly PRN    ondansetron, 4 mg, Intravenous, Q8H PRN    prochlorperazine, 5 mg, Intravenous, Q6H PRN    sodium chloride 0.9%, 10 mL, Intravenous, PRN    Flushing PICC/Midline Protocol, , , Until Discontinued **AND** sodium chloride 0.9%, 10 mL, Intravenous, Q6H **AND** sodium chloride 0.9%, 10 mL, Intravenous, PRN    traZODone, 50 mg, Oral, Nightly PRN     Assessment/Plan:    Melena /heme + stools   -Egd- gastritis  -Colonoscopy -diverticulosis/ internal hemorrhoids  - no plans for video capsule  - HH stable. Will start FD lovenox and monitor for bleed/ drop in hh    Acute on chronic anemia due to above    Acute symptomatic anemia requiring prbc transfusion     Chronic gastritis, recent EGD 05/21/2024     Chronic gastroparesis     HX:   AFib on Xarelto, CAD, HTN, HLD, GERD, CVA, SABINA, glaucoma    Plan:  Completed  ferrlicit x 3  No plans for vce/ will start FD lovenox today and monitor for bleed or drop in hh  Po protonix   Repeat blood work in a.m.  SCDs

## 2024-06-05 NOTE — DISCHARGE INSTRUCTIONS
1-please follow up with gastroenterology as scheduled   2-please follow up with your primary care physician

## 2024-06-05 NOTE — DISCHARGE SUMMARY
[facility] Hospital Medicine Discharge Summary    Admit Date: 5/27/2024  Discharge Date and Time: 6/5/20249:59 AM  Admitting Physician:  Team  Discharging Physician: Mike Myers MD.  Primary Care Physician: Renard Eddy MD  Consults: Gastroenterology    Discharge Diagnoses:  Melena /heme + stools   -Egd- gastritis  -Colonoscopy -diverticulosis/ internal hemorrhoids  - no plans for video capsule  - HH stable. Post  FD lovenox >>>discharged on xarelto     Acute on chronic LORIN anemia due to above  - post ferrlicit     Acute symptomatic anemia requiring prbc transfusion      Chronic gastritis, recent EGD 05/21/2024      Chronic gastroparesis      HX:  AFib on Xarelto, CAD, HTN, HLD, GERD, CVA, SAIBNA, glaucoma         Hospital Course:   Patient is an 79-year-old female with a history of heart failure with preserved EF, PAF on Xarelto, COPD, gastroparesis GERD, CVA, SABINA, glaucoma, HTN, HLD, CAD and additional past medical history as below who presented to the ER complaining of epigastric abdominal pain with nausea and vomiting over the prior 24 hours.  She just recently had an EGD done 05/21/2024 on an outpatient basis due to her ongoing symptoms of epigastric abdominal pain, which noted chronic gastritis and erythematous mucosa in the gastric body.  She does report melanotic stools over the past 24 hours.     She arrived to the ER tonight afebrile hemodynamically stable maintaining normal sats on room air.  Laboratory work showed a drop in her hemoglobin from 11 last month currently 8.8.  Her stool was guaiac-positive in the ER.  A CT bleeding scan was ordered and hospitalist was consulted for admission for presumed upper GI bleeding.     Protonix was added.  Anticoagulation was held.  GI was consulted.      Had colonoscopy with no signs of active bleeding . Has LORIN so will continue ferrlicit. Replace K+. Another option is to place video capsule      Ferrlicit completed / will  transfer ppi to po and continue to replace K+. Dc reglan.  she received 1 unit prbc yesterday w hemoglobin 7.1>>9.3. refers feeling weak but working with PT(low intensity therapy)      Stable hh post prbc transfusion . Not yet on her xarelto awaiting plans for video capsule       If no plans for video capsule since her HH is trending up and pt asymptomatic...will give trial of FD lovenox and monitor b4 resuming xarelto  for PAF.      no new issues and no plans for video capsule . Will give trial of FD lovenox and if no bleed then will commit to xarelto       no bleeding on FD lovenox. Will discharge home in am  on xarelto. Pt has home health , feels fine and voices no complains . HH trending up  Pt was seen and examined on the day of discharge  Vitals:  VITAL SIGNS: 24 HRS MIN & MAX LAST   Temp  Min: 97.3 °F (36.3 °C)  Max: 98.5 °F (36.9 °C) 98.5 °F (36.9 °C)   BP  Min: 127/71  Max: 162/75 (!) 162/75   Pulse  Min: 67  Max: 92  67   Resp  Min: 18  Max: 20 18   SpO2  Min: 95 %  Max: 100 % 95 %       Physical Exam:  General: In no acute distress, afebrile  Respiratory: Clear to auscultation bilaterally  Cardiovascular: S1, S2, no appreciable murmur  Abdomen: Soft, nontender, BS +  MSK: Warm, no lower extremity edema, no clubbing or cyanosis  Neurologic: Alert and oriented x4, moving all extremities with good strength            Colonoscopy:  Impression:            - Moderate diverticulosis in the sigmoid colon, in                          the descending colon, in the transverse colon and                          in the ascending colon.                          - The examined portion of the ileum was normal.                          - Internal hemorrhoids.                          -no findings to explain anemia.   Recommendation:        - [facility].                          - will give iron infusion while she is here.                          -follow up gi as outpatient. can  discuss utility                          of capsule, but this would have to be placed                          endoscopically. we can see how she does with iron                          infusions.     Procedures Performed: No admission procedures for hospital encounter.     Significant Diagnostic Studies: See Full reports for all details    Recent Labs   Lab 06/01/24  0321 06/02/24  0403 06/03/24  0412 06/04/24  0345   WBC 9.06 9.28  --  9.32  9.32   RBC 3.22* 3.43*  --  3.42*   HGB 9.0* 9.9* 9.2* 10.0*   HCT 27.4* 30.1* 28.5* 29.4*   MCV 85.1 87.8  --  86.0   MCH 28.0 28.9  --  29.2   MCHC 32.8* 32.9*  --  34.0   RDW 15.9 16.6  --  16.6    190  --  240   MPV 10.5* 11.1*  --  12.6*       Recent Labs   Lab 05/30/24  0343 05/31/24  0344 06/01/24  0321    137 139   K 2.2* 3.3* 3.7    107 107   CO2 29 22* 24   BUN 10.6 9.0* 10.2   CREATININE 0.73 0.68 0.75   CALCIUM 8.2* 8.2* 8.5   MG  --  1.90 1.90   ALBUMIN  --   --  2.9*   ALKPHOS  --   --  61   ALT  --   --  14   AST  --   --  21   BILITOT  --   --  0.5        Microbiology Results (last 7 days)       ** No results found for the last 168 hours. **             X-Ray Humerus 2 View Left  EXAMINATION  XR HUMERUS 2 VIEW LEFT    CLINICAL HISTORY  Other injury of unspecified body region, initial encounter    TECHNIQUE  A total of 4 views of the left humerus.    COMPARISON  None available at the time of initial interpretation.    FINDINGS  No displaced fracture or dislocation is identified. The visualized joint spaces are preserved. No aggressive osseous lesion or periosteal reaction is evident.    Extensive soft tissue opacity extends from the level of the antecubital fossa proximal to the left axillary and subclavian region.    IMPRESSION  Findings consistent with large volume contrast extravasation.    CONTRAST EXTRAVASATION RECOMMENDATIONS:  *elevation of the affected extremity  *routine neurovascular checks and pain evaluation  *monitoring for  evidence of erythema or findings to suggest developing skin necrosis  *intermittent utilization of warm or cold compresses may provide pain relief  ==========    Emergency surgical consultation should be pursued if there are changes that raise suspicion for potential skin necrosis or neurovascular compromise.    Electronically signed by: Wilbert Moore  Date:    05/28/2024  Time:    06:36         Medication List        ASK your doctor about these medications      ALPRAZolam 0.25 MG tablet  Commonly known as: XANAX     amLODIPine 5 MG tablet  Commonly known as: NORVASC     * aspirin 81 MG EC tablet  Commonly known as: ECOTRIN  Take 1 tablet (81 mg total) by mouth once daily.     * aspirin 81 MG EC tablet  Commonly known as: ECOTRIN     * atorvastatin 80 MG tablet  Commonly known as: LIPITOR     * atorvastatin 80 MG tablet  Commonly known as: LIPITOR     BISACODYL ORAL     carvediloL 25 MG tablet  Commonly known as: COREG  Take 1 tablet (25 mg total) by mouth 2 (two) times daily.     * cetirizine 10 MG tablet  Commonly known as: ZYRTEC     * cetirizine 10 MG tablet  Commonly known as: ZYRTEC     * famotidine 20 MG tablet  Commonly known as: PEPCID     * famotidine 20 MG tablet  Commonly known as: PEPCID     * furosemide 40 MG tablet  Commonly known as: LASIX     * furosemide 40 MG tablet  Commonly known as: LASIX  Take 1 tablet (40 mg total) by mouth once daily.     gabapentin 300 MG capsule  Commonly known as: NEURONTIN     GEMTESA 75 mg Tab  Generic drug: vibegron     * lisinopriL 10 MG tablet     * lisinopriL 10 MG tablet     methocarbamoL 500 MG Tab  Commonly known as: ROBAXIN     metoclopramide HCl 10 MG tablet  Commonly known as: REGLAN     nitroGLYCERIN 0.4 MG SL tablet  Commonly known as: NITROSTAT  Place 1 tablet (0.4 mg total) under the tongue every 5 (five) minutes as needed for Chest pain.     nystatin cream  Commonly known as: MYCOSTATIN     ondansetron 8 MG Tbdl  Commonly known as: ZOFRAN-ODT      pantoprazole 40 MG tablet  Commonly known as: PROTONIX     polyethylene glycol 17 gram Pwpk  Commonly known as: GLYCOLAX  Take 17 g by mouth 2 (two) times daily as needed for Constipation.     * potassium chloride 10 MEQ Tbsr  Commonly known as: KLOR-CON     * potassium chloride SA 10 MEQ tablet  Commonly known as: K-DUR,KLOR-CON M     * rivaroxaban 20 mg Tab  Commonly known as: XARELTO  Take 1 tablet (20 mg total) by mouth daily with dinner or evening meal.     * rivaroxaban 20 mg Tab  Commonly known as: XARELTO     sertraline 50 MG tablet  Commonly known as: ZOLOFT     timolol maleate 0.5% 0.5 % Drop  Commonly known as: TIMOPTIC     * traZODone 50 MG tablet  Commonly known as: DESYREL     * traZODone 50 MG tablet  Commonly known as: DESYREL     * ULTRAM 50 mg tablet  Generic drug: traMADoL     * traMADoL 50 mg tablet  Commonly known as: ULTRAM     VITAMIN D2 50,000 unit Cap  Generic drug: ergocalciferol           * This list has 20 medication(s) that are the same as other medications prescribed for you. Read the directions carefully, and ask your doctor or other care provider to review them with you.                   Explained in detail to the patient about the discharge plan, medications, and follow-up visits. Pt understands and agrees with the treatment plan  Discharge Disposition: home with home health  Discharged Condition: stable  Diet- cardiac   Dietary Orders (From admission, onward)       Start     Ordered    06/03/24 1629  Dietary nutrition supplements ProSource NoCarb Liquid Protein - Neutral; Daily  Continuous        Question Answer Comment   Select PO Supplement: ProSource NoCarb Liquid Protein - Neutral    Frequency: Daily        06/03/24 1628    06/03/24 1408  Diet Adult Regular Cardiac (Low Na/Chol)  Diet effective now        Comments: No rice (swallowing difficulty)   Question:  Diet Modifier:  Answer:  Cardiac (Low Na/Chol)    06/03/24 1408                   Medications Per ME med  rec  Activities as tolerated   Follow-up Information       [facility].    Specialty: Home Health Services  Contact information:  [address] [place] [id number]  [phone]                           For further questions contact hospitalist office    Discharge time 33 minutes    For worsening symptoms, chest pain, shortness of breath, increased abdominal pain, high grade fever, stroke or stroke like symptoms, immediately go to the nearest Emergency Room or call 911 as soon as possible.      Mike Niño M.D, on 6/5/2024. at 9:59 AM.

## 2024-06-05 NOTE — PLAN OF CARE
Problem: Adult Inpatient Plan of Care  Goal: Plan of Care Review  6/4/2024 2209 by Dihraj Beltran RN  Outcome: Progressing  Flowsheets (Taken 6/4/2024 2209)  Plan of Care Reviewed With: patient  6/4/2024 1931 by Dhiraj Beltran RN  Outcome: Progressing  Flowsheets (Taken 6/4/2024 1931)  Plan of Care Reviewed With: patient  Goal: Patient-Specific Goal (Individualized)  6/4/2024 2209 by Dhiraj Beltran RN  Outcome: Progressing  6/4/2024 1931 by Dhiraj Beltran RN  Outcome: Progressing  Goal: Absence of Hospital-Acquired Illness or Injury  6/4/2024 2209 by Dhiraj Beltran RN  Outcome: Progressing  6/4/2024 1931 by Dhiraj Beltran RN  Outcome: Progressing  Intervention: Identify and Manage Fall Risk  Flowsheets (Taken 6/4/2024 2209)  Safety Promotion/Fall Prevention:   assistive device/personal item within reach   side rails raised x 2   nonskid shoes/socks when out of bed  Intervention: Prevent Skin Injury  Flowsheets (Taken 6/4/2024 2209)  Skin Protection: protective footwear used  Intervention: Prevent and Manage VTE (Venous Thromboembolism) Risk  Flowsheets (Taken 6/4/2024 2209)  VTE Prevention/Management: ambulation promoted  Intervention: Prevent Infection  Flowsheets (Taken 6/4/2024 2209)  Infection Prevention:   rest/sleep promoted   single patient room provided  Goal: Optimal Comfort and Wellbeing  6/4/2024 2209 by Dhiraj Beltran RN  Outcome: Progressing  6/4/2024 1931 by Dhiraj Beltran RN  Outcome: Progressing  Goal: Readiness for Transition of Care  6/4/2024 2209 by Dhiraj Beltran RN  Outcome: Progressing  6/4/2024 1931 by Dhiraj Beltran RN  Outcome: Progressing     Problem: Infection  Goal: Absence of Infection Signs and Symptoms  6/4/2024 2209 by Dhiraj Beltran RN  Outcome: Progressing  6/4/2024 1931 by Dhiraj Beltran RN  Outcome: Progressing     Problem: Wound  Goal: Optimal Coping  6/4/2024 2209 by Dhiraj Beltran, RN  Outcome: Progressing  6/4/2024 1931 by  Dhiraj Beltran RN  Outcome: Progressing  Goal: Optimal Functional Ability  6/4/2024 2209 by Dhiraj Beltran RN  Outcome: Progressing  6/4/2024 1931 by Dhiraj Beltran RN  Outcome: Progressing  Goal: Absence of Infection Signs and Symptoms  6/4/2024 2209 by Dhiraj Beltran RN  Outcome: Progressing  6/4/2024 1931 by Dhiraj Beltran RN  Outcome: Progressing  Goal: Improved Oral Intake  6/4/2024 2209 by Dhiraj Beltran RN  Outcome: Progressing  6/4/2024 1931 by Dhiraj Beltran RN  Outcome: Progressing  Goal: Optimal Pain Control and Function  6/4/2024 2209 by Dhiraj Beltran RN  Outcome: Progressing  6/4/2024 1931 by Dhiraj Beltran RN  Outcome: Progressing  Goal: Skin Health and Integrity  6/4/2024 2209 by Dhiraj Beltran RN  Outcome: Progressing  6/4/2024 1931 by Dhiraj Beltran RN  Outcome: Progressing  Goal: Optimal Wound Healing  6/4/2024 2209 by Dhiraj Bletran RN  Outcome: Progressing  6/4/2024 1931 by Dhiraj Beltran RN  Outcome: Progressing     Problem: Fall Injury Risk  Goal: Absence of Fall and Fall-Related Injury  6/4/2024 2209 by Dhiraj Beltran RN  Outcome: Progressing  6/4/2024 1931 by Dhiraj Beltran RN  Outcome: Progressing  Intervention: Promote Injury-Free Environment  Flowsheets (Taken 6/4/2024 2209)  Safety Promotion/Fall Prevention:   assistive device/personal item within reach   side rails raised x 2   nonskid shoes/socks when out of bed

## 2024-06-06 ENCOUNTER — PATIENT OUTREACH (OUTPATIENT)
Dept: ADMINISTRATIVE | Facility: CLINIC | Age: 79
End: 2024-06-06
Payer: MEDICARE

## 2024-06-06 NOTE — PROGRESS NOTES
C3 nurse spoke with Katty Veronica  for a TCC post hospital discharge follow up call. The patient has a scheduled [facility] appointment with Renard Eddy MD (Internal Medicine) on 6/10/2024 at 1 pm.

## 2024-07-06 ENCOUNTER — HOSPITAL ENCOUNTER (INPATIENT)
Facility: HOSPITAL | Age: 79
LOS: 5 days | Discharge: NURSING, REHAB OR LONG-TERM CARE | DRG: 392 | End: 2024-07-12
Attending: INTERNAL MEDICINE | Admitting: INTERNAL MEDICINE
Payer: MEDICARE

## 2024-07-06 DIAGNOSIS — R10.13 EPIGASTRIC PAIN: Primary | ICD-10-CM

## 2024-07-06 DIAGNOSIS — R11.0 NAUSEA: ICD-10-CM

## 2024-07-06 DIAGNOSIS — R11.2 INTRACTABLE NAUSEA AND VOMITING: ICD-10-CM

## 2024-07-06 DIAGNOSIS — R00.2 PALPITATION: ICD-10-CM

## 2024-07-06 LAB
ALBUMIN SERPL-MCNC: 3.6 G/DL (ref 3.4–4.8)
ALBUMIN/GLOB SERPL: 1.1 RATIO (ref 1.1–2)
ALP SERPL-CCNC: 104 UNIT/L (ref 40–150)
ALT SERPL-CCNC: 13 UNIT/L (ref 0–55)
ANION GAP SERPL CALC-SCNC: 17 MEQ/L
APTT PPP: 31.1 SECONDS (ref 23.2–33.7)
AST SERPL-CCNC: 22 UNIT/L (ref 5–34)
BASOPHILS # BLD AUTO: 0.02 X10(3)/MCL
BASOPHILS NFR BLD AUTO: 0.2 %
BILIRUB SERPL-MCNC: 0.7 MG/DL
BUN SERPL-MCNC: 11.3 MG/DL (ref 9.8–20.1)
CALCIUM SERPL-MCNC: 9.3 MG/DL (ref 8.4–10.2)
CHLORIDE SERPL-SCNC: 105 MMOL/L (ref 98–107)
CO2 SERPL-SCNC: 19 MMOL/L (ref 23–31)
CREAT SERPL-MCNC: 0.83 MG/DL (ref 0.55–1.02)
CREAT/UREA NIT SERPL: 14
EOSINOPHIL # BLD AUTO: 0.01 X10(3)/MCL (ref 0–0.9)
EOSINOPHIL NFR BLD AUTO: 0.1 %
ERYTHROCYTE [DISTWIDTH] IN BLOOD BY AUTOMATED COUNT: 15.2 % (ref 11.5–17)
GFR SERPLBLD CREATININE-BSD FMLA CKD-EPI: >60 ML/MIN/1.73/M2
GLOBULIN SER-MCNC: 3.4 GM/DL (ref 2.4–3.5)
GLUCOSE SERPL-MCNC: 166 MG/DL (ref 82–115)
HCT VFR BLD AUTO: 36.4 % (ref 37–47)
HGB BLD-MCNC: 12 G/DL (ref 12–16)
IMM GRANULOCYTES # BLD AUTO: 0.02 X10(3)/MCL (ref 0–0.04)
IMM GRANULOCYTES NFR BLD AUTO: 0.2 %
INR PPP: 1.9
LIPASE SERPL-CCNC: 7 U/L
LYMPHOCYTES # BLD AUTO: 1.9 X10(3)/MCL (ref 0.6–4.6)
LYMPHOCYTES NFR BLD AUTO: 21.6 %
MAGNESIUM SERPL-MCNC: 1.7 MG/DL (ref 1.6–2.6)
MCH RBC QN AUTO: 28.2 PG (ref 27–31)
MCHC RBC AUTO-ENTMCNC: 33 G/DL (ref 33–36)
MCV RBC AUTO: 85.6 FL (ref 80–94)
MONOCYTES # BLD AUTO: 0.41 X10(3)/MCL (ref 0.1–1.3)
MONOCYTES NFR BLD AUTO: 4.7 %
NEUTROPHILS # BLD AUTO: 6.43 X10(3)/MCL (ref 2.1–9.2)
NEUTROPHILS NFR BLD AUTO: 73.2 %
NRBC BLD AUTO-RTO: 0 %
PLATELET # BLD AUTO: 234 X10(3)/MCL (ref 130–400)
PMV BLD AUTO: 10.8 FL (ref 7.4–10.4)
POTASSIUM SERPL-SCNC: 3.3 MMOL/L (ref 3.5–5.1)
PROT SERPL-MCNC: 7 GM/DL (ref 5.8–7.6)
PROTHROMBIN TIME: 21.5 SECONDS (ref 12.5–14.5)
RBC # BLD AUTO: 4.25 X10(6)/MCL (ref 4.2–5.4)
SODIUM SERPL-SCNC: 141 MMOL/L (ref 136–145)
TROPONIN I SERPL-MCNC: <0.01 NG/ML (ref 0–0.04)
WBC # BLD AUTO: 8.79 X10(3)/MCL (ref 4.5–11.5)

## 2024-07-06 PROCEDURE — 93010 ELECTROCARDIOGRAM REPORT: CPT | Mod: ,,, | Performed by: INTERNAL MEDICINE

## 2024-07-06 PROCEDURE — 80053 COMPREHEN METABOLIC PANEL: CPT | Performed by: NURSE PRACTITIONER

## 2024-07-06 PROCEDURE — 84484 ASSAY OF TROPONIN QUANT: CPT | Performed by: NURSE PRACTITIONER

## 2024-07-06 PROCEDURE — 25500020 PHARM REV CODE 255: Performed by: NURSE PRACTITIONER

## 2024-07-06 PROCEDURE — 96361 HYDRATE IV INFUSION ADD-ON: CPT

## 2024-07-06 PROCEDURE — 25000003 PHARM REV CODE 250: Performed by: NURSE PRACTITIONER

## 2024-07-06 PROCEDURE — 96372 THER/PROPH/DIAG INJ SC/IM: CPT | Mod: 59 | Performed by: NURSE PRACTITIONER

## 2024-07-06 PROCEDURE — 63600175 PHARM REV CODE 636 W HCPCS: Performed by: NURSE PRACTITIONER

## 2024-07-06 PROCEDURE — 83735 ASSAY OF MAGNESIUM: CPT | Performed by: NURSE PRACTITIONER

## 2024-07-06 PROCEDURE — 96375 TX/PRO/DX INJ NEW DRUG ADDON: CPT

## 2024-07-06 PROCEDURE — 93005 ELECTROCARDIOGRAM TRACING: CPT

## 2024-07-06 PROCEDURE — 85730 THROMBOPLASTIN TIME PARTIAL: CPT | Performed by: NURSE PRACTITIONER

## 2024-07-06 PROCEDURE — 85025 COMPLETE CBC W/AUTO DIFF WBC: CPT | Performed by: NURSE PRACTITIONER

## 2024-07-06 PROCEDURE — 99285 EMERGENCY DEPT VISIT HI MDM: CPT | Mod: 25

## 2024-07-06 PROCEDURE — 83690 ASSAY OF LIPASE: CPT | Performed by: NURSE PRACTITIONER

## 2024-07-06 PROCEDURE — 85610 PROTHROMBIN TIME: CPT | Performed by: NURSE PRACTITIONER

## 2024-07-06 PROCEDURE — 96374 THER/PROPH/DIAG INJ IV PUSH: CPT

## 2024-07-06 RX ORDER — DIPHENHYDRAMINE HYDROCHLORIDE 50 MG/ML
25 INJECTION INTRAMUSCULAR; INTRAVENOUS
Status: COMPLETED | OUTPATIENT
Start: 2024-07-06 | End: 2024-07-06

## 2024-07-06 RX ORDER — PROCHLORPERAZINE EDISYLATE 5 MG/ML
5 INJECTION INTRAMUSCULAR; INTRAVENOUS
Status: COMPLETED | OUTPATIENT
Start: 2024-07-06 | End: 2024-07-06

## 2024-07-06 RX ORDER — HYDRALAZINE HYDROCHLORIDE 20 MG/ML
10 INJECTION INTRAMUSCULAR; INTRAVENOUS
Status: COMPLETED | OUTPATIENT
Start: 2024-07-06 | End: 2024-07-07

## 2024-07-06 RX ORDER — ONDANSETRON 4 MG/1
4 TABLET, ORALLY DISINTEGRATING ORAL
Status: COMPLETED | OUTPATIENT
Start: 2024-07-06 | End: 2024-07-06

## 2024-07-06 RX ORDER — DROPERIDOL 2.5 MG/ML
1.25 INJECTION, SOLUTION INTRAMUSCULAR; INTRAVENOUS
Status: COMPLETED | OUTPATIENT
Start: 2024-07-06 | End: 2024-07-07

## 2024-07-06 RX ORDER — PROMETHAZINE HYDROCHLORIDE 25 MG/ML
25 INJECTION, SOLUTION INTRAMUSCULAR; INTRAVENOUS
Status: COMPLETED | OUTPATIENT
Start: 2024-07-06 | End: 2024-07-06

## 2024-07-06 RX ORDER — SODIUM CHLORIDE 9 MG/ML
1000 INJECTION, SOLUTION INTRAVENOUS
Status: COMPLETED | OUTPATIENT
Start: 2024-07-06 | End: 2024-07-06

## 2024-07-06 RX ORDER — HYDRALAZINE HYDROCHLORIDE 20 MG/ML
10 INJECTION INTRAMUSCULAR; INTRAVENOUS
Status: COMPLETED | OUTPATIENT
Start: 2024-07-06 | End: 2024-07-06

## 2024-07-06 RX ADMIN — PROCHLORPERAZINE EDISYLATE 5 MG: 5 INJECTION INTRAMUSCULAR; INTRAVENOUS at 10:07

## 2024-07-06 RX ADMIN — DIPHENHYDRAMINE HYDROCHLORIDE 25 MG: 50 INJECTION INTRAMUSCULAR; INTRAVENOUS at 10:07

## 2024-07-06 RX ADMIN — SODIUM CHLORIDE 1000 ML: 9 INJECTION, SOLUTION INTRAVENOUS at 08:07

## 2024-07-06 RX ADMIN — ONDANSETRON 4 MG: 4 TABLET, ORALLY DISINTEGRATING ORAL at 07:07

## 2024-07-06 RX ADMIN — HYDRALAZINE HYDROCHLORIDE 10 MG: 20 INJECTION INTRAMUSCULAR; INTRAVENOUS at 08:07

## 2024-07-06 RX ADMIN — PROMETHAZINE HYDROCHLORIDE 25 MG: 25 INJECTION INTRAMUSCULAR; INTRAVENOUS at 07:07

## 2024-07-06 RX ADMIN — IOHEXOL 100 ML: 350 INJECTION, SOLUTION INTRAVENOUS at 09:07

## 2024-07-06 NOTE — Clinical Note
Diagnosis: Intractable nausea and vomiting [134351]   Future Attending Provider: ALBA SHOOK [[id number]]   Admit to which facility:: [facility] [[id number]]   Reason for IP Medical Treatment  (Clinical interventions that can only be accomplished in the IP setting? ) :: anti emetics; hydration   I certify that Inpatient services for greater than or equal to 2 midnights are medically necessary:: Yes   Plans for Post-Acute care--if anticipated (pick the single best option):: A. No post acute care anticipated at this time

## 2024-07-07 LAB
ALBUMIN SERPL-MCNC: 3.8 G/DL (ref 3.4–4.8)
ALBUMIN/GLOB SERPL: 1.2 RATIO (ref 1.1–2)
ALP SERPL-CCNC: 104 UNIT/L (ref 40–150)
ALT SERPL-CCNC: 12 UNIT/L (ref 0–55)
ANION GAP SERPL CALC-SCNC: 17 MEQ/L
AST SERPL-CCNC: 27 UNIT/L (ref 5–34)
BACTERIA #/AREA URNS AUTO: ABNORMAL /HPF
BASOPHILS # BLD AUTO: 0.01 X10(3)/MCL
BASOPHILS NFR BLD AUTO: 0.1 %
BILIRUB SERPL-MCNC: 0.6 MG/DL
BILIRUB UR QL STRIP.AUTO: NEGATIVE
BUN SERPL-MCNC: 10 MG/DL (ref 9.8–20.1)
CALCIUM SERPL-MCNC: 9.4 MG/DL (ref 8.4–10.2)
CHLORIDE SERPL-SCNC: 106 MMOL/L (ref 98–107)
CLARITY UR: CLEAR
CO2 SERPL-SCNC: 18 MMOL/L (ref 23–31)
COLOR UR AUTO: ABNORMAL
CREAT SERPL-MCNC: 0.82 MG/DL (ref 0.55–1.02)
CREAT/UREA NIT SERPL: 12
EOSINOPHIL # BLD AUTO: 0 X10(3)/MCL (ref 0–0.9)
EOSINOPHIL NFR BLD AUTO: 0 %
ERYTHROCYTE [DISTWIDTH] IN BLOOD BY AUTOMATED COUNT: 15.4 % (ref 11.5–17)
GFR SERPLBLD CREATININE-BSD FMLA CKD-EPI: >60 ML/MIN/1.73/M2
GLOBULIN SER-MCNC: 3.2 GM/DL (ref 2.4–3.5)
GLUCOSE SERPL-MCNC: 188 MG/DL (ref 82–115)
GLUCOSE UR QL STRIP: ABNORMAL
HCT VFR BLD AUTO: 36 % (ref 37–47)
HGB BLD-MCNC: 11.9 G/DL (ref 12–16)
HGB UR QL STRIP: NEGATIVE
IMM GRANULOCYTES # BLD AUTO: 0.04 X10(3)/MCL (ref 0–0.04)
IMM GRANULOCYTES NFR BLD AUTO: 0.3 %
KETONES UR QL STRIP: ABNORMAL
LEUKOCYTE ESTERASE UR QL STRIP: NEGATIVE
LYMPHOCYTES # BLD AUTO: 1.96 X10(3)/MCL (ref 0.6–4.6)
LYMPHOCYTES NFR BLD AUTO: 16.2 %
MCH RBC QN AUTO: 28 PG (ref 27–31)
MCHC RBC AUTO-ENTMCNC: 33.1 G/DL (ref 33–36)
MCV RBC AUTO: 84.7 FL (ref 80–94)
MONOCYTES # BLD AUTO: 0.3 X10(3)/MCL (ref 0.1–1.3)
MONOCYTES NFR BLD AUTO: 2.5 %
NEUTROPHILS # BLD AUTO: 9.81 X10(3)/MCL (ref 2.1–9.2)
NEUTROPHILS NFR BLD AUTO: 80.9 %
NITRITE UR QL STRIP: NEGATIVE
NRBC BLD AUTO-RTO: 0 %
OHS QRS DURATION: 82 MS
OHS QTC CALCULATION: 458 MS
PH UR STRIP: 7.5 [PH]
PLATELET # BLD AUTO: 240 X10(3)/MCL (ref 130–400)
PMV BLD AUTO: 11.5 FL (ref 7.4–10.4)
POTASSIUM SERPL-SCNC: 3.3 MMOL/L (ref 3.5–5.1)
PROT SERPL-MCNC: 7 GM/DL (ref 5.8–7.6)
PROT UR QL STRIP: ABNORMAL
RBC # BLD AUTO: 4.25 X10(6)/MCL (ref 4.2–5.4)
RBC #/AREA URNS AUTO: ABNORMAL /HPF
SODIUM SERPL-SCNC: 141 MMOL/L (ref 136–145)
SP GR UR STRIP.AUTO: 1.02 (ref 1–1.03)
SQUAMOUS #/AREA URNS LPF: ABNORMAL /HPF
UROBILINOGEN UR STRIP-ACNC: NORMAL
WBC # BLD AUTO: 12.12 X10(3)/MCL (ref 4.5–11.5)
WBC #/AREA URNS AUTO: ABNORMAL /HPF

## 2024-07-07 PROCEDURE — 81001 URINALYSIS AUTO W/SCOPE: CPT | Performed by: NURSE PRACTITIONER

## 2024-07-07 PROCEDURE — 25000003 PHARM REV CODE 250: Performed by: INTERNAL MEDICINE

## 2024-07-07 PROCEDURE — 36415 COLL VENOUS BLD VENIPUNCTURE: CPT | Performed by: INTERNAL MEDICINE

## 2024-07-07 PROCEDURE — 96376 TX/PRO/DX INJ SAME DRUG ADON: CPT

## 2024-07-07 PROCEDURE — 63600175 PHARM REV CODE 636 W HCPCS: Performed by: NURSE PRACTITIONER

## 2024-07-07 PROCEDURE — 11000001 HC ACUTE MED/SURG PRIVATE ROOM

## 2024-07-07 PROCEDURE — 25000003 PHARM REV CODE 250: Performed by: STUDENT IN AN ORGANIZED HEALTH CARE EDUCATION/TRAINING PROGRAM

## 2024-07-07 PROCEDURE — 63600175 PHARM REV CODE 636 W HCPCS: Performed by: INTERNAL MEDICINE

## 2024-07-07 PROCEDURE — 80053 COMPREHEN METABOLIC PANEL: CPT | Performed by: INTERNAL MEDICINE

## 2024-07-07 PROCEDURE — 85025 COMPLETE CBC W/AUTO DIFF WBC: CPT | Performed by: INTERNAL MEDICINE

## 2024-07-07 PROCEDURE — 21400001 HC TELEMETRY ROOM

## 2024-07-07 RX ORDER — CARVEDILOL 12.5 MG/1
25 TABLET ORAL 2 TIMES DAILY
Status: DISCONTINUED | OUTPATIENT
Start: 2024-07-07 | End: 2024-07-12 | Stop reason: HOSPADM

## 2024-07-07 RX ORDER — PROCHLORPERAZINE EDISYLATE 5 MG/ML
5 INJECTION INTRAMUSCULAR; INTRAVENOUS EVERY 6 HOURS PRN
Status: DISCONTINUED | OUTPATIENT
Start: 2024-07-07 | End: 2024-07-12 | Stop reason: HOSPADM

## 2024-07-07 RX ORDER — FUROSEMIDE 40 MG/1
40 TABLET ORAL DAILY
Status: DISCONTINUED | OUTPATIENT
Start: 2024-07-07 | End: 2024-07-11

## 2024-07-07 RX ORDER — METOCLOPRAMIDE HYDROCHLORIDE 5 MG/ML
5 INJECTION INTRAMUSCULAR; INTRAVENOUS EVERY 8 HOURS
Status: DISCONTINUED | OUTPATIENT
Start: 2024-07-07 | End: 2024-07-08

## 2024-07-07 RX ORDER — ASPIRIN 81 MG/1
81 TABLET ORAL DAILY
Status: DISCONTINUED | OUTPATIENT
Start: 2024-07-07 | End: 2024-07-12 | Stop reason: HOSPADM

## 2024-07-07 RX ORDER — GABAPENTIN 300 MG/1
300 CAPSULE ORAL 3 TIMES DAILY
Status: DISCONTINUED | OUTPATIENT
Start: 2024-07-07 | End: 2024-07-12 | Stop reason: HOSPADM

## 2024-07-07 RX ORDER — LISINOPRIL 10 MG/1
10 TABLET ORAL EVERY MORNING
Status: DISCONTINUED | OUTPATIENT
Start: 2024-07-07 | End: 2024-07-12 | Stop reason: HOSPADM

## 2024-07-07 RX ORDER — SODIUM CHLORIDE, SODIUM LACTATE, POTASSIUM CHLORIDE, CALCIUM CHLORIDE 600; 310; 30; 20 MG/100ML; MG/100ML; MG/100ML; MG/100ML
INJECTION, SOLUTION INTRAVENOUS CONTINUOUS
Status: DISCONTINUED | OUTPATIENT
Start: 2024-07-07 | End: 2024-07-12

## 2024-07-07 RX ORDER — HYDRALAZINE HYDROCHLORIDE 20 MG/ML
20 INJECTION INTRAMUSCULAR; INTRAVENOUS
Status: DISCONTINUED | OUTPATIENT
Start: 2024-07-07 | End: 2024-07-12 | Stop reason: HOSPADM

## 2024-07-07 RX ORDER — ACETAMINOPHEN 325 MG/1
650 TABLET ORAL EVERY 8 HOURS PRN
Status: DISCONTINUED | OUTPATIENT
Start: 2024-07-07 | End: 2024-07-12 | Stop reason: HOSPADM

## 2024-07-07 RX ORDER — ONDANSETRON HYDROCHLORIDE 2 MG/ML
4 INJECTION, SOLUTION INTRAVENOUS EVERY 8 HOURS PRN
Status: DISCONTINUED | OUTPATIENT
Start: 2024-07-07 | End: 2024-07-12 | Stop reason: HOSPADM

## 2024-07-07 RX ORDER — HALOPERIDOL 5 MG/ML
2 INJECTION INTRAMUSCULAR EVERY 4 HOURS PRN
Status: DISCONTINUED | OUTPATIENT
Start: 2024-07-07 | End: 2024-07-12

## 2024-07-07 RX ORDER — LABETALOL HYDROCHLORIDE 5 MG/ML
10 INJECTION, SOLUTION INTRAVENOUS EVERY 4 HOURS PRN
Status: DISCONTINUED | OUTPATIENT
Start: 2024-07-07 | End: 2024-07-12 | Stop reason: HOSPADM

## 2024-07-07 RX ORDER — TALC
6 POWDER (GRAM) TOPICAL NIGHTLY PRN
Status: DISCONTINUED | OUTPATIENT
Start: 2024-07-07 | End: 2024-07-12 | Stop reason: HOSPADM

## 2024-07-07 RX ORDER — SODIUM CHLORIDE 0.9 % (FLUSH) 0.9 %
10 SYRINGE (ML) INJECTION
Status: DISCONTINUED | OUTPATIENT
Start: 2024-07-07 | End: 2024-07-12 | Stop reason: HOSPADM

## 2024-07-07 RX ORDER — ATORVASTATIN CALCIUM 40 MG/1
80 TABLET, FILM COATED ORAL EVERY MORNING
Status: DISCONTINUED | OUTPATIENT
Start: 2024-07-07 | End: 2024-07-12 | Stop reason: HOSPADM

## 2024-07-07 RX ORDER — SERTRALINE HYDROCHLORIDE 50 MG/1
50 TABLET, FILM COATED ORAL NIGHTLY
Status: DISCONTINUED | OUTPATIENT
Start: 2024-07-07 | End: 2024-07-12 | Stop reason: HOSPADM

## 2024-07-07 RX ADMIN — ACETAMINOPHEN 325MG 650 MG: 325 TABLET ORAL at 05:07

## 2024-07-07 RX ADMIN — GABAPENTIN 300 MG: 300 CAPSULE ORAL at 03:07

## 2024-07-07 RX ADMIN — CARVEDILOL 25 MG: 12.5 TABLET, FILM COATED ORAL at 09:07

## 2024-07-07 RX ADMIN — SODIUM CHLORIDE, POTASSIUM CHLORIDE, SODIUM LACTATE AND CALCIUM CHLORIDE: 600; 310; 30; 20 INJECTION, SOLUTION INTRAVENOUS at 06:07

## 2024-07-07 RX ADMIN — CARVEDILOL 25 MG: 12.5 TABLET, FILM COATED ORAL at 12:07

## 2024-07-07 RX ADMIN — LISINOPRIL 10 MG: 10 TABLET ORAL at 12:07

## 2024-07-07 RX ADMIN — SERTRALINE HYDROCHLORIDE 50 MG: 50 TABLET ORAL at 09:07

## 2024-07-07 RX ADMIN — HALOPERIDOL LACTATE 2 MG: 5 INJECTION, SOLUTION INTRAMUSCULAR at 02:07

## 2024-07-07 RX ADMIN — HYDRALAZINE HYDROCHLORIDE 10 MG: 20 INJECTION INTRAMUSCULAR; INTRAVENOUS at 12:07

## 2024-07-07 RX ADMIN — FUROSEMIDE 40 MG: 40 TABLET ORAL at 12:07

## 2024-07-07 RX ADMIN — GABAPENTIN 300 MG: 300 CAPSULE ORAL at 09:07

## 2024-07-07 RX ADMIN — ASPIRIN 81 MG: 81 TABLET, COATED ORAL at 12:07

## 2024-07-07 RX ADMIN — HYDRALAZINE HYDROCHLORIDE 20 MG: 20 INJECTION, SOLUTION INTRAMUSCULAR; INTRAVENOUS at 07:07

## 2024-07-07 RX ADMIN — HALOPERIDOL LACTATE 2 MG: 5 INJECTION, SOLUTION INTRAMUSCULAR at 09:07

## 2024-07-07 RX ADMIN — HYDRALAZINE HYDROCHLORIDE 20 MG: 20 INJECTION, SOLUTION INTRAMUSCULAR; INTRAVENOUS at 04:07

## 2024-07-07 RX ADMIN — METOCLOPRAMIDE 5 MG: 5 INJECTION, SOLUTION INTRAMUSCULAR; INTRAVENOUS at 09:07

## 2024-07-07 RX ADMIN — SODIUM CHLORIDE, POTASSIUM CHLORIDE, SODIUM LACTATE AND CALCIUM CHLORIDE: 600; 310; 30; 20 INJECTION, SOLUTION INTRAVENOUS at 07:07

## 2024-07-07 RX ADMIN — ACETAMINOPHEN 325MG 650 MG: 325 TABLET ORAL at 07:07

## 2024-07-07 RX ADMIN — SODIUM CHLORIDE, POTASSIUM CHLORIDE, SODIUM LACTATE AND CALCIUM CHLORIDE: 600; 310; 30; 20 INJECTION, SOLUTION INTRAVENOUS at 05:07

## 2024-07-07 RX ADMIN — METOCLOPRAMIDE 5 MG: 5 INJECTION, SOLUTION INTRAMUSCULAR; INTRAVENOUS at 02:07

## 2024-07-07 RX ADMIN — ATORVASTATIN CALCIUM 80 MG: 40 TABLET, FILM COATED ORAL at 12:07

## 2024-07-07 RX ADMIN — METOCLOPRAMIDE 5 MG: 5 INJECTION, SOLUTION INTRAMUSCULAR; INTRAVENOUS at 01:07

## 2024-07-07 RX ADMIN — ONDANSETRON 4 MG: 2 INJECTION INTRAMUSCULAR; INTRAVENOUS at 07:07

## 2024-07-07 RX ADMIN — DROPERIDOL 1.25 MG: 2.5 INJECTION, SOLUTION INTRAMUSCULAR; INTRAVENOUS at 12:07

## 2024-07-07 NOTE — PROGRESS NOTES
Reviewed and restarted appropriate home medications.     All diagnosis and differential diagnosis have been reviewed; assessment and plan has been documented; I have personally reviewed the labs and test results that are presently available; I have reviewed the patients medication list; I have reviewed the consulting providers response and recommendations. I have reviewed or attempted to review medical records based upon their availability.    All of the patient and family questions have been addressed and answered. Patient's is agreeable to the above stated plan. I will continue to monitor closely and make adjustments to medical management as needed.     Dr. Nadia Bocanegra DO    I have spent >30 minutes on the day of the visit; time spent includes face to face time and non-face to face time preparing to see the patient (eg, review of tests), independently reviewing and interpreting medical records, both past and current; documenting clinical information in the electronic or other health record, and communicating results to the patient/family/caregiver and care coordinator and nursing team.

## 2024-07-07 NOTE — PLAN OF CARE
Problem: Adult Inpatient Plan of Care  Goal: Plan of Care Review  Outcome: Progressing  Goal: Patient-Specific Goal (Individualized)  Outcome: Progressing  Goal: Absence of Hospital-Acquired Illness or Injury  Outcome: Progressing  Goal: Optimal Comfort and Wellbeing  Outcome: Progressing  Goal: Readiness for Transition of Care  Outcome: Progressing     Problem: Wound  Goal: Optimal Coping  Outcome: Progressing  Goal: Optimal Functional Ability  Outcome: Progressing  Goal: Absence of Infection Signs and Symptoms  Outcome: Progressing  Goal: Improved Oral Intake  Outcome: Progressing  Goal: Optimal Pain Control and Function  Outcome: Progressing  Goal: Skin Health and Integrity  Outcome: Progressing  Goal: Optimal Wound Healing  Outcome: Progressing     Problem: Fall Injury Risk  Goal: Absence of Fall and Fall-Related Injury  Outcome: Progressing     Problem: Skin Injury Risk Increased  Goal: Skin Health and Integrity  Outcome: Progressing     Problem: Pain Acute  Goal: Optimal Pain Control and Function  Outcome: Progressing

## 2024-07-07 NOTE — H&P
[facility] Hospital Medicine History & Physical Examination       Patient Name: Katty Veronica  MRN: [id number]  Patient Class: IP- Inpatient   Admission Date: 7/6/2024  7:17 PM  Length of Stay: 0  Admitting Service: Hospital Medicine   Attending Physician: Samuel Rivera MD   Primary Care Provider: Renard Eddy MD  History source: EMR, patient and/or patient's family    CHIEF COMPLAINT   Nausea and vomiting    HISTORY OF PRESENT ILLNESS:   Patient is an 79-year-old female with a history of heart failure with preserved EF, PAF on Xarelto, COPD, gastroparesis GERD, CVA, SABINA, glaucoma, HTN, HLD, CAD and additional past medical history as below who presents to the ER again complaining of epigastric abdominal pain with associated nausea and vomiting bloating over the prior 24 hours.  She underwent outpatient EGD 5/21/24 which showed chronic gastritis, erythematous mucosa in the gastric body.  She was diagnosed with opioid induced gastroparesis.  She was underwent an EGD again 5/08/20/2024 after presenting with melena, which again just noted gastritis.  She had colonoscopy the following day which showed multiple diverticula throughout the colon and internal hemorrhoids.    On arrival to the ER tonight patient was afebrile hemodynamically stable maintaining normal sats on room air.  Laboratory work showed no acute process.  CT abdomen and pelvis showed no acute process.  She was given multiple medications and attempt to control her retching/nausea in the ER without success and therefore hospitalist service was consulted for admission.    PAST MEDICAL HISTORY:     Past Medical History:   Diagnosis Date    Anxiety     CHF (congestive heart failure)     COPD (chronic obstructive pulmonary disease)     Coronary artery disease     s/p 2 stents    Depression     GERD (gastroesophageal reflux disease)     Glaucoma     Hypertension     Obstructive sleep apnea     on cpap    Stroke      Urinary, incontinence, stress female        PAST SURGICAL HISTORY:     Past Surgical History:   Procedure Laterality Date    APPENDECTOMY      BACK SURGERY      CHOLECYSTECTOMY      COLONOSCOPY N/A 5/29/2024    Procedure: COLON;  Surgeon: Samuel Washington MD;  Location: [facility] ENDOSCOPY;  Service: Gastroenterology;  Laterality: N/A;    EGD, WITH CLOSED BIOPSY N/A 5/28/2024    Procedure: EGD;  Surgeon: Samuel Washington MD;  Location: [facility] ENDOSCOPY;  Service: Gastroenterology;  Laterality: N/A;    EGD, WITH CLOSED BIOPSY N/A 5/21/2024    Procedure: EGD;  Surgeon: BASIM Washington MD;  Location: [facility] ENDOSCOPY;  Service: Gastroenterology;  Laterality: N/A;    EXPLORATION OF COMMON BILE DUCT      HERNIA REPAIR      incisional hernia times 2, central abdomen    HYSTERECTOMY      OVARIAN CYST REMOVAL      TONSILLECTOMY         ALLERGIES:   Morphine and Norco [hydrocodone-acetaminophen]    FAMILY HISTORY:   Reviewed and non-contributory     SOCIAL HISTORY:   Screening for Social Drivers for health: Patient screened for food insecurity, housing instability, transportation needs, utility   difficulties, and interpersonal safety (select all that apply as identified as concern)  []Housing or Food  []Transportation Needs  []Utility Difficulties  []Interpersonal safety  [x]None  Social History     Tobacco Use    Smoking status: Never    Smokeless tobacco: Never   Substance Use Topics    Alcohol use: Not Currently        HOME MEDICATIONS:     Prior to Admission medications    Medication Sig Start Date End Date Taking? Authorizing Provider   ALPRAZolam (XANAX) 0.25 MG tablet Take 0.25 mg by mouth nightly as needed.    Provider, Historical   aspirin (ECOTRIN) 81 MG EC tablet Take 1 tablet (81 mg total) by mouth once daily. 8/30/23 8/29/24  Cristopher Davis MD   atorvastatin (LIPITOR) 80 MG tablet Take 1 tablet by mouth every morning. 4/30/24   Provider, Historical   carvediloL  (COREG) 25 MG tablet Take 1 tablet (25 mg total) by mouth 2 (two) times daily. 11/20/23   Princess Blood FNP   cetirizine (ZYRTEC) 10 MG tablet Take 1 tablet by mouth every morning. 4/30/24   Provider, Historical   ergocalciferol (VITAMIN D2) 50,000 unit Cap Take 50,000 Units by mouth every 7 days. Takes on Sunday AM    Provider, Historical   famotidine (PEPCID) 20 MG tablet Take 20 mg by mouth Daily. 4/30/24   Provider, Historical   ferrous sulfate 325 (65 FE) MG EC tablet Take 1 tablet (325 mg total) by mouth once daily. 6/5/24 9/3/24  Mike Myers MD   folic acid (FOLVITE) 1 MG tablet Take 1 tablet (1 mg total) by mouth once daily. 6/6/24 9/4/24  Mike Myers MD   furosemide (LASIX) 40 MG tablet Take 1 tablet (40 mg total) by mouth once daily. 4/8/24 4/8/25  Cristopher Davsi MD   gabapentin (NEURONTIN) 300 MG capsule Take 300 mg by mouth 3 (three) times daily. 8/25/23   Provider, Historical   lisinopriL 10 MG tablet Take 1 tablet by mouth every morning. 4/30/24   Provider, Historical   metoclopramide HCl (REGLAN) 10 MG tablet Take 10 mg by mouth 4 (four) times daily.    Provider, Historical   nitroGLYCERIN (NITROSTAT) 0.4 MG SL tablet Place 1 tablet (0.4 mg total) under the tongue every 5 (five) minutes as needed for Chest pain. 11/20/23   Princess Blood FNP   nystatin (MYCOSTATIN) cream Apply 1,000,000 g topically once daily.    Provider, Historical   ondansetron (ZOFRAN-ODT) 8 MG TbDL Take 8 mg by mouth every 6 (six) hours as needed.    Provider, Historical   pantoprazole (PROTONIX) 40 MG tablet Take 40 mg by mouth. 8/24/23   Provider, Historical   polyethylene glycol (GLYCOLAX) 17 gram PwPk Take 17 g by mouth 2 (two) times daily as needed for Constipation. 3/12/24   Molly Dickerson MD   potassium chloride SA (K-DUR,KLOR-CON M) 10 MEQ tablet Take 10 mEq by mouth once daily.    Provider, Historical   rivaroxaban (XARELTO) 20 mg Tab Take 1 tablet by mouth once daily. 4/30/24    "Provider, Historical   sertraline (ZOLOFT) 50 MG tablet Take 50 mg by mouth every evening. 8/24/23   Provider, Historical   timolol maleate 0.5% (TIMOPTIC) 0.5 % Drop Place 1 drop into both eyes 2 (two) times daily. 9/8/23   Provider, Historical   traMADoL (ULTRAM) 50 mg tablet Take 50 mg by mouth every 8 (eight) hours as needed.    Provider, Historical   traZODone (DESYREL) 50 MG tablet Take 50 mg by mouth every evening.    Provider, Historical   traZODone (DESYREL) 50 MG tablet Take 1 tablet by mouth nightly as needed. 5/15/24   Provider, Historical   vibegron (GEMTESA) 75 mg Tab Take 75 mg by mouth once.    Provider, Historical       REVIEW OF SYSTEMS:   Except as documented, all other systems reviewed and negative     PHYSICAL EXAM:   T 98.4 °F (36.9 °C)   BP (!) 184/91   P 78   RR 17   O2 97 %  GENERAL: awake, alert, oriented and in no acute distress, non-toxic appearing   HEENT: normocephalic atraumatic   NECK: supple   LUNGS: Clear bilaterally, no wheezing or rales, no accessory muscle use   CVS: Regular rate and rhythm, normal peripheral perfusion  ABD: Soft, epigastric TTP, non-distended, bowel sounds present  EXTREMITIES: no clubbing or cyanosis  SKIN: Warm, dry.   NEURO: alert and oriented, grossly without focal deficits   PSYCHIATRIC: Cooperative    LABS AND IMAGING:     Recent Labs     07/06/24 2001   WBC 8.79   RBC 4.25   HGB 12.0   HCT 36.4*   MCV 85.6   MCH 28.2   MCHC 33.0   RDW 15.2        No results for input(s): "LACTIC" in the last 72 hours.  Recent Labs     07/06/24 2001   INR 1.9*   APTT 31.1     No results for input(s): "HGBA1C", "CHOL", "TRIG", "LDL", "VLDL", "HDL" in the last 72 hours.   Recent Labs     07/06/24 2001      K 3.3*   CO2 19*   BUN 11.3   CREATININE 0.83   GLUCOSE 166*   CALCIUM 9.3   MG 1.70   ALBUMIN 3.6   GLOBULIN 3.4   ALKPHOS 104   ALT 13   AST 22   BILITOT 0.7   LIPASE 7     Recent Labs     07/06/24 2001   TROPONINI <0.010          CT Abdomen Pelvis With " IV Contrast NO Oral Contrast  START OF REPORT:  Technique: CT of the abdomen and pelvis was performed with axial images as well as sagittal and coronal reconstruction images with intravenous contrast.    Comparison: Comparison is with study dated 2024-05-27 18:21:38.    Clinical History: N/V/ABDOMINAL PAIN.    Dosage Information: Automated Exposure Control was utilized.    Findings:  Lines and Tubes: None.  Thorax:  Lungs: The visualized lung bases appear unremarkable.  Pleura: No effusions or thickening.  Heart: The heart size is within normal limits.  Abdomen:  Abdominal Wall: No abdominal wall pathology is seen.  Liver: The liver appears unremarkable.  Biliary System: No intrahepatic or extrahepatic biliary duct dilatation is seen.  Gallbladder: Surgical clips are seen in the gallbladder fossa which may reflect prior cholecystectomy.  Pancreas: Moderate pancreatic atrophy is seen.  Spleen: The spleen appears unremarkable.  Adrenals: The adrenal glands appear unremarkable.  Kidneys: The kidneys appear unremarkable with no stones cysts masses or hydronephrosis with IV contrast decreasing sensitivity and specificity for stones.  Aorta: There is moderate calcification of the abdominal aorta and its branches.  IVC: Unremarkable.  Bowel:  Esophagus: The visualized esophagus appears unremarkable. There is a small hiatal hernia. Note is again made of surgical staples in the distal esophagus. Correlate with clinical findings and prior surgical intervention.  Stomach: The stomach appears unremarkable.  Duodenum: Unremarkable appearing duodenum.  Small Bowel: The small bowel appears unremarkable.  Colon: Multiple diverticula are seen predominantly in the cecum through to the sigmoid colon. No associated inflammatory stranding or pericolonic fluid is seen to suggest diverticulitis.  Appendix: The appendix appears unremarkable and is partially seen on Image 55, Series 4.  Peritoneum: No intraperitoneal free air or ascites is  seen.    Pelvis:  Bladder: The bladder appears unremarkable.  Female:  Uterus: The uterus is not identified.  Ovaries: No adnexal masses are seen.    Bony structures:  Dorsal Spine: There is moderate multilevel spondylosis of the visualized dorsal spine. There is a stable non-acute loss of vertebral body height of L1. There are stable medium sized sclerotic foci in the L1 and L4 vertebral bodies which may reflect bone islands. There is stable moderate levoconvex scoliosis of the thoracic spine. Note is made of spinal stimulation leads in the posterior aspect of the lower thoracic and lumbar region.  Bony Pelvis: There is mild degenerative change of the bilateral hips.    Impression:  1. No acute intraabdominal or pelvic solid organ or bowel pathology identified. Details and other findings as discussed above.      ASSESSMENT & PLAN:   Recurrent symptoms of opioid induced gastroparesis  Intractable nausea and vomiting 2/2 above    HX:  Gastroparesis, chronic gastritis, diverticulosis, AFib on Xarelto, CAD, HTN, HLD, GERD, CVA, SABINA, glaucoma     -avoid opiates use Haldol as needed for symptom control  -antiemetics and IV fluids overnight  -resume home meds pending med rec process  -advance diet in a.m. and home when tolerating oral intake    DVT prophylaxis:  Continue Xarelto  Code status:  Full code    If patient was admitted under observational status it is with my approval/permission.     At least 55 min was spent on this history and physical.  Time seen:  2:00 a.m. 7/7  Critical care time = 35 min; Critical care diagnosis =   Samuel Rivera MD

## 2024-07-07 NOTE — NURSING
Nurses Note -- 4 Eyes      7/7/2024   7:10 AM      Skin assessed during: Admit      [] No Altered Skin Integrity Present    []Prevention Measures Documented      [x] Yes- Altered Skin Integrity Present or Discovered   [x] LDA Added if Not in Epic (Describe Wound)   [x] New Altered Skin Integrity was Present on Admit and Documented in LDA   [x] Wound Image Taken    Wound Care Consulted? Yes    Attending Nurse:  Jeane Mendez RN/Staff Member:  ALFRED Carreon

## 2024-07-07 NOTE — PLAN OF CARE
Problem: Adult Inpatient Plan of Care  Goal: Plan of Care Review  7/7/2024 0403 by Jeane Ortiz RN  Outcome: Progressing  7/7/2024 0402 by Jeane Ortiz RN  Outcome: Progressing  Flowsheets (Taken 7/7/2024 0402)  Plan of Care Reviewed With:   patient   child  Goal: Absence of Hospital-Acquired Illness or Injury  7/7/2024 0403 by Jeane Ortiz RN  Outcome: Progressing  7/7/2024 0402 by Jeane Ortiz RN  Outcome: Progressing  Goal: Optimal Comfort and Wellbeing  7/7/2024 0403 by Jeane Ortiz RN  Outcome: Progressing  7/7/2024 0402 by Jeane Ortiz RN  Outcome: Progressing     Problem: Fall Injury Risk  Goal: Absence of Fall and Fall-Related Injury  7/7/2024 0403 by Jeane Ortiz RN  Outcome: Progressing  7/7/2024 0402 by Jeane Ortiz RN  Outcome: Progressing     Problem: Skin Injury Risk Increased  Goal: Skin Health and Integrity  7/7/2024 0403 by Jeane Ortiz RN  Outcome: Progressing  7/7/2024 0402 by Jeane Ortiz RN  Outcome: Progressing     Problem: Pain Acute  Goal: Optimal Pain Control and Function  Outcome: Progressing

## 2024-07-07 NOTE — ED PROVIDER NOTES
Encounter Date: 7/6/2024       History     Chief Complaint   Patient presents with    Nausea     Nausea/vomiting/abdominal pain in right and left quadrants that started yesterday, worsening today. Denies any CP/SOB. Has not been able to tolerate PO, unable to take medicines today. Hx of GI bleed,AFIB, CVA. Received 100mcg of fentanyl IM and 4 zofran IM.      See MDM    The history is provided by the patient and a relative. No  was used.     Review of patient's allergies indicates:   Allergen Reactions    Morphine Itching    Norco [hydrocodone-acetaminophen]      Past Medical History:   Diagnosis Date    Anxiety     CHF (congestive heart failure)     COPD (chronic obstructive pulmonary disease)     Coronary artery disease     s/p 2 stents    Depression     GERD (gastroesophageal reflux disease)     Glaucoma     Hypertension     Obstructive sleep apnea     on cpap    Stroke     Urinary, incontinence, stress female      Past Surgical History:   Procedure Laterality Date    APPENDECTOMY      BACK SURGERY      CHOLECYSTECTOMY      COLONOSCOPY N/A 5/29/2024    Procedure: COLON;  Surgeon: Samuel Washington MD;  Location: [facility] ENDOSCOPY;  Service: Gastroenterology;  Laterality: N/A;    EGD, WITH CLOSED BIOPSY N/A 5/28/2024    Procedure: EGD;  Surgeon: Samuel Washington MD;  Location: [facility] ENDOSCOPY;  Service: Gastroenterology;  Laterality: N/A;    EGD, WITH CLOSED BIOPSY N/A 5/21/2024    Procedure: EGD;  Surgeon: BASIM Washington MD;  Location: [facility] ENDOSCOPY;  Service: Gastroenterology;  Laterality: N/A;    EXPLORATION OF COMMON BILE DUCT      HERNIA REPAIR      incisional hernia times 2, central abdomen    HYSTERECTOMY      OVARIAN CYST REMOVAL      TONSILLECTOMY       Family History   Problem Relation Name Age of Onset    Heart disease Mother      Cancer Father       Social History     Tobacco Use    Smoking status: Never    Smokeless tobacco: Never   Substance  Use Topics    Alcohol use: Not Currently    Drug use: Not Currently     Review of Systems   Gastrointestinal:  Positive for abdominal pain, nausea and vomiting. Negative for blood in stool and diarrhea.   All other systems reviewed and are negative.      Physical Exam     Initial Vitals [07/06/24 1913]   BP Pulse Resp Temp SpO2   (!) 178/75 103 (!) 22 98.4 °F (36.9 °C) 98 %      MAP       --         Physical Exam    Nursing note and vitals reviewed.  Constitutional: She appears well-developed.   Actively dry heaving   Cardiovascular:  Normal rate, regular rhythm and normal heart sounds.           Pulmonary/Chest: Breath sounds normal. No respiratory distress.   Abdominal: Abdomen is soft. Bowel sounds are normal. She exhibits no distension. There is abdominal tenderness.       Musculoskeletal:         General: Normal range of motion.      Comments: Bilateral UE and LE strength equal. She states she has some left side weakness s/t previous CVA but in bed I didn't appreciate any. Did not have her ambulate but at home she uses a walker      Neurological: She is alert and oriented to person, place, and time.   Skin: Skin is warm and dry.   Psychiatric: She has a normal mood and affect.         ED Course   Procedures  Labs Reviewed   CBC WITH DIFFERENTIAL - Abnormal; Notable for the following components:       Result Value    Hct 36.4 (*)     MPV 10.8 (*)     All other components within normal limits   COMPREHENSIVE METABOLIC PANEL - Abnormal; Notable for the following components:    Potassium 3.3 (*)     CO2 19 (*)     Glucose 166 (*)     All other components within normal limits   PROTIME-INR - Abnormal; Notable for the following components:    PT 21.5 (*)     INR 1.9 (*)     All other components within normal limits   MAGNESIUM - Normal   LIPASE - Normal   APTT - Normal   TROPONIN I - Normal   URINALYSIS, REFLEX TO URINE CULTURE     EKG Readings: (Independently Interpreted)   Initial Reading: No STEMI. Rhythm: Normal  Sinus Rhythm. Heart Rate: 69. Ectopy: No Ectopy. T Waves Flipped: III.       Imaging Results              CT Abdomen Pelvis With IV Contrast NO Oral Contrast (Preliminary result)  Result time 07/06/24 21:32:12      Preliminary result by Tha Stone MD (07/06/24 21:32:12)                   Narrative:    START OF REPORT:  Technique: CT of the abdomen and pelvis was performed with axial images as well as sagittal and coronal reconstruction images with intravenous contrast.    Comparison: Comparison is with study dated 2024-05-27 18:21:38.    Clinical History: N/V/ABDOMINAL PAIN.    Dosage Information: Automated Exposure Control was utilized.    Findings:  Lines and Tubes: None.  Thorax:  Lungs: The visualized lung bases appear unremarkable.  Pleura: No effusions or thickening.  Heart: The heart size is within normal limits.  Abdomen:  Abdominal Wall: No abdominal wall pathology is seen.  Liver: The liver appears unremarkable.  Biliary System: No intrahepatic or extrahepatic biliary duct dilatation is seen.  Gallbladder: Surgical clips are seen in the gallbladder fossa which may reflect prior cholecystectomy.  Pancreas: Moderate pancreatic atrophy is seen.  Spleen: The spleen appears unremarkable.  Adrenals: The adrenal glands appear unremarkable.  Kidneys: The kidneys appear unremarkable with no stones cysts masses or hydronephrosis with IV contrast decreasing sensitivity and specificity for stones.  Aorta: There is moderate calcification of the abdominal aorta and its branches.  IVC: Unremarkable.  Bowel:  Esophagus: The visualized esophagus appears unremarkable. There is a small hiatal hernia. Note is again made of surgical staples in the distal esophagus. Correlate with clinical findings and prior surgical intervention.  Stomach: The stomach appears unremarkable.  Duodenum: Unremarkable appearing duodenum.  Small Bowel: The small bowel appears unremarkable.  Colon: Multiple diverticula are seen predominantly in  the cecum through to the sigmoid colon. No associated inflammatory stranding or pericolonic fluid is seen to suggest diverticulitis.  Appendix: The appendix appears unremarkable and is partially seen on Image 55, Series 4.  Peritoneum: No intraperitoneal free air or ascites is seen.    Pelvis:  Bladder: The bladder appears unremarkable.  Female:  Uterus: The uterus is not identified.  Ovaries: No adnexal masses are seen.    Bony structures:  Dorsal Spine: There is moderate multilevel spondylosis of the visualized dorsal spine. There is a stable non-acute loss of vertebral body height of L1. There are stable medium sized sclerotic foci in the L1 and L4 vertebral bodies which may reflect bone islands. There is stable moderate levoconvex scoliosis of the thoracic spine. Note is made of spinal stimulation leads in the posterior aspect of the lower thoracic and lumbar region.  Bony Pelvis: There is mild degenerative change of the bilateral hips.      Impression:  1. No acute intraabdominal or pelvic solid organ or bowel pathology identified. Details and other findings as discussed above.                                         Medications   ondansetron disintegrating tablet 4 mg (4 mg Oral Given 7/6/24 1948)   promethazine injection 25 mg (25 mg Intramuscular Given 7/6/24 1948)   0.9%  NaCl infusion (1,000 mLs Intravenous New Bag 7/6/24 2042)   hydrALAZINE injection 10 mg (10 mg Intravenous Given 7/6/24 2042)   iohexoL (OMNIPAQUE 350) injection 100 mL (100 mLs Intravenous Given 7/6/24 2111)   prochlorperazine injection Soln 5 mg (5 mg Intravenous Given 7/6/24 2203)   diphenhydrAMINE injection 25 mg (25 mg Intravenous Given 7/6/24 2203)   hydrALAZINE injection 10 mg (10 mg Intravenous Given 7/7/24 0003)   droPERidol injection 1.25 mg (1.25 mg Intravenous Given 7/7/24 0003)     Medical Decision Making  78 y/o female presents from home with daughter (she lives with her) with c/o abdominal pain along with n/v since yesterday  which is worsening. She was unable to take her medications today due to the extreme nausea. She is only spitting up mucus but not really vomiting a lot. She has had no diarrhea but states she feels like she could. No fever. No cp or sob. She was admitted about 1.5 months ago for something similar and had upper and lower scope down which didn't show anything acute. She has been diagnosed with gastroparesis it appears. Has hx chf, on lasix. Cva on xarleto. Last took xarelto last night. Htn, unable to take meds today.     EKG no acute findings. Labs show mild hypokalemia. No leukocytosis. Stable h/h. Urine not collected yet. Ct abd/pelvis no acute findings. Multiple medications given for nausea and dry heaving without much of any relief. Will plan to admit.           Amount and/or Complexity of Data Reviewed  External Data Reviewed: labs and notes.     Details: Reviewed GI notes, reviewed previous labs  Labs: ordered. Decision-making details documented in ED Course.  Radiology: ordered. Decision-making details documented in ED Course.  ECG/medicine tests: independent interpretation performed. Decision-making details documented in ED Course.  Discussion of management or test interpretation with external provider(s): Spoke with dr. Rivera with [facility] regarding patient, history, symptoms, labs, meds given. Agrees to admit.    Spoke with  and he had face to face with patient.     Risk  Prescription drug management.  Decision regarding hospitalization.      Additional MDM:   Differential Diagnosis:   Other: The following diagnoses were also considered and will be evaluated: dehydration, UTI and pancreatitis.                                   Clinical Impression:  Final diagnoses:  [R11.0] Nausea  [R11.2] Intractable nausea and vomiting  [R10.13] Epigastric pain (Primary)          ED Disposition Condition    Admit Stable                Sheila Meraz, [place]  07/07/24 0058

## 2024-07-07 NOTE — PLAN OF CARE
Problem: Adult Inpatient Plan of Care  Goal: Plan of Care Review  7/7/2024 1029 by Shawnee Campbell RN  Outcome: Progressing  7/7/2024 1024 by Shawnee Campbell RN  Outcome: Progressing  Goal: Patient-Specific Goal (Individualized)  7/7/2024 1029 by Shawnee Campbell RN  Outcome: Progressing  7/7/2024 1024 by Shawnee Campbell RN  Outcome: Progressing  Goal: Absence of Hospital-Acquired Illness or Injury  7/7/2024 1029 by Shawnee Campbell RN  Outcome: Progressing  7/7/2024 1024 by Shawnee Campbell RN  Outcome: Progressing  Goal: Optimal Comfort and Wellbeing  7/7/2024 1029 by Shawnee Campbell RN  Outcome: Progressing  7/7/2024 1024 by Shawnee Campbell RN  Outcome: Progressing  Goal: Readiness for Transition of Care  7/7/2024 1029 by Shawnee Campbell RN  Outcome: Progressing  7/7/2024 1024 by Shawnee Campbell RN  Outcome: Progressing     Problem: Wound  Goal: Optimal Coping  7/7/2024 1029 by Shawnee Campbell RN  Outcome: Progressing  7/7/2024 1024 by Shawnee Campbell RN  Outcome: Progressing  Goal: Optimal Functional Ability  7/7/2024 1029 by Shawnee Campbell RN  Outcome: Progressing  7/7/2024 1024 by Shawnee Campbell RN  Outcome: Progressing  Goal: Absence of Infection Signs and Symptoms  7/7/2024 1029 by Shawnee Campbell RN  Outcome: Progressing  7/7/2024 1024 by Shawnee Campbell RN  Outcome: Progressing  Goal: Improved Oral Intake  7/7/2024 1029 by Shawnee Campbell RN  Outcome: Progressing  7/7/2024 1024 by Shawnee Campbell RN  Outcome: Progressing  Goal: Optimal Pain Control and Function  7/7/2024 1029 by Shawnee Campbell RN  Outcome: Progressing  7/7/2024 1024 by Shawnee Campbell RN  Outcome: Progressing  Goal: Skin Health and Integrity  7/7/2024 1029 by Shannan, Shawnee, RN  Outcome: Progressing  7/7/2024 1024 by Shawnee Campbell, RN  Outcome: Progressing  Goal: Optimal Wound Healing  7/7/2024 1029 by Shawnee Campbell, RN  Outcome: Progressing  7/7/2024 1024 by Shannan,  ALFRED Mallory  Outcome: Progressing     Problem: Fall Injury Risk  Goal: Absence of Fall and Fall-Related Injury  7/7/2024 1029 by Shawnee Campbell RN  Outcome: Progressing  7/7/2024 1024 by Shawnee Campbell RN  Outcome: Progressing     Problem: Skin Injury Risk Increased  Goal: Skin Health and Integrity  7/7/2024 1029 by Shawnee Campbell RN  Outcome: Progressing  7/7/2024 1024 by Shawnee Campbell RN  Outcome: Progressing     Problem: Pain Acute  Goal: Optimal Pain Control and Function  7/7/2024 1029 by Shawnee Campbell RN  Outcome: Progressing  7/7/2024 1024 by Shawnee Campbell RN  Outcome: Progressing

## 2024-07-08 PROCEDURE — 21400001 HC TELEMETRY ROOM

## 2024-07-08 PROCEDURE — 25000003 PHARM REV CODE 250: Performed by: INTERNAL MEDICINE

## 2024-07-08 PROCEDURE — 63600175 PHARM REV CODE 636 W HCPCS: Performed by: INTERNAL MEDICINE

## 2024-07-08 PROCEDURE — 11000001 HC ACUTE MED/SURG PRIVATE ROOM

## 2024-07-08 PROCEDURE — 25000003 PHARM REV CODE 250: Performed by: STUDENT IN AN ORGANIZED HEALTH CARE EDUCATION/TRAINING PROGRAM

## 2024-07-08 RX ORDER — METOCLOPRAMIDE 10 MG/1
10 TABLET ORAL
Status: DISCONTINUED | OUTPATIENT
Start: 2024-07-09 | End: 2024-07-09

## 2024-07-08 RX ORDER — PANTOPRAZOLE SODIUM 40 MG/1
40 TABLET, DELAYED RELEASE ORAL DAILY
Status: DISCONTINUED | OUTPATIENT
Start: 2024-07-09 | End: 2024-07-11

## 2024-07-08 RX ORDER — CETIRIZINE HYDROCHLORIDE 10 MG/1
10 TABLET ORAL EVERY MORNING
Status: DISCONTINUED | OUTPATIENT
Start: 2024-07-09 | End: 2024-07-12 | Stop reason: HOSPADM

## 2024-07-08 RX ORDER — SODIUM BICARBONATE 650 MG/1
650 TABLET ORAL DAILY
Status: COMPLETED | OUTPATIENT
Start: 2024-07-08 | End: 2024-07-10

## 2024-07-08 RX ORDER — FAMOTIDINE 20 MG/1
20 TABLET, FILM COATED ORAL DAILY
Status: DISCONTINUED | OUTPATIENT
Start: 2024-07-09 | End: 2024-07-11

## 2024-07-08 RX ORDER — FOLIC ACID 1 MG/1
1 TABLET ORAL DAILY
Status: DISCONTINUED | OUTPATIENT
Start: 2024-07-09 | End: 2024-07-12 | Stop reason: HOSPADM

## 2024-07-08 RX ADMIN — GABAPENTIN 300 MG: 300 CAPSULE ORAL at 03:07

## 2024-07-08 RX ADMIN — SODIUM CHLORIDE, POTASSIUM CHLORIDE, SODIUM LACTATE AND CALCIUM CHLORIDE: 600; 310; 30; 20 INJECTION, SOLUTION INTRAVENOUS at 09:07

## 2024-07-08 RX ADMIN — LISINOPRIL 10 MG: 10 TABLET ORAL at 05:07

## 2024-07-08 RX ADMIN — HALOPERIDOL LACTATE 2 MG: 5 INJECTION, SOLUTION INTRAMUSCULAR at 08:07

## 2024-07-08 RX ADMIN — GABAPENTIN 300 MG: 300 CAPSULE ORAL at 08:07

## 2024-07-08 RX ADMIN — SODIUM BICARBONATE 650 MG TABLET 650 MG: at 09:07

## 2024-07-08 RX ADMIN — HALOPERIDOL LACTATE 2 MG: 5 INJECTION, SOLUTION INTRAMUSCULAR at 01:07

## 2024-07-08 RX ADMIN — ASPIRIN 81 MG: 81 TABLET, COATED ORAL at 09:07

## 2024-07-08 RX ADMIN — HYDRALAZINE HYDROCHLORIDE 20 MG: 20 INJECTION, SOLUTION INTRAMUSCULAR; INTRAVENOUS at 08:07

## 2024-07-08 RX ADMIN — ATORVASTATIN CALCIUM 80 MG: 40 TABLET, FILM COATED ORAL at 05:07

## 2024-07-08 RX ADMIN — METOCLOPRAMIDE 5 MG: 5 INJECTION, SOLUTION INTRAMUSCULAR; INTRAVENOUS at 01:07

## 2024-07-08 RX ADMIN — ONDANSETRON 4 MG: 2 INJECTION INTRAMUSCULAR; INTRAVENOUS at 08:07

## 2024-07-08 RX ADMIN — SERTRALINE HYDROCHLORIDE 50 MG: 50 TABLET ORAL at 08:07

## 2024-07-08 RX ADMIN — RIVAROXABAN 20 MG: 10 TABLET, FILM COATED ORAL at 03:07

## 2024-07-08 RX ADMIN — CARVEDILOL 25 MG: 12.5 TABLET, FILM COATED ORAL at 09:07

## 2024-07-08 RX ADMIN — FUROSEMIDE 40 MG: 40 TABLET ORAL at 09:07

## 2024-07-08 RX ADMIN — ONDANSETRON 4 MG: 2 INJECTION INTRAMUSCULAR; INTRAVENOUS at 09:07

## 2024-07-08 RX ADMIN — CARVEDILOL 25 MG: 12.5 TABLET, FILM COATED ORAL at 08:07

## 2024-07-08 RX ADMIN — ACETAMINOPHEN 325MG 650 MG: 325 TABLET ORAL at 03:07

## 2024-07-08 RX ADMIN — SODIUM CHLORIDE, POTASSIUM CHLORIDE, SODIUM LACTATE AND CALCIUM CHLORIDE: 600; 310; 30; 20 INJECTION, SOLUTION INTRAVENOUS at 11:07

## 2024-07-08 RX ADMIN — GABAPENTIN 300 MG: 300 CAPSULE ORAL at 09:07

## 2024-07-08 RX ADMIN — METOCLOPRAMIDE 5 MG: 5 INJECTION, SOLUTION INTRAMUSCULAR; INTRAVENOUS at 05:07

## 2024-07-08 NOTE — PLAN OF CARE
07/08/24 1626   Discharge Assessment   Assessment Type Discharge Planning Assessment   Confirmed/corrected address, phone number and insurance Yes   Confirmed Demographics Correct on Facesheet   Source of Information patient;family   If unable to respond/provide information was family/caregiver contacted? Yes   Contact Name/Number Celeste [phone]   Does patient/caregiver understand observation status   (inpatient)   Communicated AMBROSIO with patient/caregiver Date not available/Unable to determine   Reason For Admission intractable nausea and vomiting   People in Home child(renee), adult   Facility Arrived From: home   Do you expect to return to your current living situation? No   Do you have help at home or someone to help you manage your care at home? Yes   Who are your caregiver(s) and their phone number(s)? dgtr Maya [phone]   Prior to hospitilization cognitive status: Unable to Assess   Current cognitive status: Alert/Oriented   Walking or Climbing Stairs Difficulty yes   Walking or Climbing Stairs ambulation difficulty, requires equipment   Mobility Management use RW   Dressing/Bathing Difficulty yes   Dressing/Bathing bathing difficulty, assistance 1 person   Equipment Currently Used at Home walker, rolling;cane, straight   Readmission within 30 days? No   Patient currently being followed by outpatient case management? No   Do you currently have service(s) that help you manage your care at home? Yes   How Many hours does patient receive services 4   Name and Contact number of agency [facility]   Is the pt/caregiver preference to resume services with current agency Yes   Do you take prescription medications? Yes   Do you have prescription coverage? Yes   Coverage medicare   Do you have any problems affording any of your prescribed medications? No   Who is going to help you get home at discharge? TBD   How do you get to doctors appointments? family or friend will provide   Are you on dialysis? No    Discharge Plan A Skilled Nursing Facility   Discharge Plan B Home with family   DME Needed Upon Discharge  none   Discharge Plan discussed with: Adult children   Transition of Care Barriers None   OTHER   Name(s) of People in Home ingrid Trejo

## 2024-07-08 NOTE — PROGRESS NOTES
[facility]  Hospital Medicine Progress Note      Chief Complaint: nausea and vomiting       HPI: (personally reviewed by me and is documented from initial H&P)     79-year-old female with a history of heart failure with preserved EF, PAF on Xarelto, COPD, gastroparesis GERD, CVA, SABINA, glaucoma, HTN, HLD, CAD and additional past medical history as below who presents to the ER again complaining of epigastric abdominal pain with associated nausea and vomiting bloating over the prior 24 hours.  She underwent outpatient EGD 5/21/24 which showed chronic gastritis, erythematous mucosa in the gastric body.  She was diagnosed with opioid induced gastroparesis.  She was underwent an EGD again 5/08/20/2024 after presenting with melena, which again just noted gastritis.  She had colonoscopy the following day which showed multiple diverticula throughout the colon and internal hemorrhoids.     On arrival to the ER tonight patient was afebrile hemodynamically stable maintaining normal sats on room air.  Laboratory work showed no acute process.  CT abdomen and pelvis showed no acute process.  She was given multiple medications and attempt to control her retching/nausea in the ER without success and therefore hospitalist service was consulted for admission.      Interval History:        No overnight events reported.  No new complaints.  No family currently present at bedside    Objective Assessment:  Physical Exam      Vital signs have been personally reviewed by me   General: Appears comfortable, no acute distress.  Resting comfortably  Integumentary: Warm, dry, intact.  Musculoskeletal: Purposeful movement noted.     Respiratory: No accessory muscle use. Breath sounds are equal.  Cardiovascular: Regular rate.       VITAL SIGNS: 24 HRS MIN & MAX LAST   Temp  Min: 98.2 °F (36.8 °C)  Max: 99.2 °F (37.3 °C) 98.5 °F (36.9 °C)   BP  Min: 120/71  Max: 185/97 (!) 158/71   Pulse  Min: 68  Max: 97  68   Resp   Min: 16  Max: 23 18   SpO2  Min: 92 %  Max: 95 % (!) 93 %     CT Abdomen Pelvis With IV Contrast NO Oral Contrast  Narrative: EXAMINATION:  CT ABDOMEN PELVIS WITH IV CONTRAST    CLINICAL HISTORY:  Abdominal pain, acute, nonlocalized;    TECHNIQUE:  Low dose axial images, sagittal and coronal reformations were obtained from the lung bases to the pubic symphysis following the IV administration of contrast. Automatic exposure control (AEC) is utilized to reduce patient radiation exposure.    COMPARISON:  05/27/2024    FINDINGS:  Lines and Tubes: None.    Thorax:Lungs: The visualized lung bases appear unremarkable    Pleura: No effusions or thickening.    Heart: The heart size is within normal limits.    Abdomen:Abdominal Wall: No abdominal wall pathology is seen    Liver: The liver appears unremarkable.    Biliary System: No intrahepatic or extrahepatic biliary duct dilatation is seen.    Gallbladder: Surgical clips are seen in the gallbladder fossa which may reflect prior cholecystectomy.    Pancreas: Moderate pancreatic atrophy is seen    Spleen: The spleen appears unremarkable.    Adrenals: The adrenal glands appear unremarkable.    Kidneys: The kidneys appear unremarkable with no stones cysts masses or hydronephrosis with IV contrast decreasing sensitivity and specificity for stones.    Aorta: There is moderate calcification of the abdominal aorta and its branches    IVC: Unremarkable.    Bowel:Esophagus: The visualized esophagus appears unremarkable. There is a small hiatal hernia. Note is again made of surgical staples in the distal esophagus. Correlate with clinical findings and prior surgical intervention.    Stomach: The stomach appears unremarkable    Duodenum: Unremarkable appearing duodenum.    Small Bowel: The small bowel appears unremarkable.    Colon: Multiple diverticula are seen predominantly in the cecum through to the sigmoid colon. No associated inflammatory stranding or pericolonic fluid is seen to  suggest diverticulitis.    Appendix: The appendix appears unremarkable and is partially seen on Image 55, Series 4.    Peritoneum: No intraperitoneal free air or ascites is seen.    Pelvis:Bladder: The bladder appears unremarkable.    Female:Uterus: The uterus is not identified.    Ovaries: No adnexal masses are seen.    Bony structures:Dorsal Spine: There is moderate multilevel spondylosis of the visualized dorsal spine. There is a stable non-acute loss of vertebral body height of L1. There are stable medium sized sclerotic foci in the L1 and L4 vertebral bodies which may reflect bone islands. There is stable moderate levoconvex scoliosis of the thoracic spine. Note is made of spinal stimulation leads in the posterior aspect of the lower thoracic and lumbar region.    Bony Pelvis: There is mild degenerative change of the bilateral hips.  Impression: 1. No acute intraabdominal or pelvic solid organ or bowel pathology identified. Details and other findings as discussed above.    I concur with the preliminary report above.    Electronically signed by: Fritz Zheng  Date:    07/07/2024  Time:    13:45  CT Head Without Contrast  Narrative: EXAMINATION:  CT HEAD WITHOUT CONTRAST    CLINICAL HISTORY:  Intractable nausea and vomiting;    TECHNIQUE:  Multiple axial images were obtained from the base of the brain to the vertex without contrast administration.  Sagittal and coronal reconstructions were performed. .Automatic exposure control  (AEC) is utilized to reduce patient radiation exposure.    COMPARISON:  02/09/2024    FINDINGS:  There is no intracranial mass or lesion seen.  No hemorrhage is seen.  No infarct is seen.  The ventricles and basilar cisterns appear normal.  Brain parenchyma appears grossly unremarkable.    Posterior fossa appears normal.  The calvarium is intact.  The paranasal sinuses appear grossly unremarkable.  Impression: No acute abnormality seen    Electronically signed by: Fritz  Norm  Date:    07/07/2024  Time:    09:42    Recent Labs   Lab 07/06/24 2001 07/07/24  0551   WBC 8.79 12.12*   RBC 4.25 4.25   HGB 12.0 11.9*   HCT 36.4* 36.0*   MCV 85.6 84.7   MCH 28.2 28.0   MCHC 33.0 33.1   RDW 15.2 15.4    240   MPV 10.8* 11.5*       Recent Labs   Lab 07/06/24 2001 07/07/24  0908    141   K 3.3* 3.3*    106   CO2 19* 18*   BUN 11.3 10.0   CREATININE 0.83 0.82   CALCIUM 9.3 9.4   MG 1.70  --    ALBUMIN 3.6 3.8   ALKPHOS 104 104   ALT 13 12   AST 22 27   BILITOT 0.7 0.6     Microbiology Results (last 7 days)       ** No results found for the last 168 hours. **               Medications for Hospital Course         Scheduled Med:   aspirin  81 mg Oral Daily    atorvastatin  80 mg Oral QAM    carvediloL  25 mg Oral BID    furosemide  40 mg Oral Daily    gabapentin  300 mg Oral TID    lisinopriL  10 mg Oral QAM    metoclopramide  5 mg Intravenous Q8H    sertraline  50 mg Oral QHS      Continuous Infusions:   lactated ringers   Intravenous Continuous 75 mL/hr at 07/07/24 1958 New Bag at 07/07/24 1958        PRN Meds:    Current Facility-Administered Medications:     acetaminophen, 650 mg, Oral, Q8H PRN    acetaminophen, 650 mg, Oral, Q8H PRN    haloperidol lactate, 2 mg, Intramuscular, Q4H PRN    hydrALAZINE, 20 mg, Intravenous, Q2H PRN    labetaloL, 10 mg, Intravenous, Q4H PRN    melatonin, 6 mg, Oral, Nightly PRN    ondansetron, 4 mg, Intravenous, Q8H PRN    prochlorperazine, 5 mg, Intravenous, Q6H PRN    sodium chloride 0.9%, 10 mL, Intravenous, PRN     Assessment and Plan      Recurrent symptoms of opioid induced gastroparesis  Intractable nausea and vomiting 2/2 above     HX:  Gastroparesis, chronic gastritis, diverticulosis, AFib on Xarelto, CAD, HTN, HLD, GERD, CVA, SABINA, glaucoma     Above present on admission         Anticipated discharge and Disposition when medically stable:    Therapy: PT/OT/Speech    Nutrition:       ___________________________________________________________________________________________________________________________________    I think patient's symptoms are moreso related to behavioral etiology. Discontinue IV reglan and initiate oral.   Anticipate discharge in 24-48hrs.     Added sodium bicarb tablets.   Discontinue IV reglan   Continue supportive care  Continue checking vital signs q4hrs.  Reviewed and restarted appropriate home medications.     DVT prophylaxis initiated   Nurse notified to page me if any changes occur     ______________________________________________________________________________________________________________________________      I have spent 30 minutes on the day of the visit; time spent includes face to face time and non-face to face time preparing to see the patient (eg, review of tests), independently reviewing and interpreting medical records, both past and current; documenting clinical information in the electronic or other health record, and communicating results to the patient/family/caregiver and care coordinator and nursing team.      All diagnosis and differential diagnosis have been reviewed,  interpreted and communicated appropriately to care team. assessment and plan has been documented; I have personally reviewed the labs and test results that are presently available and pertinent to this hospital course; I have reviewed medical records based upon their availability.    I will continue to monitor closely and make adjustments to medical management as needed.    Nadia Bocanegra,    07/08/2024     This note was created with the assistance of Dragon voice recognition software. There may be transcription errors as a result of using this technology however minimal. Effort has been made to assure accuracy of transcription but any obvious errors or omissions should be clarified with the author of the document.

## 2024-07-08 NOTE — NURSING
[facility] - Observation Unit  Wound Care    Patient Name:  Katty Veronica   MRN:  [id number]  Date: 7/8/2024  Diagnosis: Intractable nausea and vomiting    History:     Past Medical History:   Diagnosis Date    Anxiety     CHF (congestive heart failure)     COPD (chronic obstructive pulmonary disease)     Coronary artery disease     s/p 2 stents    Depression     GERD (gastroesophageal reflux disease)     Glaucoma     Hypertension     Obstructive sleep apnea     on cpap    Stroke     Urinary, incontinence, stress female        Social History     Socioeconomic History    Marital status:    Tobacco Use    Smoking status: Never    Smokeless tobacco: Never   Substance and Sexual Activity    Alcohol use: Not Currently    Drug use: Not Currently   Social History Narrative    ** Merged History Encounter **          Social Determinants of Health     Financial Resource Strain: Low Risk  (5/29/2024)    Overall Financial Resource Strain (CARDIA)     Difficulty of Paying Living Expenses: Not hard at all   Food Insecurity: No Food Insecurity (5/29/2024)    Hunger Vital Sign     Worried About Running Out of Food in the Last Year: Never true     Ran Out of Food in the Last Year: Never true   Transportation Needs: No Transportation Needs (5/29/2024)    TRANSPORTATION NEEDS     Transportation : No   Physical Activity: Inactive (2/12/2024)    Exercise Vital Sign     Days of Exercise per Week: 0 days     Minutes of Exercise per Session: 0 min   Stress: No Stress Concern Present (5/29/2024)    [employer] - Occupational Stress Questionnaire     Feeling of Stress : Not at all   Housing Stability: Low Risk  (5/29/2024)    Housing Stability Vital Sign     Unable to Pay for Housing in the Last Year: No     Homeless in the Last Year: No       Precautions:     Allergies as of 07/06/2024 - Reviewed 07/06/2024   Allergen Reaction Noted    Morphine Itching 07/09/2020    Norco  [hydrocodone-acetaminophen]  07/09/2020       WOC Assessment Details/Treatment     78 yo consulted for small wound on L groin. Pt stated that it was from a mole that she had previously removed. Assessed wound, cleansed with vashe, and covered with small bordered foam dressing. Pt tolerated procedure well.       07/08/24 0815   WOCN Assessment   WOCN Total Time (mins) 30   Visit Date 07/08/24   Visit Time 0815   Consult Type New   WOCN Speciality Wound   Wound pressure   Number of Wounds 1   Intervention assessed;applied;chart review;orders   Skin Interventions   Pressure Reduction Devices positioning supports utilized   Pressure Reduction Techniques frequent weight shift encouraged   Skin Protection incontinence pads utilized   Positioning   Body Position position changed independently;supine   Head of Bed (HOB) Positioning HOB at 20-30 degrees   Positioning/Transfer Devices pillows;in use   Pressure Injury Prevention    Check Moisture Management Pad Done   Check Medical Devices Done        Wound 07/07/24 0143 Other (comment) Left anterior Greater trochanter   Date First Assessed/Time First Assessed: 07/07/24 0143   Present on Original Admission: Yes  Primary Wound Type: (c) Other (comment)  Side: Left  Orientation: anterior  Location: Greater trochanter   Wound Image    Dressing Appearance Open to air;Dry;Clean;Intact   Drainage Amount None   Appearance Pink   Tissue loss description Partial thickness   Black (%), Wound Tissue Color 0 %   Red (%), Wound Tissue Color 100 %   Yellow (%), Wound Tissue Color 0 %   Periwound Area Intact   Wound Edges Defined   Wound Length (cm) 1 cm   Wound Width (cm) 0.5 cm   Wound Depth (cm) 0 cm   Wound Volume (cm^3) 0 cm^3   Wound Surface Area (cm^2) 0.5 cm^2   Care Cleansed with:;Wound cleanser   Dressing Applied;Foam   Dressing Change Due 07/10/24         Recommendations: Cleanse with vashe moistened guaze, pat dry area, and apply small bordered, padded foam dressing. Change  Q2D.    07/08/2024

## 2024-07-08 NOTE — CONSULTS
Inpatient Nutrition Assessment    Admit Date: 7/6/2024   Total duration of encounter: 2 days   Patient Age: 79 y.o.    Nutrition Recommendation/Prescription     -Advance diet as tolerated per MD. Goal Diet: Low Residue;  noted elevated glucose- monitor need for diabetic diet restriction. Encourage small, frequent meals for nausea/gastroparesis.   -Medical management of nausea per MD.   -Continue Ney as medically feasible.   -Consider an appetite stimulant as medically feasible.   -Can trial an oral supplement with diet advancement.    Communication of Recommendations: reviewed with patient    Nutrition Assessment     Malnutrition Assessment/Nutrition-Focused Physical Exam    Malnutrition Context: chronic illness (07/08/24 1321)  Malnutrition Level: moderate (07/08/24 1321)  Energy Intake (Malnutrition): less than 75% for greater than or equal to 3 months (07/08/24 1321)  Weight Loss (Malnutrition): other (see comments) (does not meet criteria) (07/08/24 1321)  Subcutaneous Fat (Malnutrition): mild depletion (07/08/24 1321)  Orbital Region (Subcutaneous Fat Loss): mild depletion        Muscle Mass (Malnutrition): mild depletion (07/08/24 1321)  [place] Region (Muscle Loss): mild depletion  Clavicle Bone Region (Muscle Loss): mild depletion  Clavicle and Acromion Bone Region (Muscle Loss): mild depletion                 Fluid Accumulation (Malnutrition): other (see comments) (does not meet criteria) (07/08/24 1321)  Hand  Strength, Left (Malnutrition): unable to evaluate (07/08/24 1321)  Hand  Strength, Right (Malnutrition): unable to evaluate (07/08/24 1321)  A minimum of two characteristics is recommended for diagnosis of either severe or non-severe malnutrition.    Chart Review    Reason Seen: physician consult for not eating well    Malnutrition Screening Tool Results   Have you recently lost weight without trying?: Unsure  Have you been eating poorly because of a decreased appetite?: Yes   MST Score: 3    Diagnosis:  Recurrent symptoms of opioid induced gastroparesis  Intractable nausea and vomiting 2/2 above    Relevant Medical History: heart failure with preserved EF, PAF on Xarelto, COPD, gastroparesis GERD, CVA, SABINA, glaucoma, HTN, HLD, CAD     Scheduled Medications:  aspirin, 81 mg, Daily  atorvastatin, 80 mg, QAM  carvediloL, 25 mg, BID  furosemide, 40 mg, Daily  gabapentin, 300 mg, TID  lisinopriL, 10 mg, QAM  metoclopramide, 5 mg, Q8H  rivaroxaban, 20 mg, with dinner  sertraline, 50 mg, QHS  sodium bicarbonate, 650 mg, Daily    Continuous Infusions:  lactated ringers, Last Rate: 75 mL/hr at 07/08/24 0928    PRN Medications:  acetaminophen, 650 mg, Q8H PRN  acetaminophen, 650 mg, Q8H PRN  haloperidol lactate, 2 mg, Q4H PRN  hydrALAZINE, 20 mg, Q2H PRN  labetaloL, 10 mg, Q4H PRN  melatonin, 6 mg, Nightly PRN  ondansetron, 4 mg, Q8H PRN  prochlorperazine, 5 mg, Q6H PRN  sodium chloride 0.9%, 10 mL, PRN    Calorie Containing IV Medications: no significant kcals from medications at this time    Recent Labs   Lab 07/06/24 2001 07/07/24  0551 07/07/24  0908     --  141   K 3.3*  --  3.3*   CALCIUM 9.3  --  9.4   MG 1.70  --   --    CO2 19*  --  18*   BUN 11.3  --  10.0   CREATININE 0.83  --  0.82   EGFRNORACEVR >60  --  >60   GLUCOSE 166*  --  188*   BILITOT 0.7  --  0.6   ALKPHOS 104  --  104   ALT 13  --  12   AST 22  --  27   ALBUMIN 3.6  --  3.8   LIPASE 7  --   --    WBC 8.79 12.12*  --    HGB 12.0 11.9*  --    HCT 36.4* 36.0*  --      Nutrition Orders:  Diet Clear Liquid      Appetite/Oral Intake: poor/0-25% of meals  Factors Affecting Nutritional Intake: clear liquid diet and nausea  Social Needs Impacting Access to Food: none identified  Food/Taoism/Cultural Preferences: none reported  Food Allergies: no known food allergies  Last Bowel Movement: 07/05/24  Wound(s):     Wound 07/07/24 0143 Other (comment) Left anterior Greater trochanter-Tissue loss description: Partial thickness  "    Comments    24: Pt reports poor appetite 2/2 nausea; reports decreased appetite/intake since April. Pt reports a usual wt of 186 lbs; no wt loss noted per EMR weights.     Anthropometrics    Height: 5' 0.98" (154.9 cm), Height Method: Stated  Last Weight: 88.5 kg (195 lb 1.7 oz) (24 1315), Weight Method: Standard Scale  BMI (Calculated): 36.9  BMI Classification: obese grade II (BMI 35-39.9)     Ideal Body Weight (IBW), Female: 104.9 lb     % Ideal Body Weight, Female (lb): 186 %                    Usual Body Weight (UBW), k.5 kg  % Usual Body Weight: 104.95  % Weight Change From Usual Weight: 4.73 %  Usual Weight Provided By: patient    Wt Readings from Last 5 Encounters:   24 88.5 kg (195 lb 1.7 oz)   24 86.2 kg (190 lb)   24 86.6 kg (191 lb)   24 88 kg (194 lb)   24 88.5 kg (195 lb)     Weight Change(s) Since Admission:   Wt Readings from Last 1 Encounters:   24 1315 88.5 kg (195 lb 1.7 oz)   24 0143 88.5 kg (195 lb 1.7 oz)   24 1913 88.5 kg (195 lb)   Admit Weight: 88.5 kg (195 lb) (24 1913), Weight Method: Estimated    Estimated Needs    Weight Used For Calorie Calculations: 88.5 kg (195 lb 1.7 oz)  Energy Calorie Requirements (kcal): 5787-8057 kcal (25-30 kcal/kg)  Energy Need Method: Kcal/kg  Weight Used For Protein Calculations: 88.5 kg (195 lb 1.7 oz)  Protein Requirements: 106 gm (1.2g/kg)  Fluid Requirements (mL): 2213 mL        Enteral Nutrition     Patient not receiving enteral nutrition at this time.    Parenteral Nutrition     Patient not receiving parenteral nutrition support at this time.    Evaluation of Received Nutrient Intake    Calories: not meeting estimated needs  Protein: not meeting estimated needs    Patient Education     Not applicable.    Nutrition Diagnosis     PES: Inadequate oral intake related to altered GI function as evidenced by clear liquid diet since . (new)     PES: Moderate chronic disease or condition " related malnutrition related to suboptimal protein/energy intake as evidenced by less than 75% needs met for greater than or equal to 3 months, mild fat depletion, and mild muscle depletion. (new)    Nutrition Interventions     Intervention(s): modified composition of meals/snacks, commercial beverage, prescription medication, and collaboration with other providers    Goal: Meet greater than 80% of nutritional needs by follow-up. (new)  Goal: Maintain weight throughout hospitalization. (new)    Nutrition Goals & Monitoring     Dietitian will monitor: energy intake and weight  Discharge planning: resume home regimen  Nutrition Risk/Follow-Up: moderate (follow-up in 3-5 days)   Please consult if re-assessment needed sooner.

## 2024-07-08 NOTE — PLAN OF CARE
Pt resides with her dgtr Celeste [phone], and Celeste's spouse Pj, however they are both employed.  Celeste states pt needs to be in a rehab or Snff upon discharge.  Maya is employed M-F 8 to 430. Only can asst. On weekends Pj works as well, so pt is often alone.  Informed Celeste we will need PT?OT eval and obtain info.  [address] for SNF./[facility] is current  agency providing aides, physical therapy and Skilled Nursing. Pt walks with a RW. Awaiting therapy notes then will discuss options with pt. Dgtr. States she was prev. At [facility], but was only ok with it.

## 2024-07-09 PROCEDURE — 99900035 HC TECH TIME PER 15 MIN (STAT)

## 2024-07-09 PROCEDURE — 11000001 HC ACUTE MED/SURG PRIVATE ROOM

## 2024-07-09 PROCEDURE — 97166 OT EVAL MOD COMPLEX 45 MIN: CPT

## 2024-07-09 PROCEDURE — 25000003 PHARM REV CODE 250: Performed by: STUDENT IN AN ORGANIZED HEALTH CARE EDUCATION/TRAINING PROGRAM

## 2024-07-09 PROCEDURE — 99900031 HC PATIENT EDUCATION (STAT)

## 2024-07-09 PROCEDURE — 94760 N-INVAS EAR/PLS OXIMETRY 1: CPT

## 2024-07-09 PROCEDURE — 92610 EVALUATE SWALLOWING FUNCTION: CPT

## 2024-07-09 PROCEDURE — 97162 PT EVAL MOD COMPLEX 30 MIN: CPT

## 2024-07-09 PROCEDURE — 21400001 HC TELEMETRY ROOM

## 2024-07-09 PROCEDURE — 63600175 PHARM REV CODE 636 W HCPCS: Performed by: INTERNAL MEDICINE

## 2024-07-09 RX ADMIN — HYDRALAZINE HYDROCHLORIDE 20 MG: 20 INJECTION, SOLUTION INTRAMUSCULAR; INTRAVENOUS at 04:07

## 2024-07-09 RX ADMIN — GABAPENTIN 300 MG: 300 CAPSULE ORAL at 04:07

## 2024-07-09 RX ADMIN — CARVEDILOL 25 MG: 12.5 TABLET, FILM COATED ORAL at 09:07

## 2024-07-09 RX ADMIN — HYDRALAZINE HYDROCHLORIDE 20 MG: 20 INJECTION, SOLUTION INTRAMUSCULAR; INTRAVENOUS at 08:07

## 2024-07-09 RX ADMIN — HALOPERIDOL LACTATE 2 MG: 5 INJECTION, SOLUTION INTRAMUSCULAR at 04:07

## 2024-07-09 RX ADMIN — HALOPERIDOL LACTATE 2 MG: 5 INJECTION, SOLUTION INTRAMUSCULAR at 11:07

## 2024-07-09 RX ADMIN — PANTOPRAZOLE SODIUM 40 MG: 40 TABLET, DELAYED RELEASE ORAL at 09:07

## 2024-07-09 RX ADMIN — CARVEDILOL 25 MG: 12.5 TABLET, FILM COATED ORAL at 08:07

## 2024-07-09 RX ADMIN — CETIRIZINE HYDROCHLORIDE 10 MG: 10 TABLET, FILM COATED ORAL at 06:07

## 2024-07-09 RX ADMIN — ONDANSETRON 4 MG: 2 INJECTION INTRAMUSCULAR; INTRAVENOUS at 04:07

## 2024-07-09 RX ADMIN — RIVAROXABAN 20 MG: 10 TABLET, FILM COATED ORAL at 04:07

## 2024-07-09 RX ADMIN — GABAPENTIN 300 MG: 300 CAPSULE ORAL at 09:07

## 2024-07-09 RX ADMIN — ONDANSETRON 4 MG: 2 INJECTION INTRAMUSCULAR; INTRAVENOUS at 08:07

## 2024-07-09 RX ADMIN — GABAPENTIN 300 MG: 300 CAPSULE ORAL at 08:07

## 2024-07-09 RX ADMIN — FOLIC ACID 1 MG: 1 TABLET ORAL at 09:07

## 2024-07-09 RX ADMIN — ASPIRIN 81 MG: 81 TABLET, COATED ORAL at 09:07

## 2024-07-09 RX ADMIN — FAMOTIDINE 20 MG: 20 TABLET, FILM COATED ORAL at 04:07

## 2024-07-09 RX ADMIN — LISINOPRIL 10 MG: 10 TABLET ORAL at 06:07

## 2024-07-09 RX ADMIN — ATORVASTATIN CALCIUM 80 MG: 40 TABLET, FILM COATED ORAL at 06:07

## 2024-07-09 RX ADMIN — FUROSEMIDE 40 MG: 40 TABLET ORAL at 09:07

## 2024-07-09 RX ADMIN — SERTRALINE HYDROCHLORIDE 50 MG: 50 TABLET ORAL at 08:07

## 2024-07-09 RX ADMIN — METOCLOPRAMIDE 10 MG: 10 TABLET ORAL at 06:07

## 2024-07-09 RX ADMIN — SODIUM BICARBONATE 650 MG TABLET 650 MG: at 09:07

## 2024-07-09 NOTE — PLAN OF CARE
Problem: Physical Therapy  Goal: Physical Therapy Goal  Description: Goals to be met by: 24     Patient will increase functional independence with mobility by performin. Supine to sit with [place]  2. Sit to supine with [place]  3. Sit to stand transfer with Modified [place]  4. Gait  x 200 feet with Modified [place] using Rolling Walker.     Outcome: Progressing

## 2024-07-09 NOTE — PT/OT/SLP EVAL
Physical Therapy Evaluation    Patient Name:  Katty Veronica   MRN:  [id number]    Recommendations:     Discharge therapy intensity: Moderate Intensity Therapy   Discharge Equipment Recommendations:  (TBD by next level of care)   Barriers to discharge: Impaired mobility    Assessment:     Katty Veronica is a 79 y.o. female admitted with a medical diagnosis of intractable nausea and vomiting. Prior to admit, patient lived with daughter and RON in a SLH with a ramp. At baseline, she requires assistance with ADL's and ambulates with a rollator.  She presents with the following impairments/functional limitations: weakness, impaired endurance, impaired self care skills, impaired functional mobility, gait instability, impaired balance, pain, decreased safety awareness. She required MIN A for bed mobility. MIN A for sit<>stand. Ambulated 8 ft with RW & CGA. Limited by nausea and fatigue. Patient will benefit from continued PT services while in hospital to improve functional mobility & return to PLOF. Recommending MOD intensity therapy at discharge. Progress as tolerated.     Rehab Prognosis: Good; patient would benefit from acute skilled PT services to address these deficits and reach maximum level of function.    Recent Surgery: * No surgery found *      Plan:     During this hospitalization, patient would benefit from acute PT services 5 x/week to address the identified rehab impairments via gait training, therapeutic exercises, therapeutic activities, neuromuscular re-education and progress toward the following goals:    Plan of Care Expires:  08/09/24    Subjective     Chief Complaint: pain & nausea  Patient/Family Comments/goals: none  Pain/Comfort:  Pain Rating 1: 5/10  Location 1: abdomen  Pain Addressed 1: Reposition, Distraction  Pain Rating Post-Intervention 1: 5/10    Patients cultural, spiritual, Shinto conflicts given the current situation: no    Living Environment:  Prior to admit, patient  lived with daughter and RON in a SLH with a ramp. At baseline, she requires assistance with ADL's and ambulates with a rollator.     Equipment used at home: rollator, cane, straight.  DME owned (not currently used): none.  Upon discharge, patient will have assistance from TBD.    Objective:     Communicated with nurse prior to session.  Patient found supine with telemetry, peripheral IV  upon PT entry to room.    General Precautions: Standard, fall  Orthopedic Precautions:N/A   Braces: N/A  Respiratory Status: Room air    Exams:  Cognitive Exam:  Patient is oriented to Person, Place, Time, and Situation  Sensation: -       Intact  RLE ROM: WFL  RLE Strength: -4/5 grossly  LLE ROM: WFL  LLE Strength: -4/5 grossly  Skin integrity: Visible skin intact      Functional Mobility:  Bed Mobility:  Supine to Sit: minimum assistance  Transfers:  Sit to Stand:  minimum assistance with rolling walker  Gait: Ambulated 8 ft with RW & CGA. Limited by nausea and fatigue.   Balance: fair/poor      AM-PAC 6 CLICK MOBILITY  Total Score:17     Education Provided:  Role and goals of PT, transfer training, bed mobility, gait training, balance training, safety awareness, assistive device, strengthening exercises, and importance of participating in PT to return to PLOF.    Patient left up in chair with all lines intact, call button in reach, and educated on calling for assistance when ready to return to bed .    GOALS:   Multidisciplinary Problems       Physical Therapy Goals          Problem: Physical Therapy    Goal Priority Disciplines Outcome Goal Variances Interventions   Physical Therapy Goal     PT, PT/OT Progressing     Description: Goals to be met by: 24     Patient will increase functional independence with mobility by performin. Supine to sit with [place]  2. Sit to supine with [place]  3. Sit to stand transfer with Modified [place]  4. Gait  x 200 feet with Modified [place] using Rolling Walker.                           History:     Past Medical History:   Diagnosis Date    Anxiety     CHF (congestive heart failure)     COPD (chronic obstructive pulmonary disease)     Coronary artery disease     s/p 2 stents    Depression     GERD (gastroesophageal reflux disease)     Glaucoma     Hypertension     Obstructive sleep apnea     on cpap    Stroke     Urinary, incontinence, stress female        Past Surgical History:   Procedure Laterality Date    APPENDECTOMY      BACK SURGERY      CHOLECYSTECTOMY      COLONOSCOPY N/A 5/29/2024    Procedure: COLON;  Surgeon: Samuel Washington MD;  Location: [facility] ENDOSCOPY;  Service: Gastroenterology;  Laterality: N/A;    EGD, WITH CLOSED BIOPSY N/A 5/28/2024    Procedure: EGD;  Surgeon: Samuel Washington MD;  Location: [facility] ENDOSCOPY;  Service: Gastroenterology;  Laterality: N/A;    EGD, WITH CLOSED BIOPSY N/A 5/21/2024    Procedure: EGD;  Surgeon: BASIM Washington MD;  Location: [facility] ENDOSCOPY;  Service: Gastroenterology;  Laterality: N/A;    EXPLORATION OF COMMON BILE DUCT      HERNIA REPAIR      incisional hernia times 2, central abdomen    HYSTERECTOMY      OVARIAN CYST REMOVAL      TONSILLECTOMY         Time Tracking:     PT Received On: 07/09/24  PT Start Time: 0812     PT Stop Time: 0827  PT Total Time (min): 15 min     Billable Minutes: Evaluation 15 minutes      07/09/2024

## 2024-07-09 NOTE — PLAN OF CARE
Spoke to PT/Kary this am,  only walked 8 ft. Recs. Are SNF  spoke to pt she is in agreement.  Already have choices. Referral sent to [facility] to co-ordinate.

## 2024-07-09 NOTE — PT/OT/SLP EVAL
[facility]  Speech Language Pathology Department  Clinical Swallow Evaluation    Patient Name:  Katty Veronica   MRN:  [id number]    Recommendations     General recommendations:  SLP follow up x1  Solid texture recommendation:  Soft & Bite Sized Diet - IDDSI Level 6  Liquid consistency recommendation: Thin liquids - IDDSI Level 0   Medications: crushed in puree  Swallow strategies/precautions: small bites/sips and slow rate    History     Katty Veronica is a/n 79 y.o. female admitted with epigastric abdominal pain with nausea and vomiting.     Past Medical History:   Diagnosis Date    Anxiety     CHF (congestive heart failure)     COPD (chronic obstructive pulmonary disease)     Coronary artery disease     s/p 2 stents    Depression     GERD (gastroesophageal reflux disease)     Glaucoma     Hypertension     Obstructive sleep apnea     on cpap    Stroke     Urinary, incontinence, stress female      Past Surgical History:   Procedure Laterality Date    APPENDECTOMY      BACK SURGERY      CHOLECYSTECTOMY      COLONOSCOPY N/A 5/29/2024    Procedure: COLON;  Surgeon: Samuel Washington MD;  Location: [facility] ENDOSCOPY;  Service: Gastroenterology;  Laterality: N/A;    EGD, WITH CLOSED BIOPSY N/A 5/28/2024    Procedure: EGD;  Surgeon: Samuel Washington MD;  Location: [facility] ENDOSCOPY;  Service: Gastroenterology;  Laterality: N/A;    EGD, WITH CLOSED BIOPSY N/A 5/21/2024    Procedure: EGD;  Surgeon: BASIM Washington MD;  Location: [facility] ENDOSCOPY;  Service: Gastroenterology;  Laterality: N/A;    EXPLORATION OF COMMON BILE DUCT      HERNIA REPAIR      incisional hernia times 2, central abdomen    HYSTERECTOMY      OVARIAN CYST REMOVAL      TONSILLECTOMY       Home diet texture/consistency: Regular and thin liquids  Current method of nutrition: NPO  Patient complaint: denies    Imaging   Results for orders placed during the hospital encounter of  04/07/24    X-Ray Chest 1 View    Narrative  EXAMINATION:  XR CHEST 1 VIEW    CLINICAL HISTORY:  Shortness of breath    TECHNIQUE:  Single frontal view of the chest was performed.    COMPARISON:  Radiograph 03/04/2024  FINDINGS:  The cardiomediastinal silhouette and pulmonary vasculature are within normal limits.  Aortic vascular calcifications.  No lobar consolidation, pneumothorax or large pleural effusion.  Mild elevation of the right hemidiaphragm, unchanged.  No acute osseous abnormality identified.  Stimulator leads overlie the thoracic spine.  Electronically signed by: Aditya Haynes  Date:    04/07/2024  Time:    12:34    Results for orders placed in visit on 04/08/14    CT Chest With Contrast    Narrative  CT thorax with contrast    INDICATION: Pulmonary mass.    Mediastinal windows demonstrate right superhilar node complex. Small  precarinal nodes present. Minor coronary vascular calcification  present.    No significant left hilar hernia present.    Lung windows unremarkable. There is some benign there is a posterior  pleural wall thickening.    Lung fields and mediastinum looks comparable to prior study of August 20, 2012.    Adrenals normal. Patient status post cholecystectomy.    IMPRESSION: Significant hiatal hernia with slight twisting of the  stomach above the hemidiaphragm. This hernia has increased in size  slightly compared to prior study of August 20, 2012    Lung fields and mediastinum otherwise stable.    Electronically Signed By: Marty Henrandez MD  Date/Time Signed: 04/08/2014 11:18    Results for orders placed during the hospital encounter of 02/09/24    MRI Brain Without Contrast    Narrative  EXAMINATION:  MRI BRAIN WITHOUT CONTRAST    CLINICAL HISTORY:  Stroke, follow up;    TECHNIQUE:  Multiplanar MRI sequences were performed of the brain without contrast.    COMPARISON:  MRI brain and November 12, 2014.  CT brain February 9, 2024  FINDINGS:  Interval progression of chronic  microvascular ischemia which is now moderate and is seen in the periventricular deep white matter T2 FLAIR hyperintense signals.  There are right cerebral and bilateral basal ganglia old lacunar infarcts.  Generalized cerebral and cerebellar cortical volume loss. Gradient echo sequences demonstrate no evidence of de phasing artifact to suggest hemorrhagic byproducts. No evidence of diffusion restriction or ADC map signal drop out to suggest acute infarct. The sella and suprasellar areas are unremarkable.    The cerebellar tonsils are normally positioned. There is no acute intracranial hemorrhage, hydrocephalus, midline shift or mass effect. No acute extra axial fluid collections identified. The mastoid air cells are clear.  Impression  1.  No acute intracranial findings identified.  2.  Old lacunar infarcts, chronic microangiopathic ischemia and atrophy.  Electronically signed by: Anastacio Preston  Date:    02/09/2024  Time:    21:31    Subjective     Patient awake and alert.  Spiritual/Cultural/Shinto Beliefs/Practices that affect care:  no  Pain/Comfort:  0/10  Restraints/positioning devices: none    Objective     ORAL MUSCULATURE  Dentition: edentulous  Secretion Management: adequate  Mucosal Quality: good  Facial Movement: WFL  Buccal Strength & Mobility: WFL  Mandibular Strength & Mobility: WFL  Oral Labial Strength & Mobility: WFL  Lingual Strength & Mobility: WFL  Vocal Quality: adequate    PO TRIALS  Consistency Fed By Oral Symptoms Pharyngeal Symptoms   Thin liquid by straw SLP None None   Puree SLP None None   Chewable solid SLP Prolonged bolus formation/mastication None     Assessment     No signs/sx of aspiration observed. SLP to follow up x1 regarding diet tolerance.     Education     Patient provided with verbal education regarding POC.  Understanding was verbalized.    Plan     SLP Follow-Up:   7/10/24  Plan of Care reviewed with:  patient     Time Tracking     SLP Treatment Date:   07/09/24  Speech  Start Time:  1355  Speech Stop Time:  1410     Speech Total Time (min):  15 min    Billable minutes:  Swallow and Oral Function Evaluation, 15 minutes     07/09/2024

## 2024-07-09 NOTE — PLAN OF CARE
Problem: Occupational Therapy  Goal: Occupational Therapy Goal  Description: LTG: Pt will perform basic ADLs and ADL transfers with Modified independence using LRAD by discharge.    STG: to be met by 8/6/24:    Pt will complete grooming standing at sink with LRAD with SBA.  Pt will complete UB dressing with SBA.  Pt will complete LB dressing with SBA using LRAD.  Pt will complete toileting with SBA using LRAD.  Pt will complete functional mobility to/from toilet and toilet transfer with SBA using LRAD.   Outcome: Progressing

## 2024-07-09 NOTE — PLAN OF CARE
Sent New Referral To [facility].      Called in [facility] and Faxed [facility]

## 2024-07-09 NOTE — PT/OT/SLP EVAL
Occupational Therapy  Evaluation    Name: Katty Veronica  MRN: [id number]  Admitting Diagnosis: abdominal pain, nausea/vomiting   Recent Surgery: * No surgery found *      Recommendations:     Discharge therapy intensity: Moderate Intensity Therapy   Discharge Equipment Recommendations:  to be determined by next level of care  Barriers to discharge:  Other (Comment) (Severity of deficits)    Assessment:     Katty Veronica is a 79 y.o. female with a medical diagnosis of abdominal pain, nausea/vomiting. Prior to admit, pt reported she was IND c ADLs and mobility using a rollator.  She presents with the following performance deficits affecting function: weakness, impaired endurance, impaired self care skills, impaired functional mobility, impaired balance, pain. Pt presents with generalized weakness and poor endurance and required Max A for LB dressing and Min A to ambulate ~ 5 ft using RW. Pt would benefit from moderate intensity therapy at d/c.     Rehab Prognosis: Good; patient would benefit from acute skilled OT services to address these deficits and reach maximum level of function.       Plan:     Patient to be seen 4 x/week to address the above listed problems via self-care/home management, therapeutic activities, therapeutic exercises  Plan of Care Expires: 08/06/24  Plan of Care Reviewed with: patient    Subjective     Chief Complaint: Abdominal pain   Patient/Family Comments/goals: To get stronger     Occupational Profile:  Living Environment: Pt lives c her daughter and son-in-law in a [facility] c a walk in shower c a shower chair.   Previous level of function: IND c ADLs c the exception of showering (reported HH assists). Mod I for ambulating c rollator. Gets HH therapy  Roles and Routines: Mother   Equipment Used at Home: walker, rolling, rollator, shower chair  Assistance upon Discharge: Pt's son in law     Pain/Comfort:  Pain Rating 1: 8/10  Location 1: abdomen  Pain Addressed 1: Reposition,  Distraction    Patients cultural, spiritual, Methodist conflicts given the current situation: no    Objective:     OT communicated with RN prior to session.      Patient was found supine with peripheral IV, PureWick, telemetry upon OT entry to room.    General Precautions: Standard, fall  Orthopedic Precautions: N/A  Braces: N/A    Bed Mobility:    Patient completed Supine to Sit with minimum assistance  Patient completed Sit to Supine with moderate assistance    Functional Mobility/Transfers:  Patient completed Sit <> Stand Transfer with minimum assistance  with  rolling walker   Functional Mobility: Pt ambulated ~5 ft using RW c Min A; OT to order bsc for next session    Activities of Daily Living:  Lower Body Dressing: total assistance Doff/don socks  Toileting: total assistance Purewick    Functional Cognition:  Affect: Cooperative      Upper Extremity Function:  Right Upper Extremity:   3+/5    Left Upper Extremity:  3+/5    Therapeutic Positioning  Risk for acquired pressure injuries is increased due to impaired mobility.    OT interventions performed during the course of today's session:   Education was provided on benefits of and recommendations for therapeutic positioning    Skin assessment: all bony prominences were assessed    Findings: no redness or breakdown noted    OT recommendations for therapeutic positioning throughout hospitalization:   Follow [facility] Pressure Injury Prevention Protocol      Patient Education:  Patient provided with verbal education education regarding OT role/goals/POC.  Understanding was verbalized.     Patient left HOB elevated with all lines intact and call button in reach.    GOALS:   Multidisciplinary Problems       Occupational Therapy Goals          Problem: Occupational Therapy    Goal Priority Disciplines Outcome Interventions   Occupational Therapy Goal     OT, PT/OT Progressing    Description: LTG: Pt will perform basic ADLs and ADL transfers with Modified independence  using LRAD by discharge.    STG: to be met by 8/6/24:    Pt will complete grooming standing at sink with LRAD with SBA.  Pt will complete UB dressing with SBA.  Pt will complete LB dressing with SBA using LRAD.  Pt will complete toileting with SBA using LRAD.  Pt will complete functional mobility to/from toilet and toilet transfer with SBA using LRAD.                        History:     Past Medical History:   Diagnosis Date    Anxiety     CHF (congestive heart failure)     COPD (chronic obstructive pulmonary disease)     Coronary artery disease     s/p 2 stents    Depression     GERD (gastroesophageal reflux disease)     Glaucoma     Hypertension     Obstructive sleep apnea     on cpap    Stroke     Urinary, incontinence, stress female          Past Surgical History:   Procedure Laterality Date    APPENDECTOMY      BACK SURGERY      CHOLECYSTECTOMY      COLONOSCOPY N/A 5/29/2024    Procedure: COLON;  Surgeon: Samuel Washington MD;  Location: [facility] ENDOSCOPY;  Service: Gastroenterology;  Laterality: N/A;    EGD, WITH CLOSED BIOPSY N/A 5/28/2024    Procedure: EGD;  Surgeon: Samuel Washington MD;  Location: [facility] ENDOSCOPY;  Service: Gastroenterology;  Laterality: N/A;    EGD, WITH CLOSED BIOPSY N/A 5/21/2024    Procedure: EGD;  Surgeon: BASIM Washington MD;  Location: [facility] ENDOSCOPY;  Service: Gastroenterology;  Laterality: N/A;    EXPLORATION OF COMMON BILE DUCT      HERNIA REPAIR      incisional hernia times 2, central abdomen    HYSTERECTOMY      OVARIAN CYST REMOVAL      TONSILLECTOMY         Time Tracking:     OT Date of Treatment: 07/09/24  OT Start Time: 1016  OT Stop Time: 1026  OT Total Time (min): 10 min    Billable Minutes:Evaluation Moderate complexity     7/9/2024

## 2024-07-10 LAB
ANION GAP SERPL CALC-SCNC: 14 MEQ/L
BUN SERPL-MCNC: 19.5 MG/DL (ref 9.8–20.1)
CALCIUM SERPL-MCNC: 8 MG/DL (ref 8.4–10.2)
CHLORIDE SERPL-SCNC: 94 MMOL/L (ref 98–107)
CO2 SERPL-SCNC: 27 MMOL/L (ref 23–31)
CREAT SERPL-MCNC: 0.71 MG/DL (ref 0.55–1.02)
CREAT/UREA NIT SERPL: 27
GFR SERPLBLD CREATININE-BSD FMLA CKD-EPI: >60 ML/MIN/1.73/M2
GLUCOSE SERPL-MCNC: 109 MG/DL (ref 82–115)
MAGNESIUM SERPL-MCNC: 1.9 MG/DL (ref 1.6–2.6)
PHOSPHATE SERPL-MCNC: 3.4 MG/DL (ref 2.3–4.7)
POTASSIUM SERPL-SCNC: 2.3 MMOL/L (ref 3.5–5.1)
SODIUM SERPL-SCNC: 135 MMOL/L (ref 136–145)

## 2024-07-10 PROCEDURE — 97530 THERAPEUTIC ACTIVITIES: CPT

## 2024-07-10 PROCEDURE — 11000001 HC ACUTE MED/SURG PRIVATE ROOM

## 2024-07-10 PROCEDURE — 63600175 PHARM REV CODE 636 W HCPCS: Performed by: INTERNAL MEDICINE

## 2024-07-10 PROCEDURE — 63600175 PHARM REV CODE 636 W HCPCS: Performed by: STUDENT IN AN ORGANIZED HEALTH CARE EDUCATION/TRAINING PROGRAM

## 2024-07-10 PROCEDURE — 97110 THERAPEUTIC EXERCISES: CPT

## 2024-07-10 PROCEDURE — 63600175 PHARM REV CODE 636 W HCPCS: Performed by: NURSE PRACTITIONER

## 2024-07-10 PROCEDURE — 25000003 PHARM REV CODE 250: Performed by: INTERNAL MEDICINE

## 2024-07-10 PROCEDURE — 36415 COLL VENOUS BLD VENIPUNCTURE: CPT | Performed by: STUDENT IN AN ORGANIZED HEALTH CARE EDUCATION/TRAINING PROGRAM

## 2024-07-10 PROCEDURE — 84100 ASSAY OF PHOSPHORUS: CPT | Performed by: STUDENT IN AN ORGANIZED HEALTH CARE EDUCATION/TRAINING PROGRAM

## 2024-07-10 PROCEDURE — 80048 BASIC METABOLIC PNL TOTAL CA: CPT | Performed by: STUDENT IN AN ORGANIZED HEALTH CARE EDUCATION/TRAINING PROGRAM

## 2024-07-10 PROCEDURE — 83735 ASSAY OF MAGNESIUM: CPT | Performed by: STUDENT IN AN ORGANIZED HEALTH CARE EDUCATION/TRAINING PROGRAM

## 2024-07-10 PROCEDURE — 21400001 HC TELEMETRY ROOM

## 2024-07-10 PROCEDURE — 25000003 PHARM REV CODE 250: Performed by: STUDENT IN AN ORGANIZED HEALTH CARE EDUCATION/TRAINING PROGRAM

## 2024-07-10 RX ORDER — POTASSIUM CHLORIDE 14.9 MG/ML
20 INJECTION INTRAVENOUS 2 TIMES DAILY
Status: DISCONTINUED | OUTPATIENT
Start: 2024-07-10 | End: 2024-07-11

## 2024-07-10 RX ORDER — POTASSIUM CHLORIDE 14.9 MG/ML
40 INJECTION INTRAVENOUS ONCE
Status: COMPLETED | OUTPATIENT
Start: 2024-07-10 | End: 2024-07-10

## 2024-07-10 RX ADMIN — CARVEDILOL 25 MG: 12.5 TABLET, FILM COATED ORAL at 09:07

## 2024-07-10 RX ADMIN — POTASSIUM CHLORIDE 40 MEQ: 14.9 INJECTION, SOLUTION INTRAVENOUS at 06:07

## 2024-07-10 RX ADMIN — FAMOTIDINE 20 MG: 20 TABLET, FILM COATED ORAL at 04:07

## 2024-07-10 RX ADMIN — RIVAROXABAN 20 MG: 10 TABLET, FILM COATED ORAL at 04:07

## 2024-07-10 RX ADMIN — LISINOPRIL 10 MG: 10 TABLET ORAL at 06:07

## 2024-07-10 RX ADMIN — ATORVASTATIN CALCIUM 80 MG: 40 TABLET, FILM COATED ORAL at 06:07

## 2024-07-10 RX ADMIN — SODIUM CHLORIDE, POTASSIUM CHLORIDE, SODIUM LACTATE AND CALCIUM CHLORIDE: 600; 310; 30; 20 INJECTION, SOLUTION INTRAVENOUS at 01:07

## 2024-07-10 RX ADMIN — ASPIRIN 81 MG: 81 TABLET, COATED ORAL at 09:07

## 2024-07-10 RX ADMIN — GABAPENTIN 300 MG: 300 CAPSULE ORAL at 09:07

## 2024-07-10 RX ADMIN — SODIUM CHLORIDE, POTASSIUM CHLORIDE, SODIUM LACTATE AND CALCIUM CHLORIDE: 600; 310; 30; 20 INJECTION, SOLUTION INTRAVENOUS at 12:07

## 2024-07-10 RX ADMIN — SERTRALINE HYDROCHLORIDE 50 MG: 50 TABLET ORAL at 09:07

## 2024-07-10 RX ADMIN — CETIRIZINE HYDROCHLORIDE 10 MG: 10 TABLET, FILM COATED ORAL at 06:07

## 2024-07-10 RX ADMIN — FOLIC ACID 1 MG: 1 TABLET ORAL at 09:07

## 2024-07-10 RX ADMIN — ONDANSETRON 4 MG: 2 INJECTION INTRAMUSCULAR; INTRAVENOUS at 07:07

## 2024-07-10 RX ADMIN — ACETAMINOPHEN 325MG 650 MG: 325 TABLET ORAL at 02:07

## 2024-07-10 RX ADMIN — PANTOPRAZOLE SODIUM 40 MG: 40 TABLET, DELAYED RELEASE ORAL at 09:07

## 2024-07-10 RX ADMIN — FUROSEMIDE 40 MG: 40 TABLET ORAL at 09:07

## 2024-07-10 RX ADMIN — GABAPENTIN 300 MG: 300 CAPSULE ORAL at 04:07

## 2024-07-10 RX ADMIN — SODIUM BICARBONATE 650 MG TABLET 650 MG: at 09:07

## 2024-07-10 RX ADMIN — POTASSIUM CHLORIDE 20 MEQ: 14.9 INJECTION, SOLUTION INTRAVENOUS at 01:07

## 2024-07-10 RX ADMIN — POTASSIUM CHLORIDE 20 MEQ: 14.9 INJECTION, SOLUTION INTRAVENOUS at 09:07

## 2024-07-10 NOTE — PT/OT/SLP PROGRESS
Physical Therapy Treatment    Patient Name:  Katty Veronica   MRN:  [id number]    Recommendations:     Discharge therapy intensity: Moderate Intensity Therapy   Discharge Equipment Recommendations:  (TBD by next level of care)  Barriers to discharge: Ongoing medical needs    Assessment:     Katty Veronica is a 79 y.o. female admitted with a medical diagnosis of  intractable nausea and vomiting.  She presents with the following impairments/functional limitations: impaired endurance, weakness, impaired functional mobility, impaired self care skills, gait instability, impaired balance, decreased safety awareness, pain.    Rehab Prognosis: Good; patient would benefit from acute skilled PT services to address these deficits and reach maximum level of function.    Recent Surgery: * No surgery found *      Plan:     During this hospitalization, patient would benefit from acute PT services 5 x/week to address the identified rehab impairments via gait training, therapeutic activities, therapeutic exercises, neuromuscular re-education and progress toward the following goals:    Plan of Care Expires:  08/09/24    Subjective     Chief Complaint: pain  Patient/Family Comments/goals: none  Pain/Comfort:  Pain Rating 1: 5/10  Location 1:  (back and abdomen)  Pain Addressed 1: Distraction, Reposition, Cessation of Activity  Pain Rating Post-Intervention 1: 5/10      Objective:     Communicated with nurse prior to session.  Patient found supine with peripheral IV, telemetry, PureWick upon PT entry to room.     General Precautions: Standard, fall  Orthopedic Precautions: N/A  Braces: N/A  Respiratory Status: Room air  Blood Pressure: 169/80  Skin Integrity: Visible skin intact      Functional Mobility:  Bed Mobility:  Supine to Sit: minimum assistance  Transfers:  Sit to Stand:  minimum assistance with rolling walker  Gait: 3 ft + 8 ft with RW & CGA. Slowed pace. Limited by light headedness  Balance:  fair/poor    Therapeutic Exercises:  Patient performed seated Marches, Heel Raises, LAQ, Glute Sets, Resisted Hip ABD/ADD. All performed 2 x 20 reps.    Education Provided:  Role and goals of PT, transfer training, bed mobility, gait training, balance training, safety awareness, assistive device, strengthening exercises, and importance of participating in PT to return to PLOF.    Patient left up in chair with all lines intact, call button in reach, and educated on calling for assistance when ready to return to bed    GOALS:   Multidisciplinary Problems       Physical Therapy Goals          Problem: Physical Therapy    Goal Priority Disciplines Outcome Goal Variances Interventions   Physical Therapy Goal     PT, PT/OT Progressing     Description: Goals to be met by: 24     Patient will increase functional independence with mobility by performin. Supine to sit with [place]  2. Sit to supine with [place]  3. Sit to stand transfer with Modified [place]  4. Gait  x 200 feet with Modified [place] using Rolling Walker.                          Time Tracking:     PT Received On: 07/10/24  PT Start Time: 0740     PT Stop Time: 0804  PT Total Time (min): 24 min     Billable Minutes: Therapeutic Activity 12 minutes and Therapeutic Exercise 12 minutes    Treatment Type: Treatment  PT/PTA: PT     Number of PTA visits since last PT visit: 1     07/10/2024

## 2024-07-10 NOTE — PROGRESS NOTES
[facility]  Hospital Medicine Progress Note      Chief Complaint: nausea and vomiting       HPI: (personally reviewed by me and is documented from initial H&P)     79-year-old female with a history of heart failure with preserved EF, PAF on Xarelto, COPD, gastroparesis GERD, CVA, SABINA, glaucoma, HTN, HLD, CAD and additional past medical history as below who presents to the ER again complaining of epigastric abdominal pain with associated nausea and vomiting bloating over the prior 24 hours.  She underwent outpatient EGD 5/21/24 which showed chronic gastritis, erythematous mucosa in the gastric body.  She was diagnosed with opioid induced gastroparesis.  She was underwent an EGD again 5/08/20/2024 after presenting with melena, which again just noted gastritis.  She had colonoscopy the following day which showed multiple diverticula throughout the colon and internal hemorrhoids.     On arrival to the ER tonight patient was afebrile hemodynamically stable maintaining normal sats on room air.  Laboratory work showed no acute process.  CT abdomen and pelvis showed no acute process.  She was given multiple medications and attempt to control her retching/nausea in the ER without success and therefore hospitalist service was consulted for admission.      Interval History:        No overnight events reported.  No new complaints.  No family currently present at bedside    Objective Assessment:  Physical Exam      Vital signs have been personally reviewed by me   General: Appears comfortable, no acute distress.  Resting comfortably  Integumentary: Warm, dry, intact.  Musculoskeletal: Purposeful movement noted.     Respiratory: No accessory muscle use. Breath sounds are equal.  Cardiovascular: Regular rate.       VITAL SIGNS: 24 HRS MIN & MAX LAST   Temp  Min: 98.3 °F (36.8 °C)  Max: 99.6 °F (37.6 °C) 99.6 °F (37.6 °C)   BP  Min: 118/70  Max: 170/69 (!) 160/61   Pulse  Min: 67  Max: 73  72   Resp   Min: 16  Max: 20 20   SpO2  Min: 90 %  Max: 94 % (!) 93 %     CT Abdomen Pelvis With IV Contrast NO Oral Contrast  Narrative: EXAMINATION:  CT ABDOMEN PELVIS WITH IV CONTRAST    CLINICAL HISTORY:  Abdominal pain, acute, nonlocalized;    TECHNIQUE:  Low dose axial images, sagittal and coronal reformations were obtained from the lung bases to the pubic symphysis following the IV administration of contrast. Automatic exposure control (AEC) is utilized to reduce patient radiation exposure.    COMPARISON:  05/27/2024    FINDINGS:  Lines and Tubes: None.    Thorax:Lungs: The visualized lung bases appear unremarkable    Pleura: No effusions or thickening.    Heart: The heart size is within normal limits.    Abdomen:Abdominal Wall: No abdominal wall pathology is seen    Liver: The liver appears unremarkable.    Biliary System: No intrahepatic or extrahepatic biliary duct dilatation is seen.    Gallbladder: Surgical clips are seen in the gallbladder fossa which may reflect prior cholecystectomy.    Pancreas: Moderate pancreatic atrophy is seen    Spleen: The spleen appears unremarkable.    Adrenals: The adrenal glands appear unremarkable.    Kidneys: The kidneys appear unremarkable with no stones cysts masses or hydronephrosis with IV contrast decreasing sensitivity and specificity for stones.    Aorta: There is moderate calcification of the abdominal aorta and its branches    IVC: Unremarkable.    Bowel:Esophagus: The visualized esophagus appears unremarkable. There is a small hiatal hernia. Note is again made of surgical staples in the distal esophagus. Correlate with clinical findings and prior surgical intervention.    Stomach: The stomach appears unremarkable    Duodenum: Unremarkable appearing duodenum.    Small Bowel: The small bowel appears unremarkable.    Colon: Multiple diverticula are seen predominantly in the cecum through to the sigmoid colon. No associated inflammatory stranding or pericolonic fluid is seen to  suggest diverticulitis.    Appendix: The appendix appears unremarkable and is partially seen on Image 55, Series 4.    Peritoneum: No intraperitoneal free air or ascites is seen.    Pelvis:Bladder: The bladder appears unremarkable.    Female:Uterus: The uterus is not identified.    Ovaries: No adnexal masses are seen.    Bony structures:Dorsal Spine: There is moderate multilevel spondylosis of the visualized dorsal spine. There is a stable non-acute loss of vertebral body height of L1. There are stable medium sized sclerotic foci in the L1 and L4 vertebral bodies which may reflect bone islands. There is stable moderate levoconvex scoliosis of the thoracic spine. Note is made of spinal stimulation leads in the posterior aspect of the lower thoracic and lumbar region.    Bony Pelvis: There is mild degenerative change of the bilateral hips.  Impression: 1. No acute intraabdominal or pelvic solid organ or bowel pathology identified. Details and other findings as discussed above.    I concur with the preliminary report above.    Electronically signed by: Fritz Zheng  Date:    07/07/2024  Time:    13:45  CT Head Without Contrast  Narrative: EXAMINATION:  CT HEAD WITHOUT CONTRAST    CLINICAL HISTORY:  Intractable nausea and vomiting;    TECHNIQUE:  Multiple axial images were obtained from the base of the brain to the vertex without contrast administration.  Sagittal and coronal reconstructions were performed. .Automatic exposure control  (AEC) is utilized to reduce patient radiation exposure.    COMPARISON:  02/09/2024    FINDINGS:  There is no intracranial mass or lesion seen.  No hemorrhage is seen.  No infarct is seen.  The ventricles and basilar cisterns appear normal.  Brain parenchyma appears grossly unremarkable.    Posterior fossa appears normal.  The calvarium is intact.  The paranasal sinuses appear grossly unremarkable.  Impression: No acute abnormality seen    Electronically signed by: Fritz  Norm  Date:    07/07/2024  Time:    09:42    Recent Labs   Lab 07/06/24 2001 07/07/24  0551   WBC 8.79 12.12*   RBC 4.25 4.25   HGB 12.0 11.9*   HCT 36.4* 36.0*   MCV 85.6 84.7   MCH 28.2 28.0   MCHC 33.0 33.1   RDW 15.2 15.4    240   MPV 10.8* 11.5*       Recent Labs   Lab 07/06/24 2001 07/07/24  0908    141   K 3.3* 3.3*    106   CO2 19* 18*   BUN 11.3 10.0   CREATININE 0.83 0.82   CALCIUM 9.3 9.4   MG 1.70  --    ALBUMIN 3.6 3.8   ALKPHOS 104 104   ALT 13 12   AST 22 27   BILITOT 0.7 0.6     Microbiology Results (last 7 days)       ** No results found for the last 168 hours. **               Medications for Hospital Course         Scheduled Med:   aspirin  81 mg Oral Daily    atorvastatin  80 mg Oral QAM    carvediloL  25 mg Oral BID    cetirizine  10 mg Oral QAM    famotidine  20 mg Oral Daily    folic acid  1 mg Oral Daily    furosemide  40 mg Oral Daily    gabapentin  300 mg Oral TID    lisinopriL  10 mg Oral QAM    pantoprazole  40 mg Oral Daily    rivaroxaban  20 mg Oral with dinner    sertraline  50 mg Oral QHS    sodium bicarbonate  650 mg Oral Daily      Continuous Infusions:   lactated ringers   Intravenous Continuous 75 mL/hr at 07/08/24 2320 New Bag at 07/08/24 2320        PRN Meds:    Current Facility-Administered Medications:     acetaminophen, 650 mg, Oral, Q8H PRN    acetaminophen, 650 mg, Oral, Q8H PRN    haloperidol lactate, 2 mg, Intramuscular, Q4H PRN    hydrALAZINE, 20 mg, Intravenous, Q2H PRN    labetaloL, 10 mg, Intravenous, Q4H PRN    melatonin, 6 mg, Oral, Nightly PRN    ondansetron, 4 mg, Intravenous, Q8H PRN    prochlorperazine, 5 mg, Intravenous, Q6H PRN    sodium chloride 0.9%, 10 mL, Intravenous, PRN     Assessment and Plan      Recurrent symptoms of opioid induced gastroparesis  Intractable nausea and vomiting 2/2 above     HX:  Gastroparesis, chronic gastritis, diverticulosis, AFib on Xarelto, CAD, HTN, HLD, GERD, CVA, SABINA, glaucoma     Above present on  admission         Anticipated discharge and Disposition when medically stable:    Therapy: PT/OT/Speech    Nutrition: NPO      ___________________________________________________________________________________________________________________________________    I think patient's symptoms are moreso related to behavioral etiology.   Electrolytes are within normal limits., continue to monitor   Discontinue IV reglan and initiate oral.   NPO for speech evaluation  continue sodium bicarb tablets.   Discontinued IV reglan   Continue supportive care  Continue checking vital signs q4hrs.  Reviewed and restarted appropriate home medications.     DVT prophylaxis initiated   Nurse notified to page me if any changes occur     ______________________________________________________________________________________________________________________________      I have spent 30 minutes on the day of the visit; time spent includes face to face time and non-face to face time preparing to see the patient (eg, review of tests), independently reviewing and interpreting medical records, both past and current; documenting clinical information in the electronic or other health record, and communicating results to the patient/family/caregiver and care coordinator and nursing team.      All diagnosis and differential diagnosis have been reviewed,  interpreted and communicated appropriately to care team. assessment and plan has been documented; I have personally reviewed the labs and test results that are presently available and pertinent to this hospital course; I have reviewed medical records based upon their availability.    I will continue to monitor closely and make adjustments to medical management as needed.    Nadia D Daljit, DO   07/09/2024     This note was created with the assistance of Dragon voice recognition software. There may be transcription errors as a result of using this technology however minimal. Effort has been made to  assure accuracy of transcription but any obvious errors or omissions should be clarified with the author of the document.

## 2024-07-10 NOTE — PROGRESS NOTES
[facility]  Hospital Medicine Progress Note      Chief Complaint: nausea and vomiting       HPI: (personally reviewed by me and is documented from initial H&P)     79-year-old female with a history of heart failure with preserved EF, PAF on Xarelto, COPD, gastroparesis GERD, CVA, SABINA, glaucoma, HTN, HLD, CAD and additional past medical history as below who presents to the ER again complaining of epigastric abdominal pain with associated nausea and vomiting bloating over the prior 24 hours.     She underwent outpatient EGD 5/21/24 which showed chronic gastritis, erythematous mucosa in the gastric body.  She was diagnosed with opioid induced gastroparesis.  She was underwent an EGD again 5/08/20/2024 after presenting with melena, which again just noted gastritis.  She had colonoscopy the following day which showed multiple diverticula throughout the colon and internal hemorrhoids.     On arrival to the ER tonight patient was afebrile hemodynamically stable maintaining normal sats on room air.  Laboratory work showed no acute process.  CT abdomen and pelvis showed no acute process.  She was given multiple medications and attempt to control her retching/nausea in the ER without success and therefore hospitalist service was consulted for admission.    Interval History:        Patient was evaluated by speech therapist who recommended small bite size sips and sitting rate.  Patient seems to be uninterested in eating or drinking however this morning during my evaluation I asked her to check a sip of water patient was able to drink water without nausea vomiting or retching.  She states that she is able to eat her putting and yellow without any abnormalities.  She currently has no abdominal pain no nausea no vomiting reported.    Plan is for skilled nursing care facility.    Family is requesting gastroenterology consult has been placed.  No family present at bedside during my evaluations.       Patient is without any new complaints.  No overnight events reported.    Objective Assessment:  Physical Exam      Vital signs have been personally reviewed by me   General: Appears comfortable, no acute distress.  Resting comfortably  Integumentary: Warm, dry, intact.  Musculoskeletal: Purposeful movement noted.     Respiratory: No accessory muscle use. Breath sounds are equal.  Cardiovascular: Regular rate.       VITAL SIGNS: 24 HRS MIN & MAX LAST   Temp  Min: 98.3 °F (36.8 °C)  Max: 99.6 °F (37.6 °C) 98.7 °F (37.1 °C)   BP  Min: 110/69  Max: 172/70 (!) 156/79   Pulse  Min: 66  Max: 72  68   Resp  Min: 18  Max: 20 18   SpO2  Min: 91 %  Max: 93 % (!) 93 %     CT Abdomen Pelvis With IV Contrast NO Oral Contrast  Narrative: EXAMINATION:  CT ABDOMEN PELVIS WITH IV CONTRAST    CLINICAL HISTORY:  Abdominal pain, acute, nonlocalized;    TECHNIQUE:  Low dose axial images, sagittal and coronal reformations were obtained from the lung bases to the pubic symphysis following the IV administration of contrast. Automatic exposure control (AEC) is utilized to reduce patient radiation exposure.    COMPARISON:  05/27/2024    FINDINGS:  Lines and Tubes: None.    Thorax:Lungs: The visualized lung bases appear unremarkable    Pleura: No effusions or thickening.    Heart: The heart size is within normal limits.    Abdomen:Abdominal Wall: No abdominal wall pathology is seen    Liver: The liver appears unremarkable.    Biliary System: No intrahepatic or extrahepatic biliary duct dilatation is seen.    Gallbladder: Surgical clips are seen in the gallbladder fossa which may reflect prior cholecystectomy.    Pancreas: Moderate pancreatic atrophy is seen    Spleen: The spleen appears unremarkable.    Adrenals: The adrenal glands appear unremarkable.    Kidneys: The kidneys appear unremarkable with no stones cysts masses or hydronephrosis with IV contrast decreasing sensitivity and specificity for stones.    Aorta: There is moderate  calcification of the abdominal aorta and its branches    IVC: Unremarkable.    Bowel:Esophagus: The visualized esophagus appears unremarkable. There is a small hiatal hernia. Note is again made of surgical staples in the distal esophagus. Correlate with clinical findings and prior surgical intervention.    Stomach: The stomach appears unremarkable    Duodenum: Unremarkable appearing duodenum.    Small Bowel: The small bowel appears unremarkable.    Colon: Multiple diverticula are seen predominantly in the cecum through to the sigmoid colon. No associated inflammatory stranding or pericolonic fluid is seen to suggest diverticulitis.    Appendix: The appendix appears unremarkable and is partially seen on Image 55, Series 4.    Peritoneum: No intraperitoneal free air or ascites is seen.    Pelvis:Bladder: The bladder appears unremarkable.    Female:Uterus: The uterus is not identified.    Ovaries: No adnexal masses are seen.    Bony structures:Dorsal Spine: There is moderate multilevel spondylosis of the visualized dorsal spine. There is a stable non-acute loss of vertebral body height of L1. There are stable medium sized sclerotic foci in the L1 and L4 vertebral bodies which may reflect bone islands. There is stable moderate levoconvex scoliosis of the thoracic spine. Note is made of spinal stimulation leads in the posterior aspect of the lower thoracic and lumbar region.    Bony Pelvis: There is mild degenerative change of the bilateral hips.  Impression: 1. No acute intraabdominal or pelvic solid organ or bowel pathology identified. Details and other findings as discussed above.    I concur with the preliminary report above.    Electronically signed by: Fritz Zheng  Date:    07/07/2024  Time:    13:45  CT Head Without Contrast  Narrative: EXAMINATION:  CT HEAD WITHOUT CONTRAST    CLINICAL HISTORY:  Intractable nausea and vomiting;    TECHNIQUE:  Multiple axial images were obtained from the base of the brain to the  vertex without contrast administration.  Sagittal and coronal reconstructions were performed. .Automatic exposure control  (AEC) is utilized to reduce patient radiation exposure.    COMPARISON:  02/09/2024    FINDINGS:  There is no intracranial mass or lesion seen.  No hemorrhage is seen.  No infarct is seen.  The ventricles and basilar cisterns appear normal.  Brain parenchyma appears grossly unremarkable.    Posterior fossa appears normal.  The calvarium is intact.  The paranasal sinuses appear grossly unremarkable.  Impression: No acute abnormality seen    Electronically signed by: Fritz Zheng  Date:    07/07/2024  Time:    09:42    Recent Labs   Lab 07/06/24 2001 07/07/24  0551   WBC 8.79 12.12*   RBC 4.25 4.25   HGB 12.0 11.9*   HCT 36.4* 36.0*   MCV 85.6 84.7   MCH 28.2 28.0   MCHC 33.0 33.1   RDW 15.2 15.4    240   MPV 10.8* 11.5*       Recent Labs   Lab 07/06/24 2001 07/07/24  0908 07/10/24  0335    141 135*   K 3.3* 3.3* 2.3*    106 94*   CO2 19* 18* 27   BUN 11.3 10.0 19.5   CREATININE 0.83 0.82 0.71   CALCIUM 9.3 9.4 8.0*   MG 1.70  --  1.90   ALBUMIN 3.6 3.8  --    ALKPHOS 104 104  --    ALT 13 12  --    AST 22 27  --    BILITOT 0.7 0.6  --      Microbiology Results (last 7 days)       ** No results found for the last 168 hours. **           Medications for Hospital Course       Scheduled Med:   aspirin  81 mg Oral Daily    atorvastatin  80 mg Oral QAM    carvediloL  25 mg Oral BID    cetirizine  10 mg Oral QAM    famotidine  20 mg Oral Daily    folic acid  1 mg Oral Daily    furosemide  40 mg Oral Daily    gabapentin  300 mg Oral TID    lisinopriL  10 mg Oral QAM    pantoprazole  40 mg Oral Daily    potassium chloride in water  20 mEq Intravenous BID    rivaroxaban  20 mg Oral with dinner    sertraline  50 mg Oral QHS      Continuous Infusions:   lactated ringers   Intravenous Continuous 75 mL/hr at 07/10/24 0001 New Bag at 07/10/24 0001     PRN Meds:  Current  Facility-Administered Medications:     acetaminophen, 650 mg, Oral, Q8H PRN    acetaminophen, 650 mg, Oral, Q8H PRN    haloperidol lactate, 2 mg, Intramuscular, Q4H PRN    hydrALAZINE, 20 mg, Intravenous, Q2H PRN    labetaloL, 10 mg, Intravenous, Q4H PRN    melatonin, 6 mg, Oral, Nightly PRN    ondansetron, 4 mg, Intravenous, Q8H PRN    prochlorperazine, 5 mg, Intravenous, Q6H PRN    sodium chloride 0.9%, 10 mL, Intravenous, PRN     Assessment and Plan      Recurrent symptoms of opioid induced gastroparesis  Intractable nausea and vomiting 2/2 above     HX:  Gastroparesis, chronic gastritis, diverticulosis, AFib on Xarelto, CAD, HTN, HLD, GERD, CVA, SABINA, glaucoma     Above present on admission         Anticipated discharge and Disposition when medically stable:    Therapy: PT/OT/Speech    Nutrition: Diet Soft & Bite Sized (IDDSI Level 6) Supervision with Meals: Soft & Bite Sized starting at 07/10 1055     ___________________________________________________________________________________________________________________________________  Diet has been initiated, patient tolerates diet at her will; I think patient's symptoms are moreso related to behavioral etiology.  However she does have a noted history of gastroparesis and chronic gastritis thus we will continue PPI therapy, increase to b.i.d. until symptoms improve.  Continue Carafate.  Hold oral Reglan for now as patient is tolerating diet without it.    Severe hypokalemia, will add IV supplementation. Repeat labs daily until appropriately corrected.  continue sodium bicarb tablets.     Continue supportive care  Continue checking vital signs q4hrs.  Reviewed and restarted appropriate home medications.     DVT prophylaxis initiated   Nurse notified to page me if any changes occur     ______________________________________________________________________________________________________________________________    This is a critical care document  Critical Care  Diagnosis:severe hypokalemia   Critical care interventions: hands on evaluation, review of labs/radiographs/records and discussions; assessing and managing the high probability of imminent or life-threatening deterioration of cardiorespiratory status.  Critical care time spent  40 minutes.   minutes on the day of the visit; time spent includes face to face time and non-face to face time preparing to see the patient (eg, review of tests), independently reviewing and interpreting medical records, both past and current; documenting clinical information in the electronic or other health record, and communicating results to the patient/family/caregiver and care coordinator and nursing team.      All diagnosis and differential diagnosis have been reviewed,  interpreted and communicated appropriately to care team. assessment and plan has been documented; I have personally reviewed the labs and test results that are presently available and pertinent to this hospital course; I have reviewed medical records based upon their availability.    I will continue to monitor closely and make adjustments to medical management as needed.    Nadia Bocanegra,    07/10/2024     This note was created with the assistance of Dragon voice recognition software. There may be transcription errors as a result of using this technology however minimal. Effort has been made to assure accuracy of transcription but any obvious errors or omissions should be clarified with the author of the document.

## 2024-07-10 NOTE — PT/OT/SLP PROGRESS
Pt declining to participate in OT session 2/2 being soiled and waiting to be cleaned up. Will f/u as schedule permits.

## 2024-07-11 LAB
ANION GAP SERPL CALC-SCNC: 9 MEQ/L
BUN SERPL-MCNC: 18.3 MG/DL (ref 9.8–20.1)
CALCIUM SERPL-MCNC: 8.2 MG/DL (ref 8.4–10.2)
CHLORIDE SERPL-SCNC: 97 MMOL/L (ref 98–107)
CO2 SERPL-SCNC: 28 MMOL/L (ref 23–31)
CREAT SERPL-MCNC: 0.69 MG/DL (ref 0.55–1.02)
CREAT/UREA NIT SERPL: 27
GFR SERPLBLD CREATININE-BSD FMLA CKD-EPI: >60 ML/MIN/1.73/M2
GLUCOSE SERPL-MCNC: 101 MG/DL (ref 82–115)
MAGNESIUM SERPL-MCNC: 1.9 MG/DL (ref 1.6–2.6)
OHS QRS DURATION: 82 MS
OHS QTC CALCULATION: 465 MS
PHOSPHATE SERPL-MCNC: 2.9 MG/DL (ref 2.3–4.7)
POTASSIUM SERPL-SCNC: 2.9 MMOL/L (ref 3.5–5.1)
SODIUM SERPL-SCNC: 134 MMOL/L (ref 136–145)

## 2024-07-11 PROCEDURE — 36410 VNPNXR 3YR/> PHY/QHP DX/THER: CPT

## 2024-07-11 PROCEDURE — C1751 CATH, INF, PER/CENT/MIDLINE: HCPCS

## 2024-07-11 PROCEDURE — 25000003 PHARM REV CODE 250: Performed by: STUDENT IN AN ORGANIZED HEALTH CARE EDUCATION/TRAINING PROGRAM

## 2024-07-11 PROCEDURE — 02HV33Z INSERTION OF INFUSION DEVICE INTO SUPERIOR VENA CAVA, PERCUTANEOUS APPROACH: ICD-10-PCS | Performed by: INTERNAL MEDICINE

## 2024-07-11 PROCEDURE — 93010 ELECTROCARDIOGRAM REPORT: CPT | Mod: ,,, | Performed by: INTERNAL MEDICINE

## 2024-07-11 PROCEDURE — 36415 COLL VENOUS BLD VENIPUNCTURE: CPT | Performed by: STUDENT IN AN ORGANIZED HEALTH CARE EDUCATION/TRAINING PROGRAM

## 2024-07-11 PROCEDURE — 84100 ASSAY OF PHOSPHORUS: CPT | Performed by: STUDENT IN AN ORGANIZED HEALTH CARE EDUCATION/TRAINING PROGRAM

## 2024-07-11 PROCEDURE — A4216 STERILE WATER/SALINE, 10 ML: HCPCS | Performed by: STUDENT IN AN ORGANIZED HEALTH CARE EDUCATION/TRAINING PROGRAM

## 2024-07-11 PROCEDURE — 25000003 PHARM REV CODE 250

## 2024-07-11 PROCEDURE — 97535 SELF CARE MNGMENT TRAINING: CPT

## 2024-07-11 PROCEDURE — 25000003 PHARM REV CODE 250: Performed by: INTERNAL MEDICINE

## 2024-07-11 PROCEDURE — 93005 ELECTROCARDIOGRAM TRACING: CPT

## 2024-07-11 PROCEDURE — 76937 US GUIDE VASCULAR ACCESS: CPT

## 2024-07-11 PROCEDURE — 83735 ASSAY OF MAGNESIUM: CPT | Performed by: STUDENT IN AN ORGANIZED HEALTH CARE EDUCATION/TRAINING PROGRAM

## 2024-07-11 PROCEDURE — 63600175 PHARM REV CODE 636 W HCPCS: Performed by: INTERNAL MEDICINE

## 2024-07-11 PROCEDURE — 80048 BASIC METABOLIC PNL TOTAL CA: CPT | Performed by: STUDENT IN AN ORGANIZED HEALTH CARE EDUCATION/TRAINING PROGRAM

## 2024-07-11 PROCEDURE — 21400001 HC TELEMETRY ROOM

## 2024-07-11 RX ORDER — POTASSIUM CHLORIDE 14.9 MG/ML
20 INJECTION INTRAVENOUS
Status: DISPENSED | OUTPATIENT
Start: 2024-07-11 | End: 2024-07-11

## 2024-07-11 RX ORDER — SODIUM CHLORIDE 0.9 % (FLUSH) 0.9 %
10 SYRINGE (ML) INJECTION EVERY 6 HOURS
Status: DISCONTINUED | OUTPATIENT
Start: 2024-07-11 | End: 2024-07-12 | Stop reason: HOSPADM

## 2024-07-11 RX ORDER — POTASSIUM CHLORIDE 14.9 MG/ML
20 INJECTION INTRAVENOUS
Status: COMPLETED | OUTPATIENT
Start: 2024-07-11 | End: 2024-07-12

## 2024-07-11 RX ORDER — PANTOPRAZOLE SODIUM 40 MG/10ML
40 INJECTION, POWDER, LYOPHILIZED, FOR SOLUTION INTRAVENOUS 2 TIMES DAILY
Status: DISCONTINUED | OUTPATIENT
Start: 2024-07-11 | End: 2024-07-12

## 2024-07-11 RX ORDER — METOCLOPRAMIDE HYDROCHLORIDE 5 MG/ML
5 INJECTION INTRAMUSCULAR; INTRAVENOUS
Status: DISCONTINUED | OUTPATIENT
Start: 2024-07-11 | End: 2024-07-12

## 2024-07-11 RX ORDER — SODIUM CHLORIDE 0.9 % (FLUSH) 0.9 %
10 SYRINGE (ML) INJECTION
Status: DISCONTINUED | OUTPATIENT
Start: 2024-07-11 | End: 2024-07-12 | Stop reason: HOSPADM

## 2024-07-11 RX ORDER — SUCRALFATE 1 G/1
1 TABLET ORAL 2 TIMES DAILY
Status: DISCONTINUED | OUTPATIENT
Start: 2024-07-11 | End: 2024-07-12 | Stop reason: HOSPADM

## 2024-07-11 RX ADMIN — POTASSIUM BICARBONATE 50 MEQ: 977.5 TABLET, EFFERVESCENT ORAL at 07:07

## 2024-07-11 RX ADMIN — ASPIRIN 81 MG: 81 TABLET, COATED ORAL at 09:07

## 2024-07-11 RX ADMIN — FUROSEMIDE 40 MG: 40 TABLET ORAL at 09:07

## 2024-07-11 RX ADMIN — METOCLOPRAMIDE 5 MG: 5 INJECTION, SOLUTION INTRAMUSCULAR; INTRAVENOUS at 09:07

## 2024-07-11 RX ADMIN — POTASSIUM BICARBONATE 50 MEQ: 977.5 TABLET, EFFERVESCENT ORAL at 09:07

## 2024-07-11 RX ADMIN — ACETAMINOPHEN 325MG 650 MG: 325 TABLET ORAL at 09:07

## 2024-07-11 RX ADMIN — GABAPENTIN 300 MG: 300 CAPSULE ORAL at 09:07

## 2024-07-11 RX ADMIN — CETIRIZINE HYDROCHLORIDE 10 MG: 10 TABLET, FILM COATED ORAL at 06:07

## 2024-07-11 RX ADMIN — GABAPENTIN 300 MG: 300 CAPSULE ORAL at 04:07

## 2024-07-11 RX ADMIN — POTASSIUM CHLORIDE 20 MEQ: 14.9 INJECTION, SOLUTION INTRAVENOUS at 09:07

## 2024-07-11 RX ADMIN — Medication 10 ML: at 07:07

## 2024-07-11 RX ADMIN — LISINOPRIL 10 MG: 10 TABLET ORAL at 06:07

## 2024-07-11 RX ADMIN — METOCLOPRAMIDE 5 MG: 5 INJECTION, SOLUTION INTRAMUSCULAR; INTRAVENOUS at 04:07

## 2024-07-11 RX ADMIN — RIVAROXABAN 20 MG: 10 TABLET, FILM COATED ORAL at 04:07

## 2024-07-11 RX ADMIN — ATORVASTATIN CALCIUM 80 MG: 40 TABLET, FILM COATED ORAL at 06:07

## 2024-07-11 RX ADMIN — CARVEDILOL 25 MG: 12.5 TABLET, FILM COATED ORAL at 09:07

## 2024-07-11 RX ADMIN — SUCRALFATE 1 G: 1 TABLET ORAL at 09:07

## 2024-07-11 RX ADMIN — SERTRALINE HYDROCHLORIDE 50 MG: 50 TABLET ORAL at 09:07

## 2024-07-11 RX ADMIN — PANTOPRAZOLE SODIUM 40 MG: 40 TABLET, DELAYED RELEASE ORAL at 09:07

## 2024-07-11 RX ADMIN — FOLIC ACID 1 MG: 1 TABLET ORAL at 09:07

## 2024-07-11 RX ADMIN — PANTOPRAZOLE SODIUM 40 MG: 40 INJECTION, POWDER, FOR SOLUTION INTRAVENOUS at 09:07

## 2024-07-11 NOTE — PLAN OF CARE
New Referral Sent To [facility] and [facility] bed Via [facility]

## 2024-07-11 NOTE — PLAN OF CARE
Spoke  to pt's dgtr Celeste, whom pt resides with.  [phone].  However her dgtr Nat Youssef is [phone].  Both confirmed  they want [facility] swing bed, even after I explained situation to them.  Will ask [facility] to send referral also choose [facility]. For inpt . Rehab will send referral to them as well.  . Informed family no SNF days for nsg home SNF, they refuse to send pt. correction nsg home as they will keep her check/$$$  . Informed Celeste if above options fail.  Mom will ret. Home with HomeHealth,  Family will need to provide additional care, may consider hiring sitters.   Or someone quitting job to stay home with pt.  She states they were discussing this.

## 2024-07-11 NOTE — PT/OT/SLP PROGRESS
Occupational Therapy   Treatment    Name: Katty Veronica  MRN: [id number]  Admitting Diagnosis:  abdominal pain, nausea/vomiting     Recommendations:     Recommended therapy intensity at discharge: Moderate Intensity Therapy   Discharge Equipment Recommendations:  to be determined by next level of care  Barriers to discharge:  Other (Comment) (Severity of deficits)    Assessment:     Katty Veronica is a 79 y.o. female with a medical diagnosis of abdominal pain, nausea/vomiting. Performance deficits affecting function are weakness, impaired endurance, impaired self care skills, impaired functional mobility, impaired balance, pain. Pt c poor endurance and weakness making pt at high risk for falls. Recommend moderate intensity therapy at d/c.     Rehab Prognosis:  Good; patient would benefit from acute skilled OT services to address these deficits and reach maximum level of function.       Plan:     Patient to be seen 4 x/week to address the above listed problems via self-care/home management, therapeutic activities, therapeutic exercises  Plan of Care Expires: 08/06/24  Plan of Care Reviewed with: patient    Subjective     Pain/Comfort:  Pain Rating 1: 0/10    Objective:     Communicated with: RN prior to session.  Patient found supine with PureWick, peripheral IV upon OT entry to room.    General Precautions: Standard, fall    Orthopedic Precautions:N/A  Braces: N/A  Respiratory Status: Room air    Occupational Performance:     Bed Mobility:    Patient completed Supine to Sit with minimum assistance     Functional Mobility/Transfers:  Patient completed Sit <> Stand Transfer with minimum assistance  with  rolling walker   Patient completed Toilet Transfer Step Transfer technique with minimum assistance with  rolling walker  Functional Mobility: Pt ambulated ~8 ft towards bathroom using RW then required a seated rest break. Pt then ambulated and additional 8 ft to toilet using RW c Min A. Min A for  sit<>stand from toilet. Reported fatigue while sitting on toilet. Completed stand step t/f from toilet to bedside chair using RW c Min A.       Patient Education:  Patient provided with verbal education education regarding OT role/goals/POC.  Understanding was verbalized.      Patient left up in chair with all lines intact and call button in reach.    GOALS:   Multidisciplinary Problems       Occupational Therapy Goals          Problem: Occupational Therapy    Goal Priority Disciplines Outcome Interventions   Occupational Therapy Goal     OT, PT/OT Progressing    Description: LTG: Pt will perform basic ADLs and ADL transfers with Modified independence using LRAD by discharge.    STG: to be met by 8/6/24:    Pt will complete grooming standing at sink with LRAD with SBA.  Pt will complete UB dressing with SBA.  Pt will complete LB dressing with SBA using LRAD.  Pt will complete toileting with SBA using LRAD.  Pt will complete functional mobility to/from toilet and toilet transfer with SBA using LRAD.                        Time Tracking:     OT Date of Treatment: 07/11/24  OT Start Time: 0935  OT Stop Time: 0958  OT Total Time (min): 23 min    Billable Minutes:Self Care/Home Management 2 units    OT/GAIL: OT     Number of GAIL visits since last OT visit: 1    7/11/2024

## 2024-07-11 NOTE — CONSULTS
Consult Note    Reason for Consult:      We were consulted by Dr. Bocanegra to evaluate this patient for family request intractable n/v.     HPI:     Patient is an 79-year-old white female known to Dr.JP Washington with a pmhx of stroke, sleep apnea, hypertension, glaucoma, depression, COPD, CHF, anxiety, CAD with stents, AFib on Xarelto. Patient presented to ER 7/7 complaints of nausea, vomiting and abdominal pain.  Unable to tolerate p.o. or take medications.  She was admitted with intractable nausea vomiting, epigastric pain    CT abd/pelv 7/7:  Moderate pancreatic atrophy.  Multiple diverticula in the cecum through the sigmoid.  No acute intra-abdominal or pelvic organ or bowel pathology.    GI consulted for family's request for intractable nausea and vomiting.    Patient resting in bed, in no apparent distress.  She denies any abdominal pain or vomiting.  Advises that has more of a dry heave which she has currently not experiencing.  She does report chronic nausea and decrease in appetite.  She reports at home having bowel movements daily if not 2 to 3 times a day of formed soft brown stool.  Her last bowel movement was this morning.  She has no longer on oxycodone at home and takes tramadol as needed for pain.  I spoke with her daughter Celeste who advises she is taking Reglan at home as well as Zofran as needed. She has not received any anti-metics today so far.    Spoke with nurse who advises she normally vomits at night.  Nurse was able to give her her medications with crackers and she has tolerated since. Nurse advises she responds well to antimetics and Haldol which she has on her MAR PRN. Patient advises she is tolerating water and milk.    Previous records reviewed.   ---------------------------------------------------------------------------------  She was admitted in late May 2024  for similar complaints and underwent EGD and colonoscopy see below.  Noted to have moderate chronic gastritis negative for H  pylori.  She was evaluated by Dr. Michele in [place] in the past.  It appears she was evaluated by Dr. Stock in 2021 for gastric pacemaker--she was not an ideal candidate for gastric stimulator as she did not suffer from vomiting but mostly nausea.  She has been diagnosed with gastroparesis since 2010.    She had a hiatal hernia and toupet fundiplication 11/2021 which helped her acid reflux for about a year.      -Colonoscopy 5/29/24 [facility]:  Multiple diverticula found in the sigmoid, descending, transverse colon and ascending colon.  TI normal.  Small Internal hemorrhoids.  -EGD 5/25/24:  Normal esophagus food in the stomach, gastritis. Path:  Mild chronic gastritis negative for H pylori   -EGD 5/21/24 mod amount of food and gastric body.  Diffuse moderate inflammation characterized by edema and erythema in the gastric antrum/gastric body. path: HP negative mild to moderate chronic HP negative intestinal metaplasia and negative gastritis  -OV:  Abdominal pain and gastroparesis.  - UGI w SBS 6/21/23- Normal upper GI and small bowel follow-through.  - NM Gastric Emptying 6/13/2023 - Low gastric emptying.14% emptying at 4 hours    PCP:  Renard Eddy MD    Review of patient's allergies indicates:   Allergen Reactions    Morphine Itching    Norco [hydrocodone-acetaminophen]         Current Facility-Administered Medications   Medication Dose Route Frequency Provider Last Rate Last Admin    acetaminophen tablet 650 mg  650 mg Oral Q8H PRN Samuel Rivera MD   650 mg at 07/10/24 0201    acetaminophen tablet 650 mg  650 mg Oral Q8H PRN Samuel Rivera MD        aspirin EC tablet 81 mg  81 mg Oral Daily Nadia Bocanegra, DO   81 mg at 07/11/24 0919    atorvastatin tablet 80 mg  80 mg Oral QAM Nadia Bocanegra, DO   80 mg at 07/11/24 0622    carvediloL tablet 25 mg  25 mg Oral BID Nadia Bocanegra, DO   25 mg at 07/11/24 0919    cetirizine tablet 10 mg  10 mg Oral QAM Nadia Bocanegra, DO   10 mg  at 07/11/24 0622    famotidine tablet 20 mg  20 mg Oral Daily DaljitNadia petersen, DO   20 mg at 07/10/24 1653    folic acid tablet 1 mg  1 mg Oral Daily DaljitNadia petersen, DO   1 mg at 07/11/24 0919    furosemide tablet 40 mg  40 mg Oral Daily DaljitNadia petersen, DO   40 mg at 07/11/24 0919    gabapentin capsule 300 mg  300 mg Oral TID Nadia Bocanegra, DO   300 mg at 07/11/24 0919    haloperidol lactate injection 2 mg  2 mg Intramuscular Q4H PRN Samuel Rivera MD   2 mg at 07/09/24 2359    hydrALAZINE injection 20 mg  20 mg Intravenous Q2H PRN Samuel Rivera MD   20 mg at 07/09/24 2053    labetaloL injection 10 mg  10 mg Intravenous Q4H PRN Samuel Rivera MD        lactated ringers infusion   Intravenous Continuous Samuel Rivera MD 75 mL/hr at 07/10/24 1351 New Bag at 07/10/24 1351    lisinopriL tablet 10 mg  10 mg Oral QAM Nadia Bocanegra, DO   10 mg at 07/11/24 0622    melatonin tablet 6 mg  6 mg Oral Nightly PRN Samuel Rivera MD        ondansetron injection 4 mg  4 mg Intravenous Q8H PRN Samuel Rivera MD   4 mg at 07/10/24 0757    pantoprazole EC tablet 40 mg  40 mg Oral Daily Nadia Bocanegra, DO   40 mg at 07/11/24 0919    potassium bicarbonate disintegrating tablet 50 mEq  50 mEq Oral Q6H Zahraa Chaudhry MD   50 mEq at 07/11/24 0919    potassium chloride 20 mEq in 100 mL IVPB (FOR CENTRAL LINE ADMINISTRATION ONLY)  20 mEq Intravenous Q2H Zahraa Chaudhry MD 50 mL/hr at 07/11/24 0921 20 mEq at 07/11/24 0921    prochlorperazine injection Soln 5 mg  5 mg Intravenous Q6H PRN Samuel Rivera MD        rivaroxaban tablet 20 mg  20 mg Oral with dinner Nadia Bocanegra, DO   20 mg at 07/10/24 1653    sertraline tablet 50 mg  50 mg Oral QHS Nadia Bocanegra DO   50 mg at 07/10/24 2140    sodium chloride 0.9% flush 10 mL  10 mL Intravenous PRN Samuel Rivera MD         Medications Prior to Admission   Medication Sig Dispense Refill Last Dose     ALPRAZolam (XANAX) 0.25 MG tablet Take 0.25 mg by mouth nightly as needed.       aspirin (ECOTRIN) 81 MG EC tablet Take 1 tablet (81 mg total) by mouth once daily.  0     atorvastatin (LIPITOR) 80 MG tablet Take 1 tablet by mouth every morning.       carvediloL (COREG) 25 MG tablet Take 1 tablet (25 mg total) by mouth 2 (two) times daily. 60 tablet 11     cetirizine (ZYRTEC) 10 MG tablet Take 1 tablet by mouth every morning.       ergocalciferol (VITAMIN D2) 50,000 unit Cap Take 50,000 Units by mouth every 7 days. Takes on Sunday AM       famotidine (PEPCID) 20 MG tablet Take 20 mg by mouth Daily.       ferrous sulfate 325 (65 FE) MG EC tablet Take 1 tablet (325 mg total) by mouth once daily. 90 tablet 0     folic acid (FOLVITE) 1 MG tablet Take 1 tablet (1 mg total) by mouth once daily. 90 tablet 0     furosemide (LASIX) 40 MG tablet Take 1 tablet (40 mg total) by mouth once daily. 30 tablet 11     gabapentin (NEURONTIN) 300 MG capsule Take 300 mg by mouth 3 (three) times daily.       lisinopriL 10 MG tablet Take 1 tablet by mouth every morning.       metoclopramide HCl (REGLAN) 10 MG tablet Take 10 mg by mouth 4 (four) times daily.       nitroGLYCERIN (NITROSTAT) 0.4 MG SL tablet Place 1 tablet (0.4 mg total) under the tongue every 5 (five) minutes as needed for Chest pain. 20 tablet 1     nystatin (MYCOSTATIN) cream Apply 1,000,000 g topically once daily.       ondansetron (ZOFRAN-ODT) 8 MG TbDL Take 8 mg by mouth every 6 (six) hours as needed.       pantoprazole (PROTONIX) 40 MG tablet Take 40 mg by mouth.       polyethylene glycol (GLYCOLAX) 17 gram PwPk Take 17 g by mouth 2 (two) times daily as needed for Constipation.  0     potassium chloride SA (K-DUR,KLOR-CON M) 10 MEQ tablet Take 10 mEq by mouth once daily.       rivaroxaban (XARELTO) 20 mg Tab Take 1 tablet by mouth once daily.       sertraline (ZOLOFT) 50 MG tablet Take 50 mg by mouth every evening.       timolol maleate 0.5% (TIMOPTIC) 0.5 % Drop Place  1 drop into both eyes 2 (two) times daily.       traMADoL (ULTRAM) 50 mg tablet Take 50 mg by mouth every 8 (eight) hours as needed.       traZODone (DESYREL) 50 MG tablet Take 50 mg by mouth every evening.       traZODone (DESYREL) 50 MG tablet Take 1 tablet by mouth nightly as needed.       vibegron (GEMTESA) 75 mg Tab Take 75 mg by mouth once.          Past Medical History:  Past Medical History:   Diagnosis Date    Anxiety     CHF (congestive heart failure)     COPD (chronic obstructive pulmonary disease)     Coronary artery disease     s/p 2 stents    Depression     GERD (gastroesophageal reflux disease)     Glaucoma     Hypertension     Obstructive sleep apnea     on cpap    Stroke     Urinary, incontinence, stress female       Past Surgical History:  Past Surgical History:   Procedure Laterality Date    APPENDECTOMY      BACK SURGERY      CHOLECYSTECTOMY      COLONOSCOPY N/A 5/29/2024    Procedure: COLON;  Surgeon: Samuel Washington MD;  Location: [facility] ENDOSCOPY;  Service: Gastroenterology;  Laterality: N/A;    EGD, WITH CLOSED BIOPSY N/A 5/28/2024    Procedure: EGD;  Surgeon: Samuel Washington MD;  Location: [facility] ENDOSCOPY;  Service: Gastroenterology;  Laterality: N/A;    EGD, WITH CLOSED BIOPSY N/A 5/21/2024    Procedure: EGD;  Surgeon: BASIM Washington MD;  Location: [facility] ENDOSCOPY;  Service: Gastroenterology;  Laterality: N/A;    EXPLORATION OF COMMON BILE DUCT      HERNIA REPAIR      incisional hernia times 2, central abdomen    HYSTERECTOMY      OVARIAN CYST REMOVAL      TONSILLECTOMY        Family History:  Family History   Problem Relation Name Age of Onset    Heart disease Mother      Cancer Father       Social History:  Social History     Tobacco Use    Smoking status: Never    Smokeless tobacco: Never   Substance Use Topics    Alcohol use: Not Currently       Review of Systems:     Review of Systems   Constitutional:  Positive for appetite change. Negative for  activity change, chills, diaphoresis, fatigue and unexpected weight change.   Respiratory:  Negative for cough, choking, chest tightness and shortness of breath.    Cardiovascular:  Negative for chest pain.   Gastrointestinal:  Positive for abdominal pain (generalized) and nausea. Negative for abdominal distention, anal bleeding, blood in stool, constipation, diarrhea and vomiting.   Neurological:  Positive for weakness. Negative for dizziness.   Psychiatric/Behavioral:  Negative for agitation, behavioral problems and suicidal ideas. The patient is not nervous/anxious and is not hyperactive.        Objective:     VITAL SIGNS: 24 HR MIN & MAX LAST    Temp  Min: 98.2 °F (36.8 °C)  Max: 98.7 °F (37.1 °C)  98.3 °F (36.8 °C)        BP  Min: 138/73  Max: 166/77  (!) 143/79     Pulse  Min: 67  Max: 72  70     Resp  Min: 17  Max: 18  18    SpO2  Min: 92 %  Max: 97 %  96 %        Intake/Output Summary (Last 24 hours) at 7/11/2024 1059  Last data filed at 7/11/2024 0530  Gross per 24 hour   Intake --   Output 1201 ml   Net -1201 ml       Physical Exam  Constitutional:       General: She is not in acute distress.     Appearance: She is obese. She is ill-appearing. She is not toxic-appearing.   HENT:      Head: Normocephalic and atraumatic.      Mouth/Throat:      Mouth: Mucous membranes are moist.      Pharynx: Oropharynx is clear.   Cardiovascular:      Rate and Rhythm: Normal rate and regular rhythm.      Pulses: Normal pulses.   Pulmonary:      Effort: Pulmonary effort is normal. No respiratory distress.      Breath sounds: Normal breath sounds. No stridor. No wheezing or rales.   Abdominal:      General: Bowel sounds are normal. There is no distension.      Palpations: Abdomen is soft. There is no mass.      Tenderness: There is no abdominal tenderness. There is no guarding.   Skin:     General: Skin is warm and dry.   Neurological:      Mental Status: She is alert and oriented to person, place, and time.   Psychiatric:          Mood and Affect: Mood normal.         Behavior: Behavior normal.         Thought Content: Thought content normal.         Judgment: Judgment normal.           Recent Results (from the past 48 hour(s))   Basic Metabolic Panel    Collection Time: 07/10/24  3:35 AM   Result Value Ref Range    Sodium 135 (L) 136 - 145 mmol/L    Potassium 2.3 (LL) 3.5 - 5.1 mmol/L    Chloride 94 (L) 98 - 107 mmol/L    CO2 27 23 - 31 mmol/L    Glucose 109 82 - 115 mg/dL    Blood Urea Nitrogen 19.5 9.8 - 20.1 mg/dL    Creatinine 0.71 0.55 - 1.02 mg/dL    BUN/Creatinine Ratio 27     Calcium 8.0 (L) 8.4 - 10.2 mg/dL    Anion Gap 14.0 mEq/L    eGFR >60 mL/min/1.73/m2   Magnesium    Collection Time: 07/10/24  3:35 AM   Result Value Ref Range    Magnesium Level 1.90 1.60 - 2.60 mg/dL   Phosphorus    Collection Time: 07/10/24  3:35 AM   Result Value Ref Range    Phosphorus Level 3.4 2.3 - 4.7 mg/dL   Basic Metabolic Panel    Collection Time: 07/11/24  3:48 AM   Result Value Ref Range    Sodium 134 (L) 136 - 145 mmol/L    Potassium 2.9 (L) 3.5 - 5.1 mmol/L    Chloride 97 (L) 98 - 107 mmol/L    CO2 28 23 - 31 mmol/L    Glucose 101 82 - 115 mg/dL    Blood Urea Nitrogen 18.3 9.8 - 20.1 mg/dL    Creatinine 0.69 0.55 - 1.02 mg/dL    BUN/Creatinine Ratio 27     Calcium 8.2 (L) 8.4 - 10.2 mg/dL    Anion Gap 9.0 mEq/L    eGFR >60 mL/min/1.73/m2   Magnesium    Collection Time: 07/11/24  3:48 AM   Result Value Ref Range    Magnesium Level 1.90 1.60 - 2.60 mg/dL   Phosphorus    Collection Time: 07/11/24  3:48 AM   Result Value Ref Range    Phosphorus Level 2.9 2.3 - 4.7 mg/dL       CT Abdomen Pelvis With IV Contrast NO Oral Contrast    Result Date: 7/7/2024  EXAMINATION: CT ABDOMEN PELVIS WITH IV CONTRAST CLINICAL HISTORY: Abdominal pain, acute, nonlocalized; TECHNIQUE: Low dose axial images, sagittal and coronal reformations were obtained from the lung bases to the pubic symphysis following the IV administration of contrast. Automatic exposure control  (AEC) is utilized to reduce patient radiation exposure. COMPARISON: 05/27/2024 FINDINGS: Lines and Tubes: None. Thorax:Lungs: The visualized lung bases appear unremarkable Pleura: No effusions or thickening. Heart: The heart size is within normal limits. Abdomen:Abdominal Wall: No abdominal wall pathology is seen Liver: The liver appears unremarkable. Biliary System: No intrahepatic or extrahepatic biliary duct dilatation is seen. Gallbladder: Surgical clips are seen in the gallbladder fossa which may reflect prior cholecystectomy. Pancreas: Moderate pancreatic atrophy is seen Spleen: The spleen appears unremarkable. Adrenals: The adrenal glands appear unremarkable. Kidneys: The kidneys appear unremarkable with no stones cysts masses or hydronephrosis with IV contrast decreasing sensitivity and specificity for stones. Aorta: There is moderate calcification of the abdominal aorta and its branches IVC: Unremarkable. Bowel:Esophagus: The visualized esophagus appears unremarkable. There is a small hiatal hernia. Note is again made of surgical staples in the distal esophagus. Correlate with clinical findings and prior surgical intervention. Stomach: The stomach appears unremarkable Duodenum: Unremarkable appearing duodenum. Small Bowel: The small bowel appears unremarkable. Colon: Multiple diverticula are seen predominantly in the cecum through to the sigmoid colon. No associated inflammatory stranding or pericolonic fluid is seen to suggest diverticulitis. Appendix: The appendix appears unremarkable and is partially seen on Image 55, Series 4. Peritoneum: No intraperitoneal free air or ascites is seen. Pelvis:Bladder: The bladder appears unremarkable. Female:Uterus: The uterus is not identified. Ovaries: No adnexal masses are seen. Bony structures:Dorsal Spine: There is moderate multilevel spondylosis of the visualized dorsal spine. There is a stable non-acute loss of vertebral body height of L1. There are stable medium  sized sclerotic foci in the L1 and L4 vertebral bodies which may reflect bone islands. There is stable moderate levoconvex scoliosis of the thoracic spine. Note is made of spinal stimulation leads in the posterior aspect of the lower thoracic and lumbar region. Bony Pelvis: There is mild degenerative change of the bilateral hips.     1. No acute intraabdominal or pelvic solid organ or bowel pathology identified. Details and other findings as discussed above. I concur with the preliminary report above. Electronically signed by: Fritz Zheng Date:    07/07/2024 Time:    13:45    CT Head Without Contrast    Result Date: 7/7/2024  EXAMINATION: CT HEAD WITHOUT CONTRAST CLINICAL HISTORY: Intractable nausea and vomiting; TECHNIQUE: Multiple axial images were obtained from the base of the brain to the vertex without contrast administration.  Sagittal and coronal reconstructions were performed. .Automatic exposure control  (AEC) is utilized to reduce patient radiation exposure. COMPARISON: 02/09/2024 FINDINGS: There is no intracranial mass or lesion seen.  No hemorrhage is seen.  No infarct is seen.  The ventricles and basilar cisterns appear normal.  Brain parenchyma appears grossly unremarkable. Posterior fossa appears normal.  The calvarium is intact.  The paranasal sinuses appear grossly unremarkable.     No acute abnormality seen Electronically signed by: Fritz Zheng Date:    07/07/2024 Time:    09:42      Imaging personally reviewed by myself and SP.    Assessment / Plan:     79-year-old white female known to Dr.JP Washington with a pmhx of stroke, sleep apnea, hypertension, glaucoma, depression, COPD, CHF, anxiety, CAD with stents, AFib on Xarelto. Patient presented to ER 7/7 complaints of nausea, vomiting and abdominal pain.  Unable to tolerate p.o. or take medications.  She was admitted with intractable nausea vomiting, epigastric pain.    GI consulted for family's request for intractable nausea and  vomiting.  Gastroparesis-chronic  Recommend continuing home dose reglan (10 mg TID)  Gastroparesis diet-small frequent meals, low fat/fiber as tolerated.  May need to consider re-eval by Dr. Stock outpatient if s/s worsen.    Chronic abd pain and nausea 2/2 to above  Antimetics and Haldol as needed.     3. Hx gastritis on EGD in May  Carfafate BID  PPI BID    She had an apt Monday but daughter wanted to postpone. Our office will call her to r/s.   Gi available if needed.    Thank you for allowing us to participate in this patient's care.  Ashanti Saucedo NP acting as scribe for Dr. Tha Henao MD

## 2024-07-11 NOTE — PT/OT/SLP DISCHARGE
[facility]  Speech Language Pathology Department  Discharge Summary    Patient Name:  Katty Veronica   MRN:  [id number]    Recommendations     General recommendations: SLP follow up x1  Solid texture recommendation:  Soft & Bite Sized Diet - IDDSI Level 6  Liquid consistency recommendation: Thin liquids - IDDSI Level 0   Medications: crushed in puree  Aspiration precautions: small bites/sips and slow rate  General precautions: Standard,      Pt tolerating diet without clinical signs/sx of aspiration. SLP to sign off. No skilled SLP intervention is warranted at this time.

## 2024-07-11 NOTE — PT/OT/SLP PROGRESS
Physical Therapy Treatment    Patient Name:  Katty Veronica   MRN:  [id number]    Patient refused 2/2 fatigue. Patient reports she recently returned to bed. PT to f/u tomorrow.

## 2024-07-11 NOTE — PROGRESS NOTES
[facility]  Hospital Medicine Progress Note        Chief Complaint: Inpatient Follow-up    HPI:     79-year-old female with significant history of chronic diastolic heart failure, PAF on Xarelto, COPD, opiate induced gastroparesis, GERD, CVA, sleep apnea, HTN, HLD, CAD was brought to the ED with complaints of epigastric abdominal pain with associated nausea/vomiting worsening over the past 1 day.   EGD from May, 2024 showed chronic gastritis and she was then diagnosed with opioid induced gastroparesis.  Patient had another EGD late May for evaluation of melanotic stools which again noted gastritis and colonoscopy revealed diverticulosis and internal hemorrhoids.  Patient was hemodynamically stable in the ED, labs stable, patient was admitted hospital medicine services for acute on chronic gastroparesis.  CT abdomen/pelvis was unremarkable, patient physically deconditioned, therapy services consulted.    Interval Hx:   Patient seen at bedside, still complaining of abdominal pain and difficulty to keep food down, hemodynamics are stable, no other new complaints, nauseous, no vomiting    Objective/physical exam:  General: In no acute distress, afebrile  Chest: Clear to auscultation bilaterally  Heart: S1, S2, no appreciable murmur  Abdomen: Soft, nontender, BS +  MSK: Warm, no lower extremity edema, no clubbing or cyanosis  Neurologic: Alert and oriented x4,   VITAL SIGNS: 24 HRS MIN & MAX LAST   Temp  Min: 98.2 °F (36.8 °C)  Max: 98.7 °F (37.1 °C) 98.4 °F (36.9 °C)   BP  Min: 138/73  Max: 172/70 (!) 166/77   Pulse  Min: 67  Max: 72  72   Resp  Min: 17  Max: 18 18   SpO2  Min: 92 %  Max: 97 % 97 %       Recent Labs   Lab 07/07/24  0551   WBC 12.12*   RBC 4.25   HGB 11.9*   HCT 36.0*   MCV 84.7   MCH 28.0   MCHC 33.1   RDW 15.4      MPV 11.5*         Recent Labs   Lab 07/07/24  0908 07/10/24  0335 07/11/24  0348      < > 134*   K 3.3*   < > 2.9*      < > 97*   CO2 18*   <  > 28   BUN 10.0   < > 18.3   CREATININE 0.82   < > 0.69   CALCIUM 9.4   < > 8.2*   MG  --    < > 1.90   ALBUMIN 3.8  --   --    ALKPHOS 104  --   --    ALT 12  --   --    AST 27  --   --    BILITOT 0.6  --   --     < > = values in this interval not displayed.          Microbiology Results (last 7 days)       ** No results found for the last 168 hours. **             Scheduled Med:   aspirin  81 mg Oral Daily    atorvastatin  80 mg Oral QAM    carvediloL  25 mg Oral BID    cetirizine  10 mg Oral QAM    famotidine  20 mg Oral Daily    folic acid  1 mg Oral Daily    furosemide  40 mg Oral Daily    gabapentin  300 mg Oral TID    lisinopriL  10 mg Oral QAM    pantoprazole  40 mg Oral Daily    potassium bicarbonate  50 mEq Oral Q6H    potassium chloride in water  20 mEq Intravenous Q2H    rivaroxaban  20 mg Oral with dinner    sertraline  50 mg Oral QHS          Assessment/Plan:    Acute on chronic gastroparesis   Severe hypokalemia  Acute epigastric abdominal pain with nausea/vomiting-secondary to above   Mild leukocytosis-likely reactive  History of melena secondary to chronic gastritis  Chronic diastolic heart failure-appears compensated   History of PAF on Xarelto   COPD   History of GERD   History of CVA   History of sleep apnea   Essential HTN-stable   HLD   History of CAD   Prophylaxis    GI symptoms persist, oral intake is poor   Keep Ringer lactate infusion  DC p.o. Pepcid, Protonix and switch to IV Protonix 40 mg b.i.d.   Initiate Reglan 5 mg q.i.d. a.c.  On Carafate   Potassium replaced-40 mEq IV, 50 mEq p.o. x2 doses  Continue to monitor  Holding home Lasix since oral intake is inadequate   Leukocytosis likely reactive, low suspicion for sepsis   In case of worsening will consider septic workup  Continue home meds-aspirin, statin, Coreg, lisinopril   Continue Zyrtec, folic acid, gabapentin, Zoloft   No overt bleeding, hemoglobin stable   Continue home Xarelto   DVT prophylaxis-on Xarelto    Patient is  physically deconditioned, she does not have any skilled nursing facility days left   Options would be long-term nursing home placement versus home with home health   Family requesting swing bed for now, case management is currently working on it   If swing bed does not get approved she will have to go home with home health unless family is okay with long-term nursing home placement        Zahraa Chaudhry MD   07/11/2024

## 2024-07-12 VITALS
TEMPERATURE: 98 F | RESPIRATION RATE: 16 BRPM | OXYGEN SATURATION: 97 % | SYSTOLIC BLOOD PRESSURE: 148 MMHG | DIASTOLIC BLOOD PRESSURE: 53 MMHG | BODY MASS INDEX: 36.84 KG/M2 | HEIGHT: 61 IN | WEIGHT: 195.13 LBS | HEART RATE: 69 BPM

## 2024-07-12 LAB
ALBUMIN SERPL-MCNC: 2.9 G/DL (ref 3.4–4.8)
ALBUMIN/GLOB SERPL: 1.4 RATIO (ref 1.1–2)
ALP SERPL-CCNC: 71 UNIT/L (ref 40–150)
ALT SERPL-CCNC: 19 UNIT/L (ref 0–55)
ANION GAP SERPL CALC-SCNC: 9 MEQ/L
AST SERPL-CCNC: 32 UNIT/L (ref 5–34)
BASOPHILS # BLD AUTO: 0.02 X10(3)/MCL
BASOPHILS NFR BLD AUTO: 0.3 %
BILIRUB SERPL-MCNC: 0.5 MG/DL
BUN SERPL-MCNC: 14.5 MG/DL (ref 9.8–20.1)
CALCIUM SERPL-MCNC: 8.5 MG/DL (ref 8.4–10.2)
CHLORIDE SERPL-SCNC: 102 MMOL/L (ref 98–107)
CO2 SERPL-SCNC: 27 MMOL/L (ref 23–31)
CREAT SERPL-MCNC: 0.74 MG/DL (ref 0.55–1.02)
CREAT/UREA NIT SERPL: 20
EOSINOPHIL # BLD AUTO: 0.11 X10(3)/MCL (ref 0–0.9)
EOSINOPHIL NFR BLD AUTO: 1.4 %
ERYTHROCYTE [DISTWIDTH] IN BLOOD BY AUTOMATED COUNT: 15.5 % (ref 11.5–17)
GFR SERPLBLD CREATININE-BSD FMLA CKD-EPI: >60 ML/MIN/1.73/M2
GLOBULIN SER-MCNC: 2.1 GM/DL (ref 2.4–3.5)
GLUCOSE SERPL-MCNC: 113 MG/DL (ref 82–115)
HCT VFR BLD AUTO: 32.7 % (ref 37–47)
HGB BLD-MCNC: 10.6 G/DL (ref 12–16)
IMM GRANULOCYTES # BLD AUTO: 0.02 X10(3)/MCL (ref 0–0.04)
IMM GRANULOCYTES NFR BLD AUTO: 0.3 %
LYMPHOCYTES # BLD AUTO: 3.08 X10(3)/MCL (ref 0.6–4.6)
LYMPHOCYTES NFR BLD AUTO: 39 %
MCH RBC QN AUTO: 28 PG (ref 27–31)
MCHC RBC AUTO-ENTMCNC: 32.4 G/DL (ref 33–36)
MCV RBC AUTO: 86.3 FL (ref 80–94)
MONOCYTES # BLD AUTO: 0.8 X10(3)/MCL (ref 0.1–1.3)
MONOCYTES NFR BLD AUTO: 10.1 %
NEUTROPHILS # BLD AUTO: 3.87 X10(3)/MCL (ref 2.1–9.2)
NEUTROPHILS NFR BLD AUTO: 48.9 %
NRBC BLD AUTO-RTO: 0 %
PLATELET # BLD AUTO: 177 X10(3)/MCL (ref 130–400)
PMV BLD AUTO: 11.5 FL (ref 7.4–10.4)
POTASSIUM SERPL-SCNC: 4.5 MMOL/L (ref 3.5–5.1)
PROT SERPL-MCNC: 5 GM/DL (ref 5.8–7.6)
RBC # BLD AUTO: 3.79 X10(6)/MCL (ref 4.2–5.4)
SODIUM SERPL-SCNC: 138 MMOL/L (ref 136–145)
WBC # BLD AUTO: 7.9 X10(3)/MCL (ref 4.5–11.5)

## 2024-07-12 PROCEDURE — 25000003 PHARM REV CODE 250

## 2024-07-12 PROCEDURE — 80053 COMPREHEN METABOLIC PANEL: CPT | Performed by: INTERNAL MEDICINE

## 2024-07-12 PROCEDURE — 63600175 PHARM REV CODE 636 W HCPCS: Performed by: INTERNAL MEDICINE

## 2024-07-12 PROCEDURE — 36415 COLL VENOUS BLD VENIPUNCTURE: CPT | Performed by: INTERNAL MEDICINE

## 2024-07-12 PROCEDURE — 25000003 PHARM REV CODE 250: Performed by: INTERNAL MEDICINE

## 2024-07-12 PROCEDURE — A4216 STERILE WATER/SALINE, 10 ML: HCPCS | Performed by: STUDENT IN AN ORGANIZED HEALTH CARE EDUCATION/TRAINING PROGRAM

## 2024-07-12 PROCEDURE — 97535 SELF CARE MNGMENT TRAINING: CPT | Mod: CO

## 2024-07-12 PROCEDURE — 25000003 PHARM REV CODE 250: Performed by: STUDENT IN AN ORGANIZED HEALTH CARE EDUCATION/TRAINING PROGRAM

## 2024-07-12 PROCEDURE — 85025 COMPLETE CBC W/AUTO DIFF WBC: CPT | Performed by: INTERNAL MEDICINE

## 2024-07-12 RX ORDER — PANTOPRAZOLE SODIUM 40 MG/1
40 TABLET, DELAYED RELEASE ORAL
Qty: 180 TABLET | Refills: 3 | Status: SHIPPED | OUTPATIENT
Start: 2024-07-12 | End: 2025-07-12

## 2024-07-12 RX ORDER — METOCLOPRAMIDE 10 MG/1
10 TABLET ORAL
Status: DISCONTINUED | OUTPATIENT
Start: 2024-07-12 | End: 2024-07-12 | Stop reason: HOSPADM

## 2024-07-12 RX ORDER — PANTOPRAZOLE SODIUM 40 MG/1
40 TABLET, DELAYED RELEASE ORAL
Status: DISCONTINUED | OUTPATIENT
Start: 2024-07-12 | End: 2024-07-12 | Stop reason: HOSPADM

## 2024-07-12 RX ORDER — SUCRALFATE 1 G/1
1 TABLET ORAL 2 TIMES DAILY
Start: 2024-07-12 | End: 2024-07-22

## 2024-07-12 RX ORDER — HALOPERIDOL 2 MG/1
2 TABLET ORAL EVERY 8 HOURS PRN
Start: 2024-07-12 | End: 2025-07-12

## 2024-07-12 RX ORDER — HALOPERIDOL 2 MG/1
2 TABLET ORAL EVERY 8 HOURS PRN
Status: DISCONTINUED | OUTPATIENT
Start: 2024-07-12 | End: 2024-07-12 | Stop reason: HOSPADM

## 2024-07-12 RX ORDER — METOCLOPRAMIDE 10 MG/1
10 TABLET ORAL
Start: 2024-07-12

## 2024-07-12 RX ADMIN — GABAPENTIN 300 MG: 300 CAPSULE ORAL at 09:07

## 2024-07-12 RX ADMIN — Medication 10 ML: at 06:07

## 2024-07-12 RX ADMIN — SUCRALFATE 1 G: 1 TABLET ORAL at 09:07

## 2024-07-12 RX ADMIN — CARVEDILOL 25 MG: 12.5 TABLET, FILM COATED ORAL at 09:07

## 2024-07-12 RX ADMIN — METOCLOPRAMIDE 5 MG: 5 INJECTION, SOLUTION INTRAMUSCULAR; INTRAVENOUS at 06:07

## 2024-07-12 RX ADMIN — LISINOPRIL 10 MG: 10 TABLET ORAL at 06:07

## 2024-07-12 RX ADMIN — ATORVASTATIN CALCIUM 80 MG: 40 TABLET, FILM COATED ORAL at 06:07

## 2024-07-12 RX ADMIN — FOLIC ACID 1 MG: 1 TABLET ORAL at 09:07

## 2024-07-12 RX ADMIN — CETIRIZINE HYDROCHLORIDE 10 MG: 10 TABLET, FILM COATED ORAL at 06:07

## 2024-07-12 RX ADMIN — Medication 10 ML: at 12:07

## 2024-07-12 RX ADMIN — PANTOPRAZOLE SODIUM 40 MG: 40 INJECTION, POWDER, FOR SOLUTION INTRAVENOUS at 09:07

## 2024-07-12 RX ADMIN — METOCLOPRAMIDE 10 MG: 10 TABLET ORAL at 12:07

## 2024-07-12 RX ADMIN — SODIUM CHLORIDE, POTASSIUM CHLORIDE, SODIUM LACTATE AND CALCIUM CHLORIDE: 600; 310; 30; 20 INJECTION, SOLUTION INTRAVENOUS at 09:07

## 2024-07-12 RX ADMIN — POTASSIUM CHLORIDE 20 MEQ: 14.9 INJECTION, SOLUTION INTRAVENOUS at 12:07

## 2024-07-12 RX ADMIN — ASPIRIN 81 MG: 81 TABLET, COATED ORAL at 09:07

## 2024-07-12 NOTE — PT/OT/SLP PROGRESS
Occupational Therapy   Treatment    Name: Katty Veronica  MRN: [id number]  Admitting Diagnosis:  Intractable nausea and vomiting       Recommendations:     Recommended therapy intensity at discharge: Moderate Intensity Therapy   Discharge Equipment Recommendations:  to be determined by next level of care  Barriers to discharge:       Assessment:     Katty Veronica is a 79 y.o. female with a medical diagnosis of Intractable nausea and vomiting.  She presents with fatigue and dizziness. Performance deficits affecting function are weakness, impaired endurance, impaired self care skills, impaired functional mobility, impaired balance, pain.     Rehab Prognosis:  Good; patient would benefit from acute skilled OT services to address these deficits and reach maximum level of function.       Plan:     Patient to be seen 4 x/week to address the above listed problems via self-care/home management, therapeutic activities, therapeutic exercises  Plan of Care Expires: 08/06/24  Plan of Care Reviewed with: patient    Subjective     Pain/Comfort:  Pain Rating 1: 0/10    Objective:     Communicated with: RN prior to session.  Patient found supine with PureWick, peripheral IV upon OT entry to room.    General Precautions: Standard, fall    Orthopedic Precautions:N/A  Braces: N/A  Respiratory Status: Room air  Vital Signs: blood pressure: 161/84 supine, 141/88 EOB     Occupational Performance:     Bed Mobility:    Patient completed Supine to Sit with minimum assistance     Functional Mobility/Transfers:  Patient completed Sit <> Stand Transfer with minimum assistance  with  rolling walker   Patient completed Bed <> Chair Transfer using Step Transfer technique with minimum assistance with rolling walker  Functional Mobility: Pt ambulated to chair with Min A. Further mobility limited 2/2 dizziness.     Therapeutic Positioning    OT interventions performed during the course of today's session in an effort to prevent and/or  reduce acquired pressure injuries:   Education was provided on benefits of and recommendations for therapeutic positioning      [facility] 6 Click ADL:      Patient Education:  Patient provided with verbal education education regarding OT role/goals/POC and Discharge/DME recommendations.  Understanding was verbalized.      Patient left up in chair with all lines intact and call button in reach.    GOALS:   Multidisciplinary Problems       Occupational Therapy Goals          Problem: Occupational Therapy    Goal Priority Disciplines Outcome Interventions   Occupational Therapy Goal     OT, PT/OT Progressing    Description: LTG: Pt will perform basic ADLs and ADL transfers with Modified independence using LRAD by discharge.    STG: to be met by 8/6/24:    Pt will complete grooming standing at sink with LRAD with SBA.  Pt will complete UB dressing with SBA.  Pt will complete LB dressing with SBA using LRAD.  Pt will complete toileting with SBA using LRAD.  Pt will complete functional mobility to/from toilet and toilet transfer with SBA using LRAD.                        Time Tracking:     OT Date of Treatment: 07/12/24  OT Start Time: 0915  OT Stop Time: 0933  OT Total Time (min): 18 min    Billable Minutes:Self Care/Home Management 18    OT/GAIL: GAIL     Number of GAIL visits since last OT visit: 2    7/12/2024

## 2024-07-12 NOTE — CONSULTS
Inpatient Nutrition Assessment    Admit Date: 7/6/2024   Total duration of encounter: 6 days   Patient Age: 79 y.o.    Nutrition Recommendation/Prescription     -Continue Soft and Bite Sized diet with supervision with meals per SLP recs. Encourage small, frequent meals for nausea/gastroparesis.   -Medical management of nausea per MD.   -Continue Martin as medically feasible.   -Consider an appetite stimulant as medically feasible.   -Trial [place] [employer] VHC TID for additional nourishment; provides 530 kcal and 22 gm protein per container.   -Monitor wt, labs, and intake.     Communication of Recommendations: reviewed with patient    Nutrition Assessment     Malnutrition Assessment/Nutrition-Focused Physical Exam    Malnutrition Context: chronic illness (07/08/24 1321)  Malnutrition Level: moderate (07/08/24 1321)  Energy Intake (Malnutrition): less than 75% for greater than or equal to 3 months (07/08/24 1321)  Weight Loss (Malnutrition): other (see comments) (does not meet criteria) (07/08/24 1321)  Subcutaneous Fat (Malnutrition): mild depletion (07/08/24 1321)  Orbital Region (Subcutaneous Fat Loss): mild depletion        Muscle Mass (Malnutrition): mild depletion (07/08/24 1321)  Cheondoism Region (Muscle Loss): mild depletion  Clavicle Bone Region (Muscle Loss): mild depletion  Clavicle and Acromion Bone Region (Muscle Loss): mild depletion                 Fluid Accumulation (Malnutrition): other (see comments) (does not meet criteria) (07/08/24 1321)  Hand  Strength, Left (Malnutrition): unable to evaluate (07/08/24 1321)  Hand  Strength, Right (Malnutrition): unable to evaluate (07/08/24 1321)  A minimum of two characteristics is recommended for diagnosis of either severe or non-severe malnutrition.    Chart Review    Reason Seen: physician consult for not eating well and follow-up    Malnutrition Screening Tool Results   Have you recently lost weight without trying?: Unsure  Have you been eating poorly  because of a decreased appetite?: Yes   MST Score: 3   Diagnosis:  Recurrent symptoms of opioid induced gastroparesis  Intractable nausea and vomiting 2/2 above    Relevant Medical History: heart failure with preserved EF, PAF on Xarelto, COPD, gastroparesis GERD, CVA, SABINA, glaucoma, HTN, HLD, CAD     Scheduled Medications:  aspirin, 81 mg, Daily  atorvastatin, 80 mg, QAM  carvediloL, 25 mg, BID  cetirizine, 10 mg, QAM  folic acid, 1 mg, Daily  gabapentin, 300 mg, TID  lisinopriL, 10 mg, QAM  metoclopramide, 5 mg, QID (AC & HS)  pantoprazole, 40 mg, BID  rivaroxaban, 20 mg, with dinner  sertraline, 50 mg, QHS  sodium chloride 0.9%, 10 mL, Q6H  sucralfate, 1 g, BID    Continuous Infusions:  lactated ringers, Last Rate: 75 mL/hr at 07/12/24 0912    PRN Medications:  acetaminophen, 650 mg, Q8H PRN  acetaminophen, 650 mg, Q8H PRN  haloperidol lactate, 2 mg, Q4H PRN  hydrALAZINE, 20 mg, Q2H PRN  labetaloL, 10 mg, Q4H PRN  melatonin, 6 mg, Nightly PRN  ondansetron, 4 mg, Q8H PRN  prochlorperazine, 5 mg, Q6H PRN  sodium chloride 0.9%, 10 mL, PRN  sodium chloride 0.9%, 10 mL, PRN    Calorie Containing IV Medications: no significant kcals from medications at this time    Recent Labs   Lab 07/06/24 2001 07/07/24  0551 07/07/24  0908 07/10/24  0335 07/11/24  0348 07/12/24  0358     --  141 135* 134* 138   K 3.3*  --  3.3* 2.3* 2.9* 4.5   CALCIUM 9.3  --  9.4 8.0* 8.2* 8.5   PHOS  --   --   --  3.4 2.9  --    MG 1.70  --   --  1.90 1.90  --    CO2 19*  --  18* 27 28 27   BUN 11.3  --  10.0 19.5 18.3 14.5   CREATININE 0.83  --  0.82 0.71 0.69 0.74   EGFRNORACEVR >60  --  >60 >60 >60 >60   GLUCOSE 166*  --  188* 109 101 113   BILITOT 0.7  --  0.6  --   --  0.5   ALKPHOS 104  --  104  --   --  71   ALT 13  --  12  --   --  19   AST 22  --  27  --   --  32   ALBUMIN 3.6  --  3.8  --   --  2.9*   LIPASE 7  --   --   --   --   --    WBC 8.79 12.12*  --   --   --  7.90   HGB 12.0 11.9*  --   --   --  10.6*   HCT 36.4* 36.0*  --  "  --   --  32.7*     Nutrition Orders:  Diet Soft & Bite Sized (IDDSI Level 6) Supervision with Meals      Appetite/Oral Intake: poor/25-50% of meals  Factors Affecting Nutritional Intake: decreased appetite and nausea  Social Needs Impacting Access to Food: none identified  Food/Confucianism/Cultural Preferences: none reported  Food Allergies: no known food allergies  Last Bowel Movement: 24  Wound(s):     Wound 24 Other (comment) Left anterior Greater trochanter-Tissue loss description: Partial thickness     Comments    24: Pt reports poor appetite 2/2 nausea; reports decreased appetite/intake since April. Pt reports a usual wt of 186 lbs; no wt loss noted per EMR weights.     24: Pt reports eating a little this morning; states she is receptive to a strawberry oral supplement.    Anthropometrics    Height: 5' 0.98" (154.9 cm), Height Method: Stated  Last Weight: 88.5 kg (195 lb 1.7 oz) (24 1315), Weight Method: Standard Scale  BMI (Calculated): 36.9  BMI Classification: obese grade II (BMI 35-39.9)     Ideal Body Weight (IBW), Female: 104.9 lb     % Ideal Body Weight, Female (lb): 186 %                    Usual Body Weight (UBW), k.5 kg  % Usual Body Weight: 104.95  % Weight Change From Usual Weight: 4.73 %  Usual Weight Provided By: patient    Wt Readings from Last 5 Encounters:   24 88.5 kg (195 lb 1.7 oz)   24 86.2 kg (190 lb)   24 86.6 kg (191 lb)   24 88 kg (194 lb)   24 88.5 kg (195 lb)     Weight Change(s) Since Admission:   Wt Readings from Last 1 Encounters:   24 1315 88.5 kg (195 lb 1.7 oz)   24 0143 88.5 kg (195 lb 1.7 oz)   24 88.5 kg (195 lb)   Admit Weight: 88.5 kg (195 lb) (24), Weight Method: Estimated    Estimated Needs    Weight Used For Calorie Calculations: 88.5 kg (195 lb 1.7 oz)  Energy Calorie Requirements (kcal): 5812-1289 kcal (25-30 kcal/kg)  Energy Need Method: Kcal/kg  Weight Used For " Protein Calculations: 88.5 kg (195 lb 1.7 oz)  Protein Requirements: 106 gm (1.2g/kg)  Fluid Requirements (mL): 2213 mL        Enteral Nutrition     Patient not receiving enteral nutrition at this time.    Parenteral Nutrition     Patient not receiving parenteral nutrition support at this time.    Evaluation of Received Nutrient Intake    Calories: not meeting estimated needs  Protein: not meeting estimated needs    Patient Education     Not applicable.    Nutrition Diagnosis     PES: Inadequate oral intake related to altered GI function as evidenced by poor appetite/<50% of meals per pt report. (active)     PES: Moderate chronic disease or condition related malnutrition related to suboptimal protein/energy intake as evidenced by less than 75% needs met for greater than or equal to 3 months, mild fat depletion, and mild muscle depletion. (active)    Nutrition Interventions     Intervention(s): modified composition of meals/snacks, commercial beverage, prescription medication, and collaboration with other providers    Goal: Meet greater than 80% of nutritional needs by follow-up. (goal progressing)  Goal: Maintain weight throughout hospitalization. (goal progressing)    Nutrition Goals & Monitoring     Dietitian will monitor: energy intake and weight  Discharge planning: continue Soft and Bite Sized diet with Boost VHC oral supplements  Nutrition Risk/Follow-Up: moderate (follow-up in 3-5 days)   Please consult if re-assessment needed sooner.

## 2024-07-12 NOTE — PLAN OF CARE
Pt accepted to [facility] swing bed today, notified ingrid Alonso [phone] of discharge

## 2024-07-12 NOTE — NURSING
Patient off unit via w/c with unit staff [facility] VSS, NAD noted. Family present.

## 2024-07-12 NOTE — DISCHARGE SUMMARY
[facility]  Hospital Medicine Discharge Summary    Admit Date: 7/6/2024  Discharge Date and Time: 7/12/202412:02 PM  Admitting Physician: JOHN Team  Discharging Physician: Cristopher Davis MD.  Primary Care Physician: Renard Eddy MD  Consults: Gastroenterology    Discharge Diagnoses:  Acute on chronic gastroparesis : stable   Severe hypokalemia: resolved   Acute epigastric abdominal pain with nausea/vomiting-secondary to above   Mild leukocytosis-likely reactive  History of melena secondary to chronic gastritis  Chronic diastolic heart failure-appears compensated   History of PAF on Xarelto   COPD   History of GERD   History of CVA   History of sleep apnea   Essential HTN-stable   HLD   History of CAD     Moderate PCM     Hospital Course:   79-year-old female with significant history of chronic diastolic heart failure, PAF on Xarelto, COPD, opiate induced gastroparesis, GERD, CVA, sleep apnea, HTN, HLD, CAD was brought to the ED with complaints of epigastric abdominal pain with associated nausea/vomiting worsening over the past 1 day.   EGD from May, 2024 showed chronic gastritis and she was then diagnosed with opioid induced gastroparesis.  Patient had another EGD late May for evaluation of melanotic stools which again noted gastritis and colonoscopy revealed diverticulosis and internal hemorrhoids.  Patient was hemodynamically stable in the ED, labs stable, patient was admitted hospital medicine services for acute on chronic gastroparesis.  CT abdomen/pelvis was unremarkable, patient physically deconditioned, therapy services consulted.     Patient was started on IV fluids, IV PPI and reglan. She has chronic gastroparesis. Diet was slowly advanced. She was started with PT. She made a slow clinical recovery. Potassium was low and was replaced. She was having normal. BM. She was seen by GI team and recommended to continue regnal TID and small frequent meals. F/U in their  clinic.     She was accepted to a swing unit and was discharged in a stable condition.   Pt was seen and examined on the day of discharge  Vitals:  VITAL SIGNS: 24 HRS MIN & MAX LAST   Temp  Min: 97.8 °F (36.6 °C)  Max: 98.7 °F (37.1 °C) 97.8 °F (36.6 °C)   BP  Min: 138/59  Max: 157/63 (!) 148/53   Pulse  Min: 64  Max: 76  69   Resp  Min: 16  Max: 16 16   SpO2  Min: 93 %  Max: 97 % 97 %       Physical Exam:  Heart RRR  Lungs clear   Abdomen soft and non tender   Neuro: No FND      Procedures Performed: No admission procedures for hospital encounter.     Significant Diagnostic Studies: See Full reports for all details    Recent Labs   Lab 07/06/24 2001 07/07/24 0551 07/12/24 0358   WBC 8.79 12.12* 7.90   RBC 4.25 4.25 3.79*   HGB 12.0 11.9* 10.6*   HCT 36.4* 36.0* 32.7*   MCV 85.6 84.7 86.3   MCH 28.2 28.0 28.0   MCHC 33.0 33.1 32.4*   RDW 15.2 15.4 15.5    240 177   MPV 10.8* 11.5* 11.5*       Recent Labs   Lab 07/06/24 2001 07/07/24  0908 07/10/24  0335 07/11/24 0348 07/12/24  0358    141 135* 134* 138   K 3.3* 3.3* 2.3* 2.9* 4.5    106 94* 97* 102   CO2 19* 18* 27 28 27   BUN 11.3 10.0 19.5 18.3 14.5   CREATININE 0.83 0.82 0.71 0.69 0.74   CALCIUM 9.3 9.4 8.0* 8.2* 8.5   MG 1.70  --  1.90 1.90  --    ALBUMIN 3.6 3.8  --   --  2.9*   ALKPHOS 104 104  --   --  71   ALT 13 12  --   --  19   AST 22 27  --   --  32   BILITOT 0.7 0.6  --   --  0.5        Microbiology Results (last 7 days)       ** No results found for the last 168 hours. **             CT Abdomen Pelvis With IV Contrast NO Oral Contrast  Narrative: EXAMINATION:  CT ABDOMEN PELVIS WITH IV CONTRAST    CLINICAL HISTORY:  Abdominal pain, acute, nonlocalized;    TECHNIQUE:  Low dose axial images, sagittal and coronal reformations were obtained from the lung bases to the pubic symphysis following the IV administration of contrast. Automatic exposure control (AEC) is utilized to reduce patient radiation  exposure.    COMPARISON:  05/27/2024    FINDINGS:  Lines and Tubes: None.    Thorax:Lungs: The visualized lung bases appear unremarkable    Pleura: No effusions or thickening.    Heart: The heart size is within normal limits.    Abdomen:Abdominal Wall: No abdominal wall pathology is seen    Liver: The liver appears unremarkable.    Biliary System: No intrahepatic or extrahepatic biliary duct dilatation is seen.    Gallbladder: Surgical clips are seen in the gallbladder fossa which may reflect prior cholecystectomy.    Pancreas: Moderate pancreatic atrophy is seen    Spleen: The spleen appears unremarkable.    Adrenals: The adrenal glands appear unremarkable.    Kidneys: The kidneys appear unremarkable with no stones cysts masses or hydronephrosis with IV contrast decreasing sensitivity and specificity for stones.    Aorta: There is moderate calcification of the abdominal aorta and its branches    IVC: Unremarkable.    Bowel:Esophagus: The visualized esophagus appears unremarkable. There is a small hiatal hernia. Note is again made of surgical staples in the distal esophagus. Correlate with clinical findings and prior surgical intervention.    Stomach: The stomach appears unremarkable    Duodenum: Unremarkable appearing duodenum.    Small Bowel: The small bowel appears unremarkable.    Colon: Multiple diverticula are seen predominantly in the cecum through to the sigmoid colon. No associated inflammatory stranding or pericolonic fluid is seen to suggest diverticulitis.    Appendix: The appendix appears unremarkable and is partially seen on Image 55, Series 4.    Peritoneum: No intraperitoneal free air or ascites is seen.    Pelvis:Bladder: The bladder appears unremarkable.    Female:Uterus: The uterus is not identified.    Ovaries: No adnexal masses are seen.    Bony structures:Dorsal Spine: There is moderate multilevel spondylosis of the visualized dorsal spine. There is a stable non-acute loss of vertebral body  height of L1. There are stable medium sized sclerotic foci in the L1 and L4 vertebral bodies which may reflect bone islands. There is stable moderate levoconvex scoliosis of the thoracic spine. Note is made of spinal stimulation leads in the posterior aspect of the lower thoracic and lumbar region.    Bony Pelvis: There is mild degenerative change of the bilateral hips.  Impression: 1. No acute intraabdominal or pelvic solid organ or bowel pathology identified. Details and other findings as discussed above.    I concur with the preliminary report above.    Electronically signed by: Fritz Zheng  Date:    07/07/2024  Time:    13:45  CT Head Without Contrast  Narrative: EXAMINATION:  CT HEAD WITHOUT CONTRAST    CLINICAL HISTORY:  Intractable nausea and vomiting;    TECHNIQUE:  Multiple axial images were obtained from the base of the brain to the vertex without contrast administration.  Sagittal and coronal reconstructions were performed. .Automatic exposure control  (AEC) is utilized to reduce patient radiation exposure.    COMPARISON:  02/09/2024    FINDINGS:  There is no intracranial mass or lesion seen.  No hemorrhage is seen.  No infarct is seen.  The ventricles and basilar cisterns appear normal.  Brain parenchyma appears grossly unremarkable.    Posterior fossa appears normal.  The calvarium is intact.  The paranasal sinuses appear grossly unremarkable.  Impression: No acute abnormality seen    Electronically signed by: Fritz Zheng  Date:    07/07/2024  Time:    09:42         Medication List        START taking these medications      haloperidoL 2 MG tablet  Commonly known as: HALDOL  Take 1 tablet (2 mg total) by mouth every 8 (eight) hours as needed (Nausea and vomiting).     sucralfate 1 gram tablet  Commonly known as: CARAFATE  Take 1 tablet (1 g total) by mouth 2 (two) times daily. for 10 days            CHANGE how you take these medications      metoclopramide HCl 10 MG tablet  Commonly known as:  REGLAN  Take 1 tablet (10 mg total) by mouth 3 (three) times daily before meals.  What changed: when to take this     pantoprazole 40 MG tablet  Commonly known as: PROTONIX  Take 1 tablet (40 mg total) by mouth 2 (two) times daily before meals.  What changed: when to take this     traZODone 50 MG tablet  Commonly known as: DESYREL  What changed: Another medication with the same name was removed. Continue taking this medication, and follow the directions you see here.            CONTINUE taking these medications      ALPRAZolam 0.25 MG tablet  Commonly known as: XANAX     aspirin 81 MG EC tablet  Commonly known as: ECOTRIN  Take 1 tablet (81 mg total) by mouth once daily.     atorvastatin 80 MG tablet  Commonly known as: LIPITOR     carvediloL 25 MG tablet  Commonly known as: COREG  Take 1 tablet (25 mg total) by mouth 2 (two) times daily.     cetirizine 10 MG tablet  Commonly known as: ZYRTEC     ferrous sulfate 325 (65 FE) MG EC tablet  Take 1 tablet (325 mg total) by mouth once daily.     folic acid 1 MG tablet  Commonly known as: FOLVITE  Take 1 tablet (1 mg total) by mouth once daily.     gabapentin 300 MG capsule  Commonly known as: NEURONTIN     lisinopriL 10 MG tablet     nitroGLYCERIN 0.4 MG SL tablet  Commonly known as: NITROSTAT  Place 1 tablet (0.4 mg total) under the tongue every 5 (five) minutes as needed for Chest pain.     ondansetron 8 MG Tbdl  Commonly known as: ZOFRAN-ODT     polyethylene glycol 17 gram Pwpk  Commonly known as: GLYCOLAX  Take 17 g by mouth 2 (two) times daily as needed for Constipation.     rivaroxaban 20 mg Tab  Commonly known as: XARELTO     sertraline 50 MG tablet  Commonly known as: ZOLOFT     timolol maleate 0.5% 0.5 % Drop  Commonly known as: TIMOPTIC     VITAMIN D2 50,000 unit Cap  Generic drug: ergocalciferol            STOP taking these medications      famotidine 20 MG tablet  Commonly known as: PEPCID     furosemide 40 MG tablet  Commonly known as: LASIX     GEMTESA 75  mg Tab  Generic drug: vibegron     nystatin cream  Commonly known as: MYCOSTATIN     potassium chloride SA 10 MEQ tablet  Commonly known as: K-DURTHOMASOR-CON M     traMADoL 50 mg tablet  Commonly known as: ULTRAM               Where to Get Your Medications        [facility] [address] [address]      Phone: [phone]   pantoprazole 40 MG tablet       Information about where to get these medications is not yet available    Ask your nurse or doctor about these medications  haloperidoL 2 MG tablet  metoclopramide HCl 10 MG tablet  sucralfate 1 gram tablet          Explained in detail to the patient about the discharge plan, medications, and follow-up visits. Pt understands and agrees with the treatment plan  Discharge Disposition: Swing bed   Discharged Condition: stable  Diet-   Dietary Orders (From admission, onward)       Start     Ordered    07/12/24 1019  Dietary nutrition supplements Boost Very High Calorie Nutritional Drink - Strawberry; TID  Continuous        Question Answer Comment   Select PO Supplement: Boost Very High Calorie Nutritional Drink - Strawberry    Frequency: TID        07/12/24 1018    07/10/24 1055  Diet Soft & Bite Sized (IDDSI Level 6) Supervision with Meals  Diet effective now        Question:  Diet Modifier:  Answer:  Supervision with Meals    07/10/24 1055                   Medications Per DC med rec  Activities as tolerated   Follow-up Information       Renard Eddy MD. Schedule an appointment as soon as possible for a visit on 7/12/2024.    Specialties: Internal Medicine, Pediatrics  Why: Lory coello will call when she is discharged from there to make a follow up appointment.  Contact information:  [place] [address] [phone]                           For further questions contact hospitalist office    Discharge time 33 minutes    For worsening symptoms, chest pain,  shortness of breath, increased abdominal pain, high grade fever, stroke or stroke like symptoms, immediately go to the nearest Emergency Room or call 911 as soon as possible.      Cristopher Pierce M.D on 7/12/2024. at 12:02 PM.

## 2025-02-02 NOTE — PROGRESS NOTES
Hospital Medicine  Progress Note    Patient Name: Katty Veronica  MRN: [id number]  Status: IP- Inpatient   Admission Date: 3/4/2024  Length of Stay: 3  Date of Service: 03/09/2024       CC: hospital follow-up for knee hematoma       SUBJECTIVE   78 y.o. white female with a history that includes CAD, CVA on ASA, and atrial fibrillation on Xarelto, as well as chronic lower back pain s/p spinal stimulator, presented to [facility] on 3/4 with left knee pain following a fall.  Patient was walking in her apartment with her walker when went to plug in her phone and tripped and fell onto her left side.  Patient reports she was unable to get up following the fall due to significant knee pain.  She reports having a total replacement on that left knee about a year ago.  She denied loss of consciousness or head injury with fall.  At baseline patient lives alone, ambulates with a walker and completes activities of daily living independently.  Evaluation in ED noted patient HDS, with a large hematoma overlying left knee.  XR noted no acute abnormality. CT of the left knee was limited exam secondary to hardware, but within limitations there was no convincing evidence of acute bony abnormality, no evidence of hemarthrosis.   Patient was admitted to hospital medicine services for further medical management.  Orthopedics originally consulted, but suggested Plastics evaluation as well.    MRI knee -Markedly limited, essentially nondiagnostic evaluation, Possibility of focal/high-grade tendon rupture is not excluded.  Follow-up evaluation with focused ultrasound  if there is ongoing clinical concern   USG -no sonographic evidence of high-grade/full-thickness disruption involving the quadriceps or patellar tendon widespread periarticular subcutaneous edema with the multiple suspected areas of superficial hematoma    Plastic surgery, orthopedics team signed off.  No intervention recommended.  Orthopedics team recommended to follow up with  The patient's goals for the shift include      The clinical goals for the shift include patient will have no signs of RDS & no reports of pain this shift    Avss.  Had higher BP's, resolved later in day & lower HR's.  Meds given per orders, PRN tylenol given for 7/10 headache & lower back pain.  Also given a migraine cocktail to help with headaches.  Mom remains at the bedside.   patient's primary orthopedic surgeon who operated on her knee.    Patient worked with Physical therapy recommended moderate intensity therapy.  Case management consulted for placement.    Anticoagulation recommended monitoring her hemoglobin      Today:   No acute events reported overnight  Patient seen  at bedside, she was resting comfortably in the bed, reported improving pain and swelling discussed Xarelto use, patient reported would like to continue her antiplatelet anticoagulation does not want any CVA as she already had prior         MEDICATIONS   Scheduled   amLODIPine  5 mg Oral Daily    aspirin  81 mg Oral Daily    atorvastatin  80 mg Oral QHS    carvediloL  25 mg Oral BID    famotidine  20 mg Oral QHS    gabapentin  300 mg Oral TID    methocarbamoL  500 mg Oral BID    metoclopramide HCl  5 mg Oral QID (AC & HS)    rivaroxaban  20 mg Oral Daily with dinner    sertraline  50 mg Oral Daily    timolol maleate 0.5%  1 drop Both Eyes BID     Continuous Infusions  None      PHYSICAL EXAM   VITALS: T 98.3 °F (36.8 °C)   /64   P 75   RR 17   O2 (!) 92 %    GENERAL: Awake and in NAD  LUNGS: unlabored breathing on room air   CVS: Normal rate  GI/: Soft, NT  EXTREMITIES: Left knee hematoma with swelling, medial blister noted  NEURO: AAOx3  PSYCH: Cooperative      LABS   CBC  Recent Labs     03/08/24  0609 03/09/24  0509   WBC 8.18 8.66   RBC 3.61* 3.45*   HGB 10.7* 10.0*   HCT 31.7* 30.8*   MCV 87.8 89.3   MCH 29.6 29.0   MCHC 33.8 32.5*   RDW 15.7 15.8    193     CHEM  Recent Labs     03/07/24  0456 03/08/24  0609     --    K 3.4*  --    CHLORIDE 109*  --    CO2 25  --    BUN 10.3  --    CREATININE 0.67  --    GLUCOSE 117*  --    CALCIUM 8.4  --    FERRITIN  --  62.25   IRON  --  47*   TIBC  --  215*       ASSESSMENT   Large left knee hematoma, without evidence of hemarthrosis  Normocytic anemia  Afib on Xarelto  Essential hypertension  h/o CAD s/p prior stents, CVA, on ASA  h/o  SABINA not on CPAP        PLAN   Labs from this AM noted small drop in hemoglobin to 10 platelets 193 K patient with no evidence of active GI blood loss, worsening left knee swelling/pain  Continue to monitor hemoglobin any signs of worsening hematoma discussed at length with patient monitor symptoms and to notify  Continue aspirin, Xarelto  and monitor H&H patient agreeable with the plan  Orthopedics team signed off recommended outpatient follow up with her orthopedic surgeon that completed her left total knee arthroplasty, weight-bearing as tolerated  Plastic surgery team signed off, no procedures planned, cleared for discharge from plastic surgery standpoint  Continue MMPC , gabapentin  PT/OT services-recommended moderate intensity therapy  Care discussed with pt's nurse  Labs in a.m.    Prophylaxis:  Xarelto        Molly Dickerson MD  [facility] Medicine   DISPLAY PLAN FREE TEXT

## (undated) DEVICE — KIT SURGICAL COLON .25 1.1OZ

## (undated) DEVICE — TIP SUCTION YANKAUER

## (undated) DEVICE — ADAPTER DUAL NSL LUER M-M 7FT

## (undated) DEVICE — FORCEP BX LG CAP 2.8MMX240CM

## (undated) DEVICE — BAG LABGUARD BIOHAZARD 6X9IN

## (undated) DEVICE — BLOCK BLOX BITE DENT RIM 54FR

## (undated) DEVICE — CONTAINER SPECIMEN SCREW 4OZ

## (undated) DEVICE — KIT CANIST SUCTION 1200CC

## (undated) DEVICE — FORCEP BX CAPT 2.8X2.4MM160CM

## (undated) DEVICE — TUBING O2 FEMALE CONN 13FT

## (undated) DEVICE — COLLECTION SPECIMEN NEPTUNE

## (undated) DEVICE — SOL IRRI STRL WATER 1000ML

## (undated) DEVICE — UNDERPAD PROTECT PLUS 17X24IN